# Patient Record
Sex: FEMALE | Race: AMERICAN INDIAN OR ALASKA NATIVE | Employment: UNEMPLOYED | URBAN - METROPOLITAN AREA
[De-identification: names, ages, dates, MRNs, and addresses within clinical notes are randomized per-mention and may not be internally consistent; named-entity substitution may affect disease eponyms.]

---

## 2017-01-03 PROCEDURE — G0180 MD CERTIFICATION HHA PATIENT: HCPCS | Performed by: FAMILY MEDICINE

## 2017-01-05 DIAGNOSIS — M79.2 NERVE PAIN: Primary | ICD-10-CM

## 2017-01-05 RX ORDER — GABAPENTIN 100 MG/1
CAPSULE ORAL
Qty: 180 CAPSULE | Refills: 5 | Status: SHIPPED | OUTPATIENT
Start: 2017-01-05 | End: 2017-04-21

## 2017-01-05 NOTE — TELEPHONE ENCOUNTER
gabapentin (NEURONTIN) 100 MG capsule      Last Written Prescription Date:  11-9-16  Last Fill Quantity: 180,   # refills: 1  Last Office Visit with [facility], [facility] or [facility] prescribing provider: 11-16-16  Future Office visit:    Next 5 appointments (look out 90 days)     Jan 06, 2017 10:00 AM   Return Visit with José Vogel MD   [facility] ([address] [address]   [phone]            Jan 06, 2017 11:40 AM   SHORT with Kevin Esquivel MD   [facility]    [address] [place] [id number] [phone]                   Routing refill request to provider for review/approval because:  Drug not on the [facility], [facility] or [facility] refill protocol or controlled substance

## 2017-01-16 ENCOUNTER — TELEPHONE (OUTPATIENT)
Dept: FAMILY MEDICINE | Facility: CLINIC | Age: 72
End: 2017-01-16

## 2017-01-16 DIAGNOSIS — C34.91 MALIGNANT NEOPLASM OF UNSPECIFIED PART OF RIGHT BRONCHUS OR LUNG (H): Primary | ICD-10-CM

## 2017-01-16 NOTE — TELEPHONE ENCOUNTER
Forms received from: [facility]   Phone number listed: [phone]   Fax listed: [phone]  Date received: 1-16-17  Form description: [facility]  Once forms are completed, please return to [facility] via fax.  Is patient requesting to be contacted when forms are completed: n/a  Form placed: RN folder  Anisa Conteh    Form given to RN to review med list.  Order pended for provider to sign.

## 2017-01-18 NOTE — TELEPHONE ENCOUNTER
Discrepancies:     Not on HHC, but in Epic:  1. Compazine 10 mg- take 5 mg PO every 6 hrs PRN.  2. Albuterol(Proair, Proventil, Ventolin) 108 mcg-Inhale 2 puffs q 6 hours  3. Meperidine (Demerol) 50 mg tab-take 1 tab PO q4hrs PRN.  4. Lorazepam 1 mg-Take 1 mg PO q8 hrs PRN.    In Epic, not on HHC:  1. Labetalol 100 mg - 100 mg BID PO  2. Hydroxyzine Pamoate 25 mg- Take 25 mg TID PRN PO  3. Clindamycin - 150 mg TID PO  4. Lasix 20 mg -20 mg BID PO  5. Oxycontin 10 mg- 10 mg BID PO    Clarify;   1. Ambien 5 m mg nightly or PRN?  2. Ibuprofen 800 or 600 mg? q 8 hrs PRN.  3. Calcium Polycarbophil 625 mg- take 2 tabs (650) PO daily or 625 mg daily PO?  4. Senna- 1-2  BID PRN or 2 tab BID PO?  5. Insulin aspart (Novolog) 100 units daily or 1-15 unit TID  6. Lantus 45 u daily?  7. Miralax 17 g- daily PRN or every other day?  8. Calcium Carbonate-Vit D-600 mg (1500 mg) 400 unit 1 tab daily or 1200 mg Ca-600 mg D3.    Called patient who was at chemo. She states that she is no longer receiving HHC services through [facility] anymore. She also said that she has a new PCP and is no longer coming to [facility].     Routed to provider. What needs to be done with the HHC form?   Brittney Olivares RN  [facility]

## 2017-01-20 ENCOUNTER — TELEPHONE (OUTPATIENT)
Dept: FAMILY MEDICINE | Facility: CLINIC | Age: 72
End: 2017-01-20

## 2017-01-20 DIAGNOSIS — G47.00 INSOMNIA, UNSPECIFIED TYPE: Primary | ICD-10-CM

## 2017-01-20 RX ORDER — ZOLPIDEM TARTRATE 5 MG/1
5 TABLET ORAL
Qty: 30 TABLET | Refills: 0 | Status: SHIPPED | OUTPATIENT
Start: 2017-01-20 | End: 2017-04-21

## 2017-01-20 NOTE — TELEPHONE ENCOUNTER
Called and spoke with Opal from [facility] at [phone]. She did verify that patient is no longer receiving [facility] services. She will call back to go over patient's medications though. RN provided Opal with RN triage line number to call back today.    Brittney Olivares RN  [facility]

## 2017-01-20 NOTE — TELEPHONE ENCOUNTER
zolpidem (AMBIEN) 5 MG tablet      Last Written Prescription Date:  12-14-16  Last Fill Quantity: 30,   # refills: 0  Last Office Visit with [facility] or [facility] prescribing provider: 11-16-16  Future Office visit:    Next 5 appointments (look out 90 days)     Jan 25, 2017 10:00 AM   Return Visit with José Vogel MD   [facility] ([place] [id number] [facility]  [place] [address]   [phone]                   Routing refill request to provider for review/approval because:  Drug not on the [facility], [facility] or [facility] refill protocol or controlled substance

## 2017-01-20 NOTE — TELEPHONE ENCOUNTER
Prescription of Ambien was faxed to pharmacy. Also notified patient of this.     Brittney Olivares RN  [facility]

## 2017-01-22 ENCOUNTER — TRANSFERRED RECORDS (OUTPATIENT)
Dept: HEALTH INFORMATION MANAGEMENT | Facility: CLINIC | Age: 72
End: 2017-01-22

## 2017-01-24 DIAGNOSIS — E78.5 HYPERLIPIDEMIA LDL GOAL <100: Primary | ICD-10-CM

## 2017-01-24 RX ORDER — ROSUVASTATIN CALCIUM 20 MG/1
20 TABLET, COATED ORAL DAILY
Qty: 90 TABLET | Refills: 0 | Status: SHIPPED | OUTPATIENT
Start: 2017-01-24 | End: 2017-04-21

## 2017-01-24 NOTE — TELEPHONE ENCOUNTER
rosuvastatin (CRESTOR) 20 MG tablet     Last Written Prescription Date: 11/9/16  Last Fill Quantity: 90, # refills: 0  Last Office Visit with [facility], [facility] or [facility] prescribing provider: 11/16/16   Next 5 appointments (look out 90 days)     Jan 25, 2017 10:00 AM   [facility] Visit with José Vogel MD   [facility]    [address] [address]   [phone]                   CHOL      135   8/4/2015  HDL       52   8/4/2015  LDL       55   8/4/2015  TRIG      138   8/4/2015  CHOLHDLRATIO      2.6   8/4/2015

## 2017-01-24 NOTE — TELEPHONE ENCOUNTER
Called and spoke with Opal WINTERS RN to do med rec. Last C we had faxed back was on 1/3/17 for the exact dates of 10/30/16-12/28/16 which is around the time patient was dismissed from [facility]. Opal will inquire further if this last one that was sent 1/16 was sent in error and what to do with the form. Will call us back to let us know.     Brittney Olivares RN  [facility]

## 2017-01-27 NOTE — TELEPHONE ENCOUNTER
Called and spoke with Opal. She states that they did not receive the HHC from 12/30/16 that was faxed to them on 1/3/17.   Needing that same HHC that was faxed to them from 1/3/17 to be resent to them at fax number below.    Brittney Olivares RN  [facility]

## 2017-02-08 ENCOUNTER — TRANSFERRED RECORDS (OUTPATIENT)
Dept: HEALTH INFORMATION MANAGEMENT | Facility: CLINIC | Age: 72
End: 2017-02-08

## 2017-02-27 DIAGNOSIS — Z79.4 TYPE 2 DIABETES MELLITUS WITHOUT COMPLICATION, WITH LONG-TERM CURRENT USE OF INSULIN (H): Primary | ICD-10-CM

## 2017-02-27 DIAGNOSIS — E11.9 TYPE 2 DIABETES MELLITUS WITHOUT COMPLICATION, WITH LONG-TERM CURRENT USE OF INSULIN (H): Primary | ICD-10-CM

## 2017-02-27 NOTE — TELEPHONE ENCOUNTER
blood glucose monitoring (ONE TOUCH TEST STRIPS) test strip      Last Written Prescription Date: 11-18-15  Last Fill Quantity: 3,  # refills: 3   Last Office Visit with [facility], [facility] or [facility] prescribing provider: 1-6-17                                         Next 5 appointments (look out 90 days)     Mar 03, 2017 10:30 AM CST   [facility]    [address] [facility] [address]   [phone]                    insulin aspart (NOVOLOG PEN) 100 UNIT/ML soln         Last Written Prescription Date: 1-28-16  Last Fill Quantity: 3, # refills: 1  Last Office Visit with [facility], [facility] or  Health prescribing provider:  1-6-17   Next 5 appointments (look out 90 days)     Mar 03, 2017 10:30 AM CST   [facility]    [address]  [place] [address]   [phone]                   BP Readings from Last 3 Encounters:   11/16/16 130/58   11/09/16 133/59   07/18/16 124/70     Lab Results   Component Value Date    MICROL <5 04/15/2016     No results found for: MICROALBUMIN  Creatinine   Date Value Ref Range Status   07/29/2016 0.78 mg/dL Final   ]  GFR Estimate   Date Value Ref Range Status   07/05/2016 >90  Non  GFR Calc   >60 mL/min/1.7m2 Final   12/03/2015 >90  Non  GFR Calc   >60 mL/min/1.7m2 Final   08/04/2015 >90  Non  GFR Calc   >60 mL/min/1.7m2 Final     GFR Estimate If Black   Date Value Ref Range Status   07/05/2016 >90   GFR Calc   >60 mL/min/1.7m2 Final   12/03/2015 >90   GFR Calc   >60 mL/min/1.7m2 Final   08/04/2015 >90   GFR Calc   >60 mL/min/1.7m2 Final     Lab Results   Component Value Date    CHOL 135 08/04/2015     Lab Results   Component Value Date    HDL 52 08/04/2015     Lab Results   Component Value Date    LDL 55 08/04/2015     Lab Results    Component Value Date    TRIG 138 08/04/2015     Lab Results   Component Value Date    CHOLHDLRATIO 2.6 08/04/2015     Lab Results   Component Value Date    AST 22 07/05/2016     Lab Results   Component Value Date    ALT 28 07/05/2016     Lab Results   Component Value Date    A1C 8.4 07/05/2016    A1C 9.1 03/04/2016    A1C 8.7 12/03/2015    A1C 9.6 08/04/2015    A1C 8.6 05/28/2015     Potassium   Date Value Ref Range Status   07/29/2016 4.2 mmol/L Final

## 2017-02-28 NOTE — TELEPHONE ENCOUNTER
Patient has a new provider/ clinic she goes to.  Pharmacy should call them.  Please inform pharmacy.    Thanks    Kevin Esquivel MD

## 2017-02-28 NOTE — TELEPHONE ENCOUNTER
Routing refill request to provider for review/approval because:  Labs not current:  No results for Micro albumin    Brittney Olivares RN  [facility]

## 2017-02-28 NOTE — TELEPHONE ENCOUNTER
Called pharmacy, they knew about this.  Request has been deleted and LDD removed as PCP.    Dina Jeffrey RN  [facility]

## 2017-03-01 ENCOUNTER — TRANSFERRED RECORDS (OUTPATIENT)
Dept: HEALTH INFORMATION MANAGEMENT | Facility: CLINIC | Age: 72
End: 2017-03-01

## 2017-04-12 ENCOUNTER — TRANSFERRED RECORDS (OUTPATIENT)
Dept: HEALTH INFORMATION MANAGEMENT | Facility: CLINIC | Age: 72
End: 2017-04-12

## 2017-04-21 ENCOUNTER — OFFICE VISIT (OUTPATIENT)
Dept: FAMILY MEDICINE | Facility: CLINIC | Age: 72
End: 2017-04-21
Payer: COMMERCIAL

## 2017-04-21 ENCOUNTER — TELEPHONE (OUTPATIENT)
Dept: FAMILY MEDICINE | Facility: CLINIC | Age: 72
End: 2017-04-21

## 2017-04-21 VITALS
OXYGEN SATURATION: 99 % | HEIGHT: 64 IN | SYSTOLIC BLOOD PRESSURE: 138 MMHG | TEMPERATURE: 98.3 F | WEIGHT: 164 LBS | HEART RATE: 53 BPM | DIASTOLIC BLOOD PRESSURE: 58 MMHG | BODY MASS INDEX: 28 KG/M2

## 2017-04-21 DIAGNOSIS — K86.1 IDIOPATHIC CHRONIC PANCREATITIS (H): ICD-10-CM

## 2017-04-21 DIAGNOSIS — G47.00 INSOMNIA, UNSPECIFIED TYPE: ICD-10-CM

## 2017-04-21 DIAGNOSIS — E78.5 HYPERLIPIDEMIA LDL GOAL <100: ICD-10-CM

## 2017-04-21 DIAGNOSIS — R11.0 NAUSEA: ICD-10-CM

## 2017-04-21 DIAGNOSIS — F41.9 ANXIETY: ICD-10-CM

## 2017-04-21 DIAGNOSIS — Z79.4 TYPE 2 DIABETES MELLITUS WITHOUT COMPLICATION, WITH LONG-TERM CURRENT USE OF INSULIN (H): Primary | ICD-10-CM

## 2017-04-21 DIAGNOSIS — K86.1 CHRONIC PANCREATITIS, UNSPECIFIED PANCREATITIS TYPE (H): ICD-10-CM

## 2017-04-21 DIAGNOSIS — C34.90 MALIGNANT NEOPLASM OF LUNG, UNSPECIFIED LATERALITY, UNSPECIFIED PART OF LUNG (H): ICD-10-CM

## 2017-04-21 DIAGNOSIS — Z12.31 VISIT FOR SCREENING MAMMOGRAM: ICD-10-CM

## 2017-04-21 DIAGNOSIS — E11.9 TYPE 2 DIABETES MELLITUS WITHOUT COMPLICATION, WITH LONG-TERM CURRENT USE OF INSULIN (H): Primary | ICD-10-CM

## 2017-04-21 DIAGNOSIS — J45.20 INTERMITTENT ASTHMA, UNCOMPLICATED: ICD-10-CM

## 2017-04-21 LAB
CREAT UR-MCNC: 68 MG/DL
MICROALBUMIN UR-MCNC: 9 MG/L
MICROALBUMIN/CREAT UR: 13.68 MG/G CR (ref 0–25)

## 2017-04-21 PROCEDURE — 99214 OFFICE O/P EST MOD 30 MIN: CPT | Performed by: FAMILY MEDICINE

## 2017-04-21 PROCEDURE — 99207 C FOOT EXAM  NO CHARGE: CPT | Mod: 25 | Performed by: FAMILY MEDICINE

## 2017-04-21 PROCEDURE — 82043 UR ALBUMIN QUANTITATIVE: CPT | Performed by: FAMILY MEDICINE

## 2017-04-21 RX ORDER — PEN NEEDLE, DIABETIC 30 GX5/16"
NEEDLE, DISPOSABLE MISCELLANEOUS
Qty: 180 EACH | Refills: 11 | Status: SHIPPED | OUTPATIENT
Start: 2017-04-21 | End: 2018-05-24

## 2017-04-21 RX ORDER — LORAZEPAM 1 MG/1
1 TABLET ORAL EVERY 8 HOURS PRN
Qty: 30 TABLET | Refills: 0 | Status: SHIPPED | OUTPATIENT
Start: 2017-04-21 | End: 2018-02-22 | Stop reason: DRUGHIGH

## 2017-04-21 RX ORDER — ALBUTEROL SULFATE 90 UG/1
2 AEROSOL, METERED RESPIRATORY (INHALATION) EVERY 6 HOURS PRN
Qty: 2 INHALER | Refills: 5 | Status: SHIPPED | OUTPATIENT
Start: 2017-04-21 | End: 2018-05-24

## 2017-04-21 RX ORDER — MEPERIDINE HYDROCHLORIDE 50 MG/1
50 TABLET ORAL EVERY 4 HOURS PRN
Qty: 20 TABLET | Refills: 0 | Status: SHIPPED | OUTPATIENT
Start: 2017-04-21 | End: 2018-11-19

## 2017-04-21 RX ORDER — ROSUVASTATIN CALCIUM 20 MG/1
20 TABLET, COATED ORAL DAILY
Qty: 90 TABLET | Refills: 1 | Status: SHIPPED | OUTPATIENT
Start: 2017-04-21 | End: 2017-10-12

## 2017-04-21 RX ORDER — PROMETHAZINE HYDROCHLORIDE 25 MG/1
25 TABLET ORAL EVERY 6 HOURS PRN
Qty: 30 TABLET | Refills: 1 | Status: SHIPPED | OUTPATIENT
Start: 2017-04-21 | End: 2018-02-22 | Stop reason: DRUGHIGH

## 2017-04-21 RX ORDER — ZOLPIDEM TARTRATE 5 MG/1
5 TABLET ORAL
Qty: 30 TABLET | Refills: 2 | Status: SHIPPED | OUTPATIENT
Start: 2017-04-21 | End: 2018-05-24

## 2017-04-21 RX ORDER — OXYCODONE AND ACETAMINOPHEN 5; 325 MG/1; MG/1
TABLET ORAL EVERY 4 HOURS PRN
COMMUNITY
End: 2018-11-19

## 2017-04-21 ASSESSMENT — PAIN SCALES - GENERAL: PAINLEVEL: MODERATE PAIN (5)

## 2017-04-21 NOTE — TELEPHONE ENCOUNTER
Angélica from [employer] is calling back. She said she spoke with someone and the called dropped.    [phone]

## 2017-04-21 NOTE — PROGRESS NOTES
SUBJECTIVE:                                                    Leah Guerrero is a 71 year old female who presents to clinic today for the following health issues:       Diabetes Follow-up      Patient is checking blood sugars: blood sugars have been elevated since chemo    Diabetic concerns: blood sugar frequently over 200     Symptoms of hypoglycemia (low blood sugar): none     Paresthesias (numbness or burning in feet) or sores: No     Date of last diabetic eye exam: ?       Amount of exercise or physical activity: None    Problems taking medications regularly: No    Medication side effects: none    Diet: low salt, low fat/cholesterol and diabetic      Follow up    Problem list and histories reviewed & adjusted, as indicated.  Additional history: as documented         Reviewed and updated as needed this visit by clinical staff  Tobacco  Allergies  Meds  Problems  Med Hx  Surg Hx  Fam Hx  Soc Hx        Reviewed and updated as needed this visit by Provider          patient had surgery and chemo for lung cancer    As of now, cancer free      Starting to gain wt    Was down to 140    Weak    Feeling better now    Eating better now    Food still challenging    Takes nausea med before eating    Sugars real high with chemo    Getting iv steroids once per week, for another couple months    lantus pens, needs needles    Has been at [facility] but now switching back to our clinic    Physical Exam   Constitutional: She is oriented to person, place, and time and well-developed, well-nourished, and in no distress. No distress.   HENT:   Head: Normocephalic and atraumatic.   Neck: Carotid bruit is not present.   Cardiovascular: Normal rate, regular rhythm, normal heart sounds and intact distal pulses.  Exam reveals no gallop and no friction rub.    No murmur heard.  Pulmonary/Chest: Effort normal and breath sounds normal.   Abdominal: Soft. Bowel sounds are normal. She exhibits no distension and no mass. There is  "tenderness (minimally tender). There is no rebound and no guarding.   Musculoskeletal: She exhibits no edema.   Neurological: She is alert and oriented to person, place, and time.   Skin: She is not diaphoretic.   Psychiatric: Mood and affect normal.   foot exam normal bilaterally      ASSESSMENT / PLAN:  (E11.9,  Z79.4) Type 2 diabetes mellitus without complication, with long-term current use of insulin (H)  (primary encounter diagnosis)  Comment: very recent hemoglobin a1c about 8.3 or so.  The steroids are affecting things.  Plan: DIABETES EDUCATOR REFERRAL, OPTOMETRY REFERRAL,        FOOT EXAM, Albumin Random Urine Quantitative,         insulin glargine (LANTUS SOLOSTAR) 100 UNIT/ML         injection, insulin aspart (NOVOLOG PEN) 100         UNIT/ML injection, Insulin Pen Needle (PEN         NEEDLES 5/16\") 31G X 8 MM MISC        Refill meds.  See us in a couple months, sooner if needed.    (K86.1) Chronic pancreatitis, unspecified pancreatitis type (H)  Comment: not too bad recently but has had the lung cancer to deal with   Plan: monitor    (C34.90) Malignant neoplasm of lung, unspecified laterality, unspecified part of lung (H)  Comment: finished chemo ; had surgery   Plan: mgt per onc     (Z12.31) Visit for screening mammogram  Comment: patient to schedule   Plan: *MA Screening Digital Bilateral             (E78.5) Hyperlipidemia LDL goal <100  Comment: needs refill   Plan: rosuvastatin (CRESTOR) 20 MG tablet             (G47.00) Insomnia, unspecified type  Comment: refill   Plan: zolpidem (AMBIEN) 5 MG tablet        Uses nightly still     (R11.0) Nausea  Comment: needs prn med   Plan: promethazine (PHENERGAN) 25 MG tablet        This works for patient     (K86.1) Idiopathic chronic pancreatitis (H)  Comment: uses rarely   Plan: meperidine (DEMEROL) 50 MG tablet             (J45.20) Intermittent asthma, uncomplicated  Comment: uses prn   Plan: albuterol (PROAIR HFA/PROVENTIL HFA/VENTOLIN         HFA) 108 (90 " BASE) MCG/ACT Inhaler        Refill     (F41.9) Anxiety  Comment: lots of stress/ anxiety recently   Plan: LORazepam (ATIVAN) 1 MG tablet               I reviewed the patient's medications, allergies, medical history, family history, and social history.    Kevin Esquivel MD

## 2017-04-21 NOTE — MR AVS SNAPSHOT
After Visit Summary   4/21/2017    Leah Guerrero    MRN: [id number]           Patient Information     Date Of Birth          1945        Visit Information        Provider Department      4/21/2017 7:40 AM Kevin Esquivel MD [facility]        Today's Diagnoses     Type 2 diabetes mellitus without complication, with long-term current use of insulin (H)    -  1    Chronic pancreatitis, unspecified pancreatitis type (H)        Malignant neoplasm of lung, unspecified laterality, unspecified part of lung (H)        Visit for screening mammogram        Hyperlipidemia LDL goal <100        Insomnia, unspecified type        Nausea        Idiopathic chronic pancreatitis (H)        Intermittent asthma, uncomplicated        Anxiety          Care Instructions    See us in a couple months, check labs then    Keep working on healthy diet/exercise     Continue meds as listed            Follow-ups after your visit        Additional Services     DIABETES EDUCATOR REFERRAL       DIABETES SELF MANAGEMENT TRAINING (DSMT)      Your provider has referred you to Diabetes Education: FMG: Diabetes Education - [facility] [phone]   [email or web address]    Type of training and number of hours: Previous Diagnosis: Follow-up DSMT - 2 hours.    Medicare covers: 10 hours of initial DSMT in 12 month period from the time of first visit, plus 2 hours of follow-up DSMT annually, and additional hours as requested for insulin training.    Diabetes Type: Type 2 - On Insulin             Diabetes Co-Morbidities: none               A1C Goal:  <8.0       A1C is: Lab Results       Component                Value               Date                       A1C                      8.4                 07/05/2016              If an urgent visit is needed or A1C is above 12, Care Team to call the Diabetes Education Team at [phone] or send an In Basket  message to the Diabetes Education Pool (P DIAB ED-PATIENT CARE).    Diabetes Education Topics: Comprehensive Knowledge Assessment and Instruction    Special Educational Needs Requiring Individual DSMT: None       MEDICAL NUTRITION THERAPY (MNT) for Diabetes    Medical Nutrition Therapy with a Registered Dietitian can be provided in coordination with Diabetes Self-Management Training to assist in achieving optimal diabetes management.    MNT Type and Hours: Previous diagnosis: Annual follow-up MNT - 2 hours                       Medicare will cover: 3 hours initial MNT in 12 month period after first visit, plus 2 hours of follow-up MNT annually    Please be aware that coverage of these services is subject to the terms and limitations of your health insurance plan.  Call member services at your health plan to determine Diabetes Self-Management Training benefits and ask which blood glucose monitor brands are covered by your plan.      Please bring the following with you to your appointment:    (1)  List of current medications   (2)  List of Blood Glucose Monitor brands that are covered by your insurance plan  (3)  Blood Glucose Monitor and log book  (4)   Food records for the 3 days prior to your visit    The Certified Diabetes Educator may make diabetes medication adjustments per the CDE Protocol and Collaborative Practice Agreement.            OPTOMETRY REFERRAL       Your provider has referred you to:  FM[facility]: [facility] [place] [phone]   [email or web address]      Please be aware that coverage of these services is subject to the terms and limitations of your health insurance plan.  Call member services at your health plan with any benefit or coverage questions.      Please bring the following to your appointment:  >>   Any x-rays, CTs or MRIs which have been performed.  Contact the facility where they were done to arrange for  prior to your scheduled appointment.  Any  "new CT, MRI or other procedures ordered by your specialist must be performed at a [place] facility or coordinated by your clinic's referral office.    >>   List of current medications   >>   This referral request   >>   Any documents/labs given to you for this referral                  Future tests that were ordered for you today     Open Future Orders        Priority Expected Expires Ordered    *MA Screening Digital Bilateral Routine  4/21/2018 4/21/2017            Who to contact     If you have questions or need follow up information about today's clinic visit or your schedule please contact [facility] directly at [phone].  Normal or non-critical lab and imaging results will be communicated to you by [employer], letter or phone within 4 business days after the clinic has received the results. If you do not hear from us within 7 days, please contact the clinic through [employer] or phone. If you have a critical or abnormal lab result, we will notify you by phone as soon as possible.  Submit refill requests through [employer] or call your pharmacy and they will forward the refill request to us. Please allow 3 business days for your refill to be completed.          Additional Information About Your Visit        MyChart Information     [employer] gives you secure access to your electronic health record. If you see a primary care provider, you can also send messages to your care team and make appointments. If you have questions, please call your primary care clinic.  If you do not have a primary care provider, please call [phone] and they will assist you.        Care EveryWhere ID     This is your Care EveryWhere ID. This could be used by other organizations to access your [place] medical records  [id number]        Your Vitals Were     Pulse Temperature Height Pulse Oximetry Breastfeeding? BMI (Body Mass Index)    53 98.3  F (36.8  C) (Oral) 5' 4\" (1.626 m) 99% No 28.15 kg/m2       Blood " "Pressure from Last 3 Encounters:   04/21/17 138/58   11/16/16 130/58   11/09/16 133/59    Weight from Last 3 Encounters:   04/21/17 164 lb (74.4 kg)   11/16/16 176 lb (79.8 kg)   11/09/16 176 lb (79.8 kg)              We Performed the Following     Albumin Random Urine Quantitative     DIABETES EDUCATOR REFERRAL     FOOT EXAM     OPTOMETRY REFERRAL          Today's Medication Changes          These changes are accurate as of: 4/21/17  8:24 AM.  If you have any questions, ask your nurse or doctor.               Start taking these medicines.        Dose/Directions    insulin glargine 100 UNIT/ML injection   Commonly known as:  LANTUS SOLOSTAR   Used for:  Type 2 diabetes mellitus without complication, with long-term current use of insulin (H)   Replaces:  insulin glargine 100 UNIT/ML injection   Started by:  Kevin Esquivel MD        Dose:  70 Units   Inject 70 Units Subcutaneous At Bedtime   Quantity:  30 mL   Refills:  1         These medicines have changed or have updated prescriptions.        Dose/Directions    insulin aspart 100 UNIT/ML injection   Commonly known as:  NovoLOG PEN   This may have changed:  additional instructions   Used for:  Type 2 diabetes mellitus without complication, with long-term current use of insulin (H)   Changed by:  Kevin Esquivel MD        Patient uses 100 units daily split up between multiple doses   Quantity:  30 mL   Refills:  1       pen needles 5/16\" 31G X 8 MM Misc   This may have changed:  additional instructions   Used for:  Type 2 diabetes mellitus without complication, with long-term current use of insulin (H)   Changed by:  Kevin Esquivel MD        Patient does 4 doses novolog and 2 lantus shots  insulin daily.  Needs 180 needles per month. Okay refills for one year.   Quantity:  180 each   Refills:  11       rosuvastatin 20 MG tablet   Commonly known as:  CRESTOR   This may have changed:  additional instructions   Used for:  Hyperlipidemia LDL goal <100   Changed " "by:  Kevin Esquivel MD        Dose:  20 mg   Take 1 tablet (20 mg) by mouth daily   Quantity:  90 tablet   Refills:  1         Stop taking these medicines if you haven't already. Please contact your care team if you have questions.     insulin glargine 100 UNIT/ML injection   Commonly known as:  LANTUS   Replaced by:  insulin glargine 100 UNIT/ML injection   Stopped by:  Kevin Esquivel MD           insulin syringe-needle U-100 31G X 5/16\" 1 ML   Stopped by:  Kevin Esquivel MD           prochlorperazine 10 MG tablet   Commonly known as:  COMPAZINE   Stopped by:  Kevin Esquivel MD                Where to get your medicines      These medications were sent to [facility] [address] [place] [address] [address] [facility] [address], [facility] [address]    Hours:  Test fax successful 12/11/02  [facility] Phone:  [phone]     albuterol 108 (90 BASE) MCG/ACT Inhaler    insulin aspart 100 UNIT/ML injection    insulin glargine 100 UNIT/ML injection    pen needles 5/16\" 31G X 8 MM Misc    promethazine 25 MG tablet    rosuvastatin 20 MG tablet         Some of these will need a paper prescription and others can be bought over the counter.  Ask your nurse if you have questions.     Bring a paper prescription for each of these medications     LORazepam 1 MG tablet    meperidine 50 MG tablet    zolpidem 5 MG tablet                Primary Care Provider Office Phone # Fax #    Kevin Esquivel [phone]       [facility] [address] [facility] [address]        Goals        General    I will continue the exercises provided by physical therapy for my knee (pt-stated)     Notes - Note created  7/9/2015 10:30 AM by Ray Holland RN    As of today's date 7/9/2015 goal is met at 0 - 25%.   Goal Status:  Active        I will remember to take my insulin before meals especially lunch when I am  away from home. (pt-stated)     Notes - Note " created  3/25/2015  3:51 PM by Ray Holland RN      As of today's date 3/25/2015 goal is met at 0 - 25%.   Goal Status:  Active.          I will work on testing my blood sugar and taking my insulin when I am away from home at lunch time. (pt-stated)     Notes - Note created  5/20/2016  2:43 PM by Ray Holland RN    As of today's date 5/20/2016 goal is met at 0 - 25%.   Goal Status:  Active          Thank you!     Thank you for choosing [facility]  for your care. Our goal is always to provide you with excellent care. Hearing back from our patients is one way we can continue to improve our services. Please take a few minutes to complete the written survey that you may receive in the mail after your visit with us. Thank you!             Your Updated Medication List - Protect others around you: Learn how to safely use, store and throw away your medicines at www.disposemymeds.org.          This list is accurate as of: 4/21/17  8:24 AM.  Always use your most recent med list.                   Brand Name Dispense Instructions for use    ACE/ARB NOT PRESCRIBED (INTENTIONAL)      ACE & ARB not prescribed due to Allergy       * albuterol (2.5 MG/3ML) 0.083% neb solution     1 Box    Take 3 mLs (2.5 mg) by nebulization 4 times daily as needed       * albuterol 108 (90 BASE) MCG/ACT Inhaler    PROAIR HFA/PROVENTIL HFA/VENTOLIN HFA    2 Inhaler    Inhale 2 puffs into the lungs every 6 hours as needed for shortness of breath / dyspnea or wheezing       aspirin 81 MG tablet      Take 1 tablet by mouth daily. 1 daily       blood glucose monitoring test strip    ONE TOUCH TEST STRIPS    3 Month    Please dispense the particular type of lancets and test strips that patient needs.  She tests frequently, averaging 6 times per day. Okay to dispense 3 months supplies with refills for a year       calcium polycarbophil 625 MG tablet    FIBERCON    60 tablet    Take 2 tablets (1,250 mg) by mouth daily        "CALCIUM-D PO      Take  by mouth. 1200mg calcium/600mg D3       fish oil-omega-3 fatty acids 1000 MG capsule      Take 1 capsule by mouth daily.       Garlic Caps      Take  by mouth. One daily       ibuprofen 800 MG tablet    ADVIL/MOTRIN    100 tablet    Take 1 tablet (800 mg) by mouth every 8 hours as needed for pain       insulin aspart 100 UNIT/ML injection    NovoLOG PEN    30 mL    Patient uses 100 units daily split up between multiple doses       insulin glargine 100 UNIT/ML injection    LANTUS SOLOSTAR    30 mL    Inject 70 Units Subcutaneous At Bedtime       LORazepam 1 MG tablet    ATIVAN    30 tablet    Take 1 tablet (1 mg) by mouth every 8 hours as needed for anxiety       meperidine 50 MG tablet    DEMEROL    20 tablet    Take 1 tablet (50 mg) by mouth every 4 hours as needed       MULTIVITAMIN TABS   OR      1 TABLET DAILY       oxyCODONE-acetaminophen 5-325 MG per tablet    PERCOCET     Take by mouth every 4 hours as needed for moderate to severe pain       pen needles 5/16\" 31G X 8 MM Misc     180 each    Patient does 4 doses novolog and 2 lantus shots  insulin daily.  Needs 180 needles per month. Okay refills for one year.       polyethylene glycol powder    MIRALAX    510 g    Take 17 g by mouth daily as needed for constipation       promethazine 25 MG tablet    PHENERGAN    30 tablet    Take 1 tablet (25 mg) by mouth every 6 hours as needed for nausea       rosuvastatin 20 MG tablet    CRESTOR    90 tablet    Take 1 tablet (20 mg) by mouth daily       SALINE      to clean port every other month       senna-docusate 8.6-50 MG per tablet    SENOKOT-S;PERICOLACE    100 tablet    1-2 po bid prn constipation       tiZANidine 2 MG tablet    ZANAFLEX    30 tablet    Take 1 tablet (2 mg) by mouth 3 times daily as needed for muscle spasms       zolpidem 5 MG tablet    AMBIEN    30 tablet    Take 1 tablet (5 mg) by mouth nightly as needed for sleep       * Notice:  This list has 2 medication(s) that are " the same as other medications prescribed for you. Read the directions carefully, and ask your doctor or other care provider to review them with you.

## 2017-04-21 NOTE — TELEPHONE ENCOUNTER
PA is needed for the medication, Meperidine 50 mg and Promethazine 25 mg.     Insurance has been called.  Alternatives that are covered are: NA        Would you like to start PA or Rx alternative medication?    If PA, please list all medications this patient has tried along with any contraindications that may have been experienced in the past. Thank you.    Pharmacy: [facility]  Phone: [phone]    Insurance Plan: [facility]  Phone: [phone]  Pt ID: [id number]  Group:   PA's done over the phone. Insurance will reply back within 72 hours.    Forwarded to Dr. Esquivel for review.  Emma Medina CMA

## 2017-04-21 NOTE — PATIENT INSTRUCTIONS
See us in a couple months, check labs then    Keep working on healthy diet/exercise     Continue meds as listed

## 2017-04-21 NOTE — NURSING NOTE
"Chief Complaint   Patient presents with     Diabetes       Initial /60 (BP Location: Left arm, Patient Position: Chair, Cuff Size: Adult Regular)  Pulse 53  Temp 98.3  F (36.8  C) (Oral)  Ht 5' 4\" (1.626 m)  Wt 164 lb (74.4 kg)  SpO2 99%  Breastfeeding? No  BMI 28.15 kg/m2 Estimated body mass index is 28.15 kg/(m^2) as calculated from the following:    Height as of this encounter: 5' 4\" (1.626 m).    Weight as of this encounter: 164 lb (74.4 kg).  Medication Reconciliation: complete   Emma Medina CMA      "

## 2017-04-21 NOTE — TELEPHONE ENCOUNTER
PA approved.  Effective date: 04/21/2017  PA reference #:   Pt. notified:   Letter sent by insurance company and letter forwarded to Abstracting I called pharmacy and notified them and they will contact patient when ready to .  Emma Medina CMA

## 2017-04-21 NOTE — TELEPHONE ENCOUNTER
Cesar @ [facility] called to confirm the Promethazine 25 mg.  When Emma answered the question of does the benefits out weigh the risks for a 65+ patient, she said no - which would indicate that the medication is not needed.  Cesar was calling to confirm that the question was answered correctly and not misunderstood.  Please call [phone], RE# [id number] to clarify.

## 2017-04-22 ASSESSMENT — ASTHMA QUESTIONNAIRES: ACT_TOTALSCORE: 20

## 2017-04-28 ENCOUNTER — OFFICE VISIT (OUTPATIENT)
Dept: FAMILY MEDICINE | Facility: CLINIC | Age: 72
End: 2017-04-28
Payer: COMMERCIAL

## 2017-04-28 VITALS
HEART RATE: 55 BPM | SYSTOLIC BLOOD PRESSURE: 111 MMHG | OXYGEN SATURATION: 98 % | WEIGHT: 164 LBS | BODY MASS INDEX: 28.15 KG/M2 | DIASTOLIC BLOOD PRESSURE: 56 MMHG | TEMPERATURE: 98.7 F

## 2017-04-28 DIAGNOSIS — E11.9 TYPE 2 DIABETES MELLITUS WITHOUT COMPLICATION, WITH LONG-TERM CURRENT USE OF INSULIN (H): Primary | ICD-10-CM

## 2017-04-28 DIAGNOSIS — Z79.4 TYPE 2 DIABETES MELLITUS WITHOUT COMPLICATION, WITH LONG-TERM CURRENT USE OF INSULIN (H): Primary | ICD-10-CM

## 2017-04-28 DIAGNOSIS — F43.10 PTSD (POST-TRAUMATIC STRESS DISORDER): ICD-10-CM

## 2017-04-28 DIAGNOSIS — K86.1 CHRONIC PANCREATITIS, UNSPECIFIED PANCREATITIS TYPE (H): ICD-10-CM

## 2017-04-28 PROCEDURE — 99213 OFFICE O/P EST LOW 20 MIN: CPT | Performed by: FAMILY MEDICINE

## 2017-04-28 ASSESSMENT — PAIN SCALES - GENERAL: PAINLEVEL: NO PAIN (0)

## 2017-04-28 NOTE — PROGRESS NOTES
SUBJECTIVE:                                                    Leah Guerrero is a 71 year old female who presents to clinic today for the following health issues:       Forms for DMV    none    Problem list and histories reviewed & adjusted, as indicated.  Additional history: as documented         Reviewed and updated as needed this visit by clinical staff       Reviewed and updated as needed this visit by Provider            Full physical not done     Mentation and affect are fine    No tremor of speech or extremity    Reviewed some labs      Patient also has form for discharging of a student loan    ASSESSMENT / PLAN:  (E11.9,  Z79.4) Type 2 diabetes mellitus without complication, with long-term current use of insulin (H)  (primary encounter diagnosis)  Comment: fair control.  Last hemoglobin a1c was 8.4.     Plan: okay to drive.  Completed form.  Good for 2 years dmv form.     (K86.1) Chronic pancreatitis, unspecified pancreatitis type (H)  Comment: patient has been hospitalized many times with this.  Has other significant health issues.   Plan: thus reasonable to do the form for the discharging of student loan.  Completed today.     (F43.10) PTSD (post-traumatic stress disorder)  Comment: see above   Plan: see above       I reviewed the patient's medications, allergies, medical history, family history, and social history.    Kevin Esquivel MD

## 2017-04-28 NOTE — NURSING NOTE
"Chief Complaint   Patient presents with     Forms     Health Maintenance       Initial /56 (BP Location: Right arm, Patient Position: Chair, Cuff Size: Adult Regular)  Pulse 55  Temp 98.7  F (37.1  C) (Oral)  Wt 164 lb (74.4 kg)  SpO2 98%  Breastfeeding? No  BMI 28.15 kg/m2 Estimated body mass index is 28.15 kg/(m^2) as calculated from the following:    Height as of 4/21/17: 5' 4\" (1.626 m).    Weight as of this encounter: 164 lb (74.4 kg).  Medication Reconciliation: complete   Emma Medina CMA      "

## 2017-04-28 NOTE — MR AVS SNAPSHOT
After Visit Summary   4/28/2017    Leah Guerrero    MRN: [id number]           Patient Information     Date Of Birth          1945        Visit Information        Provider Department      4/28/2017 11:00 AM Kevin Esquivel MD [facility]        Care Instructions    Check with [facility] about the stool card test    See nutritionist     Keep working on healthy diet/exercise and wt loss    Continue same medications        Follow-ups after your visit        Your next 10 appointments already scheduled     May 18, 2017  1:00 PM CDT   Diabetic Education with [facility] DIABETIC ED RESOURCE   [facility] ([facility])    [address] [facility] [id number] [phone]              Who to contact     If you have questions or need follow up information about today's clinic visit or your schedule please contact [facility] directly at [phone].  Normal or non-critical lab and imaging results will be communicated to you by MyChart, letter or phone within 4 business days after the clinic has received the results. If you do not hear from us within 7 days, please contact the clinic through [employer] or phone. If you have a critical or abnormal lab result, we will notify you by phone as soon as possible.  Submit refill requests through [employer] or call your pharmacy and they will forward the refill request to us. Please allow 3 business days for your refill to be completed.          Additional Information About Your Visit        MyChart Information     [employer] gives you secure access to your electronic health record. If you see a primary care provider, you can also send messages to your care team and make appointments. If you have questions, please call your primary care clinic.  If you do not have a primary care provider, please call [phone] and they will assist you.        Care EveryWhere  ID     This is your Care EveryWhere ID. This could be used by other organizations to access your [facility] medical records  [id number]        Your Vitals Were     Pulse Temperature Pulse Oximetry Breastfeeding? BMI (Body Mass Index)       55 98.7  F (37.1  C) (Oral) 98% No 28.15 kg/m2        Blood Pressure from Last 3 Encounters:   04/28/17 111/56   04/21/17 138/58   11/16/16 130/58    Weight from Last 3 Encounters:   04/28/17 164 lb (74.4 kg)   04/21/17 164 lb (74.4 kg)   11/16/16 176 lb (79.8 kg)              Today, you had the following     No orders found for display       Primary Care Provider Office Phone # Fax #    Kevin Esquivel [phone]       [facility] [address] [facility] [address]        Goals        General    I will continue the exercises provided by physical therapy for my knee (pt-stated)     Notes - Note created  7/9/2015 10:30 AM by Ray Holland RN    As of today's date 7/9/2015 goal is met at 0 - 25%.   Goal Status:  Active        I will remember to take my insulin before meals especially lunch when I am  away from home. (pt-stated)     Notes - Note created  3/25/2015  3:51 PM by Ray Holland RN      As of today's date 3/25/2015 goal is met at 0 - 25%.   Goal Status:  Active.          I will work on testing my blood sugar and taking my insulin when I am away from home at lunch time. (pt-stated)     Notes - Note created  5/20/2016  2:43 PM by Ray Holland RN    As of today's date 5/20/2016 goal is met at 0 - 25%.   Goal Status:  Active          Thank you!     Thank you for choosing [facility]  for your care. Our goal is always to provide you with excellent care. Hearing back from our patients is one way we can continue to improve our services. Please take a few minutes to complete the written survey that you may receive in the mail after your visit with us. Thank you!             Your Updated Medication List -  Protect others around you: Learn how to safely use, store and throw away your medicines at www.disposemymeds.org.          This list is accurate as of: 4/28/17 11:26 AM.  Always use your most recent med list.                   Brand Name Dispense Instructions for use    ACE/ARB NOT PRESCRIBED (INTENTIONAL)      ACE & ARB not prescribed due to Allergy       * albuterol (2.5 MG/3ML) 0.083% neb solution     1 Box    Take 3 mLs (2.5 mg) by nebulization 4 times daily as needed       * albuterol 108 (90 BASE) MCG/ACT Inhaler    PROAIR HFA/PROVENTIL HFA/VENTOLIN HFA    2 Inhaler    Inhale 2 puffs into the lungs every 6 hours as needed for shortness of breath / dyspnea or wheezing       aspirin 81 MG tablet      Take 1 tablet by mouth daily. 1 daily       blood glucose monitoring test strip    ONE TOUCH TEST STRIPS    3 Month    Please dispense the particular type of lancets and test strips that patient needs.  She tests frequently, averaging 6 times per day. Okay to dispense 3 months supplies with refills for a year       calcium polycarbophil 625 MG tablet    FIBERCON    60 tablet    Take 2 tablets (1,250 mg) by mouth daily       CALCIUM-D PO      Take  by mouth. 1200mg calcium/600mg D3       fish oil-omega-3 fatty acids 1000 MG capsule      Take 1 capsule by mouth daily.       Garlic Caps      Take  by mouth. One daily       ibuprofen 800 MG tablet    ADVIL/MOTRIN    100 tablet    Take 1 tablet (800 mg) by mouth every 8 hours as needed for pain       insulin aspart 100 UNIT/ML injection    NovoLOG PEN    30 mL    Patient uses 100 units daily split up between multiple doses       insulin glargine 100 UNIT/ML injection    LANTUS SOLOSTAR    30 mL    Inject 70 Units Subcutaneous At Bedtime       LORazepam 1 MG tablet    ATIVAN    30 tablet    Take 1 tablet (1 mg) by mouth every 8 hours as needed for anxiety       meperidine 50 MG tablet    DEMEROL    20 tablet    Take 1 tablet (50 mg) by mouth every 4 hours as needed        "MULTIVITAMIN TABS   OR      1 TABLET DAILY       oxyCODONE-acetaminophen 5-325 MG per tablet    PERCOCET     Take by mouth every 4 hours as needed for moderate to severe pain       pen needles 5/16\" 31G X 8 MM Misc     180 each    Patient does 4 doses novolog and 2 lantus shots  insulin daily.  Needs 180 needles per month. Okay refills for one year.       polyethylene glycol powder    MIRALAX    510 g    Take 17 g by mouth daily as needed for constipation       promethazine 25 MG tablet    PHENERGAN    30 tablet    Take 1 tablet (25 mg) by mouth every 6 hours as needed for nausea       rosuvastatin 20 MG tablet    CRESTOR    90 tablet    Take 1 tablet (20 mg) by mouth daily       SALINE      to clean port every other month       senna-docusate 8.6-50 MG per tablet    SENOKOT-S;PERICOLACE    100 tablet    1-2 po bid prn constipation       tiZANidine 2 MG tablet    ZANAFLEX    30 tablet    Take 1 tablet (2 mg) by mouth 3 times daily as needed for muscle spasms       zolpidem 5 MG tablet    AMBIEN    30 tablet    Take 1 tablet (5 mg) by mouth nightly as needed for sleep       * Notice:  This list has 2 medication(s) that are the same as other medications prescribed for you. Read the directions carefully, and ask your doctor or other care provider to review them with you.      "

## 2017-04-28 NOTE — PATIENT INSTRUCTIONS
Check with [facility] about the stool card test    See nutritionist     Keep working on healthy diet/exercise and wt loss    Continue same medications

## 2017-05-10 ENCOUNTER — TRANSFERRED RECORDS (OUTPATIENT)
Dept: HEALTH INFORMATION MANAGEMENT | Facility: CLINIC | Age: 72
End: 2017-05-10

## 2017-05-16 ENCOUNTER — TELEPHONE (OUTPATIENT)
Dept: FAMILY MEDICINE | Facility: CLINIC | Age: 72
End: 2017-05-16

## 2017-05-16 DIAGNOSIS — Z95.828 PORT-A-CATH IN PLACE: Primary | ICD-10-CM

## 2017-05-16 NOTE — TELEPHONE ENCOUNTER
Patient is scheduled for port flush on Friday; previous port flush order , has not had port flushed routinely at clinic by RN since 2016.  Pended standing order routed to Dr. Esquivel to approve.  Keyanna Carrasquillo RN  [facility]

## 2017-05-18 ENCOUNTER — ALLIED HEALTH/NURSE VISIT (OUTPATIENT)
Dept: EDUCATION SERVICES | Facility: CLINIC | Age: 72
End: 2017-05-18
Payer: COMMERCIAL

## 2017-05-18 VITALS — BODY MASS INDEX: 28.49 KG/M2 | WEIGHT: 166 LBS

## 2017-05-18 DIAGNOSIS — Z79.4 TYPE 2 DIABETES MELLITUS WITHOUT COMPLICATION, WITH LONG-TERM CURRENT USE OF INSULIN (H): Primary | ICD-10-CM

## 2017-05-18 DIAGNOSIS — E11.9 TYPE 2 DIABETES MELLITUS WITHOUT COMPLICATION, WITH LONG-TERM CURRENT USE OF INSULIN (H): Primary | ICD-10-CM

## 2017-05-18 PROCEDURE — G0108 DIAB MANAGE TRN  PER INDIV: HCPCS

## 2017-05-18 NOTE — PATIENT INSTRUCTIONS
Rather than taking multiple Lantus doses during the day, begin taking:    Lantus 50 units in the morning and 20 units at bedtime (about 12 hours apart)    For Humalog:    Take 2 units per 15 grams of carbohydrate    Correction scale - if blood sugar is:  170-199 - take 2 units Humalog  200-229 - 4 units  230-259 - 6 units  260-289 - 8 units  290-319 - 10 units  320-349 - 12 units  350-379 - 14 units  380-409 - 16 units  410-439 - 18 units  440-469 - 20 units  470-499 - 22 units  500 or higher, take 24 units    Follow-up on Thursday, 5/25 at 1 pm with Lorena white [place]

## 2017-05-18 NOTE — PROGRESS NOTES
Diabetes Self Management Training: Individual Review Visit    Leah Guerrero presents today for education and evaluation of glucose control related to Type 2 diabetes.    She is accompanied by self    Patient's diabetes management related comments/concerns: patient reports that her blood sugars are all over the place, stopped chemotherapy but receives weekly steroid infusions on Wednesdays and this causes high blood sugars for 2-3 days each week. She has been trying to take 15 units of rapid-acting insulin prior to leaving for her steroid infusions to help offset the high BG that start that day. States that she thinks her Lantus dose is too high - she reports taking 20 units 4 times a day for a total of 80 units - because she's having low blood sugars overnight - wakes up, treats with orange juice, and blood sugar continues to drop so continues to treat with juice, bread and cookies until BG starts going back up. Reports appetite is poor, so has not been eating as much. Often forgets to take her mealtime insulin before meals, especially if she is away from home and forgets her insulin. Then usually doses when she gets home after blood sugars are already high. Usually doses 3-4 units insulin for food and 6-7 units for correction at each mealtime dose she takes.     Patient's emotional response to diabetes: expresses readiness to learn and concern for health and well-being    Patient would like this visit to be focused around the following diabetes-related behaviors and goals: improving BG management    ASSESSMENT:  Patient Problem List and Family Medical History reviewed for relevant medical history, current medical status, and diabetes risk factors.    Patient is a questionable historian - reports she saw an Endocrinologist at [facility] last week, he prescribed an oral medication (she thinks it might be Glipizide twice a day), in addition to her current insulin regimen. BG are elevated, with the exception of some  overnights when they recently have been in the 70-80's. Basal bolus ratio is 73% to 27%. Patient would benefit from more balanced insulin doses. She reports that she is dosing 1 unit per 15 grams carbohydrate at meals and she doesn't have a set scale for correcting for high blood sugars, but doses 6 units for 200-250 and up to 15 units if in the 400's, but reports this does not seem to help lower blood sugars. Based on estimated average TDD per patient report, insulin to carbohydrate ratio is 1 unit per 4 grams carbohydrate and insulin sensitivity factor is 1 unit lowers 16 mg/dL. Recommend that patient not dose Lantus so frequently, but only twice a day - advised reducing from 80 units to 70 units (current prescribed dose) and split to be 50 units in the morning and 20 units in the evening. Recommend insulin to carbohydrate ratio of 2 units per 15 grams of carbohydrate to get closer to calculated insulin to carbohydrate ratio and to use correction scale of 2 units per 30 mg/dL over 170 mg/dL.    Current Diabetes Management per Patient:  Taking diabetes medications?   yes:     Diabetes Medication(s)     Insulin Sig    insulin glargine (LANTUS SOLOSTAR) 100 UNIT/ML injection Inject 70 Units Subcutaneous At Bedtime     Patient taking differently:  Inject 20 Units Subcutaneous 4 times daily     insulin aspart (NOVOLOG PEN) 100 UNIT/ML injection Patient uses 100 units daily split up between multiple doses     Patient taking differently:  10 Units 3 times daily (with meals), reports 1 unit per 15 g carbohydrate and correction dosing        Difficulty taking medication as prescribed? Yes - forgets NovoLog    Also, leaves pen needles on insulin pens between use, which may cause variability in the amount of insulin she gets when she doses if air is introduced into the pen reservoir.    Past Diabetes Education: Yes    Patient glucose self monitoring as follows: 2-13 times daily.   BG meter: One Touch Ultra Mini meter  BG  "results:          BG values are: Not in goal  Patient's most recent A1C from [facility] - [facility]  Lab Results   Component Value Date    A1C 8.3 04/12/2017    is not meeting goal of <8.0    Nutrition:  Patient skips breakfast regularly, but is trying to have something like a protein mix with milk; stopped drinking pop    Breakfast - nothing or protein mix with milk + sometimes orange juice  Lunch - sandwich (bologna and cheese OR peanut butter and banana OR grilled cheese) OR fried egg over hashbrowns edvin  Dinner - lean cuisine with at least 10-12 grams protein - usually has vegetable and some carbohydrate in as well   Snacks - not usually during the day, may have popcorn or nuts; bedtime - glass of milk OR OJ and 1/2 slice bread with peanut butter if hypoglycemia.     Beverages: Juice 8-16 oz/day, Milk 1-2/day, Sports Drink (Gatorade, Propel, etc.)  G2 - 3-4 bottles/day and zero calorie water 3-4/day    Cultural/Denominational diet restrictions: No     Biggest Challenge to Healthy Eating: not hungry    Physical Activity:    Minimal activity  Physical limitations: health issues limit activity    Diabetes Risk Factors:  age over 45 years, ethnicity, overweight/obesity and inactivity    Diabetes Complications:  Acute Complications:   At risk for hypoglycemia? yes  Symptoms of low blood sugar? sweating and shaking  Frequency of hypoglycemia: 1-2 times/week  Patient carries a carbohydrate source with them regularly: Yes     Vitals:  Wt 75.3 kg (166 lb)  BMI 28.49 kg/m2  Estimated body mass index is 28.49 kg/(m^2) as calculated from the following:    Height as of 4/21/17: 1.626 m (5' 4\").    Weight as of this encounter: 75.3 kg (166 lb).   Last 3 BP:   BP Readings from Last 3 Encounters:   04/28/17 111/56   04/21/17 138/58   11/16/16 130/58       History   Smoking Status     Never Smoker   Smokeless Tobacco     Never Used       Labs:  Lab Results   Component Value Date    A1C 8.4 07/05/2016     Lab Results   Component " Value Date     07/29/2016     Lab Results   Component Value Date    LDL 55 08/04/2015     HDL Cholesterol   Date Value Ref Range Status   08/04/2015 52 >50 mg/dL Final   ]  GFR Estimate   Date Value Ref Range Status   07/05/2016 >90  Non  GFR Calc   >60 mL/min/1.7m2 Final     GFR Estimate If Black   Date Value Ref Range Status   07/05/2016 >90   GFR Calc   >60 mL/min/1.7m2 Final     Lab Results   Component Value Date    CR 0.78 07/29/2016     No results found for: MICROALBUMIN    Socio/Economic Considerations:    Support system: not assessed    Health Beliefs and Attitudes:   Patient Activation Measure Survey Score:  No flowsheet data found.    Stage of Change: ACTION (Actively working towards change)      Diabetes knowledge and skills assessment:     Patient is knowledgeable in diabetes management concepts related to: Monitoring    Patient needs further education on the following diabetes management concepts: Healthy Eating, Being Active, Taking Medication, Problem Solving, Reducing Risks and Healthy Coping    Barriers to Learning Assessment: No Barriers identified    Based on learning assessment above, most appropriate setting for further diabetes education would be: Individual setting.    INTERVENTION:   1. Adjust rapid-acting insulin dosing to provide better daytime and mealtime coverage per calculated insulin to carbohydrate ratio and insulin sensitivity factor - 2 units per 15 grams carbohydrate and 2 units per 30 mg/dL over 170 mg/dL.  2. Adjust long-acting insulin dosing downward and split dosing to 50 units in the morning and 20 units in the evening.  3. Discussed that if BG is elevated, such as in the 2-3 days after steroid infusion, she can use correction dosing every 4 hours while awake, as needed.    Education provided today on:  AADE Self-Care Behaviors:  Healthy Eating: carbohydrate counting and consistency in amount, composition, and timing of food  intake  Taking Medication: administering and storing injectable diabetes medications and when to take medications  Problem Solving: high blood glucose - causes, signs/symptoms, treatment and prevention, low blood glucose - causes, signs/symptoms, treatment and prevention and carrying a carbohydrate source at all times  Reducing Risks: A1C - goals, relating to blood glucose levels, how often to check    Opportunities for ongoing education and support in diabetes-self management were discussed.    Pt verbalized understanding of concepts discussed and recommendations provided today.       Education Materials Provided:  No new materials provided today    PLAN:  See Patient Instructions for co-developed, patient-stated behavior change goals.  AVS printed and provided to patient today.    FOLLOW-UP:  Follow-up appointment scheduled on 5/25.  Chart routed to referring provider.    Ongoing plan for education and support: Follow-up visit with diabetes educator in 1 week    Lorena Mejia MPH RD LD CDE   Time Spent: 60 minutes  Encounter Type: Individual    Any diabetes medication dose changes were made via the CDE Protocol and Collaborative Practice Agreement with the patient's primary care provider. A copy of this encounter was shared with the provider.

## 2017-05-18 NOTE — MR AVS SNAPSHOT
After Visit Summary   5/18/2017    Leah Guerrero    MRN: [id number]           Patient Information     Date Of Birth          1945        Visit Information        Provider Department      5/18/2017 1:00 PM CP DIABETIC ED RESOURCE [facility]        Care Instructions    Rather than taking multiple Lantus doses during the day, begin taking:    Lantus 50 units in the morning and 20 units at bedtime (about 12 hours apart)    For Humalog:    Take 2 units per 15 grams of carbohydrate    Correction scale - if blood sugar is:  170-199 - take 2 units Humalog  200-229 - 4 units  230-259 - 6 units  260-289 - 8 units  290-319 - 10 units  320-349 - 12 units  350-379 - 14 units  380-409 - 16 units  410-439 - 18 units  440-469 - 20 units  470-499 - 22 units  500 or higher, take 24 units    Follow-up on Thursday, 5/25 at 1 pm with Lorena [facility]          Follow-ups after your visit        Your next 10 appointments already scheduled     May 19, 2017 11:30 AM CDT   Nurse Only with CP RN   [facility] ([facility])    [address] [address]   [phone]           Honoring Choices need to be scheduled for 60 mins            May 25, 2017  1:00 PM CDT   Diabetic Education with CP DIABETIC ED RESOURCE   [facility] ([facility])    [address] [address]   [phone]              Who to contact     If you have questions or need follow up information about today's clinic visit or your schedule please contact [facility] directly at [phone].  Normal or non-critical lab and imaging results will be communicated to you by MyChart, letter or phone within 4 business days after the clinic has received the results. If you do not hear from us within 7 days, please contact the clinic through TripsByTipst  or phone. If you have a critical or abnormal lab result, we will notify you by phone as soon as possible.  Submit refill requests through RentFeeder or call your pharmacy and they will forward the refill request to us. Please allow 3 business days for your refill to be completed.          Additional Information About Your Visit        Bringghart Information     RentFeeder gives you secure access to your electronic health record. If you see a primary care provider, you can also send messages to your care team and make appointments. If you have questions, please call your primary care clinic.  If you do not have a primary care provider, please call [phone] and they will assist you.        Care EveryWhere ID     This is your Care EveryWhere ID. This could be used by other organizations to access your [place] medical records  [id number]        Your Vitals Were     BMI (Body Mass Index)                   28.49 kg/m2            Blood Pressure from Last 3 Encounters:   04/28/17 111/56   04/21/17 138/58   11/16/16 130/58    Weight from Last 3 Encounters:   05/18/17 75.3 kg (166 lb)   04/28/17 74.4 kg (164 lb)   04/21/17 74.4 kg (164 lb)              Today, you had the following     No orders found for display       Primary Care Provider Office Phone # Fax #    Kevin Esquivel [phone]       [facility] [address] [facility] [address]        Goals        General    I will continue the exercises provided by physical therapy for my knee (pt-stated)     Notes - Note created  7/9/2015 10:30 AM by Ray Holland RN    As of today's date 7/9/2015 goal is met at 0 - 25%.   Goal Status:  Active        I will remember to take my insulin before meals especially lunch when I am  away from home. (pt-stated)     Notes - Note created  3/25/2015  3:51 PM by Ray Holland RN      As of today's date 3/25/2015 goal is met at 0 - 25%.   Goal Status:  Active.          I will work on testing my  blood sugar and taking my insulin when I am away from home at lunch time. (pt-stated)     Notes - Note created  5/20/2016  2:43 PM by Ray Holland RN    As of today's date 5/20/2016 goal is met at 0 - 25%.   Goal Status:  Active          Thank you!     Thank you for choosing [facility]  for your care. Our goal is always to provide you with excellent care. Hearing back from our patients is one way we can continue to improve our services. Please take a few minutes to complete the written survey that you may receive in the mail after your visit with us. Thank you!             Your Updated Medication List - Protect others around you: Learn how to safely use, store and throw away your medicines at www.disposemymeds.org.          This list is accurate as of: 5/18/17  2:02 PM.  Always use your most recent med list.                   Brand Name Dispense Instructions for use    ACE/ARB NOT PRESCRIBED (INTENTIONAL)      ACE & ARB not prescribed due to Allergy       * albuterol (2.5 MG/3ML) 0.083% neb solution     1 Box    Take 3 mLs (2.5 mg) by nebulization 4 times daily as needed       * albuterol 108 (90 BASE) MCG/ACT Inhaler    PROAIR HFA/PROVENTIL HFA/VENTOLIN HFA    2 Inhaler    Inhale 2 puffs into the lungs every 6 hours as needed for shortness of breath / dyspnea or wheezing       aspirin 81 MG tablet      Take 1 tablet by mouth daily. 1 daily       blood glucose monitoring test strip    ONE TOUCH TEST STRIPS    3 Month    Please dispense the particular type of lancets and test strips that patient needs.  She tests frequently, averaging 6 times per day. Okay to dispense 3 months supplies with refills for a year       calcium polycarbophil 625 MG tablet    FIBERCON    60 tablet    Take 2 tablets (1,250 mg) by mouth daily       CALCIUM-D PO      Take  by mouth. 1200mg calcium/600mg D3       fish oil-omega-3 fatty acids 1000 MG capsule      Take 1 capsule by mouth daily.       Garlic Caps       "Take  by mouth. One daily       ibuprofen 800 MG tablet    ADVIL/MOTRIN    100 tablet    Take 1 tablet (800 mg) by mouth every 8 hours as needed for pain       insulin aspart 100 UNIT/ML injection    NovoLOG PEN    30 mL    Patient uses 100 units daily split up between multiple doses       insulin glargine 100 UNIT/ML injection    LANTUS SOLOSTAR    30 mL    Inject 70 Units Subcutaneous At Bedtime       LORazepam 1 MG tablet    ATIVAN    30 tablet    Take 1 tablet (1 mg) by mouth every 8 hours as needed for anxiety       meperidine 50 MG tablet    DEMEROL    20 tablet    Take 1 tablet (50 mg) by mouth every 4 hours as needed       MULTIVITAMIN TABS   OR      1 TABLET DAILY       oxyCODONE-acetaminophen 5-325 MG per tablet    PERCOCET     Take by mouth every 4 hours as needed for moderate to severe pain       pen needles 5/16\" 31G X 8 MM Misc     180 each    Patient does 4 doses novolog and 2 lantus shots  insulin daily.  Needs 180 needles per month. Okay refills for one year.       polyethylene glycol powder    MIRALAX    510 g    Take 17 g by mouth daily as needed for constipation       promethazine 25 MG tablet    PHENERGAN    30 tablet    Take 1 tablet (25 mg) by mouth every 6 hours as needed for nausea       rosuvastatin 20 MG tablet    CRESTOR    90 tablet    Take 1 tablet (20 mg) by mouth daily       SALINE      to clean port every other month       senna-docusate 8.6-50 MG per tablet    SENOKOT-S;PERICOLACE    100 tablet    1-2 po bid prn constipation       tiZANidine 2 MG tablet    ZANAFLEX    30 tablet    Take 1 tablet (2 mg) by mouth 3 times daily as needed for muscle spasms       zolpidem 5 MG tablet    AMBIEN    30 tablet    Take 1 tablet (5 mg) by mouth nightly as needed for sleep       * Notice:  This list has 2 medication(s) that are the same as other medications prescribed for you. Read the directions carefully, and ask your doctor or other care provider to review them with you.      "

## 2017-05-18 NOTE — Clinical Note
Hi Dr. Esquivel,  I saw Leah for diabetes education today. Her BG are quite elevated most of the time, with the exception of some overnight readings in the 70-80's. She is dosing her Lantus unconventionally - 20 units 4 times a day to help prevent hypoglycemia. She forgets NovoLog sometimes but also reports that the correction dosing she's been using (which does not appear to be a formal scale) is not helping to get blood glucose levels down. Recommended that she take Lantus 50 units in the morning and 20 units in the evening. I calculated insulin to carbohydrate ratio and insulin correction factor based on her estimated total daily dose and would recommend 2 units NovoLog per 15 grams carbohydrate and 2 units NovoLog per 30 mg/dL that BG is above 170 mg/dL. She is agreeable to trying this plan. We'll follow-up next week to review how it is working.   Thanks! Lorena

## 2017-05-19 ENCOUNTER — ALLIED HEALTH/NURSE VISIT (OUTPATIENT)
Dept: NURSING | Facility: CLINIC | Age: 72
End: 2017-05-19
Payer: COMMERCIAL

## 2017-05-19 VITALS — TEMPERATURE: 97.5 F

## 2017-05-19 DIAGNOSIS — E78.5 HYPERLIPIDEMIA LDL GOAL <100: ICD-10-CM

## 2017-05-19 DIAGNOSIS — R53.83 FATIGUE, UNSPECIFIED TYPE: ICD-10-CM

## 2017-05-19 DIAGNOSIS — E11.9 TYPE 2 DIABETES MELLITUS WITHOUT COMPLICATION, WITH LONG-TERM CURRENT USE OF INSULIN (H): ICD-10-CM

## 2017-05-19 DIAGNOSIS — Z79.4 TYPE 2 DIABETES MELLITUS WITHOUT COMPLICATION, WITH LONG-TERM CURRENT USE OF INSULIN (H): ICD-10-CM

## 2017-05-19 DIAGNOSIS — Z95.828 PORT-A-CATH IN PLACE: ICD-10-CM

## 2017-05-19 LAB
ALBUMIN SERPL-MCNC: 3.2 G/DL (ref 3.4–5)
ALP SERPL-CCNC: 92 U/L (ref 40–150)
ALT SERPL W P-5'-P-CCNC: 19 U/L (ref 0–50)
ANION GAP SERPL CALCULATED.3IONS-SCNC: 10 MMOL/L (ref 3–14)
AST SERPL W P-5'-P-CCNC: 11 U/L (ref 0–45)
BASOPHILS # BLD AUTO: 0 10E9/L (ref 0–0.2)
BASOPHILS NFR BLD AUTO: 0.2 %
BILIRUB SERPL-MCNC: 0.2 MG/DL (ref 0.2–1.3)
BUN SERPL-MCNC: 18 MG/DL (ref 7–30)
CALCIUM SERPL-MCNC: 8.7 MG/DL (ref 8.5–10.1)
CHLORIDE SERPL-SCNC: 109 MMOL/L (ref 94–109)
CHOLEST SERPL-MCNC: 201 MG/DL
CO2 SERPL-SCNC: 23 MMOL/L (ref 20–32)
CREAT SERPL-MCNC: 0.6 MG/DL (ref 0.52–1.04)
DIFFERENTIAL METHOD BLD: NORMAL
EOSINOPHIL # BLD AUTO: 0.2 10E9/L (ref 0–0.7)
EOSINOPHIL NFR BLD AUTO: 2.5 %
ERYTHROCYTE [DISTWIDTH] IN BLOOD BY AUTOMATED COUNT: 14.6 % (ref 10–15)
GFR SERPL CREATININE-BSD FRML MDRD: ABNORMAL ML/MIN/1.7M2
GLUCOSE SERPL-MCNC: 147 MG/DL (ref 70–99)
HBA1C MFR BLD: 8.8 % (ref 4.3–6)
HCT VFR BLD AUTO: 36.6 % (ref 35–47)
HDLC SERPL-MCNC: 56 MG/DL
HGB BLD-MCNC: 11.9 G/DL (ref 11.7–15.7)
LDLC SERPL CALC-MCNC: 101 MG/DL
LYMPHOCYTES # BLD AUTO: 2.9 10E9/L (ref 0.8–5.3)
LYMPHOCYTES NFR BLD AUTO: 46 %
MCH RBC QN AUTO: 27.7 PG (ref 26.5–33)
MCHC RBC AUTO-ENTMCNC: 32.5 G/DL (ref 31.5–36.5)
MCV RBC AUTO: 85 FL (ref 78–100)
MONOCYTES # BLD AUTO: 0.7 10E9/L (ref 0–1.3)
MONOCYTES NFR BLD AUTO: 10.7 %
NEUTROPHILS # BLD AUTO: 2.6 10E9/L (ref 1.6–8.3)
NEUTROPHILS NFR BLD AUTO: 40.6 %
NONHDLC SERPL-MCNC: 145 MG/DL
PLATELET # BLD AUTO: 253 10E9/L (ref 150–450)
POTASSIUM SERPL-SCNC: 4.3 MMOL/L (ref 3.4–5.3)
PROT SERPL-MCNC: 7 G/DL (ref 6.8–8.8)
RBC # BLD AUTO: 4.29 10E12/L (ref 3.8–5.2)
SODIUM SERPL-SCNC: 142 MMOL/L (ref 133–144)
TRIGL SERPL-MCNC: 222 MG/DL
WBC # BLD AUTO: 6.3 10E9/L (ref 4–11)

## 2017-05-19 PROCEDURE — 85025 COMPLETE CBC W/AUTO DIFF WBC: CPT | Performed by: FAMILY MEDICINE

## 2017-05-19 PROCEDURE — 83036 HEMOGLOBIN GLYCOSYLATED A1C: CPT | Performed by: FAMILY MEDICINE

## 2017-05-19 PROCEDURE — 80061 LIPID PANEL: CPT | Performed by: FAMILY MEDICINE

## 2017-05-19 PROCEDURE — 99207 ZZC NO CHARGE NURSE ONLY: CPT

## 2017-05-19 PROCEDURE — 36415 COLL VENOUS BLD VENIPUNCTURE: CPT | Performed by: FAMILY MEDICINE

## 2017-05-19 PROCEDURE — 80053 COMPREHEN METABOLIC PANEL: CPT | Performed by: FAMILY MEDICINE

## 2017-05-19 NOTE — MR AVS SNAPSHOT
After Visit Summary   5/19/2017    Leah Guerrero    MRN: [id number]           Patient Information     Date Of Birth          1945        Visit Information        Provider Department      5/19/2017 11:30 AM CP RN [facility]        Today's Diagnoses     Port-a-cath in place        Hyperlipidemia LDL goal <100        Type 2 diabetes mellitus without complication, with long-term current use of insulin (H)        Fatigue, unspecified type           Follow-ups after your visit        Your next 10 appointments already scheduled     May 25, 2017  1:00 PM CDT   Diabetic Education with CP DIABETIC ED RESOURCE   [facility] ([facility])    [address] [id number] [phone]              Who to contact     If you have questions or need follow up information about today's clinic visit or your schedule please contact [facility] directly at [phone].  Normal or non-critical lab and imaging results will be communicated to you by Talkrayhart, letter or phone within 4 business days after the clinic has received the results. If you do not hear from us within 7 days, please contact the clinic through Talkrayhart or phone. If you have a critical or abnormal lab result, we will notify you by phone as soon as possible.  Submit refill requests through Bunchball or call your pharmacy and they will forward the refill request to us. Please allow 3 business days for your refill to be completed.          Additional Information About Your Visit        MyChart Information     Bunchball gives you secure access to your electronic health record. If you see a primary care provider, you can also send messages to your care team and make appointments. If you have questions, please call your primary care clinic.  If you do not have a primary care provider, please call [phone] and they will assist  you.        Care EveryWhere ID     This is your Care EveryWhere ID. This could be used by other organizations to access your [place] medical records  [id number]        Your Vitals Were     Temperature                   97.5  F (36.4  C) (Tympanic)            Blood Pressure from Last 3 Encounters:   04/28/17 111/56   04/21/17 138/58   11/16/16 130/58    Weight from Last 3 Encounters:   05/18/17 166 lb (75.3 kg)   04/28/17 164 lb (74.4 kg)   04/21/17 164 lb (74.4 kg)              We Performed the Following     CBC with platelets differential     Comprehensive metabolic panel     Hemoglobin A1c     HEPARIN SODIUM PER 10 U     Lipid panel reflex to direct LDL     PORT FLUSH ONLY        Primary Care Provider Office Phone # Fax #    Kevin Esquivel [phone]       [facility] [address]  [facility] [address]        Goals        General    I will continue the exercises provided by physical therapy for my knee (pt-stated)     Notes - Note created  7/9/2015 10:30 AM by Ray Holland RN    As of today's date 7/9/2015 goal is met at 0 - 25%.   Goal Status:  Active        I will remember to take my insulin before meals especially lunch when I am  away from home. (pt-stated)     Notes - Note created  3/25/2015  3:51 PM by Ray Holland RN      As of today's date 3/25/2015 goal is met at 0 - 25%.   Goal Status:  Active.          I will work on testing my blood sugar and taking my insulin when I am away from home at lunch time. (pt-stated)     Notes - Note created  5/20/2016  2:43 PM by Ray Holland RN    As of today's date 5/20/2016 goal is met at 0 - 25%.   Goal Status:  Active          Thank you!     Thank you for choosing [facility]  for your care. Our goal is always to provide you with excellent care. Hearing back from our patients is one way we can continue to improve our services. Please take a few minutes to complete the written survey that you  may receive in the mail after your visit with us. Thank you!             Your Updated Medication List - Protect others around you: Learn how to safely use, store and throw away your medicines at www.disposemymeds.org.          This list is accurate as of: 5/19/17 11:53 AM.  Always use your most recent med list.                   Brand Name Dispense Instructions for use    ACE/ARB NOT PRESCRIBED (INTENTIONAL)      ACE & ARB not prescribed due to Allergy       * albuterol (2.5 MG/3ML) 0.083% neb solution     1 Box    Take 3 mLs (2.5 mg) by nebulization 4 times daily as needed       * albuterol 108 (90 BASE) MCG/ACT Inhaler    PROAIR HFA/PROVENTIL HFA/VENTOLIN HFA    2 Inhaler    Inhale 2 puffs into the lungs every 6 hours as needed for shortness of breath / dyspnea or wheezing       aspirin 81 MG tablet      Take 1 tablet by mouth daily. 1 daily       blood glucose monitoring test strip    ONE TOUCH TEST STRIPS    3 Month    Please dispense the particular type of lancets and test strips that patient needs.  She tests frequently, averaging 6 times per day. Okay to dispense 3 months supplies with refills for a year       calcium polycarbophil 625 MG tablet    FIBERCON    60 tablet    Take 2 tablets (1,250 mg) by mouth daily       CALCIUM-D PO      Take  by mouth. 1200mg calcium/600mg D3       fish oil-omega-3 fatty acids 1000 MG capsule      Take 1 capsule by mouth daily.       Garlic Caps      Take  by mouth. One daily       ibuprofen 800 MG tablet    ADVIL/MOTRIN    100 tablet    Take 1 tablet (800 mg) by mouth every 8 hours as needed for pain       insulin aspart 100 UNIT/ML injection    NovoLOG PEN    30 mL    Patient uses 100 units daily split up between multiple doses. Take 2 units per 15 grams of carbohydrate eaten and 2 units per 30 mg/dL above 170 mg/dL.       insulin glargine 100 UNIT/ML injection    LANTUS SOLOSTAR    30 mL    Take 50 units in the morning and 20 units in the evening.       LORazepam 1 MG  "tablet    ATIVAN    30 tablet    Take 1 tablet (1 mg) by mouth every 8 hours as needed for anxiety       meperidine 50 MG tablet    DEMEROL    20 tablet    Take 1 tablet (50 mg) by mouth every 4 hours as needed       MULTIVITAMIN TABS   OR      1 TABLET DAILY       oxyCODONE-acetaminophen 5-325 MG per tablet    PERCOCET     Take by mouth every 4 hours as needed for moderate to severe pain       pen needles 5/16\" 31G X 8 MM Misc     180 each    Patient does 4 doses novolog and 2 lantus shots  insulin daily.  Needs 180 needles per month. Okay refills for one year.       polyethylene glycol powder    MIRALAX    510 g    Take 17 g by mouth daily as needed for constipation       promethazine 25 MG tablet    PHENERGAN    30 tablet    Take 1 tablet (25 mg) by mouth every 6 hours as needed for nausea       rosuvastatin 20 MG tablet    CRESTOR    90 tablet    Take 1 tablet (20 mg) by mouth daily       SALINE      to clean port every other month       senna-docusate 8.6-50 MG per tablet    SENOKOT-S;PERICOLACE    100 tablet    1-2 po bid prn constipation       tiZANidine 2 MG tablet    ZANAFLEX    30 tablet    Take 1 tablet (2 mg) by mouth 3 times daily as needed for muscle spasms       zolpidem 5 MG tablet    AMBIEN    30 tablet    Take 1 tablet (5 mg) by mouth nightly as needed for sleep       * Notice:  This list has 2 medication(s) that are the same as other medications prescribed for you. Read the directions carefully, and ask your doctor or other care provider to review them with you.      "

## 2017-05-19 NOTE — PROGRESS NOTES
Port flush orders dated 5/16/17, current.    Port site without redness, swelling, or breakdown, has healed incision above port on right chest; patient reports port from left side was removed and power port place about a month ago to accommodate contrast for scans.  Port site prepped with 3 betadine swabs, air dried, followed by 3 alcohol wipes, air dried.  Stabilized port and accessed with 20 G 3/4 inch bello gripper non-coring needle without difficulty on 1st attempt.  Easy flush, good blood return observed.  Flushed with 10 ml saline and then waste blood removed and discarded, sample obtained; flushed with 20 ml of sterile saline for infusion followed by 5 ml of 100 unit/ml heparin flush.  De-accessed and gauze pad applied, patient tolerated well.  Lab tubes labeled and delivered to lab.  Patient reports she is fasting.    The following medication was given:     MEDICATION: saline 30 ml  ROUTE: IV  SITE: via port-a-cath  DOSE: 20 ml  LOT #: [id number]  :  Hospira  EXPIRATION DATE:  3/1/2018  NDC#: 9707-8306-99    The following medication was given:     MEDICATION: heparin 100 unit/ml  ROUTE: IV  SITE: port-a-cath  DOSE: 5 ml  LOT #: [id number]  :  Embibe  EXPIRATION DATE:  12/2018  NDC#: 82733-981-44    Patient reports she gets weekly infusions at [facility] center so no need to schedule routine monthly flush at this time.    Keyanna Carrasquillo RN

## 2017-05-21 NOTE — PROGRESS NOTES
The diabetes test ( hemoglobin a1c ) is higher than last time.  Now 8.8.  Ideal would be around 7.    Increase insulin as needed and keep working on healthy diet/exercise / wt loss.    We can recheck in 2-3 months.    Cholesterol is higher than last time.   Stay on the cholesterol med rosuvastatin.     Other labs are okay.    Kevin Esquivel MD

## 2017-05-25 ENCOUNTER — TRANSFERRED RECORDS (OUTPATIENT)
Dept: HEALTH INFORMATION MANAGEMENT | Facility: CLINIC | Age: 72
End: 2017-05-25

## 2017-05-26 NOTE — PROGRESS NOTES
[address] [address]  [phone]  Dept: [phone]    PRE-OP EVALUATION:  Today's date: 2017    Leah Guerrero (: 1945) presents for pre-operative evaluation assessment as requested by Dr. Keller.  She requires evaluation and anesthesia risk assessment prior to undergoing surgery/procedure for treatment of lump in neck .  Proposed procedure: lump removal    Date of Surgery/ Procedure: 2017  Time of Surgery/ Procedure:   Hospital/Surgical Facility: [facility]  Fax number for surgical facility:   Primary Physician: Kevin Esquivel  Type of Anesthesia Anticipated: General    Patient has a Health Care Directive or Living Will:  YES on file    1. YES - DO YOU HAVE A HISTORY OF HEART ATTACK, STROKE, STENT, BYPASS OR SURGERY ON AN ARTERY IN THE HEAD, NECK, HEART OR LEG? In the 90s had small stroke after anesthesia  2. NO - Do you ever have any pain or discomfort in your chest?  3. NO - Do you have a history of  Heart Failure?  4. NO - Are you troubled by shortness of breath when: walking on the level, up a slight hill or at night?  5. NO - Do you currently have a cold, bronchitis or other respiratory infection?  6. NO - Do you have a cough, shortness of breath or wheezing?  7. NO - Do you sometimes get pains in the calves of your legs when you walk?  8. NO - Do you or anyone in your family have previous history of blood clots?  9. NO - Do you or does anyone in your family have a serious bleeding problem such as prolonged bleeding following surgeries or cuts?  10. NO - Have you ever had problems with anemia or been told to take iron pills?  11. NO - Have you had any abnormal blood loss such as black, tarry or bloody stools, or abnormal vaginal bleeding?  12. YES - HAVE YOU EVER HAD A BLOOD TRANSFUSION? Long time ago  13. NO - Have you or any of your relatives ever had problems with anesthesia?  14. NO - Do you have sleep  apnea, excessive snoring or daytime drowsiness?  15. NO - Do you have any prosthetic heart valves?  16. NO - Do you have prosthetic joints?  17. NO - Is there any chance that you may be pregnant?      HPI:                                                      Brief HPI related to upcoming procedure: 71 year old female with lump on back of neck for a few months.  Has been dealing with lung cancer for about a year.           MEDICAL HISTORY:                                                      Patient Active Problem List    Diagnosis Date Noted     Malignant neoplasm of lung, unspecified laterality, unspecified part of lung (H) 04/21/2017     Priority: Medium     Chronic pancreatitis, unspecified pancreatitis type (H) 11/16/2016     Priority: Medium     Type 2 diabetes mellitus without complication, with long-term current use of insulin (H) 11/16/2016     Priority: Medium     Anxiety 07/20/2016     Priority: Medium     Insomnia, unspecified type 06/07/2016     Priority: Medium     Idiopathic chronic pancreatitis (H) 11/04/2015     Priority: Medium     Primary osteoarthritis of left knee 10/28/2015     Priority: Medium     Hypertension goal BP (blood pressure) < 140/90 05/11/2015     Priority: Medium     Obesity 01/26/2015     Priority: Medium     Health Care Home 10/22/2014     Priority: Medium       Status:  Accepted  Care Coordinator:  Ray Holland    See Letters for [facility] Care Plan  Date:  March 25, 2015               Intermittent asthma without complication 07/11/2014     Priority: Medium     IT band syndrome 01/13/2014     Priority: Medium     Contusion of knee 11/20/2013     Priority: Medium     Prepatellar bursitis of left knee 11/20/2013     Priority: Medium     Insomnia 05/30/2013     Priority: Medium     Mechanical low back pain 03/22/2013     Priority: Medium     Radicular pain of left lower extremity 03/22/2013     Priority: Medium     GERD (gastroesophageal reflux disease) 02/06/2013     Priority: Medium      Pulmonary nodule, left      Priority: Medium     0.5 cm; needs f/u chest CT scan Jan 2013       Abnormal CT of the chest      Priority: Medium     Nodular consolidation right lobe 1.6 cm. Needs repeat CT Jan 2013       Anxiety state 06/21/2012     Priority: Medium     Problem list name updated by automated process. Provider to review       Intermittent asthma 12/30/2011     Priority: Medium     Pancreatitis, chronic (H) 09/09/2011     Priority: Medium     Advanced directives, counseling/discussion 05/06/2011     Priority: Medium     04/29/2013  Advance Care Planning:   Receipt of ACP document:  Received: Health Care Directive which was witnessed or notarized on 07/06/2011.  Document previously scanned on 07/07/2011.  Validation form completed and sent to be scanned.  Code Status reflects choices in most recent ACP document.  Confirmed/documented designated decision maker(s). See permanent comments section of demographics in clinical tab. View document(s) and details by clicking on code status.   Added by Lacie Canchola on 4/29/2013.          Advance Care Planning:   ACP Review and Resources Provided:  Reviewed chart for advance care plan.  Leah Guerrero has no plan or code status on file however states presence of ACP document. Copy requested. Confirmed code status reflects current choices pending receipt of document/advance care plan review. Confirmed/documented designated decision maker(s). See permanent comments section of demographics in clinical tab.   Added by Emma Medina on 4/24/2013          Planning  Advance Directive Initial Visit  Leah Guerrero presents in person for initial session regarding completion of advanced directive form. She was referred to the facilitator by self.  She currently has an advance directive from 1980 & wants to fill out a new updated one.  Plan:  Advanced directive form, healthcare agent information card, advanced care planning information booklet provided to  patient.   Follow up meeting: to be arranged when patient calls back to make an appointment after reviewing documents in one week or so. Patient instructed to bring healthcare agent to this meeting.  Flores Gerard RN  Letter sent to patient for [employer] follow up.  6/22/2011  Flores Gerard RN  Advance Directive Follow-up Visit  Leah Guerrero presents for advanced care planning session accompanied by no one.  Reviewed definitions of advanced care planning and advance directive form, and informational handouts given to patient previously.  Patient has questions and /or concerns about acp process or form .  Reviewed advance directive form with patient & answered all of her questions. Designated healthcare agent is identified. Healthcare agent s name is Paul Guerrero. My Hopes and Wishes reviewed.  Finalized wishes are clear to patient Yes  Patient and designated healthcare agent are aware that document may be changed at any time in the future.  Advanced directive form: completed at this visit.  Advanced directive form: verified with notary signature. Original and two copies of advanced directive form given to patient copy sent to  for scanning into EMR and original given to patient and instructed to give copy to designated [facility] and non-[place] physician where applicable.  Flores Gerard RN  7/6/2011  Advance Directive Problem List Overview:   Name Relationship Phone    Primary Health Care Agent Paul Guerrero Son [phone]         Alternative Health Care Agent Hiram Guerrero Son c [phone]  [phone]                      Hyperlipidemia LDL goal <100 12/16/2010     Priority: Medium     Fatigue 12/15/2010     Priority: Medium     Type 2 diabetes, HbA1C goal < 8% (H) 11/24/2010     Priority: Medium     Female stress incontinence 10/28/2010     Priority: Medium      Past Medical History:   Diagnosis Date     Abnormal CT of the chest 10/12    Nodular consolidation right lobe 1.6 cm. Needs  repeat CT Jan 2013     Bleeding disorder (H)     in bowels x2      Chronic pancreatitis (H)      CVA (cerebral infarction)      Diabetes      Female stress incontinence      Hyperlipidemia LDL goal <100      Hypertension      Intermittent asthma 12/30/2011     Lateral epicondylitis (tennis elbow) - left 3/30/2011     OA (osteoarthritis) of knee 3/22/2013     Pulmonary nodule, left 10/12    0.5 cm; needs f/u chest CT scan Jan 2013     Surgical complications      Unspecified asthma(493.90)      Past Surgical History:   Procedure Laterality Date     APPENDECTOMY  2012     C HAND/FINGER SURGERY UNLISTED       C SHOULDER SURG PROC UNLISTED       C STOMACH SURGERY PROCEDURE UNLISTED       CHOLECYSTECTOMY  1969     COLONOSCOPY  6-2-08    Neg. At [facility]     COLONOSCOPY  9-3-13     EYE SURGERY       HYSTERECTOMY TOTAL ABD, HEIDI SALPINGO-OOPHORECTOMY, NODE DISSECTION, TUMOR DEBULKING, COMBINED  1988    fibroids?     KNEE SURGERY  x5     KNEE SURGERY      kneecap procedure     LUNG SURGERY  10-7-16    removal lung cancer     SHOULDER SURGERY  2005    right shoulder, bone spurs     WRIST SURGERY      right wrist     Current Outpatient Prescriptions   Medication Sig Dispense Refill     insulin aspart (NOVOLOG PEN) 100 UNIT/ML injection Patient uses 100 units daily split up between multiple doses. Take 2 units per 15 grams of carbohydrate eaten and 2 units per 30 mg/dL above 170 mg/dL. 30 mL 1     insulin glargine (LANTUS SOLOSTAR) 100 UNIT/ML injection Take 50 units in the morning and 20 units in the evening. 30 mL 1     oxyCODONE-acetaminophen (PERCOCET) 5-325 MG per tablet Take by mouth every 4 hours as needed for moderate to severe pain       rosuvastatin (CRESTOR) 20 MG tablet Take 1 tablet (20 mg) by mouth daily 90 tablet 1     zolpidem (AMBIEN) 5 MG tablet Take 1 tablet (5 mg) by mouth nightly as needed for sleep 30 tablet 2     promethazine (PHENERGAN) 25 MG tablet Take 1 tablet (25 mg) by mouth every 6 hours as needed for  "nausea 30 tablet 1     meperidine (DEMEROL) 50 MG tablet Take 1 tablet (50 mg) by mouth every 4 hours as needed 20 tablet 0     Insulin Pen Needle (PEN NEEDLES 5/16\") 31G X 8 MM MISC Patient does 4 doses novolog and 2 lantus shots  insulin daily.  Needs 180 needles per month. Okmaria isabel refills for one year. 180 each 11     albuterol (PROAIR HFA/PROVENTIL HFA/VENTOLIN HFA) 108 (90 BASE) MCG/ACT Inhaler Inhale 2 puffs into the lungs every 6 hours as needed for shortness of breath / dyspnea or wheezing 2 Inhaler 5     LORazepam (ATIVAN) 1 MG tablet Take 1 tablet (1 mg) by mouth every 8 hours as needed for anxiety 30 tablet 0     tiZANidine (ZANAFLEX) 2 MG tablet Take 1 tablet (2 mg) by mouth 3 times daily as needed for muscle spasms 30 tablet 1     ACE/ARB NOT PRESCRIBED, INTENTIONAL, ACE & ARB not prescribed due to Allergy       calcium polycarbophil (FIBERCON) 625 MG tablet Take 2 tablets (1,250 mg) by mouth daily 60 tablet 11     ibuprofen (ADVIL,MOTRIN) 800 MG tablet Take 1 tablet (800 mg) by mouth every 8 hours as needed for pain 100 tablet 0     senna-docusate (SENOKOT-S;PERICOLACE) 8.6-50 MG per tablet 1-2 po bid prn constipation 100 tablet 1     blood glucose monitoring (ONE TOUCH TEST STRIPS) test strip Please dispense the particular type of lancets and test strips that patient needs.  She tests frequently, averaging 6 times per day. Okay to dispense 3 months supplies with refills for a year 3 Month 3     polyethylene glycol (MIRALAX) powder Take 17 g by mouth daily as needed for constipation 510 g 1     albuterol (2.5 MG/3ML) 0.083% nebulizer solution Take 3 mLs (2.5 mg) by nebulization 4 times daily as needed 1 Box 5     fish oil-omega-3 fatty acids (FISH OIL) 1000 MG capsule Take 1 capsule by mouth daily.       Calcium Carbonate-Vitamin D (CALCIUM-D PO) Take  by mouth. 1200mg calcium/600mg D3       Garlic CAPS Take  by mouth. One daily       aspirin 81 MG tablet Take 1 tablet by mouth daily. 1 daily       SALINE " to clean port every other month       MULTIVITAMIN TABS   OR 1 TABLET DAILY     note patient also on glipizide bid, per endocrinology       OTC products: None, except as noted above    Allergies   Allergen Reactions     Hydralazine Shortness Of Breath     Famotidine Nausea and Vomiting and Unknown     Benadryl Itch Stopping      Gives her more energy, can't sleep     Lisinopril      Tongue swelling       Metoclopramide Nausea and Vomiting     Ondansetron Nausea and Vomiting     Other reaction(s): Headache     Penicillins Hives     Tape [Adhesive Tape]      blisters     Tylenol      Anxiety  Tablets only.      Latex Allergy: NO    Social History   Substance Use Topics     Smoking status: Never Smoker     Smokeless tobacco: Never Used     Alcohol use No     History   Drug Use No       REVIEW OF SYSTEMS:                                                    Constitutional, neuro, ENT, endocrine, pulmonary, cardiac, gastrointestinal, genitourinary, musculoskeletal, integument and psychiatric systems are negative, except as otherwise noted.    No recent illnesses    Long history of chronic pancreatitis but no acute flares recently    Currently getting steroids but not chemo for the lung ca        EXAM:                                                    /71 (BP Location: Right arm, Patient Position: Chair, Cuff Size: Adult Regular)  Pulse 70  Temp 98.8  F (37.1  C) (Oral)  Wt 167 lb (75.8 kg)  SpO2 98%  Breastfeeding? No  BMI 28.67 kg/m2    GENERAL APPEARANCE: healthy, alert and no distress     EYES: EOMI, PERRL     HENT: ear canals and TM's normal and nose and mouth without ulcers or lesions     NECK: no adenopathy, no asymmetry, masses, or scars and thyroid normal to palpation     RESP: lungs clear to auscultation - no rales, rhonchi or wheezes     CV: regular rates and rhythm, normal S1 S2, no S3 or S4 and no murmur, click or rub     ABDOMEN: mild discomfort epigastrium and right upper quadrant.  No change.      MS: extremities normal- no gross deformities noted, no evidence of inflammation in joints, FROM in all extremities.     SKIN: no suspicious lesions or rashes     NEURO: Normal strength and tone, sensory exam grossly normal, mentation intact and speech normal     PSYCH: mentation appears normal. and affect normal/bright     LYMPHATICS: No axillary, cervical, or supraclavicular nodes    DIAGNOSTICS:                                                    Just had ekg done at [facility], see in chart  Also had recent cxr at [facility], no acute findings     On 5-25-17 at [facility], creatinine 0.75, potassium 4.3    Hemoglobin 11.5, wbc 9.0., and platelets 241    Lipase normal, liver function tests normal    Last hemoglobin a1c 8.8 on 5-19-17    Also on that day, chol 201,     Recent Labs   Lab Test  05/19/17   1130 07/29/16 07/05/16   1148   10/06/14   1150   HGB  11.9   --   12.7   < >  12.7   PLT  253   --   263   < >  214   INR   --    --    --    --   1.03   NA  142   --   138   < >  137   POTASSIUM  4.3  4.2  4.4   < >  4.1   CR  0.60  0.78  0.57   < >  0.59   A1C  8.8*   --   8.4*   < >  8.8*    < > = values in this interval not displayed.        IMPRESSION:                                                    Reason for surgery/procedure: 71 year old with lump on back left of neck, to have biopsy done.  History of lung cancer   Diagnosis/reason for consult: pre-op clearance    The proposed surgical procedure is considered INTERMEDIATE risk.    REVISED CARDIAC RISK INDEX  The patient has the following serious cardiovascular risks for perioperative complications such as (MI, PE, VFib and 3  AV Block):  No serious cardiac risks  INTERPRETATION: 0 risks: Class I (very low risk - 0.4% complication rate)    The patient has the following additional risks for perioperative complications:  No identified additional risks      ICD-10-CM    1. Preop general physical exam Z01.818        RECOMMENDATIONS:                                                            --Patient is to take all scheduled medications on the day of surgery EXCEPT for modifications listed below.    Hold aspirin and fish oil this week    Patient will take 1/2 dose lantus night before procedure    Hold insulin am of procedure    Note patient recently started glipizide per endocrinology.  She will hold this night before and am of procedure.    Patient was taken off lisinopril last fall due to angioedema.  Blood pressure occasionally high.   Blood pressure okay here today.  We will start low dose hctz but patient can start this after procedure completed.    APPROVAL GIVEN to proceed with proposed procedure, without further diagnostic evaluation       Signed Electronically by: Kevin Esquivel MD    Copy of this evaluation report is provided to requesting physician.    [place] Preop Guidelines

## 2017-05-26 NOTE — PATIENT INSTRUCTIONS
Before Your Surgery      Call your surgeon if there is any change in your health. This includes signs of a cold or flu (such as a sore throat, runny nose, cough, rash or fever).    Do not smoke, drink alcohol or take over the counter medicine (unless your surgeon or primary care doctor tells you to) for the 24 hours before and after surgery.    If you take prescribed drugs: Follow your doctor s orders about which medicines to take and which to stop until after surgery.    Eating and drinking prior to surgery: follow the instructions from your surgeon    Take a shower or bath the night before surgery. Use the soap your surgeon gave you to gently clean your skin. If you do not have soap from your surgeon, use your regular soap. Do not shave or scrub the surgery site.  Wear clean pajamas and have clean sheets on your bed.         Hold aspirin and fish oil this week    Take 1/2 dose lantus night before procedure    No insulin morning of procedure    Hold glipizide night before and am of procedure    Start new blood pressure med hydrochlorothiazide 12.5 once daily in am, a couple days after procedure

## 2017-05-30 ENCOUNTER — OFFICE VISIT (OUTPATIENT)
Dept: FAMILY MEDICINE | Facility: CLINIC | Age: 72
End: 2017-05-30
Payer: COMMERCIAL

## 2017-05-30 VITALS
SYSTOLIC BLOOD PRESSURE: 128 MMHG | HEART RATE: 70 BPM | WEIGHT: 167 LBS | TEMPERATURE: 98.8 F | BODY MASS INDEX: 28.67 KG/M2 | OXYGEN SATURATION: 98 % | DIASTOLIC BLOOD PRESSURE: 71 MMHG

## 2017-05-30 DIAGNOSIS — I10 HYPERTENSION GOAL BP (BLOOD PRESSURE) < 140/90: ICD-10-CM

## 2017-05-30 DIAGNOSIS — Z01.818 PREOP GENERAL PHYSICAL EXAM: Primary | ICD-10-CM

## 2017-05-30 DIAGNOSIS — R22.1 LUMP IN NECK: ICD-10-CM

## 2017-05-30 PROCEDURE — 99215 OFFICE O/P EST HI 40 MIN: CPT | Performed by: FAMILY MEDICINE

## 2017-05-30 RX ORDER — HYDROCHLOROTHIAZIDE 12.5 MG/1
12.5 TABLET ORAL DAILY
Qty: 30 TABLET | Refills: 1 | Status: SHIPPED | OUTPATIENT
Start: 2017-05-30 | End: 2018-02-22

## 2017-05-30 ASSESSMENT — PAIN SCALES - GENERAL: PAINLEVEL: NO PAIN (0)

## 2017-05-30 NOTE — MR AVS SNAPSHOT
After Visit Summary   5/30/2017    Leah Guerrero    MRN: [id number]           Patient Information     Date Of Birth          1945        Visit Information        Provider Department      5/30/2017 8:00 AM Kevin Esquivel MD [facility]        Today's Diagnoses     Preop general physical exam    -  1    Lump in neck        Hypertension goal BP (blood pressure) < 140/90          Care Instructions      Before Your Surgery      Call your surgeon if there is any change in your health. This includes signs of a cold or flu (such as a sore throat, runny nose, cough, rash or fever).    Do not smoke, drink alcohol or take over the counter medicine (unless your surgeon or primary care doctor tells you to) for the 24 hours before and after surgery.    If you take prescribed drugs: Follow your doctor s orders about which medicines to take and which to stop until after surgery.    Eating and drinking prior to surgery: follow the instructions from your surgeon    Take a shower or bath the night before surgery. Use the soap your surgeon gave you to gently clean your skin. If you do not have soap from your surgeon, use your regular soap. Do not shave or scrub the surgery site.  Wear clean pajamas and have clean sheets on your bed.         Hold aspirin and fish oil this week    Take 1/2 dose lantus night before procedure    No insulin morning of procedure    Hold glipizide night before and am of procedure    Start new blood pressure med hydrochlorothiazide 12.5 once daily in am, a couple days after procedure          Follow-ups after your visit        Who to contact     If you have questions or need follow up information about today's clinic visit or your schedule please contact [facility] directly at [phone].  Normal or non-critical lab and imaging results will be communicated to you by MyChart, letter or phone within 4 business days after the clinic has  received the results. If you do not hear from us within 7 days, please contact the clinic through [employer] or phone. If you have a critical or abnormal lab result, we will notify you by phone as soon as possible.  Submit refill requests through [employer] or call your pharmacy and they will forward the refill request to us. Please allow 3 business days for your refill to be completed.          Additional Information About Your Visit        [employer] Information     [employer] gives you secure access to your electronic health record. If you see a primary care provider, you can also send messages to your care team and make appointments. If you have questions, please call your primary care clinic.  If you do not have a primary care provider, please call [phone] and they will assist you.        Care EveryWhere ID     This is your Care EveryWhere ID. This could be used by other organizations to access your [place] medical records  [id number]        Your Vitals Were     Pulse Temperature Pulse Oximetry Breastfeeding? BMI (Body Mass Index)       70 98.8  F (37.1  C) (Oral) 98% No 28.67 kg/m2        Blood Pressure from Last 3 Encounters:   05/30/17 128/71   04/28/17 111/56   04/21/17 138/58    Weight from Last 3 Encounters:   05/30/17 167 lb (75.8 kg)   05/18/17 166 lb (75.3 kg)   04/28/17 164 lb (74.4 kg)              Today, you had the following     No orders found for display         Today's Medication Changes          These changes are accurate as of: 5/30/17  8:35 AM.  If you have any questions, ask your nurse or doctor.               Start taking these medicines.        Dose/Directions    hydrochlorothiazide 12.5 MG Tabs tablet   Used for:  Hypertension goal BP (blood pressure) < 140/90   Started by:  Kevin Esquivel MD        Dose:  12.5 mg   Take 1 tablet (12.5 mg) by mouth daily   Quantity:  30 tablet   Refills:  1            Where to get your medicines      These medications were sent to [facility]  [id number] - [place], [place] - [address]  [address], [place] [address]    Hours:  Test fax successful 12/11/02  KR Phone:  [phone]     hydrochlorothiazide 12.5 MG Tabs tablet                Primary Care Provider Office Phone # Fax #    Kevin Esquivel [phone]       [facility] [address]  [facility] [address]        Goals        General    I will continue the exercises provided by physical therapy for my knee (pt-stated)     Notes - Note created  7/9/2015 10:30 AM by Ray Holland RN    As of today's date 7/9/2015 goal is met at 0 - 25%.   Goal Status:  Active        I will remember to take my insulin before meals especially lunch when I am  away from home. (pt-stated)     Notes - Note created  3/25/2015  3:51 PM by Ray Holland RN      As of today's date 3/25/2015 goal is met at 0 - 25%.   Goal Status:  Active.          I will work on testing my blood sugar and taking my insulin when I am away from home at lunch time. (pt-stated)     Notes - Note created  5/20/2016  2:43 PM by Ray Holland RN    As of today's date 5/20/2016 goal is met at 0 - 25%.   Goal Status:  Active          Thank you!     Thank you for choosing [facility]  for your care. Our goal is always to provide you with excellent care. Hearing back from our patients is one way we can continue to improve our services. Please take a few minutes to complete the written survey that you may receive in the mail after your visit with us. Thank you!             Your Updated Medication List - Protect others around you: Learn how to safely use, store and throw away your medicines at www.disposemymeds.org.          This list is accurate as of: 5/30/17  8:35 AM.  Always use your most recent med list.                   Brand Name Dispense Instructions for use    ACE/ARB NOT PRESCRIBED (INTENTIONAL)      ACE & ARB not prescribed due to  Allergy       * albuterol (2.5 MG/3ML) 0.083% neb solution     1 Box    Take 3 mLs (2.5 mg) by nebulization 4 times daily as needed       * albuterol 108 (90 BASE) MCG/ACT Inhaler    PROAIR HFA/PROVENTIL HFA/VENTOLIN HFA    2 Inhaler    Inhale 2 puffs into the lungs every 6 hours as needed for shortness of breath / dyspnea or wheezing       aspirin 81 MG tablet      Take 1 tablet by mouth daily. 1 daily       blood glucose monitoring test strip    ONE TOUCH TEST STRIPS    3 Month    Please dispense the particular type of lancets and test strips that patient needs.  She tests frequently, averaging 6 times per day. Okay to dispense 3 months supplies with refills for a year       calcium polycarbophil 625 MG tablet    FIBERCON    60 tablet    Take 2 tablets (1,250 mg) by mouth daily       CALCIUM-D PO      Take  by mouth. 1200mg calcium/600mg D3       fish oil-omega-3 fatty acids 1000 MG capsule      Take 1 capsule by mouth daily.       Garlic Caps      Take  by mouth. One daily       hydrochlorothiazide 12.5 MG Tabs tablet     30 tablet    Take 1 tablet (12.5 mg) by mouth daily       ibuprofen 800 MG tablet    ADVIL/MOTRIN    100 tablet    Take 1 tablet (800 mg) by mouth every 8 hours as needed for pain       insulin aspart 100 UNIT/ML injection    NovoLOG PEN    30 mL    Patient uses 100 units daily split up between multiple doses. Take 2 units per 15 grams of carbohydrate eaten and 2 units per 30 mg/dL above 170 mg/dL.       insulin glargine 100 UNIT/ML injection    LANTUS SOLOSTAR    30 mL    Take 50 units in the morning and 20 units in the evening.       LORazepam 1 MG tablet    ATIVAN    30 tablet    Take 1 tablet (1 mg) by mouth every 8 hours as needed for anxiety       meperidine 50 MG tablet    DEMEROL    20 tablet    Take 1 tablet (50 mg) by mouth every 4 hours as needed       MULTIVITAMIN TABS   OR      1 TABLET DAILY       oxyCODONE-acetaminophen 5-325 MG per tablet    PERCOCET     Take by mouth every 4  "hours as needed for moderate to severe pain       pen needles 5/16\" 31G X 8 MM Misc     180 each    Patient does 4 doses novolog and 2 lantus shots  insulin daily.  Needs 180 needles per month. Okay refills for one year.       polyethylene glycol powder    MIRALAX    510 g    Take 17 g by mouth daily as needed for constipation       promethazine 25 MG tablet    PHENERGAN    30 tablet    Take 1 tablet (25 mg) by mouth every 6 hours as needed for nausea       rosuvastatin 20 MG tablet    CRESTOR    90 tablet    Take 1 tablet (20 mg) by mouth daily       SALINE      to clean port every other month       senna-docusate 8.6-50 MG per tablet    SENOKOT-S;PERICOLACE    100 tablet    1-2 po bid prn constipation       tiZANidine 2 MG tablet    ZANAFLEX    30 tablet    Take 1 tablet (2 mg) by mouth 3 times daily as needed for muscle spasms       zolpidem 5 MG tablet    AMBIEN    30 tablet    Take 1 tablet (5 mg) by mouth nightly as needed for sleep       * Notice:  This list has 2 medication(s) that are the same as other medications prescribed for you. Read the directions carefully, and ask your doctor or other care provider to review them with you.      "

## 2017-05-30 NOTE — NURSING NOTE
"Chief Complaint   Patient presents with     Pre-Op Exam     Health Maintenance       Initial /71 (BP Location: Right arm, Patient Position: Chair, Cuff Size: Adult Regular)  Pulse 70  Temp 98.8  F (37.1  C) (Oral)  Wt 167 lb (75.8 kg)  SpO2 98%  Breastfeeding? No  BMI 28.67 kg/m2 Estimated body mass index is 28.67 kg/(m^2) as calculated from the following:    Height as of 4/21/17: 5' 4\" (1.626 m).    Weight as of this encounter: 167 lb (75.8 kg).  Medication Reconciliation: complete   Emma Medina CMA      "

## 2017-06-01 ENCOUNTER — TELEPHONE (OUTPATIENT)
Dept: FAMILY MEDICINE | Facility: CLINIC | Age: 72
End: 2017-06-01

## 2017-06-01 NOTE — TELEPHONE ENCOUNTER
Reason for Call:  Other call back/Fax    Detailed comments:Mary from [facility] called and is requesting a copy of the patients Pre-op from 05/30/17 be faxed to [phone], if you have any questions please call at [phone]  Phone Number can be reached at:[phone]  Best Time:Anytime  Can we leave a detailed message on this number? YES    Call taken on 6/1/2017 at 7:39 AM by Amalia Patiño

## 2017-06-14 ENCOUNTER — TRANSFERRED RECORDS (OUTPATIENT)
Dept: HEALTH INFORMATION MANAGEMENT | Facility: CLINIC | Age: 72
End: 2017-06-14

## 2017-06-15 ENCOUNTER — TRANSFERRED RECORDS (OUTPATIENT)
Dept: HEALTH INFORMATION MANAGEMENT | Facility: CLINIC | Age: 72
End: 2017-06-15

## 2017-06-18 ENCOUNTER — TELEPHONE (OUTPATIENT)
Dept: FAMILY MEDICINE | Facility: CLINIC | Age: 72
End: 2017-06-18

## 2017-06-18 DIAGNOSIS — M79.2 NERVE PAIN: ICD-10-CM

## 2017-06-19 RX ORDER — GABAPENTIN 100 MG/1
CAPSULE ORAL
Qty: 180 CAPSULE | Refills: 0 | Status: SHIPPED | OUTPATIENT
Start: 2017-06-19 | End: 2017-07-19

## 2017-06-19 NOTE — TELEPHONE ENCOUNTER
Okay x one but then see us in the next month in clinic.    Please inform patient    Kevin Esquivel MD

## 2017-06-19 NOTE — TELEPHONE ENCOUNTER
gabapentin (NEURONTIN) 100 MG capsule (Discontinued)      Last Written Prescription Date:  1/5/17  Last Fill Quantity: 180,   # refills: 5  Last Office Visit with G, P or [facility] prescribing provider: 5/30/17  Future Office visit:       Routing refill request to provider for review/approval because:  Drug not active on patient's medication list

## 2017-06-19 NOTE — TELEPHONE ENCOUNTER
Spoke with patient and informed.  Patient states that she is not longer taking this medication and to disregard any further requests from pharmacy.

## 2017-08-14 ENCOUNTER — TELEPHONE (OUTPATIENT)
Dept: FAMILY MEDICINE | Facility: CLINIC | Age: 72
End: 2017-08-14

## 2017-08-14 NOTE — LETTER
August 14, 2017    Leah Guerrero  [address]   [place] [address]    Dear Leah    We care about your health and have reviewed your health plan. We have reviewed your medical conditions, medication list, and lab results and are making recommendations based on this review, to better manage your health.    You are in particular need of attention regarding:  - Scheduling a Breast Cancer Screening (Mammography) [phone]      Here is a list of Health Maintenance topics that are due now or due soon:  Health Maintenance Due   Topic Date Due     MEDICARE ANNUAL WELLNESS VISIT  11/27/1963     EYE EXAM Q1 YEAR  09/16/2011     DIABETIC EDUCATION Q1 YEAR  05/24/2012     DEXA Q3 YR  02/22/2014     PNEUMOCOCCAL (2 of 2 - PCV13) 04/24/2014     MAMMO SCREEN Q2 YR (SYSTEM ASSIGNED)  04/13/2017     We will be calling you in the next couple of weeks to help you schedule any appointments that are needed.  Please call us at [phone] (or use [employer]) to address the above recommendations.     Thank you for trusting [facility] and we appreciate the opportunity to serve you.  We look forward to supporting your healthcare needs in the future.    Healthy Regards,    Your Care Team

## 2017-08-14 NOTE — TELEPHONE ENCOUNTER
Panel Management Review      Patient has the following on her problem list:     Hypertension   Last three blood pressure readings:  BP Readings from Last 3 Encounters:   05/30/17 128/71   04/28/17 111/56   04/21/17 138/58     Blood pressure: Passed    HTN Guidelines:  Age 18-59 BP range:  Less than 140/90  Age 60-85 with Diabetes:  Less than 140/90  Age 60-85 without Diabetes:  less than 150/90        Composite cancer screening  Chart review shows that this patient is due/due soon for the following Mammogram  Summary:    Patient is due/failing the following:   MAMMOGRAM    Action needed:   Patient needs referral/order: Mammogram    Type of outreach:    Sent letter.    Questions for provider review:    None                                                                                                                                    Raine Hawkins MA       Chart routed to Care Team .

## 2017-09-06 ENCOUNTER — TRANSFERRED RECORDS (OUTPATIENT)
Dept: HEALTH INFORMATION MANAGEMENT | Facility: CLINIC | Age: 72
End: 2017-09-06

## 2017-09-27 ENCOUNTER — OFFICE VISIT (OUTPATIENT)
Dept: ORTHOPEDICS | Facility: CLINIC | Age: 72
End: 2017-09-27
Payer: COMMERCIAL

## 2017-09-27 VITALS — WEIGHT: 179 LBS | HEIGHT: 64 IN | RESPIRATION RATE: 16 BRPM | BODY MASS INDEX: 30.56 KG/M2

## 2017-09-27 DIAGNOSIS — M17.12 PRIMARY OSTEOARTHRITIS OF LEFT KNEE: Primary | ICD-10-CM

## 2017-09-27 PROCEDURE — 20610 DRAIN/INJ JOINT/BURSA W/O US: CPT | Mod: LT | Performed by: ORTHOPAEDIC SURGERY

## 2017-09-27 RX ORDER — METHYLPREDNISOLONE ACETATE 80 MG/ML
80 INJECTION, SUSPENSION INTRA-ARTICULAR; INTRALESIONAL; INTRAMUSCULAR; SOFT TISSUE ONCE
Qty: 5 ML | Refills: 0 | OUTPATIENT
Start: 2017-09-27 | End: 2017-09-27

## 2017-09-27 ASSESSMENT — PAIN SCALES - GENERAL: PAINLEVEL: MODERATE PAIN (5)

## 2017-09-27 NOTE — LETTER
9/27/2017         RE: Leah Guerrero  [address]   [place] [id number]        Dear Colleague,    Thank you for referring your patient, Leah Guerrero, to the [facility]. Please see a copy of my visit note below.    Chief Complaint   Patient presents with     RECHECK     Left knee pain follow-up. Had knee scope on 10/10/14. Last cortisone injection on 3/30/16. This helped a lot up until last week when she twisted her leg. She's having pain on the lateral aspect of the knee.        HISTORY OF PRESENT ILLNESS:  Leah Guerrero is a 71 year old female seen for left knee pain, advanced primary osteoarthritis. She had prior left knee arthroscopy on 10/10/2014. She returns today with moderate pain today, rated a 5/10. Her last injection was on 3/30/2016, 1.5 years ago. She reports this injection worked great until last week after twisting her leg. Pain is located over the lateral and medial aspects of the knee. She would like a repeat injection today. Presents today with her dog.    Past Medical History:   Diagnosis Date     Abnormal CT of the chest 10/12    Nodular consolidation right lobe 1.6 cm. Needs repeat CT Jan 2013     Bleeding disorder (H)     in bowels x2      Chronic pancreatitis (H)      CVA (cerebral infarction)      Diabetes      Female stress incontinence      Hyperlipidemia LDL goal <100      Hypertension      Intermittent asthma 12/30/2011     Lateral epicondylitis (tennis elbow) - left 3/30/2011     OA (osteoarthritis) of knee 3/22/2013     Pulmonary nodule, left 10/12    0.5 cm; needs f/u chest CT scan Jan 2013     Surgical complications      Unspecified asthma(493.90)      Patient Active Problem List    Diagnosis Date Noted     Malignant neoplasm of lung, unspecified laterality, unspecified part of lung (H) 04/21/2017     Priority: Medium     Chronic pancreatitis, unspecified pancreatitis type (H) 11/16/2016     Priority: Medium     Type 2 diabetes  mellitus without complication, with long-term current use of insulin (H) 11/16/2016     Priority: Medium     Anxiety 07/20/2016     Priority: Medium     Insomnia, unspecified type 06/07/2016     Priority: Medium     Idiopathic chronic pancreatitis (H) 11/04/2015     Priority: Medium     Primary osteoarthritis of left knee 10/28/2015     Priority: Medium     Hypertension goal BP (blood pressure) < 140/90 05/11/2015     Priority: Medium     Obesity 01/26/2015     Priority: Medium     Health Care Home 10/22/2014     Priority: Medium       Status:  Accepted  Care Coordinator:  Ray Holland    See Letters for [facility] Care Plan  Date:  March 25, 2015               Intermittent asthma without complication 07/11/2014     Priority: Medium     IT band syndrome 01/13/2014     Priority: Medium     Contusion of knee 11/20/2013     Priority: Medium     Prepatellar bursitis of left knee 11/20/2013     Priority: Medium     Insomnia 05/30/2013     Priority: Medium     Mechanical low back pain 03/22/2013     Priority: Medium     Radicular pain of left lower extremity 03/22/2013     Priority: Medium     GERD (gastroesophageal reflux disease) 02/06/2013     Priority: Medium     Pulmonary nodule, left      Priority: Medium     0.5 cm; needs f/u chest CT scan Jan 2013       Abnormal CT of the chest      Priority: Medium     Nodular consolidation right lobe 1.6 cm. Needs repeat CT Jan 2013       Anxiety state 06/21/2012     Priority: Medium     Problem list name updated by automated process. Provider to review       Intermittent asthma 12/30/2011     Priority: Medium     Pancreatitis, chronic (H) 09/09/2011     Priority: Medium     Advanced directives, counseling/discussion 05/06/2011     Priority: Medium     04/29/2013  Advance Care Planning:   Receipt of ACP document:  Received: Health Care Directive which was witnessed or notarized on 07/06/2011.  Document previously scanned on 07/07/2011.  Validation form completed and sent to be scanned.   Code Status reflects choices in most recent ACP document.  Confirmed/documented designated decision maker(s). See permanent comments section of demographics in clinical tab. View document(s) and details by clicking on code status.   Added by Lacie Canchola on 4/29/2013.          Advance Care Planning:   ACP Review and Resources Provided:  Reviewed chart for advance care plan.  Leah Guerrero has no plan or code status on file however states presence of ACP document. Copy requested. Confirmed code status reflects current choices pending receipt of document/advance care plan review. Confirmed/documented designated decision maker(s). See permanent comments section of demographics in clinical tab.   Added by Emma Medina on 4/24/2013          Planning  Advance Directive Initial Visit  Leah Guerrero presents in person for initial session regarding completion of advanced directive form. She was referred to the facilitator by self.  She currently has an advance directive from 1980 & wants to fill out a new updated one.  Plan:  Advanced directive form, healthcare agent information card, advanced care planning information booklet provided to patient.   Follow up meeting: to be arranged when patient calls back to make an appointment after reviewing documents in one week or so. Patient instructed to bring healthcare agent to this meeting.  Flores Gerard RN  Letter sent to patient for [employer] follow up.  6/22/2011  Flores Gerard RN  Advance Directive Follow-up Visit  Leah Guerrero presents for advanced care planning session accompanied by no one.  Reviewed definitions of advanced care planning and advance directive form, and informational handouts given to patient previously.  Patient has questions and /or concerns about acp process or form .  Reviewed advance directive form with patient & answered all of her questions. Designated healthcare agent is identified. Healthcare agent s name is Paul  Cesar. My Hopes and Wishes reviewed.  Finalized wishes are clear to patient Yes  Patient and designated healthcare agent are aware that document may be changed at any time in the future.  Advanced directive form: completed at this visit.  Advanced directive form: verified with notary signature. Original and two copies of advanced directive form given to patient copy sent to  for scanning into EMR and original given to patient and instructed to give copy to designated [facility] and non-[place] physician where applicable.  Flores Gerard RN  7/6/2011  Advance Directive Problem List Overview:   Name Relationship Phone    Primary Health Care Agent Paul Guerrero Son [phone]         Alternative Health Care Agent Hiram Guerrero Son c [phone]  [phone]                      Hyperlipidemia LDL goal <100 12/16/2010     Priority: Medium     Fatigue 12/15/2010     Priority: Medium     Type 2 diabetes, HbA1C goal < 8% (H) 11/24/2010     Priority: Medium     Female stress incontinence 10/28/2010     Priority: Medium     Past Surgical History:   Procedure Laterality Date     APPENDECTOMY  2012     C HAND/FINGER SURGERY UNLISTED       C SHOULDER SURG PROC UNLISTED       C STOMACH SURGERY PROCEDURE UNLISTED       CHOLECYSTECTOMY  1969     COLONOSCOPY  6-2-08    Neg. At [facility]     COLONOSCOPY  9-3-13     EYE SURGERY       HYSTERECTOMY TOTAL ABD, HEIDI SALPINGO-OOPHORECTOMY, NODE DISSECTION, TUMOR DEBULKING, COMBINED  1988    fibroids?     KNEE SURGERY  x5     KNEE SURGERY      kneecap procedure     LUNG SURGERY  10-7-16    removal lung cancer     SHOULDER SURGERY  2005    right shoulder, bone spurs     WRIST SURGERY      right wrist       Medications:   Current Outpatient Prescriptions:      gabapentin (NEURONTIN) 100 MG capsule, TAKE TWO CAPSULES BY MOUTH THREE TIMES DAILY, Disp: 180 capsule, Rfl: 3     hydrochlorothiazide 12.5 MG TABS tablet, Take 1 tablet (12.5 mg) by mouth daily, Disp: 30 tablet, Rfl: 1      "insulin aspart (NOVOLOG PEN) 100 UNIT/ML injection, Patient uses 100 units daily split up between multiple doses. Take 2 units per 15 grams of carbohydrate eaten and 2 units per 30 mg/dL above 170 mg/dL., Disp: 30 mL, Rfl: 1     insulin glargine (LANTUS SOLOSTAR) 100 UNIT/ML injection, Take 50 units in the morning and 20 units in the evening., Disp: 30 mL, Rfl: 1     oxyCODONE-acetaminophen (PERCOCET) 5-325 MG per tablet, Take by mouth every 4 hours as needed for moderate to severe pain, Disp: , Rfl:      rosuvastatin (CRESTOR) 20 MG tablet, Take 1 tablet (20 mg) by mouth daily, Disp: 90 tablet, Rfl: 1     zolpidem (AMBIEN) 5 MG tablet, Take 1 tablet (5 mg) by mouth nightly as needed for sleep, Disp: 30 tablet, Rfl: 2     promethazine (PHENERGAN) 25 MG tablet, Take 1 tablet (25 mg) by mouth every 6 hours as needed for nausea, Disp: 30 tablet, Rfl: 1     meperidine (DEMEROL) 50 MG tablet, Take 1 tablet (50 mg) by mouth every 4 hours as needed, Disp: 20 tablet, Rfl: 0     Insulin Pen Needle (PEN NEEDLES 5/16\") 31G X 8 MM MISC, Patient does 4 doses novolog and 2 lantus shots  insulin daily.  Needs 180 needles per month. Okay refills for one year., Disp: 180 each, Rfl: 11     albuterol (PROAIR HFA/PROVENTIL HFA/VENTOLIN HFA) 108 (90 BASE) MCG/ACT Inhaler, Inhale 2 puffs into the lungs every 6 hours as needed for shortness of breath / dyspnea or wheezing, Disp: 2 Inhaler, Rfl: 5     LORazepam (ATIVAN) 1 MG tablet, Take 1 tablet (1 mg) by mouth every 8 hours as needed for anxiety, Disp: 30 tablet, Rfl: 0     tiZANidine (ZANAFLEX) 2 MG tablet, Take 1 tablet (2 mg) by mouth 3 times daily as needed for muscle spasms, Disp: 30 tablet, Rfl: 1     ACE/ARB NOT PRESCRIBED, INTENTIONAL,, ACE & ARB not prescribed due to Allergy, Disp: , Rfl:      calcium polycarbophil (FIBERCON) 625 MG tablet, Take 2 tablets (1,250 mg) by mouth daily, Disp: 60 tablet, Rfl: 11     ibuprofen (ADVIL,MOTRIN) 800 MG tablet, Take 1 tablet (800 mg) by " mouth every 8 hours as needed for pain, Disp: 100 tablet, Rfl: 0     senna-docusate (SENOKOT-S;PERICOLACE) 8.6-50 MG per tablet, 1-2 po bid prn constipation, Disp: 100 tablet, Rfl: 1     blood glucose monitoring (ONE TOUCH TEST STRIPS) test strip, Please dispense the particular type of lancets and test strips that patient needs.  She tests frequently, averaging 6 times per day. Okay to dispense 3 months supplies with refills for a year, Disp: 3 Month, Rfl: 3     polyethylene glycol (MIRALAX) powder, Take 17 g by mouth daily as needed for constipation, Disp: 510 g, Rfl: 1     albuterol (2.5 MG/3ML) 0.083% nebulizer solution, Take 3 mLs (2.5 mg) by nebulization 4 times daily as needed, Disp: 1 Box, Rfl: 5     fish oil-omega-3 fatty acids (FISH OIL) 1000 MG capsule, Take 1 capsule by mouth daily., Disp: , Rfl:      Calcium Carbonate-Vitamin D (CALCIUM-D PO), Take  by mouth. 1200mg calcium/600mg D3, Disp: , Rfl:      Garlic CAPS, Take  by mouth. One daily, Disp: , Rfl:      aspirin 81 MG tablet, Take 1 tablet by mouth daily. 1 daily, Disp: , Rfl:      SALINE, to clean port every other month, Disp: , Rfl:      MULTIVITAMIN TABS   OR, 1 TABLET DAILY, Disp: , Rfl:     Allergies:   Allergies   Allergen Reactions     Hydralazine Shortness Of Breath     Famotidine Nausea and Vomiting and Unknown     Benadryl Itch Stopping      Gives her more energy, can't sleep     Lisinopril      Tongue swelling       Metoclopramide Nausea and Vomiting     Ondansetron Nausea and Vomiting     Other reaction(s): Headache     Penicillins Hives     Tape [Adhesive Tape]      blisters     Tylenol      Anxiety  Tablets only.       REVIEW OF SYSTEMS:   CONSTITUTIONAL:NEGATIVE for fever, chills, night sweats  INTEGUMENTARY/SKIN: NEGATIVE for worrisome wound problems or redness.  MUSCULOSKELETAL:See HPI above  NEURO: NEGATIVE for weakness, dizziness or paresthesias    This document serves as a record of the services and decisions personally performed  "and made by José Vogel MD. It was created on his behalf by Nely Pratt, a trained medical scribe. The creation of this document is based the provider's statements to the medical scribe.    Scribe Nely Pratt 10:55 AM 9/27/2017     PHYSICAL EXAM:  Resp 16  Ht 1.613 m (5' 3.5\")  Wt 81.2 kg (179 lb)  BMI 31.21 kg/m2   GENERAL APPEARANCE: healthy, alert, no distress, accompanied by her dog   SKIN: no suspicious lesions or rashes  NEURO: Normal strength and tone, mentation intact and speech normal  PSYCH:  mentation appears normal and affect normal/bright, not anxious  RESPIRATORY: No increased work of breathing.    left  LOWER EXTREMITY:  Gait: antalgic left.  mild Quad atrophy, strength weakened.  Intact sensation deep peroneal nerve, superficial peroneal nerve, med/lat tibial nerve, sural nerve, saphenous nerve  Intact EHL, EDL, TA, FHL, GS, quadriceps hamstrings and hip flexors  Toes warm and well perfused, brisk capillary refill. Palpable 2+ dp pulses.  calf soft and nttp or squeeze.  Edema: trace    left  KNEE EXAM:    Skin: intact, no ecchymosis or erythema; mild swelling.  ROM: 5 degrees extension to 95 degrees flexion, discomfort at extremes  Effusion: none  Tender: med/lat joint line, anterior/posterior knee.      X-RAY: no new xrays today.    3 views left knee 3/30/2016 reviewed: Bone on bone medial loss of joint space, subchondral sclerosis, marginal osteohytes. Possible lucency under medial femoral condyle subchondral bone. Patello-femoral marginal osteophytes. Arthritis progression since prior xrays.    LEFT KNEE INTRAOP ARTHROSCOPY FINDINGS   1. Trace effusion.   2. Moderate synovitis.   3. Grade 4 chondrosis of the patella and grade 3 chondrosis of the femoral trochlea.   4. Prominent medial plica with rubbing along the anterior aspect of the medial femoral condyle.   5. No loose bodies in the media or lateral gutter but small marginal osteophytes.   6. Intact ACL, within the notch. There was some " fraying or partial tearing of fibers of the PCL with PCL grossly otherwise intact.   7. Lateral compartment with a small free edge flap tear off of the posterior horn of the lateral meniscus and a partial thickness undersurface tear towards the periphery of the posterior horn body junction.   8. Lateral compartment with an area of grade 4 chondrosis of the weight-bearing portion of the femoral condyle, as well as grade 4 chondrosis of the posterior medial aspect of the lateral tibia.   9. Complex tearing of the posterior horn and body of the medial meniscus with horizontal cleavage and radial tears with a displaced flap off of the medial aspect of the body of the meniscus.   10. Medial compartment with grade 4 chondrosis of the majority of the medial femoral condyle and grade 3 diffuse chondrosis of the medial tibia with areas of grade 4 chondrosis        Impression: Leah Guerrero is a 71 year old female with left knee pain, primary osteoarthritis.       Plan:   Discussed typical symptoms of arthritic pain is pain aggravated with weight bearing activities, stiffness, relieved by sitting or rest. Swelling may be associated. It is common to have some grinding and popping in the knee with arthritis.    Discussed various treatments for degenerative arthrosis of the knee, including nonoperative and operative approaches. Nonoperative approaches, which are exhausted prior to operative treatment, include doing nothing and living with the pain as patient has been doing, activity modification (avoid aggravating activities), physical therapy and strengthening exercises, weight loss, anti-inflammatory medications, bracing, ambulatory aids (cane, walker) and injections. Once these have been tried and are deemed unsuccessful with a good effort, and the patient is an appropriate candidate, the next treatment would be knee arthroplasty or replacement. Depending on the location of the arthritis, knee replacement can be partial  "(one side of the knee affected by arthritis) or a total knee arthroplasty (all 3 compartments). The risks, perceived benefits and perioperative rehabilitation expectations of knee arthroplasty were discussed in detail. Also discussed that approximately 10% of patients that undergo knee arthroplasty are not happy following surgery and may have worse pain or no improvement in pain, contrary to their preoperative expectations.    At this time, nonoperative treatment will be continued.    Non-surgical treatment for knee arthritis includes:    * rest, sitting  * Activity modification - avoid impact activities or activities that aggravate symptoms.  * NSAIDS (non-steroidal anti-inflammatory medications; e.g. Aleve, advil, motrin, ibuprofen) - regular use for inflammation ( twice daily or three times daily), with food, as long as no contra-indications Please discuss with primary care doctor if needed  * ice, 15-20 minutes at a time several times a day or as needed.  * Strengthening of quadriceps muscles  * Physical Therapy for strengthening, stretching and range of motion exercises of legs  * Tylenol as needed for pain, consider Tylenol arthritis or similar  * Weight loss: Weight loss:  Body mass index is 31.21 kg/(m^2).. weight loss benefits, not only for the current pain symptoms, but also overall health. Recommend a good diet plan that works for the patient, with the assistance of a dietician or primary care doctor as needed. Also, a good, low-impact exercise program for at least 20 minutes per day, 3 times per week, such as exercise bike, elliptical , or pool.  * Exercise: low impact such as stationary bike, elliptical, pool.  * Injections: cortisone versus viscosupplementation (hyaluronic acid, \"rooster comb\", \"gel shots\"); risks and perceived benefits discussed today. Patient elects to proceed.  * Bracing: bracing the knee may offer some relief of symptoms when worn and provide some stability.  * over the " counter supplements such as glucosamine and chondroitin sulfate may help with joint pain.  * topical ointments may help as well    * return to clinic as needed.      PROCEDURE NOTE:  The risks, perceived benefits and potential complications (including but not limited to: bleeding, infection, pain, scar, damage to adjacent structures, atrophy or necrosis of soft tissue, skin blanching, failure to relieve symptoms, worsening of symptoms, allergic reaction) of injection were discussed with the patient. Questions were addressed and answered.The patient elected to proceed. Written informed consent was obtained. The correct procedural site was identified and confirmed. A left knee intraarticular injection was performed using 1mL Depo Medrol 80mg per mL and 7mL (4mL 1% lidocaine, 3mL 0.25% marcaine)  of local anesthetic after sterile prep, to the correct procedural site. Sterile bandaid applied. This was tolerated well by the patient. No apparent complications. Did also discuss that if diabetic, recommend close monitoring of blood sugars over the next week as cortisone injections can temporarily elevate blood sugars.    The information in this document, created by a scribe for me, accurately reflects the services I personally performed and the decisions made by me. I have reviewed and approved this document for accuracy.      José Vogel M.D., M.S.  Dept. of Orthopaedic Surgery  [facility]      The patient's left knee was prepped with betadine solution after verification of allergies. Area approximately 10 cm x 10 cm prepped in a sterile fashion. After injection, betadine removed with soap and water and band-aids applied.    4cc Lidocaine 1% NDC 1377-4853-46, [id number],  [id number]  3cc Bupivacaine 0.25% NDC 7148-9751-38, LOT -[id number],  [id number]  1cc Depo Medrol 80 mg/ml NDC 3693-8055-13, LOT [id number],  2019     injected into patient's left Knee by:  Grant Dillon PA-C, CAQ  (Ortho)  Supervising Physician: José Vogel M.D., M.S.  Dept. of Orthopaedic Surgery  [facility]            Again, thank you for allowing me to participate in the care of your patient.        Sincerely,        José Vogel MD

## 2017-09-27 NOTE — NURSING NOTE
"Chief Complaint   Patient presents with     RECHECK     Left knee pain follow-up. Had knee scope on 10/10/14. Last cortisone injection on 3/30/16. This helped a lot up until last week when she twisted her leg. She's having pain on the lateral aspect of the knee.        Initial Resp 16  Ht 1.613 m (5' 3.5\")  Wt 81.2 kg (179 lb)  BMI 31.21 kg/m2 Estimated body mass index is 31.21 kg/(m^2) as calculated from the following:    Height as of this encounter: 1.613 m (5' 3.5\").    Weight as of this encounter: 81.2 kg (179 lb).  Medication Reconciliation: complete   Grant Dillon PA-C, CAQ (Ortho)  Supervising Physician: José Vogel M.D., M.S.  Dept. of Orthopaedic Surgery  [facility]      "

## 2017-09-27 NOTE — PROGRESS NOTES
Chief Complaint   Patient presents with     RECHECK     Left knee pain follow-up. Had knee scope on 10/10/14. Last cortisone injection on 3/30/16. This helped a lot up until last week when she twisted her leg. She's having pain on the lateral aspect of the knee.        HISTORY OF PRESENT ILLNESS:  Leah Guerrero is a 71 year old female seen for left knee pain, advanced primary osteoarthritis. She had prior left knee arthroscopy on 10/10/2014. She returns today with moderate pain today, rated a 5/10. Her last injection was on 3/30/2016, 1.5 years ago. She reports this injection worked great until last week after twisting her leg. Pain is located over the lateral and medial aspects of the knee. She would like a repeat injection today. Presents today with her dog.    Past Medical History:   Diagnosis Date     Abnormal CT of the chest 10/12    Nodular consolidation right lobe 1.6 cm. Needs repeat CT Jan 2013     Bleeding disorder (H)     in bowels x2      Chronic pancreatitis (H)      CVA (cerebral infarction)      Diabetes      Female stress incontinence      Hyperlipidemia LDL goal <100      Hypertension      Intermittent asthma 12/30/2011     Lateral epicondylitis (tennis elbow) - left 3/30/2011     OA (osteoarthritis) of knee 3/22/2013     Pulmonary nodule, left 10/12    0.5 cm; needs f/u chest CT scan Jan 2013     Surgical complications      Unspecified asthma(493.90)      Patient Active Problem List    Diagnosis Date Noted     Malignant neoplasm of lung, unspecified laterality, unspecified part of lung (H) 04/21/2017     Priority: Medium     Chronic pancreatitis, unspecified pancreatitis type (H) 11/16/2016     Priority: Medium     Type 2 diabetes mellitus without complication, with long-term current use of insulin (H) 11/16/2016     Priority: Medium     Anxiety 07/20/2016     Priority: Medium     Insomnia, unspecified type 06/07/2016     Priority: Medium     Idiopathic chronic pancreatitis (H) 11/04/2015      Priority: Medium     Primary osteoarthritis of left knee 10/28/2015     Priority: Medium     Hypertension goal BP (blood pressure) < 140/90 05/11/2015     Priority: Medium     Obesity 01/26/2015     Priority: Medium     Health Care Home 10/22/2014     Priority: Medium       Status:  Accepted  Care Coordinator:  Ray Holland    See Letters for [facility] Care Plan  Date:  March 25, 2015               Intermittent asthma without complication 07/11/2014     Priority: Medium     IT band syndrome 01/13/2014     Priority: Medium     Contusion of knee 11/20/2013     Priority: Medium     Prepatellar bursitis of left knee 11/20/2013     Priority: Medium     Insomnia 05/30/2013     Priority: Medium     Mechanical low back pain 03/22/2013     Priority: Medium     Radicular pain of left lower extremity 03/22/2013     Priority: Medium     GERD (gastroesophageal reflux disease) 02/06/2013     Priority: Medium     Pulmonary nodule, left      Priority: Medium     0.5 cm; needs f/u chest CT scan Jan 2013       Abnormal CT of the chest      Priority: Medium     Nodular consolidation right lobe 1.6 cm. Needs repeat CT Jan 2013       Anxiety state 06/21/2012     Priority: Medium     Problem list name updated by automated process. Provider to review       Intermittent asthma 12/30/2011     Priority: Medium     Pancreatitis, chronic (H) 09/09/2011     Priority: Medium     Advanced directives, counseling/discussion 05/06/2011     Priority: Medium     04/29/2013  Advance Care Planning:   Receipt of ACP document:  Received: Health Care Directive which was witnessed or notarized on 07/06/2011.  Document previously scanned on 07/07/2011.  Validation form completed and sent to be scanned.  Code Status reflects choices in most recent ACP document.  Confirmed/documented designated decision maker(s). See permanent comments section of demographics in clinical tab. View document(s) and details by clicking on code status.   Added by Lacie Canchola on  4/29/2013.          Advance Care Planning:   ACP Review and Resources Provided:  Reviewed chart for advance care plan.  Leah Guerrero has no plan or code status on file however states presence of ACP document. Copy requested. Confirmed code status reflects current choices pending receipt of document/advance care plan review. Confirmed/documented designated decision maker(s). See permanent comments section of demographics in clinical tab.   Added by Emma Medina on 4/24/2013          Planning  Advance Directive Initial Visit  Leah Guerrero presents in person for initial session regarding completion of advanced directive form. She was referred to the facilitator by self.  She currently has an advance directive from 1980 & wants to fill out a new updated one.  Plan:  Advanced directive form, healthcare agent information card, advanced care planning information booklet provided to patient.   Follow up meeting: to be arranged when patient calls back to make an appointment after reviewing documents in one week or so. Patient instructed to bring healthcare agent to this meeting.  Flores Gerard RN  Letter sent to patient for [employer] follow up.  6/22/2011  Flores Gerard RN  Advance Directive Follow-up Visit  Leah Guerrero presents for advanced care planning session accompanied by no one.  Reviewed definitions of advanced care planning and advance directive form, and informational handouts given to patient previously.  Patient has questions and /or concerns about acp process or form .  Reviewed advance directive form with patient & answered all of her questions. Designated healthcare agent is identified. Healthcare agent s name is Paul Guerrero. My Hopes and Wishes reviewed.  Finalized wishes are clear to patient Yes  Patient and designated healthcare agent are aware that document may be changed at any time in the future.  Advanced directive form: completed at this visit.  Advanced directive  form: verified with notary signature. Original and two copies of advanced directive form given to patient copy sent to  for scanning into EMR and original given to patient and instructed to give copy to designated [facility] and non-[place] physician where applicable.  Flores Gerard RN  7/6/2011  Advance Directive Problem List Overview:   Name Relationship Phone    Primary Health Care Agent Paul Bro [phone]         Alternative Health Care Agent Hiram Bro c [phone]  [phone]                      Hyperlipidemia LDL goal <100 12/16/2010     Priority: Medium     Fatigue 12/15/2010     Priority: Medium     Type 2 diabetes, HbA1C goal < 8% (H) 11/24/2010     Priority: Medium     Female stress incontinence 10/28/2010     Priority: Medium     Past Surgical History:   Procedure Laterality Date     APPENDECTOMY  2012     C HAND/FINGER SURGERY UNLISTED       C SHOULDER SURG PROC UNLISTED       C STOMACH SURGERY PROCEDURE UNLISTED       CHOLECYSTECTOMY  1969     COLONOSCOPY  6-2-08    Neg. At [facility]     COLONOSCOPY  9-3-13     EYE SURGERY       HYSTERECTOMY TOTAL ABD, HEIDI SALPINGO-OOPHORECTOMY, NODE DISSECTION, TUMOR DEBULKING, COMBINED  1988    fibroids?     KNEE SURGERY  x5     KNEE SURGERY      kneecap procedure     LUNG SURGERY  10-7-16    removal lung cancer     SHOULDER SURGERY  2005    right shoulder, bone spurs     WRIST SURGERY      right wrist       Medications:   Current Outpatient Prescriptions:      gabapentin (NEURONTIN) 100 MG capsule, TAKE TWO CAPSULES BY MOUTH THREE TIMES DAILY, Disp: 180 capsule, Rfl: 3     hydrochlorothiazide 12.5 MG TABS tablet, Take 1 tablet (12.5 mg) by mouth daily, Disp: 30 tablet, Rfl: 1     insulin aspart (NOVOLOG PEN) 100 UNIT/ML injection, Patient uses 100 units daily split up between multiple doses. Take 2 units per 15 grams of carbohydrate eaten and 2 units per 30 mg/dL above 170 mg/dL., Disp: 30 mL, Rfl: 1     insulin glargine (LANTUS SOLOSTAR)  "100 UNIT/ML injection, Take 50 units in the morning and 20 units in the evening., Disp: 30 mL, Rfl: 1     oxyCODONE-acetaminophen (PERCOCET) 5-325 MG per tablet, Take by mouth every 4 hours as needed for moderate to severe pain, Disp: , Rfl:      rosuvastatin (CRESTOR) 20 MG tablet, Take 1 tablet (20 mg) by mouth daily, Disp: 90 tablet, Rfl: 1     zolpidem (AMBIEN) 5 MG tablet, Take 1 tablet (5 mg) by mouth nightly as needed for sleep, Disp: 30 tablet, Rfl: 2     promethazine (PHENERGAN) 25 MG tablet, Take 1 tablet (25 mg) by mouth every 6 hours as needed for nausea, Disp: 30 tablet, Rfl: 1     meperidine (DEMEROL) 50 MG tablet, Take 1 tablet (50 mg) by mouth every 4 hours as needed, Disp: 20 tablet, Rfl: 0     Insulin Pen Needle (PEN NEEDLES 5/16\") 31G X 8 MM MISC, Patient does 4 doses novolog and 2 lantus shots  insulin daily.  Needs 180 needles per month. Okay refills for one year., Disp: 180 each, Rfl: 11     albuterol (PROAIR HFA/PROVENTIL HFA/VENTOLIN HFA) 108 (90 BASE) MCG/ACT Inhaler, Inhale 2 puffs into the lungs every 6 hours as needed for shortness of breath / dyspnea or wheezing, Disp: 2 Inhaler, Rfl: 5     LORazepam (ATIVAN) 1 MG tablet, Take 1 tablet (1 mg) by mouth every 8 hours as needed for anxiety, Disp: 30 tablet, Rfl: 0     tiZANidine (ZANAFLEX) 2 MG tablet, Take 1 tablet (2 mg) by mouth 3 times daily as needed for muscle spasms, Disp: 30 tablet, Rfl: 1     ACE/ARB NOT PRESCRIBED, INTENTIONAL,, ACE & ARB not prescribed due to Allergy, Disp: , Rfl:      calcium polycarbophil (FIBERCON) 625 MG tablet, Take 2 tablets (1,250 mg) by mouth daily, Disp: 60 tablet, Rfl: 11     ibuprofen (ADVIL,MOTRIN) 800 MG tablet, Take 1 tablet (800 mg) by mouth every 8 hours as needed for pain, Disp: 100 tablet, Rfl: 0     senna-docusate (SENOKOT-S;PERICOLACE) 8.6-50 MG per tablet, 1-2 po bid prn constipation, Disp: 100 tablet, Rfl: 1     blood glucose monitoring (ONE TOUCH TEST STRIPS) test strip, Please dispense the " particular type of lancets and test strips that patient needs.  She tests frequently, averaging 6 times per day. Okay to dispense 3 months supplies with refills for a year, Disp: 3 Month, Rfl: 3     polyethylene glycol (MIRALAX) powder, Take 17 g by mouth daily as needed for constipation, Disp: 510 g, Rfl: 1     albuterol (2.5 MG/3ML) 0.083% nebulizer solution, Take 3 mLs (2.5 mg) by nebulization 4 times daily as needed, Disp: 1 Box, Rfl: 5     fish oil-omega-3 fatty acids (FISH OIL) 1000 MG capsule, Take 1 capsule by mouth daily., Disp: , Rfl:      Calcium Carbonate-Vitamin D (CALCIUM-D PO), Take  by mouth. 1200mg calcium/600mg D3, Disp: , Rfl:      Garlic CAPS, Take  by mouth. One daily, Disp: , Rfl:      aspirin 81 MG tablet, Take 1 tablet by mouth daily. 1 daily, Disp: , Rfl:      SALINE, to clean port every other month, Disp: , Rfl:      MULTIVITAMIN TABS   OR, 1 TABLET DAILY, Disp: , Rfl:     Allergies:   Allergies   Allergen Reactions     Hydralazine Shortness Of Breath     Famotidine Nausea and Vomiting and Unknown     Benadryl Itch Stopping      Gives her more energy, can't sleep     Lisinopril      Tongue swelling       Metoclopramide Nausea and Vomiting     Ondansetron Nausea and Vomiting     Other reaction(s): Headache     Penicillins Hives     Tape [Adhesive Tape]      blisters     Tylenol      Anxiety  Tablets only.       REVIEW OF SYSTEMS:   CONSTITUTIONAL:NEGATIVE for fever, chills, night sweats  INTEGUMENTARY/SKIN: NEGATIVE for worrisome wound problems or redness.  MUSCULOSKELETAL:See HPI above  NEURO: NEGATIVE for weakness, dizziness or paresthesias    This document serves as a record of the services and decisions personally performed and made by José Vogel MD. It was created on his behalf by Nely Pratt, a trained medical scribe. The creation of this document is based the provider's statements to the medical scribe.    Tamiko Pratt 10:55 AM 9/27/2017     PHYSICAL EXAM:  Resp 16  Ht 1.613  "m (5' 3.5\")  Wt 81.2 kg (179 lb)  BMI 31.21 kg/m2   GENERAL APPEARANCE: healthy, alert, no distress, accompanied by her dog   SKIN: no suspicious lesions or rashes  NEURO: Normal strength and tone, mentation intact and speech normal  PSYCH:  mentation appears normal and affect normal/bright, not anxious  RESPIRATORY: No increased work of breathing.    left  LOWER EXTREMITY:  Gait: antalgic left.  mild Quad atrophy, strength weakened.  Intact sensation deep peroneal nerve, superficial peroneal nerve, med/lat tibial nerve, sural nerve, saphenous nerve  Intact EHL, EDL, TA, FHL, GS, quadriceps hamstrings and hip flexors  Toes warm and well perfused, brisk capillary refill. Palpable 2+ dp pulses.  calf soft and nttp or squeeze.  Edema: trace    left  KNEE EXAM:    Skin: intact, no ecchymosis or erythema; mild swelling.  ROM: 5 degrees extension to 95 degrees flexion, discomfort at extremes  Effusion: none  Tender: med/lat joint line, anterior/posterior knee.      X-RAY: no new xrays today.    3 views left knee 3/30/2016 reviewed: Bone on bone medial loss of joint space, subchondral sclerosis, marginal osteohytes. Possible lucency under medial femoral condyle subchondral bone. Patello-femoral marginal osteophytes. Arthritis progression since prior xrays.    LEFT KNEE INTRAOP ARTHROSCOPY FINDINGS   1. Trace effusion.   2. Moderate synovitis.   3. Grade 4 chondrosis of the patella and grade 3 chondrosis of the femoral trochlea.   4. Prominent medial plica with rubbing along the anterior aspect of the medial femoral condyle.   5. No loose bodies in the media or lateral gutter but small marginal osteophytes.   6. Intact ACL, within the notch. There was some fraying or partial tearing of fibers of the PCL with PCL grossly otherwise intact.   7. Lateral compartment with a small free edge flap tear off of the posterior horn of the lateral meniscus and a partial thickness undersurface tear towards the periphery of the " posterior horn body junction.   8. Lateral compartment with an area of grade 4 chondrosis of the weight-bearing portion of the femoral condyle, as well as grade 4 chondrosis of the posterior medial aspect of the lateral tibia.   9. Complex tearing of the posterior horn and body of the medial meniscus with horizontal cleavage and radial tears with a displaced flap off of the medial aspect of the body of the meniscus.   10. Medial compartment with grade 4 chondrosis of the majority of the medial femoral condyle and grade 3 diffuse chondrosis of the medial tibia with areas of grade 4 chondrosis        Impression: Leah Guerrero is a 71 year old female with left knee pain, primary osteoarthritis.       Plan:   Discussed typical symptoms of arthritic pain is pain aggravated with weight bearing activities, stiffness, relieved by sitting or rest. Swelling may be associated. It is common to have some grinding and popping in the knee with arthritis.    Discussed various treatments for degenerative arthrosis of the knee, including nonoperative and operative approaches. Nonoperative approaches, which are exhausted prior to operative treatment, include doing nothing and living with the pain as patient has been doing, activity modification (avoid aggravating activities), physical therapy and strengthening exercises, weight loss, anti-inflammatory medications, bracing, ambulatory aids (cane, walker) and injections. Once these have been tried and are deemed unsuccessful with a good effort, and the patient is an appropriate candidate, the next treatment would be knee arthroplasty or replacement. Depending on the location of the arthritis, knee replacement can be partial (one side of the knee affected by arthritis) or a total knee arthroplasty (all 3 compartments). The risks, perceived benefits and perioperative rehabilitation expectations of knee arthroplasty were discussed in detail. Also discussed that approximately 10% of  "patients that undergo knee arthroplasty are not happy following surgery and may have worse pain or no improvement in pain, contrary to their preoperative expectations.    At this time, nonoperative treatment will be continued.    Non-surgical treatment for knee arthritis includes:    * rest, sitting  * Activity modification - avoid impact activities or activities that aggravate symptoms.  * NSAIDS (non-steroidal anti-inflammatory medications; e.g. Aleve, advil, motrin, ibuprofen) - regular use for inflammation ( twice daily or three times daily), with food, as long as no contra-indications Please discuss with primary care doctor if needed  * ice, 15-20 minutes at a time several times a day or as needed.  * Strengthening of quadriceps muscles  * Physical Therapy for strengthening, stretching and range of motion exercises of legs  * Tylenol as needed for pain, consider Tylenol arthritis or similar  * Weight loss: Weight loss:  Body mass index is 31.21 kg/(m^2).. weight loss benefits, not only for the current pain symptoms, but also overall health. Recommend a good diet plan that works for the patient, with the assistance of a dietician or primary care doctor as needed. Also, a good, low-impact exercise program for at least 20 minutes per day, 3 times per week, such as exercise bike, elliptical , or pool.  * Exercise: low impact such as stationary bike, elliptical, pool.  * Injections: cortisone versus viscosupplementation (hyaluronic acid, \"rooster comb\", \"gel shots\"); risks and perceived benefits discussed today. Patient elects to proceed.  * Bracing: bracing the knee may offer some relief of symptoms when worn and provide some stability.  * over the counter supplements such as glucosamine and chondroitin sulfate may help with joint pain.  * topical ointments may help as well    * return to clinic as needed.      PROCEDURE NOTE:  The risks, perceived benefits and potential complications (including but not " limited to: bleeding, infection, pain, scar, damage to adjacent structures, atrophy or necrosis of soft tissue, skin blanching, failure to relieve symptoms, worsening of symptoms, allergic reaction) of injection were discussed with the patient. Questions were addressed and answered.The patient elected to proceed. Written informed consent was obtained. The correct procedural site was identified and confirmed. A left knee intraarticular injection was performed using 1mL Depo Medrol 80mg per mL and 7mL (4mL 1% lidocaine, 3mL 0.25% marcaine)  of local anesthetic after sterile prep, to the correct procedural site. Sterile bandaid applied. This was tolerated well by the patient. No apparent complications. Did also discuss that if diabetic, recommend close monitoring of blood sugars over the next week as cortisone injections can temporarily elevate blood sugars.    The information in this document, created by a scribe for me, accurately reflects the services I personally performed and the decisions made by me. I have reviewed and approved this document for accuracy.      José Vogel M.D., M.S.  Dept. of Orthopaedic Surgery  [facility]

## 2017-09-27 NOTE — PROGRESS NOTES
The patient's left knee was prepped with betadine solution after verification of allergies. Area approximately 10 cm x 10 cm prepped in a sterile fashion. After injection, betadine removed with soap and water and band-aids applied.    4cc Lidocaine 1% NDC 0623-7812-40, [id number],  [id number]  3cc Bupivacaine 0.25% NDC 7323-3632-02, LOT -[id number],  [id number]  1cc Depo Medrol 80 mg/ml NDC 5473-4419-88, LOT [id number],  2019     injected into patient's left Knee by:  Grant Dillon PA-C, CAQ (Ortho)  Supervising Physician: José Vogel M.D., M.S.  Dept. of Orthopaedic Surgery  [facility]

## 2017-09-27 NOTE — MR AVS SNAPSHOT
After Visit Summary   9/27/2017    Leah Guerrero    MRN: [id number]           Patient Information     Date Of Birth          1945        Visit Information        Provider Department      9/27/2017 10:30 AM José Vogel MD [facility]        Today's Diagnoses     Primary osteoarthritis of left knee    -  1      Care Instructions    Please remember to call and schedule a follow up appointment if one was recommended at your earliest convenience.  Orthopedics CLINIC HOURS TELEPHONE NUMBER   Dr. Lela Frausto  Certified Medical Assistant   Monday & Wednesday   8am - 5pm  Thursday 1pm - 5pm  Friday 8am -11:30am Specialty schedulers:   [phone] to schedule your surgery.  Main Clinic:   [phone] to make an appointment with any provider.    Urgent Care locations:    [facility] Monday-Friday Closed  Saturday-Sunday 9am-5pm      Monday-Friday 12pm - 8pm  Saturday-Sunday 9am-5pm [phone]     If SURGERY has been recommended, please call our Specialty Schedulers at [phone] to schedule your procedure.    If you need a medication refill, please contact your pharmacy. Please allow 3 business days for your refill to be completed.    If an MRI or CT scan has been recommended, please call [facility] Schedulers at [phone] to schedule your appointment.  Use re3Dt (secure e-mail communication and access to your chart) to send a message or to make an appointment. Please ask how you can sign up for Miproto.  Your care team's suggested websites for health information:   [email or web address] : Up to date and easily searchable information on multiple topics.   Www.[email or web address].Count includes the [facility].[place].us : [place] dept of heat, public health issues in [place], N1N1              Follow-ups after your visit        Follow-up notes from your care team     Return if symptoms worsen or fail to improve.      Who to contact     If you have  "questions or need follow up information about today's clinic visit or your schedule please contact [facility] directly at [phone].  Normal or non-critical lab and imaging results will be communicated to you by RocketOnhart, letter or phone within 4 business days after the clinic has received the results. If you do not hear from us within 7 days, please contact the clinic through RocketOnhart or phone. If you have a critical or abnormal lab result, we will notify you by phone as soon as possible.  Submit refill requests through [employer] or call your pharmacy and they will forward the refill request to us. Please allow 3 business days for your refill to be completed.          Additional Information About Your Visit        [employer] Information     [employer] gives you secure access to your electronic health record. If you see a primary care provider, you can also send messages to your care team and make appointments. If you have questions, please call your primary care clinic.  If you do not have a primary care provider, please call [phone] and they will assist you.        Care EveryWhere ID     This is your Care EveryWhere ID. This could be used by other organizations to access your [place] medical records  [id number]        Your Vitals Were     Respirations Height BMI (Body Mass Index)             16 5' 3.5\" (1.613 m) 31.21 kg/m2          Blood Pressure from Last 3 Encounters:   05/30/17 128/71   04/28/17 111/56   04/21/17 138/58    Weight from Last 3 Encounters:   09/27/17 179 lb (81.2 kg)   05/30/17 167 lb (75.8 kg)   05/18/17 166 lb (75.3 kg)              We Performed the Following     DRAIN/INJECT LARGE JOINT/BURSA     METHYLPREDNISOLONE 80 MG INJ          Today's Medication Changes          These changes are accurate as of: 9/27/17 12:51 PM.  If you have any questions, ask your nurse or doctor.               Start taking these medicines.        Dose/Directions    methylPREDNISolone acetate 80 MG/ML " injection   Commonly known as:  DEPO-MEDROL   Used for:  Primary osteoarthritis of left knee   Started by:  José Vogel MD        Dose:  80 mg   1 mL (80 mg) by INTRA-ARTICULAR route once for 1 dose   Quantity:  5 mL   Refills:  0            Where to get your medicines      Some of these will need a paper prescription and others can be bought over the counter.  Ask your nurse if you have questions.     You don't need a prescription for these medications     methylPREDNISolone acetate 80 MG/ML injection                Primary Care Provider Office Phone # Fax #    Kevin Esquivel [phone]       [address] [facility] [address]        Goals        General    I will continue the exercises provided by physical therapy for my knee (pt-stated)     Notes - Note created  7/9/2015 10:30 AM by Ray Holland RN    As of today's date 7/9/2015 goal is met at 0 - 25%.   Goal Status:  Active        I will remember to take my insulin before meals especially lunch when I am  away from home. (pt-stated)     Notes - Note created  3/25/2015  3:51 PM by Ray Holland RN      As of today's date 3/25/2015 goal is met at 0 - 25%.   Goal Status:  Active.          I will work on testing my blood sugar and taking my insulin when I am away from home at lunch time. (pt-stated)     Notes - Note created  5/20/2016  2:43 PM by Ray Holland RN    As of today's date 5/20/2016 goal is met at 0 - 25%.   Goal Status:  Active          Equal Access to Services     [facility] AH: Hadii aad ku hadasho Soomaali, waaxda luqadaha, qaybta kaalmada maralyamaxine, waxay viridiana [place] . So [facility] [phone].    ATENCIÓN: Si habla español, tiene a verduzco disposición servicios gratuitos de asistencia lingüística. Llame al [phone].    We comply with applicable federal civil rights laws and [place] laws. We do not discriminate on the basis of race, color, national origin, age, disability sex, sexual  orientation or gender identity.            Thank you!     Thank you for choosing [facility]  for your care. Our goal is always to provide you with excellent care. Hearing back from our patients is one way we can continue to improve our services. Please take a few minutes to complete the written survey that you may receive in the mail after your visit with us. Thank you!             Your Updated Medication List - Protect others around you: Learn how to safely use, store and throw away your medicines at www.disposemymeds.org.          This list is accurate as of: 9/27/17 12:51 PM.  Always use your most recent med list.                   Brand Name Dispense Instructions for use Diagnosis    ACE/ARB NOT PRESCRIBED (INTENTIONAL)      ACE & ARB not prescribed due to Allergy    Type 2 diabetes mellitus without complication, with long-term current use of insulin (H)       * albuterol (2.5 MG/3ML) 0.083% neb solution     1 Box    Take 3 mLs (2.5 mg) by nebulization 4 times daily as needed    Intermittent asthma       * albuterol 108 (90 BASE) MCG/ACT Inhaler    PROAIR HFA/PROVENTIL HFA/VENTOLIN HFA    2 Inhaler    Inhale 2 puffs into the lungs every 6 hours as needed for shortness of breath / dyspnea or wheezing    Intermittent asthma, uncomplicated       aspirin 81 MG tablet      Take 1 tablet by mouth daily. 1 daily        blood glucose monitoring test strip    ONE TOUCH TEST STRIPS    3 Month    Please dispense the particular type of lancets and test strips that patient needs.  She tests frequently, averaging 6 times per day. Okay to dispense 3 months supplies with refills for a year    Type 2 diabetes mellitus without complication (H)       calcium polycarbophil 625 MG tablet    FIBERCON    60 tablet    Take 2 tablets (1,250 mg) by mouth daily    Health care maintenance       CALCIUM-D PO      Take  by mouth. 1200mg calcium/600mg D3        fish oil-omega-3 fatty acids 1000 MG capsule      Take 1 capsule by  "mouth daily.        gabapentin 100 MG capsule    NEURONTIN    180 capsule    TAKE TWO CAPSULES BY MOUTH THREE TIMES DAILY    Nerve pain       Garlic Caps      Take  by mouth. One daily        hydrochlorothiazide 12.5 MG Tabs tablet     30 tablet    Take 1 tablet (12.5 mg) by mouth daily    Hypertension goal BP (blood pressure) < 140/90       ibuprofen 800 MG tablet    ADVIL/MOTRIN    100 tablet    Take 1 tablet (800 mg) by mouth every 8 hours as needed for pain    Arthritis       insulin aspart 100 UNIT/ML injection    NovoLOG PEN    30 mL    Patient uses 100 units daily split up between multiple doses. Take 2 units per 15 grams of carbohydrate eaten and 2 units per 30 mg/dL above 170 mg/dL.    Type 2 diabetes mellitus without complication, with long-term current use of insulin (H)       insulin glargine 100 UNIT/ML injection    LANTUS SOLOSTAR    30 mL    Take 50 units in the morning and 20 units in the evening.    Type 2 diabetes mellitus without complication, with long-term current use of insulin (H)       LORazepam 1 MG tablet    ATIVAN    30 tablet    Take 1 tablet (1 mg) by mouth every 8 hours as needed for anxiety    Anxiety       meperidine 50 MG tablet    DEMEROL    20 tablet    Take 1 tablet (50 mg) by mouth every 4 hours as needed    Idiopathic chronic pancreatitis (H)       methylPREDNISolone acetate 80 MG/ML injection    DEPO-MEDROL    5 mL    1 mL (80 mg) by INTRA-ARTICULAR route once for 1 dose    Primary osteoarthritis of left knee       MULTIVITAMIN TABS   OR      1 TABLET DAILY        oxyCODONE-acetaminophen 5-325 MG per tablet    PERCOCET     Take by mouth every 4 hours as needed for moderate to severe pain        pen needles 5/16\" 31G X 8 MM Misc     180 each    Patient does 4 doses novolog and 2 lantus shots  insulin daily.  Needs 180 needles per month. Okay refills for one year.    Type 2 diabetes mellitus without complication, with long-term current use of insulin (H)       polyethylene glycol " powder    MIRALAX    510 g    Take 17 g by mouth daily as needed for constipation    Constipation, unspecified constipation type       promethazine 25 MG tablet    PHENERGAN    30 tablet    Take 1 tablet (25 mg) by mouth every 6 hours as needed for nausea    Nausea       rosuvastatin 20 MG tablet    CRESTOR    90 tablet    Take 1 tablet (20 mg) by mouth daily    Hyperlipidemia LDL goal <100       SALINE      to clean port every other month    Bronchitis       senna-docusate 8.6-50 MG per tablet    SENOKOT-S;PERICOLACE    100 tablet    1-2 po bid prn constipation    Constipation, unspecified constipation type       tiZANidine 2 MG tablet    ZANAFLEX    30 tablet    Take 1 tablet (2 mg) by mouth 3 times daily as needed for muscle spasms    Muscle pain       zolpidem 5 MG tablet    AMBIEN    30 tablet    Take 1 tablet (5 mg) by mouth nightly as needed for sleep    Insomnia, unspecified type       * Notice:  This list has 2 medication(s) that are the same as other medications prescribed for you. Read the directions carefully, and ask your doctor or other care provider to review them with you.

## 2017-09-27 NOTE — PATIENT INSTRUCTIONS
Please remember to call and schedule a follow up appointment if one was recommended at your earliest convenience.  Orthopedics CLINIC HOURS TELEPHONE NUMBER   Dr. Lela Frausto  Certified Medical Assistant   Monday & Wednesday   8am - 5pm  Thursday 1pm - 5pm  Friday 8am -11:30am Specialty schedulers:   [phone] to schedule your surgery.  Main Clinic:   [phone] to make an appointment with any provider.    Urgent Care locations:    [facility] Monday-Friday Closed  Saturday-Sunday 9am-5pm      Monday-Friday 12pm - 8pm  Saturday-Sunday 9am-5pm [phone]     If SURGERY has been recommended, please call our Specialty Schedulers at [phone] to schedule your procedure.    If you need a medication refill, please contact your pharmacy. Please allow 3 business days for your refill to be completed.    If an MRI or CT scan has been recommended, please call [facility] Schedulers at [phone] to schedule your appointment.  Use Agenda (secure e-mail communication and access to your chart) to send a message or to make an appointment. Please ask how you can sign up for Agenda.  Your care team's suggested websites for health information:   Www.[email or web address] : Up to date and easily searchable information on multiple topics.   [email or web address] : [place] dept of heat, public health issues in [place], N1N1

## 2017-10-07 ENCOUNTER — TELEPHONE (OUTPATIENT)
Dept: FAMILY MEDICINE | Facility: CLINIC | Age: 72
End: 2017-10-07

## 2017-10-07 DIAGNOSIS — E11.9 TYPE 2 DIABETES MELLITUS WITHOUT COMPLICATION, WITH LONG-TERM CURRENT USE OF INSULIN (H): ICD-10-CM

## 2017-10-07 DIAGNOSIS — Z79.4 TYPE 2 DIABETES MELLITUS WITHOUT COMPLICATION, WITH LONG-TERM CURRENT USE OF INSULIN (H): ICD-10-CM

## 2017-10-09 RX ORDER — INSULIN GLARGINE 100 [IU]/ML
INJECTION, SOLUTION SUBCUTANEOUS
Qty: 30 ML | Refills: 0 | Status: SHIPPED | OUTPATIENT
Start: 2017-10-09 | End: 2017-11-02

## 2017-10-09 NOTE — TELEPHONE ENCOUNTER
Did okay one refill but I think this patient may have a new primary clinic ( [facility] ? ).  Please call patient to clarify.    Thanks.    Kevin Esquivel MD

## 2017-10-09 NOTE — TELEPHONE ENCOUNTER
RN sees 2 differing prescriptions that do not match requested Rx.  Routed to PCP to clarify dosing.        BP Readings from Last 3 Encounters:   05/30/17 128/71   04/28/17 111/56   04/21/17 138/58     Lab Results   Component Value Date    MICROL 9 04/21/2017     Lab Results   Component Value Date    UMALCR 13.68 04/21/2017     Creatinine   Date Value Ref Range Status   05/19/2017 0.60 0.52 - 1.04 mg/dL Final   ]  GFR Estimate   Date Value Ref Range Status   05/19/2017 >90  Non  GFR Calc   >60 mL/min/1.7m2 Final   07/05/2016 >90  Non  GFR Calc   >60 mL/min/1.7m2 Final   12/03/2015 >90  Non  GFR Calc   >60 mL/min/1.7m2 Final     GFR Estimate If Black   Date Value Ref Range Status   05/19/2017 >90   GFR Calc   >60 mL/min/1.7m2 Final   07/05/2016 >90   GFR Calc   >60 mL/min/1.7m2 Final   12/03/2015 >90   GFR Calc   >60 mL/min/1.7m2 Final     Lab Results   Component Value Date    CHOL 201 05/19/2017     Lab Results   Component Value Date    HDL 56 05/19/2017     Lab Results   Component Value Date     05/19/2017     Lab Results   Component Value Date    TRIG 222 05/19/2017     Lab Results   Component Value Date    CHOLHDLRATIO 2.6 08/04/2015     Lab Results   Component Value Date    AST 11 05/19/2017     Lab Results   Component Value Date    ALT 19 05/19/2017     Lab Results   Component Value Date    A1C 8.8 05/19/2017    A1C 8.4 07/05/2016    A1C 9.1 03/04/2016    A1C 8.7 12/03/2015    A1C 9.6 08/04/2015     Potassium   Date Value Ref Range Status   05/19/2017 4.3 3.4 - 5.3 mmol/L Final

## 2017-10-09 NOTE — TELEPHONE ENCOUNTER
Spoke with patient and informed.  She see's [facility] for her Oncologist only, still considers [facility] as PCP.    Routing to provider as FYI.

## 2017-10-09 NOTE — TELEPHONE ENCOUNTER
insulin glargine (LANTUS SOLOSTAR) 100 UNIT/ML injection (Discontinued)      Last Written Prescription Date:  4/21/17  Last Fill Quantity: 30,   # refills: 1  Last Office Visit with [facility], P or [facility] prescribing provider: 5/30/17  Future Office visit:       Routing refill request to provider for review/approval because:  Drug not active on patient's medication list

## 2017-10-10 ENCOUNTER — TELEPHONE (OUTPATIENT)
Dept: OBGYN | Facility: CLINIC | Age: 72
End: 2017-10-10

## 2017-10-11 NOTE — TELEPHONE ENCOUNTER
Patient having pain on left breast, will not travel to any other clinic due to no car. Please put patient on waiting list for cancellations for a sooner appointment. Please advise.     Thank You,    Central Scheduler  [facility]

## 2017-10-11 NOTE — TELEPHONE ENCOUNTER
Message handled by Nurse Triage with [facility] - provider name: Dr. Kilgore via [facility].  It would be appropriate to follow up with Dr. Esquivel.   Nayeli Soares RN, BAN

## 2017-10-11 NOTE — TELEPHONE ENCOUNTER
Noted patient has not been seen by Dr. Kilgore in the past. First available appt is on 10-24-17 at [facility] with Dr. Kilgore.   Noted last mammogram was 04-13-15-normal. Orders for annual mammo placed on 04-21-17 by Dr. Esquivel. No mammo scheduled.  Noted last appt with Dr. Esquivel was on 04-21-17. Noted hx of lung cancer.     This writer attempted to contact Leah on 10/11/17      Reason for call back about an appt with Dr. Kilgore and left detailed message. Stated do not have an appt with Dr. Kilgore at [facility] sooner than 10-24-17. Explained it may be more appropriate to be seen by Dr. Esquivel for sx's and that she could probably be scheduled with him sooner.       If patient calls back:   Patient contacted by clinic RN team. Inform patient that someone from the team will contact them, document that pt called and route to care team. .        Nayeli Soares RN

## 2017-10-12 DIAGNOSIS — E78.5 HYPERLIPIDEMIA LDL GOAL <100: ICD-10-CM

## 2017-10-12 NOTE — TELEPHONE ENCOUNTER
rosuvastatin (CRESTOR) 20 MG tablet     Last Written Prescription Date: 4-21-17  Last Fill Quantity: 90, # refills: 1  Last Office Visit with [facility], P or [facility] prescribing provider: 5-30-17  Next 5 appointments (look out 90 days)     Oct 24, 2017 11:00 AM CDT   Office Visit with Rajan Kilgore MD   [facility]    [address]  [place] [address]   [phone]            Oct 25, 2017 10:45 AM CDT   Return Visit with José Vogel MD   [facility] ([facility])    [address]  [place] [address]   [phone]                   Lab Results   Component Value Date    CHOL 201 05/19/2017     Lab Results   Component Value Date    HDL 56 05/19/2017     Lab Results   Component Value Date     05/19/2017     Lab Results   Component Value Date    TRIG 222 05/19/2017     Lab Results   Component Value Date    CHOLHDLRATIO 2.6 08/04/2015

## 2017-10-13 RX ORDER — ROSUVASTATIN CALCIUM 20 MG/1
TABLET, COATED ORAL
Qty: 90 TABLET | Refills: 1 | Status: SHIPPED | OUTPATIENT
Start: 2017-10-13 | End: 2018-04-26

## 2017-10-24 ENCOUNTER — OFFICE VISIT (OUTPATIENT)
Dept: OBGYN | Facility: CLINIC | Age: 72
End: 2017-10-24
Payer: COMMERCIAL

## 2017-10-24 VITALS
OXYGEN SATURATION: 97 % | WEIGHT: 179.6 LBS | SYSTOLIC BLOOD PRESSURE: 140 MMHG | HEART RATE: 63 BPM | BODY MASS INDEX: 31.32 KG/M2 | DIASTOLIC BLOOD PRESSURE: 67 MMHG

## 2017-10-24 DIAGNOSIS — N64.4 BREAST PAIN: Primary | ICD-10-CM

## 2017-10-24 DIAGNOSIS — N64.52 DISCHARGE FROM LEFT NIPPLE: ICD-10-CM

## 2017-10-24 PROCEDURE — 99204 OFFICE O/P NEW MOD 45 MIN: CPT | Performed by: OBSTETRICS & GYNECOLOGY

## 2017-10-24 NOTE — MR AVS SNAPSHOT
After Visit Summary   10/24/2017    Leah Guerrero    MRN: [id number]           Patient Information     Date Of Birth          1945        Visit Information        Provider Department      10/24/2017 11:00 AM Rajan Kilgore MD [facility]        Today's Diagnoses     Breast pain    -  1    Discharge from left nipple           Follow-ups after your visit        Your next 10 appointments already scheduled     Oct 25, 2017 10:45 AM CDT   Return Visit with José Vogel MD   [facility] ([facility]    [address] [id number] [phone]            Oct 27, 2017 11:20 AM CDT   SHORT with Kevin Esquivel MD   [facility]    [address] [place] [id number] [phone]              Future tests that were ordered for you today     Open Future Orders        Priority Expected Expires Ordered    MA Diagnostic Digital Bilateral Routine  10/24/2018 10/24/2017            Who to contact     If you have questions or need follow up information about today's clinic visit or your schedule please contact [facility] directly at [phone].  Normal or non-critical lab and imaging results will be communicated to you by MyChart, letter or phone within 4 business days after the clinic has received the results. If you do not hear from us within 7 days, please contact the clinic through MyChart or phone. If you have a critical or abnormal lab result, we will notify you by phone as soon as possible.  Submit refill requests through [employer] or call your pharmacy and they will forward the refill request to us. Please allow 3 business days for your refill to be completed.          Additional Information About Your Visit        [employer] Information     [employer] gives you secure access to your electronic health record. If you see a primary care  provider, you can also send messages to your care team and make appointments. If you have questions, please call your primary care clinic.  If you do not have a primary care provider, please call [phone] and they will assist you.        Care EveryWhere ID     This is your Care EveryWhere ID. This could be used by other organizations to access your [facility] medical records  [id number]        Your Vitals Were     Pulse Pulse Oximetry Breastfeeding? BMI (Body Mass Index)          63 97% No 31.32 kg/m2         Blood Pressure from Last 3 Encounters:   10/24/17 140/67   05/30/17 128/71   04/28/17 111/56    Weight from Last 3 Encounters:   10/24/17 179 lb 9.6 oz (81.5 kg)   09/27/17 179 lb (81.2 kg)   05/30/17 167 lb (75.8 kg)               Primary Care Provider Office Phone # Fax #    Kevin Esquivel [phone]       [address] [facility] [address]        Goals        General    I will continue the exercises provided by physical therapy for my knee (pt-stated)     Notes - Note created  7/9/2015 10:30 AM by Ray Holland RN    As of today's date 7/9/2015 goal is met at 0 - 25%.   Goal Status:  Active        I will remember to take my insulin before meals especially lunch when I am  away from home. (pt-stated)     Notes - Note created  3/25/2015  3:51 PM by Ray Holland, RN      As of today's date 3/25/2015 goal is met at 0 - 25%.   Goal Status:  Active.          I will work on testing my blood sugar and taking my insulin when I am away from home at lunch time. (pt-stated)     Notes - Note created  5/20/2016  2:43 PM by Ray Holland, RN    As of today's date 5/20/2016 goal is met at 0 - 25%.   Goal Status:  Active          Equal Access to Services     KRISTEN GUNTER : Sandie Evans, wacharmaine luqadaha, qaybta kaaljude king. [facility] [phone].    ATENCIÓN: Si habla español, tiene a verduzco disposición servicios gratuitos de  cristoa lingüística. Ese al [phone].    We comply with applicable federal civil rights laws and [place] laws. We do not discriminate on the basis of race, color, national origin, age, disability, sex, sexual orientation, or gender identity.            Thank you!     Thank you for choosing [facility]  for your care. Our goal is always to provide you with excellent care. Hearing back from our patients is one way we can continue to improve our services. Please take a few minutes to complete the written survey that you may receive in the mail after your visit with us. Thank you!             Your Updated Medication List - Protect others around you: Learn how to safely use, store and throw away your medicines at www.disposemymeds.org.          This list is accurate as of: 10/24/17 11:23 AM.  Always use your most recent med list.                   Brand Name Dispense Instructions for use Diagnosis    ACE/ARB/ARNI NOT PRESCRIBED (INTENTIONAL)      ACE & ARB not prescribed due to Allergy    Type 2 diabetes mellitus without complication, with long-term current use of insulin (H)       * albuterol (2.5 MG/3ML) 0.083% neb solution     1 Box    Take 3 mLs (2.5 mg) by nebulization 4 times daily as needed    Intermittent asthma       * albuterol 108 (90 BASE) MCG/ACT Inhaler    PROAIR HFA/PROVENTIL HFA/VENTOLIN HFA    2 Inhaler    Inhale 2 puffs into the lungs every 6 hours as needed for shortness of breath / dyspnea or wheezing    Intermittent asthma, uncomplicated       aspirin 81 MG tablet      Take 1 tablet by mouth daily. 1 daily        blood glucose monitoring test strip    ONE TOUCH TEST STRIPS    3 Month    Please dispense the particular type of lancets and test strips that patient needs.  She tests frequently, averaging 6 times per day. Okay to dispense 3 months supplies with refills for a year    Type 2 diabetes mellitus without complication (H)       calcium polycarbophil 625 MG tablet     FIBERCON    60 tablet    Take 2 tablets (1,250 mg) by mouth daily    Health care maintenance       CALCIUM-D PO      Take  by mouth. 1200mg calcium/600mg D3        fish oil-omega-3 fatty acids 1000 MG capsule      Take 1 capsule by mouth daily.        gabapentin 100 MG capsule    NEURONTIN    180 capsule    TAKE TWO CAPSULES BY MOUTH THREE TIMES DAILY    Nerve pain       Garlic Caps      Take  by mouth. One daily        hydrochlorothiazide 12.5 MG Tabs tablet     30 tablet    Take 1 tablet (12.5 mg) by mouth daily    Hypertension goal BP (blood pressure) < 140/90       ibuprofen 800 MG tablet    ADVIL/MOTRIN    100 tablet    Take 1 tablet (800 mg) by mouth every 8 hours as needed for pain    Arthritis       insulin aspart 100 UNIT/ML injection    NovoLOG PEN    30 mL    Patient uses 100 units daily split up between multiple doses. Take 2 units per 15 grams of carbohydrate eaten and 2 units per 30 mg/dL above 170 mg/dL.    Type 2 diabetes mellitus without complication, with long-term current use of insulin (H)       * insulin glargine 100 UNIT/ML injection    LANTUS SOLOSTAR    30 mL    Take 50 units in the morning and 20 units in the evening.    Type 2 diabetes mellitus without complication, with long-term current use of insulin (H)       * LANTUS SOLOSTAR 100 UNIT/ML injection   Generic drug:  insulin glargine     30 mL    NJECT 70 UNITS SUBCUTANEOUSLY AT BEDTIME    Type 2 diabetes mellitus without complication, with long-term current use of insulin (H)       LORazepam 1 MG tablet    ATIVAN    30 tablet    Take 1 tablet (1 mg) by mouth every 8 hours as needed for anxiety    Anxiety       meperidine 50 MG tablet    DEMEROL    20 tablet    Take 1 tablet (50 mg) by mouth every 4 hours as needed    Idiopathic chronic pancreatitis (H)       MULTIVITAMIN TABS   OR      1 TABLET DAILY        oxyCODONE-acetaminophen 5-325 MG per tablet    PERCOCET     Take by mouth every 4 hours as needed for moderate to severe pain         "pen needles 5/16\" 31G X 8 MM Misc     180 each    Patient does 4 doses novolog and 2 lantus shots  insulin daily.  Needs 180 needles per month. Okay refills for one year.    Type 2 diabetes mellitus without complication, with long-term current use of insulin (H)       polyethylene glycol powder    MIRALAX    510 g    Take 17 g by mouth daily as needed for constipation    Constipation, unspecified constipation type       promethazine 25 MG tablet    PHENERGAN    30 tablet    Take 1 tablet (25 mg) by mouth every 6 hours as needed for nausea    Nausea       rosuvastatin 20 MG tablet    CRESTOR    90 tablet    TAKE 1 TABLET(20 MG) BY MOUTH DAILY    Hyperlipidemia LDL goal <100       SALINE      to clean port every other month    Bronchitis       senna-docusate 8.6-50 MG per tablet    SENOKOT-S;PERICOLACE    100 tablet    1-2 po bid prn constipation    Constipation, unspecified constipation type       tiZANidine 2 MG tablet    ZANAFLEX    30 tablet    Take 1 tablet (2 mg) by mouth 3 times daily as needed for muscle spasms    Muscle pain       zolpidem 5 MG tablet    AMBIEN    30 tablet    Take 1 tablet (5 mg) by mouth nightly as needed for sleep    Insomnia, unspecified type       * Notice:  This list has 4 medication(s) that are the same as other medications prescribed for you. Read the directions carefully, and ask your doctor or other care provider to review them with you.      "

## 2017-10-24 NOTE — NURSING NOTE
"Chief Complaint   Patient presents with     Breast Pain     left breast pain for 1 month       Initial /67 (BP Location: Right arm, Cuff Size: Adult Regular)  Pulse 63  Wt 179 lb 9.6 oz (81.5 kg)  SpO2 97%  Breastfeeding? No  BMI 31.32 kg/m2 Estimated body mass index is 31.32 kg/(m^2) as calculated from the following:    Height as of 9/27/17: 5' 3.5\" (1.613 m).    Weight as of this encounter: 179 lb 9.6 oz (81.5 kg).  Medication Reconciliation: complete   MILES Alonzo 10/24/2017         "

## 2017-10-24 NOTE — PROGRESS NOTES
"Leah Guerrero is a 71 year old year old female, G 4 , who presents today with breast pain/chest pain on left side.  She reports that she has had the pain for about one month.    Location: left breast, from the 12 o'clock position to the 3 o'clock position, in the upper, outer quadrant.   Quality: sharp, \"just there.\"     Severity: mild to moderate.   Timing: see above.   Context: She feels it when she touches the breast.   Modifying factors:  No known alleviating factors, or aggravating factors.   Associated signs/symptoms: she had some brown discharge from the left nipple a couple of months ago.  Family history of breast cancer:  None known   Previous breast surgery:  None   Previous pelvic surgery:  Hysterectomy in the 1980s  Hormones used:   Currently none, in past she used some estrogen but she developed pancreatitis and she stopped the HT in 1996  Nipple discharge:  she had some brown discharge from the left nipple a couple of months ago.  Skin changes:  no  History of breast feeding:  no  Previous mammogram:  She has had mammogram  Previous ultrasound:  No    She has a history of lung cancer diagnosed last year.  It was located in the right lung.   She sees Dr. Borrego at [place] [facility].     SCREENING MAMMOGRAM, BILATERAL, DIGITAL w/CAD - 4/13/2015 11:55 AM.  BREAST SYMPTOMS: No current breast complaints.   COMPARISON:  12/17/2012, 02/22/2011.  BREAST DENSITY: Heterogeneously dense.  COMMENTS: No findings of suspicion for malignancy.        IMPRESSION: BI-RADS CATEGORY: 1 - Negative.  RECOMMENDED FOLLOW-UP: Annual Mammography.  Recommend routine annual screening mammography.         Past Medical History:   Diagnosis Date     Abnormal CT of the chest 10/12    Nodular consolidation right lobe 1.6 cm. Needs repeat CT Jan 2013     Bleeding disorder (H)     in bowels x2      Chronic pancreatitis (H)      CVA (cerebral infarction)      Diabetes      Female stress incontinence      Hyperlipidemia LDL goal " <100      Hypertension      Intermittent asthma 2011     Lateral epicondylitis (tennis elbow) - left 3/30/2011     OA (osteoarthritis) of knee 3/22/2013     Pulmonary nodule, left 10/12    0.5 cm; needs f/u chest CT scan 2013     Surgical complications      Unspecified asthma(493.90)        Past Surgical History:   Procedure Laterality Date     APPENDECTOMY       C HAND/FINGER SURGERY UNLISTED       C SHOULDER SURG PROC UNLISTED       C STOMACH SURGERY PROCEDURE UNLISTED       CHOLECYSTECTOMY       COLONOSCOPY  08    Neg. At [facility]     COLONOSCOPY  9-3-13     EYE SURGERY       HYSTERECTOMY TOTAL ABD, HEIDI SALPINGO-OOPHORECTOMY, NODE DISSECTION, TUMOR DEBULKING, COMBINED      fibroids?     KNEE SURGERY  x5     KNEE SURGERY      kneecap procedure     LUNG SURGERY  10-7-16    removal lung cancer     SHOULDER SURGERY      right shoulder, bone spurs     WRIST SURGERY      right wrist       Obstetric History       T3      L1     SAB0   TAB0   Ectopic0   Multiple0   Live Births2       # Outcome Date GA Lbr Kulwinder/2nd Weight Sex Delivery Anes PTL Lv   4 SAB            3 Term            2 Term            1 Term                   Allergies   Allergen Reactions     Hydralazine Shortness Of Breath     Famotidine Nausea and Vomiting and Unknown     Benadryl Itch Stopping      Gives her more energy, can't sleep     Lisinopril      Tongue swelling       Metoclopramide Nausea and Vomiting     Ondansetron Nausea and Vomiting     Other reaction(s): Headache     Penicillins Hives     Tape [Adhesive Tape]      blisters     Tylenol      Anxiety  Tablets only.       Current Outpatient Prescriptions   Medication Sig Dispense Refill     rosuvastatin (CRESTOR) 20 MG tablet TAKE 1 TABLET(20 MG) BY MOUTH DAILY 90 tablet 1     LANTUS SOLOSTAR 100 UNIT/ML soln NJECT 70 UNITS SUBCUTANEOUSLY AT BEDTIME 30 mL 0     gabapentin (NEURONTIN) 100 MG capsule TAKE TWO CAPSULES BY MOUTH THREE TIMES DAILY 180 capsule  "3     hydrochlorothiazide 12.5 MG TABS tablet Take 1 tablet (12.5 mg) by mouth daily 30 tablet 1     insulin aspart (NOVOLOG PEN) 100 UNIT/ML injection Patient uses 100 units daily split up between multiple doses. Take 2 units per 15 grams of carbohydrate eaten and 2 units per 30 mg/dL above 170 mg/dL. 30 mL 1     insulin glargine (LANTUS SOLOSTAR) 100 UNIT/ML injection Take 50 units in the morning and 20 units in the evening. 30 mL 1     oxyCODONE-acetaminophen (PERCOCET) 5-325 MG per tablet Take by mouth every 4 hours as needed for moderate to severe pain       zolpidem (AMBIEN) 5 MG tablet Take 1 tablet (5 mg) by mouth nightly as needed for sleep 30 tablet 2     promethazine (PHENERGAN) 25 MG tablet Take 1 tablet (25 mg) by mouth every 6 hours as needed for nausea 30 tablet 1     meperidine (DEMEROL) 50 MG tablet Take 1 tablet (50 mg) by mouth every 4 hours as needed 20 tablet 0     Insulin Pen Needle (PEN NEEDLES 5/16\") 31G X 8 MM MISC Patient does 4 doses novolog and 2 lantus shots  insulin daily.  Needs 180 needles per month. Okay refills for one year. 180 each 11     albuterol (PROAIR HFA/PROVENTIL HFA/VENTOLIN HFA) 108 (90 BASE) MCG/ACT Inhaler Inhale 2 puffs into the lungs every 6 hours as needed for shortness of breath / dyspnea or wheezing 2 Inhaler 5     LORazepam (ATIVAN) 1 MG tablet Take 1 tablet (1 mg) by mouth every 8 hours as needed for anxiety 30 tablet 0     tiZANidine (ZANAFLEX) 2 MG tablet Take 1 tablet (2 mg) by mouth 3 times daily as needed for muscle spasms 30 tablet 1     ACE/ARB NOT PRESCRIBED, INTENTIONAL, ACE & ARB not prescribed due to Allergy       calcium polycarbophil (FIBERCON) 625 MG tablet Take 2 tablets (1,250 mg) by mouth daily 60 tablet 11     ibuprofen (ADVIL,MOTRIN) 800 MG tablet Take 1 tablet (800 mg) by mouth every 8 hours as needed for pain 100 tablet 0     senna-docusate (SENOKOT-S;PERICOLACE) 8.6-50 MG per tablet 1-2 po bid prn constipation 100 tablet 1     blood glucose " monitoring (ONE TOUCH TEST STRIPS) test strip Please dispense the particular type of lancets and test strips that patient needs.  She tests frequently, averaging 6 times per day. Okay to dispense 3 months supplies with refills for a year 3 Month 3     polyethylene glycol (MIRALAX) powder Take 17 g by mouth daily as needed for constipation 510 g 1     albuterol (2.5 MG/3ML) 0.083% nebulizer solution Take 3 mLs (2.5 mg) by nebulization 4 times daily as needed 1 Box 5     fish oil-omega-3 fatty acids (FISH OIL) 1000 MG capsule Take 1 capsule by mouth daily.       Calcium Carbonate-Vitamin D (CALCIUM-D PO) Take  by mouth. 1200mg calcium/600mg D3       Garlic CAPS Take  by mouth. One daily       aspirin 81 MG tablet Take 1 tablet by mouth daily. 1 daily       SALINE to clean port every other month       MULTIVITAMIN TABS   OR 1 TABLET DAILY         Social History     Social History     Marital status: Single     Spouse name: N/A     Number of children: 4     Years of education: 15     Occupational History      Disabled     Social History Main Topics     Smoking status: Never Smoker     Smokeless tobacco: Never Used     Alcohol use No     Drug use: No     Sexual activity: No     Other Topics Concern     Parent/Sibling W/ Cabg, Mi Or Angioplasty Before 65f 55m? No      Service No     Blood Transfusions No     Caffeine Concern No     Occupational Exposure No     Hobby Hazards No     Sleep Concern Yes     Stress Concern No     Weight Concern Yes     Special Diet Yes     diabetic diet     Back Care No     Exercise Yes     Bike Helmet No     no bike     Seat Belt Yes     Self-Exams No     Social History Narrative       No family history on file.       Review of Symptoms:    10 point ROS of systems including Constitutional, Eyes, Respiratory, Cardiovascular, Gastroenterology, Genitourinary, Integumentary, Muscularskeletal, Psychiatric were all negative except for pertinent positives noted in my HPI and in the PMH.            EXAM:  /67 (BP Location: Right arm, Cuff Size: Adult Regular)  Pulse 63  Wt 179 lb 9.6 oz (81.5 kg)  SpO2 97%  Breastfeeding? No  BMI 31.32 kg/m2   General Appearance: Pleasant, alert, appropriate appearance for age. No acute distress  Head Exam: Normal. Normocephalic, atraumatic.  Neck Exam: neck supple with no rigidity  Thyroid Exam: No nodules or enlargement., normal to palpation  Chest/Respiratory Exam: good respiratory effort   Breast Exam: No dimpling, nipple retraction or discharge. No masses or nodes, normal breast tissue bilaterally.  Bilateral breast tenderness.   Cardiovascular Exam: Regular rate  Gastrointestinal Exam: Soft, nontender, no abnormal masses or organomegaly.  Musculoskeletal Exam: Back is straight and non-tender, full ROM of upper and lower extremities.  Skin: no rash or abnormalities  Neurologic Exam: Normal gross motor movement, tone, and coordination. No tremor.  Psychiatric Exam: Alert and oriented, appropriate affect.      ASSESSMENT:  Left breast pain  Left nipple discharge  Bilateral breast tenderness      PLAN:  Recommend to have the diagnostic mammogram and possible ultrasound.    Follow up with Dr. Borrego for the lung cancer and possible correlation with current symptoms.     Rajan Kilgore MD  10/24/2017

## 2017-10-25 ENCOUNTER — RADIANT APPOINTMENT (OUTPATIENT)
Dept: GENERAL RADIOLOGY | Facility: CLINIC | Age: 72
End: 2017-10-25
Attending: PHYSICIAN ASSISTANT
Payer: COMMERCIAL

## 2017-10-25 ENCOUNTER — OFFICE VISIT (OUTPATIENT)
Dept: ORTHOPEDICS | Facility: CLINIC | Age: 72
End: 2017-10-25
Payer: COMMERCIAL

## 2017-10-25 VITALS — BODY MASS INDEX: 30.59 KG/M2 | HEIGHT: 64 IN | WEIGHT: 179.2 LBS | RESPIRATION RATE: 16 BRPM

## 2017-10-25 DIAGNOSIS — M17.12 PRIMARY OSTEOARTHRITIS OF LEFT KNEE: Primary | ICD-10-CM

## 2017-10-25 DIAGNOSIS — M17.12 PRIMARY OSTEOARTHRITIS OF LEFT KNEE: ICD-10-CM

## 2017-10-25 PROCEDURE — 73562 X-RAY EXAM OF KNEE 3: CPT | Mod: LT

## 2017-10-25 PROCEDURE — 20610 DRAIN/INJ JOINT/BURSA W/O US: CPT | Mod: LT | Performed by: ORTHOPAEDIC SURGERY

## 2017-10-25 RX ORDER — METHYLPREDNISOLONE ACETATE 80 MG/ML
80 INJECTION, SUSPENSION INTRA-ARTICULAR; INTRALESIONAL; INTRAMUSCULAR; SOFT TISSUE ONCE
Qty: 1 ML | Refills: 0 | OUTPATIENT
Start: 2017-10-25 | End: 2017-10-25

## 2017-10-25 ASSESSMENT — PAIN SCALES - GENERAL: PAINLEVEL: EXTREME PAIN (9)

## 2017-10-25 NOTE — PROGRESS NOTES
The patient's left knee was prepped with betadine solution after verification of allergies. Area approximately 10 cm x 10 cm prepped in a sterile fashion. After injection, betadine removed with soap and water and band-aids applied.    4cc Lidocaine 1% NDC 4253-0690-58, LOT -[id number],  [id number]  3cc Bupivacaine 0.25% NDC 0018-6115-05, LOT -DK,  2017  1cc Depo Medrol 80 mg/ml NDC 3887-7045-77, LOT [id number],  2019     injected into patient's left Knee by:  Grant Dillon PA-C, CAQ (Ortho)  Supervising Physician: José Vogel M.D., M.S.  Dept. of Orthopaedic Surgery  [facility]

## 2017-10-25 NOTE — NURSING NOTE
"Chief Complaint   Patient presents with     RECHECK     Left knee pain. Last injection: 9/27/17. Injection only helped for a couple of days. She would like to cut her knee off. She is having excruciating pain when walking. She has trouble walking. Pain will go all the way up to her hip. She says her knee is crooked, it goes in instead of straight.        Initial Resp 16  Ht 1.613 m (5' 3.5\")  Wt 81.3 kg (179 lb 3.2 oz)  BMI 31.25 kg/m2 Estimated body mass index is 31.25 kg/(m^2) as calculated from the following:    Height as of this encounter: 1.613 m (5' 3.5\").    Weight as of this encounter: 81.3 kg (179 lb 3.2 oz).  Medication Reconciliation: howard Jacob Certified Medical Assistant    "

## 2017-10-25 NOTE — PROGRESS NOTES
Chief Complaint   Patient presents with     RECHECK     Left knee pain. Last injection: 9/27/17. Injection only helped for a couple of days. She would like to cut her knee off. She is having excruciating pain when walking. She has trouble walking. Pain will go all the way up to her hip. She says her knee is crooked, it goes in instead of straight.        HISTORY OF PRESENT ILLNESS:  Leah Guerrero is a 71 year old female seen for follow up left knee pain, advanced primary osteoarthritis. She had prior left knee arthroscopy on 10/10/2014. She returns today with extreme pain today, rated a 9/10. Her last injection was on 9/27/2017, 1 month ago. She notes the injection only worked for a couple of days. She is having excruciating pain with walking. Pain is the worst with any weightbearing. She reports the pain will start in the knee and radiates up into the hip. She also feels that her knee is crooked, not straight. Presents with her dog.       Past Medical History:   Diagnosis Date     Abnormal CT of the chest 10/12    Nodular consolidation right lobe 1.6 cm. Needs repeat CT Jan 2013     Bleeding disorder (H)     in bowels x2      Chronic pancreatitis (H)      CVA (cerebral infarction)      Diabetes      Female stress incontinence      Hyperlipidemia LDL goal <100      Hypertension      Intermittent asthma 12/30/2011     Lateral epicondylitis (tennis elbow) - left 3/30/2011     OA (osteoarthritis) of knee 3/22/2013     Pulmonary nodule, left 10/12    0.5 cm; needs f/u chest CT scan Jan 2013     Surgical complications      Unspecified asthma(493.90)      Patient Active Problem List    Diagnosis Date Noted     Malignant neoplasm of lung, unspecified laterality, unspecified part of lung (H) 04/21/2017     Priority: Medium     Chronic pancreatitis, unspecified pancreatitis type (H) 11/16/2016     Priority: Medium     Type 2 diabetes mellitus without complication, with long-term current use of insulin (H) 11/16/2016      Priority: Medium     Anxiety 07/20/2016     Priority: Medium     Insomnia, unspecified type 06/07/2016     Priority: Medium     Idiopathic chronic pancreatitis (H) 11/04/2015     Priority: Medium     Primary osteoarthritis of left knee 10/28/2015     Priority: Medium     Hypertension goal BP (blood pressure) < 140/90 05/11/2015     Priority: Medium     Obesity 01/26/2015     Priority: Medium     Health Care Home 10/22/2014     Priority: Medium       Status:  Accepted  Care Coordinator:  Ray Holland    See Letters for [facility] Care Plan  Date:  March 25, 2015               Intermittent asthma without complication 07/11/2014     Priority: Medium     IT band syndrome 01/13/2014     Priority: Medium     Contusion of knee 11/20/2013     Priority: Medium     Prepatellar bursitis of left knee 11/20/2013     Priority: Medium     Insomnia 05/30/2013     Priority: Medium     Mechanical low back pain 03/22/2013     Priority: Medium     Radicular pain of left lower extremity 03/22/2013     Priority: Medium     GERD (gastroesophageal reflux disease) 02/06/2013     Priority: Medium     Pulmonary nodule, left      Priority: Medium     0.5 cm; needs f/u chest CT scan Jan 2013       Abnormal CT of the chest      Priority: Medium     Nodular consolidation right lobe 1.6 cm. Needs repeat CT Jan 2013       Anxiety state 06/21/2012     Priority: Medium     Problem list name updated by automated process. Provider to review       Intermittent asthma 12/30/2011     Priority: Medium     Pancreatitis, chronic (H) 09/09/2011     Priority: Medium     Advanced directives, counseling/discussion 05/06/2011     Priority: Medium     04/29/2013  Advance Care Planning:   Receipt of ACP document:  Received: Health Care Directive which was witnessed or notarized on 07/06/2011.  Document previously scanned on 07/07/2011.  Validation form completed and sent to be scanned.  Code Status reflects choices in most recent ACP document.  Confirmed/documented  designated decision maker(s). See permanent comments section of demographics in clinical tab. View document(s) and details by clicking on code status.   Added by Lacie Canchola on 4/29/2013.          Advance Care Planning:   ACP Review and Resources Provided:  Reviewed chart for advance care plan.  Leah Guerrero has no plan or code status on file however states presence of ACP document. Copy requested. Confirmed code status reflects current choices pending receipt of document/advance care plan review. Confirmed/documented designated decision maker(s). See permanent comments section of demographics in clinical tab.   Added by Emma Medina on 4/24/2013          Planning  Advance Directive Initial Visit  Leah Guerrero presents in person for initial session regarding completion of advanced directive form. She was referred to the facilitator by self.  She currently has an advance directive from 1980 & wants to fill out a new updated one.  Plan:  Advanced directive form, healthcare agent information card, advanced care planning information booklet provided to patient.   Follow up meeting: to be arranged when patient calls back to make an appointment after reviewing documents in one week or so. Patient instructed to bring healthcare agent to this meeting.  Flores Gerard RN  Letter sent to patient for [facility] [employer] follow up.  6/22/2011  Flores Gerard RN  Advance Directive Follow-up Visit  Leah Guerrero presents for advanced care planning session accompanied by no one.  Reviewed definitions of advanced care planning and advance directive form, and informational handouts given to patient previously.  Patient has questions and /or concerns about acp process or form .  Reviewed advance directive form with patient & answered all of her questions. Designated healthcare agent is identified. Healthcare agent s name is Paul Purcells. My Hopes and Wishes reviewed.  Finalized wishes are clear to patient  Yes  Patient and designated healthcare agent are aware that document may be changed at any time in the future.  Advanced directive form: completed at this visit.  Advanced directive form: verified with notary signature. Original and two copies of advanced directive form given to patient copy sent to  for scanning into EMR and original given to patient and instructed to give copy to designated [facility] and non-[place] physician where applicable.  Flores Gerrad RN  7/6/2011  Advance Directive Problem List Overview:   Name Relationship Phone    Primary Health Care Agent Paul Bro [phone]         Alternative Health Care Agent Hiram Bro c [phone]  [phone]                      Hyperlipidemia LDL goal <100 12/16/2010     Priority: Medium     Fatigue 12/15/2010     Priority: Medium     Type 2 diabetes, HbA1C goal < 8% (H) 11/24/2010     Priority: Medium     Female stress incontinence 10/28/2010     Priority: Medium     Past Surgical History:   Procedure Laterality Date     APPENDECTOMY  2012     C HAND/FINGER SURGERY UNLISTED       C SHOULDER SURG PROC UNLISTED       C STOMACH SURGERY PROCEDURE UNLISTED       CHOLECYSTECTOMY  1969     COLONOSCOPY  6-2-08    Neg. At [facility]     COLONOSCOPY  9-3-13     EYE SURGERY       HYSTERECTOMY TOTAL ABD, HEIDI SALPINGO-OOPHORECTOMY, NODE DISSECTION, TUMOR DEBULKING, COMBINED  1988    fibroids?     KNEE SURGERY  x5     KNEE SURGERY      kneecap procedure     LUNG SURGERY  10-7-16    removal lung cancer     SHOULDER SURGERY  2005    right shoulder, bone spurs     WRIST SURGERY      right wrist       Medications:   Current Outpatient Prescriptions:      rosuvastatin (CRESTOR) 20 MG tablet, TAKE 1 TABLET(20 MG) BY MOUTH DAILY, Disp: 90 tablet, Rfl: 1     LANTUS SOLOSTAR 100 UNIT/ML soln, NJECT 70 UNITS SUBCUTANEOUSLY AT BEDTIME, Disp: 30 mL, Rfl: 0     gabapentin (NEURONTIN) 100 MG capsule, TAKE TWO CAPSULES BY MOUTH THREE TIMES DAILY, Disp: 180 capsule,  "Rfl: 3     hydrochlorothiazide 12.5 MG TABS tablet, Take 1 tablet (12.5 mg) by mouth daily, Disp: 30 tablet, Rfl: 1     insulin aspart (NOVOLOG PEN) 100 UNIT/ML injection, Patient uses 100 units daily split up between multiple doses. Take 2 units per 15 grams of carbohydrate eaten and 2 units per 30 mg/dL above 170 mg/dL., Disp: 30 mL, Rfl: 1     insulin glargine (LANTUS SOLOSTAR) 100 UNIT/ML injection, Take 50 units in the morning and 20 units in the evening., Disp: 30 mL, Rfl: 1     oxyCODONE-acetaminophen (PERCOCET) 5-325 MG per tablet, Take by mouth every 4 hours as needed for moderate to severe pain, Disp: , Rfl:      zolpidem (AMBIEN) 5 MG tablet, Take 1 tablet (5 mg) by mouth nightly as needed for sleep, Disp: 30 tablet, Rfl: 2     promethazine (PHENERGAN) 25 MG tablet, Take 1 tablet (25 mg) by mouth every 6 hours as needed for nausea, Disp: 30 tablet, Rfl: 1     meperidine (DEMEROL) 50 MG tablet, Take 1 tablet (50 mg) by mouth every 4 hours as needed, Disp: 20 tablet, Rfl: 0     Insulin Pen Needle (PEN NEEDLES 5/16\") 31G X 8 MM MISC, Patient does 4 doses novolog and 2 lantus shots  insulin daily.  Needs 180 needles per month. Okay refills for one year., Disp: 180 each, Rfl: 11     albuterol (PROAIR HFA/PROVENTIL HFA/VENTOLIN HFA) 108 (90 BASE) MCG/ACT Inhaler, Inhale 2 puffs into the lungs every 6 hours as needed for shortness of breath / dyspnea or wheezing, Disp: 2 Inhaler, Rfl: 5     LORazepam (ATIVAN) 1 MG tablet, Take 1 tablet (1 mg) by mouth every 8 hours as needed for anxiety, Disp: 30 tablet, Rfl: 0     tiZANidine (ZANAFLEX) 2 MG tablet, Take 1 tablet (2 mg) by mouth 3 times daily as needed for muscle spasms, Disp: 30 tablet, Rfl: 1     ACE/ARB NOT PRESCRIBED, INTENTIONAL,, ACE & ARB not prescribed due to Allergy, Disp: , Rfl:      calcium polycarbophil (FIBERCON) 625 MG tablet, Take 2 tablets (1,250 mg) by mouth daily, Disp: 60 tablet, Rfl: 11     ibuprofen (ADVIL,MOTRIN) 800 MG tablet, Take 1 " tablet (800 mg) by mouth every 8 hours as needed for pain, Disp: 100 tablet, Rfl: 0     senna-docusate (SENOKOT-S;PERICOLACE) 8.6-50 MG per tablet, 1-2 po bid prn constipation, Disp: 100 tablet, Rfl: 1     blood glucose monitoring (ONE TOUCH TEST STRIPS) test strip, Please dispense the particular type of lancets and test strips that patient needs.  She tests frequently, averaging 6 times per day. Okay to dispense 3 months supplies with refills for a year, Disp: 3 Month, Rfl: 3     polyethylene glycol (MIRALAX) powder, Take 17 g by mouth daily as needed for constipation, Disp: 510 g, Rfl: 1     albuterol (2.5 MG/3ML) 0.083% nebulizer solution, Take 3 mLs (2.5 mg) by nebulization 4 times daily as needed, Disp: 1 Box, Rfl: 5     fish oil-omega-3 fatty acids (FISH OIL) 1000 MG capsule, Take 1 capsule by mouth daily., Disp: , Rfl:      Calcium Carbonate-Vitamin D (CALCIUM-D PO), Take  by mouth. 1200mg calcium/600mg D3, Disp: , Rfl:      Garlic CAPS, Take  by mouth. One daily, Disp: , Rfl:      aspirin 81 MG tablet, Take 1 tablet by mouth daily. 1 daily, Disp: , Rfl:      SALINE, to clean port every other month, Disp: , Rfl:      MULTIVITAMIN TABS   OR, 1 TABLET DAILY, Disp: , Rfl:     Allergies:   Allergies   Allergen Reactions     Hydralazine Shortness Of Breath     Famotidine Nausea and Vomiting and Unknown     Benadryl Itch Stopping      Gives her more energy, can't sleep     Lisinopril      Tongue swelling       Metoclopramide Nausea and Vomiting     Ondansetron Nausea and Vomiting     Other reaction(s): Headache     Penicillins Hives     Tape [Adhesive Tape]      blisters     Tylenol      Anxiety  Tablets only.       REVIEW OF SYSTEMS:   CONSTITUTIONAL:NEGATIVE for fever, chills, night sweats  INTEGUMENTARY/SKIN: NEGATIVE for worrisome wound problems or redness.  MUSCULOSKELETAL:See HPI above  NEURO: NEGATIVE for weakness, dizziness or paresthesias    This document serves as a record of the services and decisions  "personally performed and made by José Vogel MD. It was created on his behalf by Nely Pratt, a trained medical scribe. The creation of this document is based the provider's statements to the medical scribe.    Scribe Nely Pratt 11:06 AM 10/25/2017      PHYSICAL EXAM:  Resp 16  Ht 1.613 m (5' 3.5\")  Wt 81.3 kg (179 lb 3.2 oz)  BMI 31.25 kg/m2   GENERAL APPEARANCE: healthy, alert, no distress, accompanied by her dog   SKIN: no suspicious lesions or rashes  NEURO: Normal strength and tone, mentation intact and speech normal  PSYCH:  mentation appears normal and affect normal, not anxious  RESPIRATORY: No increased work of breathing.    left  LOWER EXTREMITY:  Gait: antalgic left.  mild Quad atrophy, strength weakened.  Intact sensation deep peroneal nerve, superficial peroneal nerve, med/lat tibial nerve, sural nerve, saphenous nerve  Intact EHL, EDL, TA, FHL, GS, quadriceps hamstrings and hip flexors  Toes warm and well perfused, brisk capillary refill. Palpable 2+ dp pulses.  calf soft and nttp or squeeze.  Edema: trace    left  KNEE EXAM:    Skin: intact, no ecchymosis or erythema; mild swelling.  ROM: 5 degrees extension to 95 degrees flexion, discomfort at extremes  Effusion: none  Tender: med/lat joint line, anterior/posterior knee.      X-RAY: no new xrays today.  3 views left knee 10/25/2017 reviewed: Bone on bone medial loss of joint space, subchondral sclerosis, marginal osteohytes with medial articular flattening. Possible lucency under medial femoral condyle subchondral bone. Patello-femoral marginal osteophytes are more prominent. Arthritis progression since prior xrays.    LEFT KNEE INTRAOP ARTHROSCOPY FINDINGS   1. Trace effusion.   2. Moderate synovitis.   3. Grade 4 chondrosis of the patella and grade 3 chondrosis of the femoral trochlea.   4. Prominent medial plica with rubbing along the anterior aspect of the medial femoral condyle.   5. No loose bodies in the media or lateral gutter but small " marginal osteophytes.   6. Intact ACL, within the notch. There was some fraying or partial tearing of fibers of the PCL with PCL grossly otherwise intact.   7. Lateral compartment with a small free edge flap tear off of the posterior horn of the lateral meniscus and a partial thickness undersurface tear towards the periphery of the posterior horn body junction.   8. Lateral compartment with an area of grade 4 chondrosis of the weight-bearing portion of the femoral condyle, as well as grade 4 chondrosis of the posterior medial aspect of the lateral tibia.   9. Complex tearing of the posterior horn and body of the medial meniscus with horizontal cleavage and radial tears with a displaced flap off of the medial aspect of the body of the meniscus.   10. Medial compartment with grade 4 chondrosis of the majority of the medial femoral condyle and grade 3 diffuse chondrosis of the medial tibia with areas of grade 4 chondrosis      Impression: Leah Guerrero is a 71 year old female with left knee pain, primary osteoarthritis.       Plan:   * unclear why recent injection didn't provide relief like others in the past. Injections do have lessened effect over time.  * discussed that it is too early to repeat injection and advise against it too soon.    Discussed various treatments for degenerative arthrosis of the knee, including nonoperative and operative approaches. Nonoperative approaches, which are exhausted prior to operative treatment, include doing nothing and living with the pain as patient has been doing, activity modification (avoid aggravating activities), physical therapy and strengthening exercises, weight loss, anti-inflammatory medications, bracing, ambulatory aids (cane, walker) and injections. Once these have been tried and are deemed unsuccessful with a good effort, and the patient is an appropriate candidate, the next treatment would be knee arthroplasty or replacement. Depending on the location of the  "arthritis, knee replacement can be partial (one side of the knee affected by arthritis) or a total knee arthroplasty (all 3 compartments). The risks, perceived benefits and perioperative rehabilitation expectations of knee arthroplasty were discussed in detail. Also discussed that approximately 10% of patients that undergo knee arthroplasty are not happy following surgery and may have worse pain or no improvement in pain, contrary to their preoperative expectations.    At this time, nonoperative treatment will be continued.    Non-surgical treatment for knee arthritis includes:    * rest, sitting  * Activity modification - avoid impact activities or activities that aggravate symptoms.  * NSAIDS (non-steroidal anti-inflammatory medications; e.g. Aleve, advil, motrin, ibuprofen) - regular use for inflammation ( twice daily or three times daily), with food, as long as no contra-indications Please discuss with primary care doctor if needed  * ice, 15-20 minutes at a time several times a day or as needed.  * Strengthening of quadriceps muscles  * Physical Therapy for strengthening, stretching and range of motion exercises of legs  * Tylenol as needed for pain, consider Tylenol arthritis or similar  * Weight loss: Body mass index is 31.25 kg/(m^2).. weight loss benefits, not only for the current pain symptoms, but also overall health. Recommend a good diet plan that works for the patient, with the assistance of a dietician or primary care doctor as needed. Also, a good, low-impact exercise program for at least 20 minutes per day, 3 times per week, such as exercise bike, elliptical , or pool.  * Exercise: low impact such as stationary bike, elliptical, pool.  * Injections: discussed viscosupplementation (hyaluronic acid, \"rooster comb\", \"gel shots\"); risks and perceived benefits discussed today. Patient elects to not proceed with that but would again like to try cortisone.   * Bracing: bracing the knee may offer " some relief of symptoms when worn and provide some stability.  * over the counter supplements such as glucosamine and chondroitin sulfate may help with joint pain.  * topical ointments may help as well  * x-rays taken on the way out of clinic today to evaluate for progression.    * return to clinic as needed.    PROCEDURE NOTE:  The risks, perceived benefits and potential complications (including but not limited to: bleeding, infection, pain, scar, damage to adjacent structures, atrophy or necrosis of soft tissue, skin blanching, failure to relieve symptoms, worsening of symptoms, allergic reaction) of injection were discussed with the patient. Questions were addressed and answered.The patient elected to proceed. Written informed consent was obtained. The correct procedural site was identified and confirmed. A LEFT knee intraarticular injection was performed using 1mL Depo Medrol 80mg per mL and 7mL (4mL 1% lidocaine, 3mL 0.25% marcaine)  of local anesthetic after sterile prep, to the correct procedural site. Sterile bandaid applied. This was tolerated well by the patient. No apparent complications. Did also discuss that if diabetic, recommend close monitoring of blood sugars over the next week as cortisone injections can temporarily elevate blood sugars.       The information in this document, created by a scribe for me, accurately reflects the services I personally performed and the decisions made by me. I have reviewed and approved this document for accuracy.      José Vogel M.D., M.S.  Dept. of Orthopaedic Surgery  [facility]

## 2017-10-25 NOTE — PATIENT INSTRUCTIONS
Please remember to call and schedule a follow up appointment if one was recommended at your earliest convenience.  Orthopedics CLINIC HOURS TELEPHONE NUMBER   Dr. Lela Frausto  Certified Medical Assistant   Monday & Wednesday   8am - 5pm  Thursday 1pm - 5pm  Friday 8am -11:30am Specialty schedulers:   [phone] to schedule your surgery.  Main Clinic:   [phone] to make an appointment with any provider.    Urgent Care locations:    [facility] Monday-Friday Closed  Saturday-Sunday 9am-5pm      Monday-Friday 12pm - 8pm  Saturday-Sunday 9am-5pm [phone]     If SURGERY has been recommended, please call our Specialty Schedulers at [phone] to schedule your procedure.    If you need a medication refill, please contact your pharmacy. Please allow 3 business days for your refill to be completed.    If an MRI or CT scan has been recommended, please call [facility] Schedulers at [phone] to schedule your appointment.  Use [facility] (secure e-mail communication and access to your chart) to send a message or to make an appointment. Please ask how you can sign up for [facility].  Your care team's suggested websites for health information:   [email or web address] : Up to date and easily searchable information on multiple topics.   [email or web address] : [place] dept of heat, public health issues in [place], N1N1

## 2017-10-25 NOTE — LETTER
10/25/2017         RE: Leah Guerrero  [address]   [facility] [id number]        Dear Colleague,    Thank you for referring your patient, Leah Guerrero, to the [facility]. Please see a copy of my visit note below.    Chief Complaint   Patient presents with     RECHECK     Left knee pain. Last injection: 9/27/17. Injection only helped for a couple of days. She would like to cut her knee off. She is having excruciating pain when walking. She has trouble walking. Pain will go all the way up to her hip. She says her knee is crooked, it goes in instead of straight.        HISTORY OF PRESENT ILLNESS:  Leah Guerrero is a 71 year old female seen for follow up left knee pain, advanced primary osteoarthritis. She had prior left knee arthroscopy on 10/10/2014. She returns today with extreme pain today, rated a 9/10. Her last injection was on 9/27/2017, 1 month ago. She notes the injection only worked for a couple of days. She is having excruciating pain with walking. Pain is the worst with any weightbearing. She reports the pain will start in the knee and radiates up into the hip. She also feels that her knee is crooked, not straight. Presents with her dog.       Past Medical History:   Diagnosis Date     Abnormal CT of the chest 10/12    Nodular consolidation right lobe 1.6 cm. Needs repeat CT Jan 2013     Bleeding disorder (H)     in bowels x2      Chronic pancreatitis (H)      CVA (cerebral infarction)      Diabetes      Female stress incontinence      Hyperlipidemia LDL goal <100      Hypertension      Intermittent asthma 12/30/2011     Lateral epicondylitis (tennis elbow) - left 3/30/2011     OA (osteoarthritis) of knee 3/22/2013     Pulmonary nodule, left 10/12    0.5 cm; needs f/u chest CT scan Jan 2013     Surgical complications      Unspecified asthma(493.90)      Patient Active Problem List    Diagnosis Date Noted     Malignant neoplasm of lung, unspecified  laterality, unspecified part of lung (H) 04/21/2017     Priority: Medium     Chronic pancreatitis, unspecified pancreatitis type (H) 11/16/2016     Priority: Medium     Type 2 diabetes mellitus without complication, with long-term current use of insulin (H) 11/16/2016     Priority: Medium     Anxiety 07/20/2016     Priority: Medium     Insomnia, unspecified type 06/07/2016     Priority: Medium     Idiopathic chronic pancreatitis (H) 11/04/2015     Priority: Medium     Primary osteoarthritis of left knee 10/28/2015     Priority: Medium     Hypertension goal BP (blood pressure) < 140/90 05/11/2015     Priority: Medium     Obesity 01/26/2015     Priority: Medium     Health Care Home 10/22/2014     Priority: Medium       Status:  Accepted  Care Coordinator:  Ray Holland    See Letters for [facility] Care Plan  Date:  March 25, 2015               Intermittent asthma without complication 07/11/2014     Priority: Medium     IT band syndrome 01/13/2014     Priority: Medium     Contusion of knee 11/20/2013     Priority: Medium     Prepatellar bursitis of left knee 11/20/2013     Priority: Medium     Insomnia 05/30/2013     Priority: Medium     Mechanical low back pain 03/22/2013     Priority: Medium     Radicular pain of left lower extremity 03/22/2013     Priority: Medium     GERD (gastroesophageal reflux disease) 02/06/2013     Priority: Medium     Pulmonary nodule, left      Priority: Medium     0.5 cm; needs f/u chest CT scan Jan 2013       Abnormal CT of the chest      Priority: Medium     Nodular consolidation right lobe 1.6 cm. Needs repeat CT Jan 2013       Anxiety state 06/21/2012     Priority: Medium     Problem list name updated by automated process. Provider to review       Intermittent asthma 12/30/2011     Priority: Medium     Pancreatitis, chronic (H) 09/09/2011     Priority: Medium     Advanced directives, counseling/discussion 05/06/2011     Priority: Medium     04/29/2013  Advance Care Planning:   Receipt of ACP  document:  Received: Health Care Directive which was witnessed or notarized on 07/06/2011.  Document previously scanned on 07/07/2011.  Validation form completed and sent to be scanned.  Code Status reflects choices in most recent ACP document.  Confirmed/documented designated decision maker(s). See permanent comments section of demographics in clinical tab. View document(s) and details by clicking on code status.   Added by Lacie Canchola on 4/29/2013.          Advance Care Planning:   ACP Review and Resources Provided:  Reviewed chart for advance care plan.  Leah Guerrero has no plan or code status on file however states presence of ACP document. Copy requested. Confirmed code status reflects current choices pending receipt of document/advance care plan review. Confirmed/documented designated decision maker(s). See permanent comments section of demographics in clinical tab.   Added by Emma Medina on 4/24/2013          Planning  Advance Directive Initial Visit  Leah Guerrero presents in person for initial session regarding completion of advanced directive form. She was referred to the facilitator by self.  She currently has an advance directive from 1980 & wants to fill out a new updated one.  Plan:  Advanced directive form, healthcare agent information card, advanced care planning information booklet provided to patient.   Follow up meeting: to be arranged when patient calls back to make an appointment after reviewing documents in one week or so. Patient instructed to bring healthcare agent to this meeting.  Flores Gerard RN  Letter sent to patient for [employer] follow up.  6/22/2011  Flores Gerard RN  Advance Directive Follow-up Visit  Leah Guerrero presents for advanced care planning session accompanied by no one.  Reviewed definitions of advanced care planning and advance directive form, and informational handouts given to patient previously.  Patient has questions and /or  concerns about acp process or form .  Reviewed advance directive form with patient & answered all of her questions. Designated healthcare agent is identified. Healthcare agent s name is Paul Guerrero. My Hopes and Wishes reviewed.  Finalized wishes are clear to patient Yes  Patient and designated healthcare agent are aware that document may be changed at any time in the future.  Advanced directive form: completed at this visit.  Advanced directive form: verified with notary signature. Original and two copies of advanced directive form given to patient copy sent to  for scanning into EMR and original given to patient and instructed to give copy to designated [facility] and non-[place] physician where applicable.  Flores Gerard RN  7/6/2011  Advance Directive Problem List Overview:   Name Relationship Phone    Primary Health Care Agent Paul Guerrero Son [phone]         Alternative Health Care Agent Hiram Guerrero Son c [phone]  [phone]                      Hyperlipidemia LDL goal <100 12/16/2010     Priority: Medium     Fatigue 12/15/2010     Priority: Medium     Type 2 diabetes, HbA1C goal < 8% (H) 11/24/2010     Priority: Medium     Female stress incontinence 10/28/2010     Priority: Medium     Past Surgical History:   Procedure Laterality Date     APPENDECTOMY  2012     C HAND/FINGER SURGERY UNLISTED       C SHOULDER SURG PROC UNLISTED       C STOMACH SURGERY PROCEDURE UNLISTED       CHOLECYSTECTOMY  1969     COLONOSCOPY  6-2-08    Neg. At [facility]     COLONOSCOPY  9-3-13     EYE SURGERY       HYSTERECTOMY TOTAL ABD, HEIDI SALPINGO-OOPHORECTOMY, NODE DISSECTION, TUMOR DEBULKING, COMBINED  1988    fibroids?     KNEE SURGERY  x5     KNEE SURGERY      kneecap procedure     LUNG SURGERY  10-7-16    removal lung cancer     SHOULDER SURGERY  2005    right shoulder, bone spurs     WRIST SURGERY      right wrist       Medications:   Current Outpatient Prescriptions:      rosuvastatin (CRESTOR) 20 MG  "tablet, TAKE 1 TABLET(20 MG) BY MOUTH DAILY, Disp: 90 tablet, Rfl: 1     LANTUS SOLOSTAR 100 UNIT/ML soln, NJECT 70 UNITS SUBCUTANEOUSLY AT BEDTIME, Disp: 30 mL, Rfl: 0     gabapentin (NEURONTIN) 100 MG capsule, TAKE TWO CAPSULES BY MOUTH THREE TIMES DAILY, Disp: 180 capsule, Rfl: 3     hydrochlorothiazide 12.5 MG TABS tablet, Take 1 tablet (12.5 mg) by mouth daily, Disp: 30 tablet, Rfl: 1     insulin aspart (NOVOLOG PEN) 100 UNIT/ML injection, Patient uses 100 units daily split up between multiple doses. Take 2 units per 15 grams of carbohydrate eaten and 2 units per 30 mg/dL above 170 mg/dL., Disp: 30 mL, Rfl: 1     insulin glargine (LANTUS SOLOSTAR) 100 UNIT/ML injection, Take 50 units in the morning and 20 units in the evening., Disp: 30 mL, Rfl: 1     oxyCODONE-acetaminophen (PERCOCET) 5-325 MG per tablet, Take by mouth every 4 hours as needed for moderate to severe pain, Disp: , Rfl:      zolpidem (AMBIEN) 5 MG tablet, Take 1 tablet (5 mg) by mouth nightly as needed for sleep, Disp: 30 tablet, Rfl: 2     promethazine (PHENERGAN) 25 MG tablet, Take 1 tablet (25 mg) by mouth every 6 hours as needed for nausea, Disp: 30 tablet, Rfl: 1     meperidine (DEMEROL) 50 MG tablet, Take 1 tablet (50 mg) by mouth every 4 hours as needed, Disp: 20 tablet, Rfl: 0     Insulin Pen Needle (PEN NEEDLES 5/16\") 31G X 8 MM MISC, Patient does 4 doses novolog and 2 lantus shots  insulin daily.  Needs 180 needles per month. Okay refills for one year., Disp: 180 each, Rfl: 11     albuterol (PROAIR HFA/PROVENTIL HFA/VENTOLIN HFA) 108 (90 BASE) MCG/ACT Inhaler, Inhale 2 puffs into the lungs every 6 hours as needed for shortness of breath / dyspnea or wheezing, Disp: 2 Inhaler, Rfl: 5     LORazepam (ATIVAN) 1 MG tablet, Take 1 tablet (1 mg) by mouth every 8 hours as needed for anxiety, Disp: 30 tablet, Rfl: 0     tiZANidine (ZANAFLEX) 2 MG tablet, Take 1 tablet (2 mg) by mouth 3 times daily as needed for muscle spasms, Disp: 30 tablet, " Rfl: 1     ACE/ARB NOT PRESCRIBED, INTENTIONAL,, ACE & ARB not prescribed due to Allergy, Disp: , Rfl:      calcium polycarbophil (FIBERCON) 625 MG tablet, Take 2 tablets (1,250 mg) by mouth daily, Disp: 60 tablet, Rfl: 11     ibuprofen (ADVIL,MOTRIN) 800 MG tablet, Take 1 tablet (800 mg) by mouth every 8 hours as needed for pain, Disp: 100 tablet, Rfl: 0     senna-docusate (SENOKOT-S;PERICOLACE) 8.6-50 MG per tablet, 1-2 po bid prn constipation, Disp: 100 tablet, Rfl: 1     blood glucose monitoring (ONE TOUCH TEST STRIPS) test strip, Please dispense the particular type of lancets and test strips that patient needs.  She tests frequently, averaging 6 times per day. Okay to dispense 3 months supplies with refills for a year, Disp: 3 Month, Rfl: 3     polyethylene glycol (MIRALAX) powder, Take 17 g by mouth daily as needed for constipation, Disp: 510 g, Rfl: 1     albuterol (2.5 MG/3ML) 0.083% nebulizer solution, Take 3 mLs (2.5 mg) by nebulization 4 times daily as needed, Disp: 1 Box, Rfl: 5     fish oil-omega-3 fatty acids (FISH OIL) 1000 MG capsule, Take 1 capsule by mouth daily., Disp: , Rfl:      Calcium Carbonate-Vitamin D (CALCIUM-D PO), Take  by mouth. 1200mg calcium/600mg D3, Disp: , Rfl:      Garlic CAPS, Take  by mouth. One daily, Disp: , Rfl:      aspirin 81 MG tablet, Take 1 tablet by mouth daily. 1 daily, Disp: , Rfl:      SALINE, to clean port every other month, Disp: , Rfl:      MULTIVITAMIN TABS   OR, 1 TABLET DAILY, Disp: , Rfl:     Allergies:   Allergies   Allergen Reactions     Hydralazine Shortness Of Breath     Famotidine Nausea and Vomiting and Unknown     Benadryl Itch Stopping      Gives her more energy, can't sleep     Lisinopril      Tongue swelling       Metoclopramide Nausea and Vomiting     Ondansetron Nausea and Vomiting     Other reaction(s): Headache     Penicillins Hives     Tape [Adhesive Tape]      blisters     Tylenol      Anxiety  Tablets only.       REVIEW OF SYSTEMS:  "  CONSTITUTIONAL:NEGATIVE for fever, chills, night sweats  INTEGUMENTARY/SKIN: NEGATIVE for worrisome wound problems or redness.  MUSCULOSKELETAL:See HPI above  NEURO: NEGATIVE for weakness, dizziness or paresthesias    This document serves as a record of the services and decisions personally performed and made by José Vogel MD. It was created on his behalf by Nely Pratt, a trained medical scribe. The creation of this document is based the provider's statements to the medical scribe.    Scribe Nely Pratt 11:06 AM 10/25/2017      PHYSICAL EXAM:  Resp 16  Ht 1.613 m (5' 3.5\")  Wt 81.3 kg (179 lb 3.2 oz)  BMI 31.25 kg/m2   GENERAL APPEARANCE: healthy, alert, no distress, accompanied by her dog   SKIN: no suspicious lesions or rashes  NEURO: Normal strength and tone, mentation intact and speech normal  PSYCH:  mentation appears normal and affect normal, not anxious  RESPIRATORY: No increased work of breathing.    left  LOWER EXTREMITY:  Gait: antalgic left.  mild Quad atrophy, strength weakened.  Intact sensation deep peroneal nerve, superficial peroneal nerve, med/lat tibial nerve, sural nerve, saphenous nerve  Intact EHL, EDL, TA, FHL, GS, quadriceps hamstrings and hip flexors  Toes warm and well perfused, brisk capillary refill. Palpable 2+ dp pulses.  calf soft and nttp or squeeze.  Edema: trace    left  KNEE EXAM:    Skin: intact, no ecchymosis or erythema; mild swelling.  ROM: 5 degrees extension to 95 degrees flexion, discomfort at extremes  Effusion: none  Tender: med/lat joint line, anterior/posterior knee.      X-RAY: no new xrays today.  3 views left knee 10/25/2017 reviewed: Bone on bone medial loss of joint space, subchondral sclerosis, marginal osteohytes with medial articular flattening. Possible lucency under medial femoral condyle subchondral bone. Patello-femoral marginal osteophytes are more prominent. Arthritis progression since prior xrays.    LEFT KNEE INTRAOP ARTHROSCOPY FINDINGS   1. Trace " effusion.   2. Moderate synovitis.   3. Grade 4 chondrosis of the patella and grade 3 chondrosis of the femoral trochlea.   4. Prominent medial plica with rubbing along the anterior aspect of the medial femoral condyle.   5. No loose bodies in the media or lateral gutter but small marginal osteophytes.   6. Intact ACL, within the notch. There was some fraying or partial tearing of fibers of the PCL with PCL grossly otherwise intact.   7. Lateral compartment with a small free edge flap tear off of the posterior horn of the lateral meniscus and a partial thickness undersurface tear towards the periphery of the posterior horn body junction.   8. Lateral compartment with an area of grade 4 chondrosis of the weight-bearing portion of the femoral condyle, as well as grade 4 chondrosis of the posterior medial aspect of the lateral tibia.   9. Complex tearing of the posterior horn and body of the medial meniscus with horizontal cleavage and radial tears with a displaced flap off of the medial aspect of the body of the meniscus.   10. Medial compartment with grade 4 chondrosis of the majority of the medial femoral condyle and grade 3 diffuse chondrosis of the medial tibia with areas of grade 4 chondrosis      Impression: Leah Guerrero is a 71 year old female with left knee pain, primary osteoarthritis.       Plan:   * unclear why recent injection didn't provide relief like others in the past. Injections do have lessened effect over time.  * discussed that it is too early to repeat injection and advise against it too soon.    Discussed various treatments for degenerative arthrosis of the knee, including nonoperative and operative approaches. Nonoperative approaches, which are exhausted prior to operative treatment, include doing nothing and living with the pain as patient has been doing, activity modification (avoid aggravating activities), physical therapy and strengthening exercises, weight loss, anti-inflammatory  medications, bracing, ambulatory aids (cane, walker) and injections. Once these have been tried and are deemed unsuccessful with a good effort, and the patient is an appropriate candidate, the next treatment would be knee arthroplasty or replacement. Depending on the location of the arthritis, knee replacement can be partial (one side of the knee affected by arthritis) or a total knee arthroplasty (all 3 compartments). The risks, perceived benefits and perioperative rehabilitation expectations of knee arthroplasty were discussed in detail. Also discussed that approximately 10% of patients that undergo knee arthroplasty are not happy following surgery and may have worse pain or no improvement in pain, contrary to their preoperative expectations.    At this time, nonoperative treatment will be continued.    Non-surgical treatment for knee arthritis includes:    * rest, sitting  * Activity modification - avoid impact activities or activities that aggravate symptoms.  * NSAIDS (non-steroidal anti-inflammatory medications; e.g. Aleve, advil, motrin, ibuprofen) - regular use for inflammation ( twice daily or three times daily), with food, as long as no contra-indications Please discuss with primary care doctor if needed  * ice, 15-20 minutes at a time several times a day or as needed.  * Strengthening of quadriceps muscles  * Physical Therapy for strengthening, stretching and range of motion exercises of legs  * Tylenol as needed for pain, consider Tylenol arthritis or similar  * Weight loss: Body mass index is 31.25 kg/(m^2).. weight loss benefits, not only for the current pain symptoms, but also overall health. Recommend a good diet plan that works for the patient, with the assistance of a dietician or primary care doctor as needed. Also, a good, low-impact exercise program for at least 20 minutes per day, 3 times per week, such as exercise bike, elliptical , or pool.  * Exercise: low impact such as stationary  "bike, elliptical, pool.  * Injections: discussed viscosupplementation (hyaluronic acid, \"rooster comb\", \"gel shots\"); risks and perceived benefits discussed today. Patient elects to not proceed with that but would again like to try cortisone.   * Bracing: bracing the knee may offer some relief of symptoms when worn and provide some stability.  * over the counter supplements such as glucosamine and chondroitin sulfate may help with joint pain.  * topical ointments may help as well  * x-rays taken on the way out of clinic today to evaluate for progression.    * return to clinic as needed.    PROCEDURE NOTE:  The risks, perceived benefits and potential complications (including but not limited to: bleeding, infection, pain, scar, damage to adjacent structures, atrophy or necrosis of soft tissue, skin blanching, failure to relieve symptoms, worsening of symptoms, allergic reaction) of injection were discussed with the patient. Questions were addressed and answered.The patient elected to proceed. Written informed consent was obtained. The correct procedural site was identified and confirmed. A LEFT knee intraarticular injection was performed using 1mL Depo Medrol 80mg per mL and 7mL (4mL 1% lidocaine, 3mL 0.25% marcaine)  of local anesthetic after sterile prep, to the correct procedural site. Sterile bandaid applied. This was tolerated well by the patient. No apparent complications. Did also discuss that if diabetic, recommend close monitoring of blood sugars over the next week as cortisone injections can temporarily elevate blood sugars.       The information in this document, created by a scribe for me, accurately reflects the services I personally performed and the decisions made by me. I have reviewed and approved this document for accuracy.      José Vogel M.D., M.S.  Dept. of Orthopaedic Surgery  [facility]      The patient's left knee was prepped with betadine solution after verification of " allergies. Area approximately 10 cm x 10 cm prepped in a sterile fashion. After injection, betadine removed with soap and water and band-aids applied.    4cc Lidocaine 1% NDC 2342-5616-74, LOT -[id number],  [id number]  3cc Bupivacaine 0.25% NDC 0703-7789-02, LOT -[id number],  2017  1cc Depo Medrol 80 mg/ml NDC 0466-1923-77, LOT [id number],  2019     injected into patient's left Knee by:  Grant Dillon PA-C, CAQ (Ortho)  Supervising Physician: José Vogel M.D., M.S.  Dept. of Orthopaedic Surgery  [facility]            Again, thank you for allowing me to participate in the care of your patient.        Sincerely,        José Vogel MD

## 2017-10-25 NOTE — MR AVS SNAPSHOT
After Visit Summary   10/25/2017    Leah Guerrero    MRN: [id number]           Patient Information     Date Of Birth          1945        Visit Information        Provider Department      10/25/2017 10:45 AM José Vogel MD [facility]        Today's Diagnoses     Primary osteoarthritis of left knee    -  1      Care Instructions    Please remember to call and schedule a follow up appointment if one was recommended at your earliest convenience.  Orthopedics CLINIC HOURS TELEPHONE NUMBER   Dr. Lela Frausto  Certified Medical Assistant   Monday & Wednesday   8am - 5pm  Thursday 1pm - 5pm  Friday 8am -11:30am Specialty schedulers:   [phone] to schedule your surgery.  Main Clinic:   [phone] to make an appointment with any provider.    Urgent Care locations:    [facility] Monday-Friday Closed  Saturday-Sunday 9am-5pm      Monday-Friday 12pm - 8pm  Saturday-Sunday 9am-5pm [phone]     If SURGERY has been recommended, please call our Specialty Schedulers at [phone] to schedule your procedure.    If you need a medication refill, please contact your pharmacy. Please allow 3 business days for your refill to be completed.    If an MRI or CT scan has been recommended, please call [facility] Schedulers at [phone] to schedule your appointment.  Use [employer] (secure e-mail communication and access to your chart) to send a message or to make an appointment. Please ask how you can sign up for [employer]ker.  Your care team's suggested websites for health information:   Www.Sensobi.org : Up to date and easily searchable information on multiple topics.   [email or web address] : [place] dept of heat, public health issues in [place], N1N1              Follow-ups after your visit        Follow-up notes from your care team     Return if symptoms worsen or fail to improve.      Your next 10 appointments already  "scheduled     Oct 27, 2017 11:20 AM CDT   SHORT with Kevin Esquivel MD   [facility] ([facility]    [address] [place] [id number] [phone]              Future tests that were ordered for you today     Open Future Orders        Priority Expected Expires Ordered    US Breast Left Routine  10/24/2018 10/24/2017    MA Diagnostic Digital Bilateral Routine  10/24/2018 10/24/2017            Who to contact     If you have questions or need follow up information about today's clinic visit or your schedule please contact [facility] directly at [phone].  Normal or non-critical lab and imaging results will be communicated to you by MyChart, letter or phone within 4 business days after the clinic has received the results. If you do not hear from us within 7 days, please contact the clinic through DataMarkethart or phone. If you have a critical or abnormal lab result, we will notify you by phone as soon as possible.  Submit refill requests through [employer] or call your pharmacy and they will forward the refill request to us. Please allow 3 business days for your refill to be completed.          Additional Information About Your Visit        DataMarkethart Information     [employer] gives you secure access to your electronic health record. If you see a primary care provider, you can also send messages to your care team and make appointments. If you have questions, please call your primary care clinic.  If you do not have a primary care provider, please call [phone] and they will assist you.        Care EveryWhere ID     This is your Care EveryWhere ID. This could be used by other organizations to access your [place] medical records  [id number]        Your Vitals Were     Respirations Height BMI (Body Mass Index)             16 5' 3.5\" (1.613 m) 31.25 kg/m2          Blood Pressure from Last 3 Encounters:   10/24/17 140/67   05/30/17 " 128/71   04/28/17 111/56    Weight from Last 3 Encounters:   10/25/17 179 lb 3.2 oz (81.3 kg)   10/24/17 179 lb 9.6 oz (81.5 kg)   09/27/17 179 lb (81.2 kg)              We Performed the Following     DRAIN/INJECT LARGE JOINT/BURSA     METHYLPREDNISOLONE 80 MG INJ          Today's Medication Changes          These changes are accurate as of: 10/25/17  9:26 PM.  If you have any questions, ask your nurse or doctor.               Start taking these medicines.        Dose/Directions    methylPREDNISolone acetate 80 MG/ML injection   Commonly known as:  DEPO-MEDROL   Used for:  Primary osteoarthritis of left knee   Started by:  José Vogel MD        Dose:  80 mg   1 mL (80 mg) by INTRA-ARTICULAR route once for 1 dose   Quantity:  1 mL   Refills:  0            Where to get your medicines      Some of these will need a paper prescription and others can be bought over the counter.  Ask your nurse if you have questions.     You don't need a prescription for these medications     methylPREDNISolone acetate 80 MG/ML injection                Primary Care Provider Office Phone # Fax #    Kevin Esquivel [phone]       [address] [address]        Goals        General    I will continue the exercises provided by physical therapy for my knee (pt-stated)     Notes - Note created  7/9/2015 10:30 AM by Ray Holland RN    As of today's date 7/9/2015 goal is met at 0 - 25%.   Goal Status:  Active        I will remember to take my insulin before meals especially lunch when I am  away from home. (pt-stated)     Notes - Note created  3/25/2015  3:51 PM by Ray Holland RN      As of today's date 3/25/2015 goal is met at 0 - 25%.   Goal Status:  Active.          I will work on testing my blood sugar and taking my insulin when I am away from home at lunch time. (pt-stated)     Notes - Note created  5/20/2016  2:43 PM by Ray Holland RN    As of today's date 5/20/2016 goal is met at 0  - 25%.   Goal Status:  Active          Equal Access to Services     [facility] : Hadii candido santos kevin Evans, wakevinda luqmaura, qapilarta kadi marybetholivermaxine, waxmaria isabel yoniin hayaascarlet ruedaadam ortegaberthapancho angela. So [facility] [phone].    ATENCIÓN: Si habla español, tiene a verduzco disposición servicios gratuitos de asistencia lingüística. [facility] [phone].    We comply with applicable federal civil rights laws and [place] laws. We do not discriminate on the basis of race, color, national origin, age, disability, sex, sexual orientation, or gender identity.            Thank you!     Thank you for choosing [facility]  for your care. Our goal is always to provide you with excellent care. Hearing back from our patients is one way we can continue to improve our services. Please take a few minutes to complete the written survey that you may receive in the mail after your visit with us. Thank you!             Your Updated Medication List - Protect others around you: Learn how to safely use, store and throw away your medicines at www.disposemymeds.org.          This list is accurate as of: 10/25/17  9:26 PM.  Always use your most recent med list.                   Brand Name Dispense Instructions for use Diagnosis    ACE/ARB/ARNI NOT PRESCRIBED (INTENTIONAL)      ACE & ARB not prescribed due to Allergy    Type 2 diabetes mellitus without complication, with long-term current use of insulin (H)       * albuterol (2.5 MG/3ML) 0.083% neb solution     1 Box    Take 3 mLs (2.5 mg) by nebulization 4 times daily as needed    Intermittent asthma       * albuterol 108 (90 BASE) MCG/ACT Inhaler    PROAIR HFA/PROVENTIL HFA/VENTOLIN HFA    2 Inhaler    Inhale 2 puffs into the lungs every 6 hours as needed for shortness of breath / dyspnea or wheezing    Intermittent asthma, uncomplicated       aspirin 81 MG tablet      Take 1 tablet by mouth daily. 1 daily        blood glucose monitoring test strip    ONE TOUCH TEST STRIPS    3  Month    Please dispense the particular type of lancets and test strips that patient needs.  She tests frequently, averaging 6 times per day. Okay to dispense 3 months supplies with refills for a year    Type 2 diabetes mellitus without complication (H)       calcium polycarbophil 625 MG tablet    FIBERCON    60 tablet    Take 2 tablets (1,250 mg) by mouth daily    Health care maintenance       CALCIUM-D PO      Take  by mouth. 1200mg calcium/600mg D3        fish oil-omega-3 fatty acids 1000 MG capsule      Take 1 capsule by mouth daily.        gabapentin 100 MG capsule    NEURONTIN    180 capsule    TAKE TWO CAPSULES BY MOUTH THREE TIMES DAILY    Nerve pain       Garlic Caps      Take  by mouth. One daily        hydrochlorothiazide 12.5 MG Tabs tablet     30 tablet    Take 1 tablet (12.5 mg) by mouth daily    Hypertension goal BP (blood pressure) < 140/90       ibuprofen 800 MG tablet    ADVIL/MOTRIN    100 tablet    Take 1 tablet (800 mg) by mouth every 8 hours as needed for pain    Arthritis       insulin aspart 100 UNIT/ML injection    NovoLOG PEN    30 mL    Patient uses 100 units daily split up between multiple doses. Take 2 units per 15 grams of carbohydrate eaten and 2 units per 30 mg/dL above 170 mg/dL.    Type 2 diabetes mellitus without complication, with long-term current use of insulin (H)       * insulin glargine 100 UNIT/ML injection    LANTUS SOLOSTAR    30 mL    Take 50 units in the morning and 20 units in the evening.    Type 2 diabetes mellitus without complication, with long-term current use of insulin (H)       * LANTUS SOLOSTAR 100 UNIT/ML injection   Generic drug:  insulin glargine     30 mL    NJECT 70 UNITS SUBCUTANEOUSLY AT BEDTIME    Type 2 diabetes mellitus without complication, with long-term current use of insulin (H)       LORazepam 1 MG tablet    ATIVAN    30 tablet    Take 1 tablet (1 mg) by mouth every 8 hours as needed for anxiety    Anxiety       meperidine 50 MG tablet    DEMEROL  "   20 tablet    Take 1 tablet (50 mg) by mouth every 4 hours as needed    Idiopathic chronic pancreatitis (H)       methylPREDNISolone acetate 80 MG/ML injection    DEPO-MEDROL    1 mL    1 mL (80 mg) by INTRA-ARTICULAR route once for 1 dose    Primary osteoarthritis of left knee       MULTIVITAMIN TABS   OR      1 TABLET DAILY        oxyCODONE-acetaminophen 5-325 MG per tablet    PERCOCET     Take by mouth every 4 hours as needed for moderate to severe pain        pen needles 5/16\" 31G X 8 MM Misc     180 each    Patient does 4 doses novolog and 2 lantus shots  insulin daily.  Needs 180 needles per month. Okay refills for one year.    Type 2 diabetes mellitus without complication, with long-term current use of insulin (H)       polyethylene glycol powder    MIRALAX    510 g    Take 17 g by mouth daily as needed for constipation    Constipation, unspecified constipation type       promethazine 25 MG tablet    PHENERGAN    30 tablet    Take 1 tablet (25 mg) by mouth every 6 hours as needed for nausea    Nausea       rosuvastatin 20 MG tablet    CRESTOR    90 tablet    TAKE 1 TABLET(20 MG) BY MOUTH DAILY    Hyperlipidemia LDL goal <100       SALINE      to clean port every other month    Bronchitis       senna-docusate 8.6-50 MG per tablet    SENOKOT-S;PERICOLACE    100 tablet    1-2 po bid prn constipation    Constipation, unspecified constipation type       tiZANidine 2 MG tablet    ZANAFLEX    30 tablet    Take 1 tablet (2 mg) by mouth 3 times daily as needed for muscle spasms    Muscle pain       zolpidem 5 MG tablet    AMBIEN    30 tablet    Take 1 tablet (5 mg) by mouth nightly as needed for sleep    Insomnia, unspecified type       * Notice:  This list has 4 medication(s) that are the same as other medications prescribed for you. Read the directions carefully, and ask your doctor or other care provider to review them with you.      "

## 2017-10-27 ENCOUNTER — TELEPHONE (OUTPATIENT)
Dept: FAMILY MEDICINE | Facility: CLINIC | Age: 72
End: 2017-10-27

## 2017-10-27 NOTE — TELEPHONE ENCOUNTER
10/27/2017    Attempt 3    Contacted patient in regards to scheduling VIP mammogram  Message on voicemail     Patient is also due for - NONE    Comments: Clinic made prior attempts      Outreach   CC

## 2017-11-29 ENCOUNTER — TELEPHONE (OUTPATIENT)
Dept: FAMILY MEDICINE | Facility: CLINIC | Age: 72
End: 2017-11-29

## 2017-11-29 ENCOUNTER — TRANSFERRED RECORDS (OUTPATIENT)
Dept: HEALTH INFORMATION MANAGEMENT | Facility: CLINIC | Age: 72
End: 2017-11-29

## 2017-11-29 NOTE — TELEPHONE ENCOUNTER
Panel Management Review      Patient has the following on her problem list:     Hypertension   Last three blood pressure readings:  BP Readings from Last 3 Encounters:   10/24/17 140/67   05/30/17 128/71   04/28/17 111/56     Blood pressure: Passed    HTN Guidelines:  Age 18-59 BP range:  Less than 140/90  Age 60-85 with Diabetes:  Less than 140/90  Age 60-85 without Diabetes:  less than 150/90        Composite cancer screening  Chart review shows that this patient is due/due soon for the following Mammogram  Summary:    Patient is due/failing the following:   MAMMOGRAM    Action needed:   Patient needs referral/order: Mammogram    Type of outreach:    Sent letter.    Questions for provider review:    None                                                                                                                                    Raine Hawkins MA       Chart routed to Care Team .

## 2017-11-29 NOTE — LETTER
November 29, 2017    Leah Guerrero  [address]   [place] [address]    Dear Leah    We care about your health and have reviewed your health plan. We have reviewed your medical conditions, medication list, and lab results and are making recommendations based on this review, to better manage your health.    You are in particular need of attention regarding:  - Your Asthma  - Your Depression  - Your Diabetes  - Scheduling a Breast Cancer Screening (Mammography) [phone]      Here is a list of Health Maintenance topics that are due now or due soon:  Health Maintenance Due   Topic Date Due     MEDICARE ANNUAL WELLNESS VISIT  [birth year]27/1963     EYE EXAM Q1 YEAR  09/16/2011     DIABETIC EDUCATION Q1 YEAR  05/24/2012     DEXA Q3 YR  02/22/2014     PNEUMOCOCCAL (2 of 2 - PCV13) 04/24/2014     MAMMO SCREEN Q2 YR (SYSTEM ASSIGNED)  04/13/2017     INFLUENZA VACCINE (SYSTEM ASSIGNED)  09/01/2017     ASTHMA CONTROL TEST Q6 MOS  10/21/2017     DANIELLE QUESTIONNAIRE 1 YEAR  11/09/2017     PHQ-9 Q1YR  11/09/2017     A1C Q6 MO  11/19/2017     ASTHMA ACTION PLAN Q1 YR  11/09/2017     FALL RISK ASSESSMENT  11/09/2017     We will be calling you in the next couple of weeks to help you schedule any appointments that are needed.  Please call us at [phone] (or use Roomish) to address the above recommendations.     Thank you for trusting [facility] and we appreciate the opportunity to serve you.  We look forward to supporting your healthcare needs in the future.    Healthy Regards, Your Care Team

## 2017-12-04 ENCOUNTER — TRANSFERRED RECORDS (OUTPATIENT)
Dept: HEALTH INFORMATION MANAGEMENT | Facility: CLINIC | Age: 72
End: 2017-12-04

## 2018-01-06 ENCOUNTER — TELEPHONE (OUTPATIENT)
Dept: FAMILY MEDICINE | Facility: CLINIC | Age: 73
End: 2018-01-06

## 2018-01-06 DIAGNOSIS — Z79.4 TYPE 2 DIABETES MELLITUS WITHOUT COMPLICATION, WITH LONG-TERM CURRENT USE OF INSULIN (H): ICD-10-CM

## 2018-01-06 DIAGNOSIS — E11.9 TYPE 2 DIABETES MELLITUS WITHOUT COMPLICATION, WITH LONG-TERM CURRENT USE OF INSULIN (H): ICD-10-CM

## 2018-01-08 RX ORDER — INSULIN GLARGINE 100 [IU]/ML
INJECTION, SOLUTION SUBCUTANEOUS
Qty: 30 ML | Refills: 0 | Status: SHIPPED | OUTPATIENT
Start: 2018-01-08 | End: 2018-02-22

## 2018-01-08 NOTE — TELEPHONE ENCOUNTER
Requested Prescriptions   Pending Prescriptions Disp Refills     LANTUS SOLOSTAR 100 UNIT/ML soln [Pharmacy Med Name: LANTUS SOLOSTAR PEN INJ 5 X 3ML] 30 mL 0    Last Written Prescription Date:  11/3/17  Last Fill Quantity: 30,  # refills: 0   Last Office Visit with JUDITH, ZE or [facility] prescribing provider:  5/30/17   Future Office Visit:      Sig: NJECT 70 UNITS SUBCUTANEOUSLY AT BEDTIME    Long Acting Insulin Protocol Failed    1/6/2018  3:48 AM       Failed - Blood pressure less than 140/90 in past 6 months    BP Readings from Last 3 Encounters:   10/24/17 140/67   05/30/17 128/71   04/28/17 111/56                Failed - HgbA1C in past 3 or 6 months    Recent Labs   Lab Test  05/19/17   1130   A1C  8.8*            Failed - Recent visit with authorizing provider's specialty in past 6 months    Patient had office visit in the last 6 months or has a visit in the next 30 days with authorizing provider.  See chart review.            Passed - LDL on file in past 12 months    Recent Labs   Lab Test  05/19/17   1130   LDL  101*            Passed - Microalbumin on file in past 12 months    Recent Labs   Lab Test  04/21/17   0830   MICROL  9   UMALCR  13.68            Passed - Serum creatinine on file in past 12 months    Recent Labs   Lab Test  05/19/17   1130   CR  0.60            Passed - Patient is age 18 or older

## 2018-01-11 ENCOUNTER — TRANSFERRED RECORDS (OUTPATIENT)
Dept: HEALTH INFORMATION MANAGEMENT | Facility: CLINIC | Age: 73
End: 2018-01-11

## 2018-01-12 ENCOUNTER — CARE COORDINATION (OUTPATIENT)
Dept: GERIATRIC MEDICINE | Facility: CLINIC | Age: 73
End: 2018-01-12

## 2018-01-12 NOTE — PROGRESS NOTES
1/12/18 CM received client as a new [facility] member. CM received from previous /[facility], care plan, OBRA1 and UTF. Member was just seen 11/30/17 for her home visit.  CM called member and introduced herself as her new [facility] .  CM reviewed members care plan and LTCC. HRA completed with member.  Auth information sent to [employer] for homemaking and metro mobility tickets.  Client states she had been seeing Dr Esquivel for many years. She had a  named Maddy who she liked and was going to switch to an [facility] clinic to keep Maddy as her SW but could not find a new doctor there and so she was switched back to [place]. Maddy is her  from [facility]. Her last visit with Dr Esquivel was 4/17. She has been a no show for a few apppts last fall. She does see an ortho at [facility] and an oncologist at [facility]. She has lung cancer diagnosed in 2016 and has had partial lung removal and chemotherapy. Is now under observation. Leah states she will continue to see Dr Esquivel at [facility].  Leah states she has had some issues with her homemaking services. No one has come over the last 3 weeks. ARPAN called [facility] and inquired about the homemaking. [facility] staff state her regular homemaker had surgery and has been out. They have had Kimi another homemaker assigned to Leah. Normally the homemaker comes on Tuesdays but Leah changed the day to a Monday last week and Kimi could not do Monday. They have been in contact with Leah today and Kimi is in the process of scheduling a visit with Leah. [facility] staff will call her this afternoon to make arrangements for homemaking to come out.   Leah also states she was supposed to continue to get [employer] tickets but that has been inconsistent.  ARPAN sent and auth for metro mobility tickets monthly to [employer].    Leah states she has depression and PTSD. She was supposed to have MH home visits but  that was not set up. ARPAN is looking into a home visit MH clinic for member.  ARPAN gave Leah her phone number to call if needed. ARPAN will follow up with Leah about a MH clinic that will come to her home.  Keyanna Zhao RN BA N  [facility] Case Management  [phone]

## 2018-01-12 NOTE — PROGRESS NOTES
1/12/18 CM called financial worker Savanna GIBBONS at [facility] and LM giving her CM's name and number as members new CM.  Keyanna Zhao RN BA N  [facility] Case Management  [phone]

## 2018-01-12 NOTE — PROGRESS NOTES
1/12/18 CM received a VM from [facility] stating they have called enzo no answer, LMTRC and also the number of the homemaker to text if she would like to schedule a visit for homemaking.  Keyanna Zhao RN BA N  [facility] Case Management  [phone]

## 2018-01-15 ENCOUNTER — CARE COORDINATION (OUTPATIENT)
Dept: GERIATRIC MEDICINE | Facility: CLINIC | Age: 73
End: 2018-01-15

## 2018-01-15 NOTE — PROGRESS NOTES
1/15/17 In reviewing the UTF sent by previous CM there is a Mental Health   Aditi Villanueva at [phone]. CM called Aditi and [facility] asking what her role is with member. ARPAN was told by Paula that she was to get a psychiatrist or psychologist or maybe a counselor in her home for visits but it was not set up. Was this being set up by Aditi? Also the previous CM has mentioned an [facility] worker and was she involved in this also.  Keyanna Zhao RN BA N  [facility] Case Management  [phone]

## 2018-01-15 NOTE — PROGRESS NOTES
1/15/18  Members UTF from previous CM states she is being seen by an [facility] worker from [employer]. ARPAN called [facility] in [place] and they states they had a referral for member and they called 3 times and never heard back from Leah. They only call 3 times and if no response they do not call again.  ARPAN called Leah and left a detailed message [facility] her last Friday to schedule a homemaking visit this week. If she has not sppken to [facility] about a homemaking visit she needs to call them.   Also telling her the [facility] worker who comes to help her in her home referral was made to [facility] by her previous CM. They called 3 times and never heard back from her.   Also has she heard of a Aditi Villanueva listed from her previous CM as her mental health . ARPAN also has a call out to Aditi to see how she is involved in her care.  ARPAN asked leah to please call ARPAN back to discuss these. ARPAN left her phone number to call back.  Keyanna Zhao RN BA N  [facility] Case Management  [phone]

## 2018-01-18 ENCOUNTER — CARE COORDINATION (OUTPATIENT)
Dept: GERIATRIC MEDICINE | Facility: CLINIC | Age: 73
End: 2018-01-18

## 2018-01-18 ENCOUNTER — TRANSFERRED RECORDS (OUTPATIENT)
Dept: HEALTH INFORMATION MANAGEMENT | Facility: CLINIC | Age: 73
End: 2018-01-18

## 2018-01-18 NOTE — PROGRESS NOTES
18 ARPAN spoke with Leah regardin. [employer] homemaking. She states they are now coming out and doing homemaking services.  2. [facility] worker. Leah does not remember [facility] calling her. ARPAN asked her if she needs assistance in doing paperwork, bills, scheduling appts, organization in the home and she states no she does not need help with this. She would like a Psychologist to come to the home. ARPAN asked if the name Aidti Villanueva is familiar. She states she knows the name but has so many people coming into her home she cannot remember who that is. Aditi Villanueva is listed as her mental health . Leah did not know of that.   ARPAN called Aditi Villanueva [phone]. Aditi states she is Leah's mental health . She works for [employer] She comes out to see Leah monthly and Leah does have a lot of people in and out of her home it is hard for her to keep everyone straight. ARPAN told her Leah is asking CM for a psychologist to come into the home and see her. Aditi did not know that. Aditi states she has been trying to locate the psychologist member states she used to see but cannot remember who that is. Now that Aditi knows she prefers someone to come into the home she will work on finding someone for Leah.    Leah also [facility] CM [facility]. CM let her know it has been ordered and she will get it in the mail from [employer].    Keyanna Zhao RN BA N  [facility] Case Management  [phone]

## 2018-02-19 ENCOUNTER — TRANSFERRED RECORDS (OUTPATIENT)
Dept: HEALTH INFORMATION MANAGEMENT | Facility: CLINIC | Age: 73
End: 2018-02-19

## 2018-02-21 ENCOUNTER — TRANSFERRED RECORDS (OUTPATIENT)
Dept: HEALTH INFORMATION MANAGEMENT | Facility: CLINIC | Age: 73
End: 2018-02-21

## 2018-02-22 ENCOUNTER — TELEPHONE (OUTPATIENT)
Dept: FAMILY MEDICINE | Facility: CLINIC | Age: 73
End: 2018-02-22

## 2018-02-22 ENCOUNTER — OFFICE VISIT (OUTPATIENT)
Dept: FAMILY MEDICINE | Facility: CLINIC | Age: 73
End: 2018-02-22
Payer: COMMERCIAL

## 2018-02-22 VITALS
OXYGEN SATURATION: 97 % | BODY MASS INDEX: 32.18 KG/M2 | WEIGHT: 188.5 LBS | TEMPERATURE: 98.3 F | SYSTOLIC BLOOD PRESSURE: 138 MMHG | DIASTOLIC BLOOD PRESSURE: 61 MMHG | HEART RATE: 57 BPM | HEIGHT: 64 IN

## 2018-02-22 DIAGNOSIS — Z79.4 TYPE 2 DIABETES MELLITUS WITHOUT COMPLICATION, WITH LONG-TERM CURRENT USE OF INSULIN (H): Primary | ICD-10-CM

## 2018-02-22 DIAGNOSIS — E11.9 TYPE 2 DIABETES MELLITUS WITHOUT COMPLICATION, WITH LONG-TERM CURRENT USE OF INSULIN (H): ICD-10-CM

## 2018-02-22 DIAGNOSIS — Z95.828 PORT-A-CATH IN PLACE: ICD-10-CM

## 2018-02-22 DIAGNOSIS — Z79.4 TYPE 2 DIABETES MELLITUS WITHOUT COMPLICATION, WITH LONG-TERM CURRENT USE OF INSULIN (H): ICD-10-CM

## 2018-02-22 DIAGNOSIS — E11.9 TYPE 2 DIABETES MELLITUS WITHOUT COMPLICATION, WITH LONG-TERM CURRENT USE OF INSULIN (H): Primary | ICD-10-CM

## 2018-02-22 DIAGNOSIS — J45.20 MILD INTERMITTENT ASTHMA WITHOUT COMPLICATION: ICD-10-CM

## 2018-02-22 DIAGNOSIS — Z01.818 PREOP GENERAL PHYSICAL EXAM: Primary | ICD-10-CM

## 2018-02-22 DIAGNOSIS — M17.12 PRIMARY OSTEOARTHRITIS OF LEFT KNEE: ICD-10-CM

## 2018-02-22 LAB
ALBUMIN SERPL-MCNC: 3.5 G/DL (ref 3.4–5)
ALBUMIN UR-MCNC: NEGATIVE MG/DL
ALP SERPL-CCNC: 110 U/L (ref 40–150)
ALT SERPL W P-5'-P-CCNC: 33 U/L (ref 0–50)
ANION GAP SERPL CALCULATED.3IONS-SCNC: 11 MMOL/L (ref 3–14)
APPEARANCE UR: CLEAR
AST SERPL W P-5'-P-CCNC: 30 U/L (ref 0–45)
BASOPHILS # BLD AUTO: 0 10E9/L (ref 0–0.2)
BASOPHILS NFR BLD AUTO: 0.1 %
BILIRUB SERPL-MCNC: 0.3 MG/DL (ref 0.2–1.3)
BILIRUB UR QL STRIP: NEGATIVE
BUN SERPL-MCNC: 20 MG/DL (ref 7–30)
CALCIUM SERPL-MCNC: 8.9 MG/DL (ref 8.5–10.1)
CHLORIDE SERPL-SCNC: 101 MMOL/L (ref 94–109)
CO2 SERPL-SCNC: 25 MMOL/L (ref 20–32)
COLOR UR AUTO: YELLOW
CREAT SERPL-MCNC: 0.56 MG/DL (ref 0.52–1.04)
DIFFERENTIAL METHOD BLD: NORMAL
EOSINOPHIL # BLD AUTO: 0 10E9/L (ref 0–0.7)
EOSINOPHIL NFR BLD AUTO: 0.3 %
ERYTHROCYTE [DISTWIDTH] IN BLOOD BY AUTOMATED COUNT: 13 % (ref 10–15)
GFR SERPL CREATININE-BSD FRML MDRD: >90 ML/MIN/1.7M2
GLUCOSE SERPL-MCNC: 297 MG/DL (ref 70–99)
GLUCOSE UR STRIP-MCNC: >=1000 MG/DL
HBA1C MFR BLD: 10.2 % (ref 4.3–6)
HCT VFR BLD AUTO: 37.6 % (ref 35–47)
HGB BLD-MCNC: 12.6 G/DL (ref 11.7–15.7)
HGB UR QL STRIP: NEGATIVE
KETONES UR STRIP-MCNC: NEGATIVE MG/DL
LEUKOCYTE ESTERASE UR QL STRIP: NEGATIVE
LYMPHOCYTES # BLD AUTO: 2.1 10E9/L (ref 0.8–5.3)
LYMPHOCYTES NFR BLD AUTO: 22.1 %
MCH RBC QN AUTO: 29.8 PG (ref 26.5–33)
MCHC RBC AUTO-ENTMCNC: 33.5 G/DL (ref 31.5–36.5)
MCV RBC AUTO: 89 FL (ref 78–100)
MONOCYTES # BLD AUTO: 0.8 10E9/L (ref 0–1.3)
MONOCYTES NFR BLD AUTO: 8.8 %
NEUTROPHILS # BLD AUTO: 6.5 10E9/L (ref 1.6–8.3)
NEUTROPHILS NFR BLD AUTO: 68.7 %
NITRATE UR QL: NEGATIVE
PH UR STRIP: 5 PH (ref 5–7)
PLATELET # BLD AUTO: 195 10E9/L (ref 150–450)
POTASSIUM SERPL-SCNC: 4.1 MMOL/L (ref 3.4–5.3)
PROT SERPL-MCNC: 7 G/DL (ref 6.8–8.8)
RBC # BLD AUTO: 4.23 10E12/L (ref 3.8–5.2)
SODIUM SERPL-SCNC: 137 MMOL/L (ref 133–144)
SOURCE: ABNORMAL
SP GR UR STRIP: >1.03 (ref 1–1.03)
TSH SERPL DL<=0.005 MIU/L-ACNC: 0.44 MU/L (ref 0.4–4)
UROBILINOGEN UR STRIP-ACNC: 0.2 EU/DL (ref 0.2–1)
WBC # BLD AUTO: 9.5 10E9/L (ref 4–11)

## 2018-02-22 PROCEDURE — 83036 HEMOGLOBIN GLYCOSYLATED A1C: CPT | Performed by: FAMILY MEDICINE

## 2018-02-22 PROCEDURE — 81003 URINALYSIS AUTO W/O SCOPE: CPT | Performed by: FAMILY MEDICINE

## 2018-02-22 PROCEDURE — 80050 GENERAL HEALTH PANEL: CPT | Performed by: FAMILY MEDICINE

## 2018-02-22 PROCEDURE — 99215 OFFICE O/P EST HI 40 MIN: CPT | Performed by: FAMILY MEDICINE

## 2018-02-22 PROCEDURE — 36415 COLL VENOUS BLD VENIPUNCTURE: CPT | Performed by: FAMILY MEDICINE

## 2018-02-22 RX ORDER — INSULIN GLARGINE 100 [IU]/ML
INJECTION, SOLUTION SUBCUTANEOUS
Qty: 75 ML | Refills: 3 | Status: SHIPPED | OUTPATIENT
Start: 2018-02-22 | End: 2018-03-23

## 2018-02-22 RX ORDER — PROMETHAZINE HYDROCHLORIDE 12.5 MG/1
12.5 TABLET ORAL PRN
Refills: 5 | COMMUNITY
Start: 2018-02-14 | End: 2019-08-23

## 2018-02-22 RX ORDER — LORAZEPAM 0.5 MG/1
0.5 TABLET ORAL PRN
Refills: 3 | COMMUNITY
Start: 2018-02-10 | End: 2018-05-03

## 2018-02-22 ASSESSMENT — ANXIETY QUESTIONNAIRES
1. FEELING NERVOUS, ANXIOUS, OR ON EDGE: NOT AT ALL
IF YOU CHECKED OFF ANY PROBLEMS ON THIS QUESTIONNAIRE, HOW DIFFICULT HAVE THESE PROBLEMS MADE IT FOR YOU TO DO YOUR WORK, TAKE CARE OF THINGS AT HOME, OR GET ALONG WITH OTHER PEOPLE: NOT DIFFICULT AT ALL
7. FEELING AFRAID AS IF SOMETHING AWFUL MIGHT HAPPEN: NOT AT ALL
3. WORRYING TOO MUCH ABOUT DIFFERENT THINGS: NOT AT ALL
GAD7 TOTAL SCORE: 0
6. BECOMING EASILY ANNOYED OR IRRITABLE: NOT AT ALL
5. BEING SO RESTLESS THAT IT IS HARD TO SIT STILL: NOT AT ALL
2. NOT BEING ABLE TO STOP OR CONTROL WORRYING: NOT AT ALL

## 2018-02-22 ASSESSMENT — PAIN SCALES - GENERAL: PAINLEVEL: NO PAIN (0)

## 2018-02-22 ASSESSMENT — PATIENT HEALTH QUESTIONNAIRE - PHQ9: 5. POOR APPETITE OR OVEREATING: NOT AT ALL

## 2018-02-22 NOTE — PROGRESS NOTES
[address] [address] [phone]  Dept: [phone]    PRE-OP EVALUATION:  Today's date: 2018    Leah Guerrero (: 1945) presents for pre-operative evaluation assessment as requested by Dr. Cooper.  She requires evaluation and anesthesia risk assessment prior to undergoing surgery/procedure for treatment of knee pain .    Fax number for surgical facility: [phone]  Primary Physician: Kevin Esquivel  Type of Anesthesia Anticipated: General    Patient has a Health Care Directive or Living Will:  YES     Preop Questions 2018   Who is doing your surgery? dr prieto   What are you having done? knee replacement   Date of Surgery/Procedure: 3 2 2018   [facility]    1.  Do you have a history of Heart attack, stroke, stent, coronary bypass surgery, or other heart surgery? No   2.  Do you ever have any pain or discomfort in your chest? No   3.  Do you have a history of  Heart Failure? No   4.   Are you troubled by shortness of breath when:  walking on a level surface, or up a slight hill, or at night? No   5.  Do you currently have a cold, bronchitis or other respiratory infection? No   6.  Do you have a cough, shortness of breath, or wheezing? YES - breathing stable; cold weather sets it off   7.  Do you sometimes get pains in the calves of your legs when you walk? No   8. Do you or anyone in your family have previous history of blood clots? No   9.  Do you or does anyone in your family have a serious bleeding problem such as prolonged bleeding following surgeries or cuts? No   10. Have you ever had problems with anemia or been told to take iron pills? YES -not on iron currently   11. Have you had any abnormal blood loss such as black, tarry or bloody stools, or abnormal vaginal bleeding? YES - no recent bleeding   12. Have you ever had a blood transfusion? No    13. Have you or any of your relatives ever had problems with anesthesia? No   14. Do you have sleep apnea, excessive snoring or daytime drowsiness? No   15. Do you have any prosthetic heart valves? No   16. Do you have prosthetic joints? No   17. Is there any chance that you may be pregnant? No         HPI:     HPI related to upcoming procedure: left knee pain / arthritis.  To have tka next week per orthopedics            MEDICAL HISTORY:     Patient Active Problem List    Diagnosis Date Noted     Malignant neoplasm of lung, unspecified laterality, unspecified part of lung (H) 2017     Priority: Medium     Chronic pancreatitis, unspecified pancreatitis type (H) 2016     Priority: Medium     Type 2 diabetes mellitus without complication, with long-term current use of insulin (H) 2016     Priority: Medium     Anxiety 2016     Priority: Medium     Insomnia, unspecified type 2016     Priority: Medium     Idiopathic chronic pancreatitis (H) 2015     Priority: Medium     Primary osteoarthritis of left knee 10/28/2015     Priority: Medium     Hypertension goal BP (blood pressure) < 140/90 2015     Priority: Medium     Obesity 2015     Priority: Medium     Health Care Home 10/22/2014     Priority: Medium     [facility]   Guerline Zhao RN  [phone]    [facility] CARE PLAN SUMMARY    Client Name:  Leah Guerrero  Address:  [address] [place] [address] Phone: [phone] (home)    :  1945 Date of Assessment: HRA telephone assessment 18,  Previous LTCC 17   [place]  [facility] [place]: [id number] Medical Assistance Number: [id number]  Financial Worker:  Savanna Collins  [phone]  Case #:  [id number]   [facility] :  Guerline Zhao RN CM Phone:  [phone]  CM Fax:  [phone]   [facility] Enrollment Date: 18 Case Mix:  L  Rate Cell:  B  Waiver Type: EW    Emergency  Contact:   Primary Emergency Contact: Paul Guerrero  Home Phone: [phone]  Relation: Son  Secondary Emergency Contact: Hiram Guerrero   Mobile Phone: [phone]  Relation: Son Language:  English  :  No   Health Care Agent/POA:   Advanced Directives/Living Will:  On file   Primary Care Clinic/Phone/Fax:  [facility]/(p) [phone], (f) [phone] Primary Dx:  Diabetes:E11.9  Secondary Dx: Osteoarthritis: M19.91    Primary Physician:  Kevin Esquivel   Height:    Weight:     Specialty Physician:    Audiologist:     Eye Care Provider:   Dental Care Provider:    [facility]: [facility] [phone]   Other:        Homemaking/MILS  [phone]  Fax: [phone] 1/1/18-12/31/18 weekly 4 hrs/week  (16 units)  $4.61/unit      Transportation/[employer]  [facility] 1/1/18-12/31/18 monthly Rush:10/m  Non Rush: 10/m Rush $4/ticket  ($40/m)  Non rush  $3/ticket ($30/m)     [employer]Mental Health   Aditi Rich:[phone]  Fax: [phone] Ongoing Monthly visits              Intermittent asthma without complication 07/11/2014     Priority: Medium     IT band syndrome 01/13/2014     Priority: Medium     Contusion of knee 11/20/2013     Priority: Medium     Prepatellar bursitis of left knee 11/20/2013     Priority: Medium     Insomnia 05/30/2013     Priority: Medium     Mechanical low back pain 03/22/2013     Priority: Medium     Radicular pain of left lower extremity 03/22/2013     Priority: Medium     GERD (gastroesophageal reflux disease) 02/06/2013     Priority: Medium     Pulmonary nodule, left      Priority: Medium     0.5 cm; needs f/u chest CT scan Jan 2013       Abnormal CT of the chest      Priority: Medium     Nodular consolidation right lobe 1.6 cm. Needs repeat CT Jan 2013       Anxiety state 06/21/2012     Priority: Medium     Problem list name updated by automated process. Provider to review       Intermittent asthma 12/30/2011     Priority: Medium      Pancreatitis, chronic (H) 09/09/2011     Priority: Medium     Advanced directives, counseling/discussion 05/06/2011     Priority: Medium     04/29/2013  Advance Care Planning:   Receipt of ACP document:  Received: Health Care Directive which was witnessed or notarized on 07/06/2011.  Document previously scanned on 07/07/2011.  Validation form completed and sent to be scanned.  Code Status reflects choices in most recent ACP document.  Confirmed/documented designated decision maker(s). See permanent comments section of demographics in clinical tab. View document(s) and details by clicking on code status.   Added by Lacie Canchola on 4/29/2013.          Advance Care Planning:   ACP Review and Resources Provided:  Reviewed chart for advance care plan.  Leah Guerrero has no plan or code status on file however states presence of ACP document. Copy requested. Confirmed code status reflects current choices pending receipt of document/advance care plan review. Confirmed/documented designated decision maker(s). See permanent comments section of demographics in clinical tab.   Added by Emma Medina on 4/24/2013          Planning  Advance Directive Initial Visit  Leah Guerrero presents in person for initial session regarding completion of advanced directive form. She was referred to the facilitator by self.  She currently has an advance directive from 1980 & wants to fill out a new updated one.  Plan:  Advanced directive form, healthcare agent information card, advanced care planning information booklet provided to patient.   Follow up meeting: to be arranged when patient calls back to make an appointment after reviewing documents in one week or so. Patient instructed to bring healthcare agent to this meeting.  Flores Gerard RN  Letter sent to patient for Honoring Choices follow up.  6/22/2011  Flores Gerard RN  Advance Directive Follow-up Visit  Leah ALEK Cesar presents for advanced care planning session  accompanied by no one.  Reviewed definitions of advanced care planning and advance directive form, and informational handouts given to patient previously.  Patient has questions and /or concerns about acp process or form .  Reviewed advance directive form with patient & answered all of her questions. Designated healthcare agent is identified. Healthcare agent s name is Paul Guerrero. My Hopes and Wishes reviewed.  Finalized wishes are clear to patient Yes  Patient and designated healthcare agent are aware that document may be changed at any time in the future.  Advanced directive form: completed at this visit.  Advanced directive form: verified with notary signature. Original and two copies of advanced directive form given to patient copy sent to  for scanning into EMR and original given to patient and instructed to give copy to designated [facility] and non-[place] physician where applicable.  Flores Gerard RN  7/6/2011  Advance Directive Problem List Overview:   Name Relationship Phone    Primary Health Care Agent Paul Guerrero Son [phone]         Alternative Health Care Agent Hiram Guerrero Son c [phone]  [phone]                      Hyperlipidemia LDL goal <100 12/16/2010     Priority: Medium     Fatigue 12/15/2010     Priority: Medium     Type 2 diabetes, HbA1C goal < 8% (H) 11/24/2010     Priority: Medium     Female stress incontinence 10/28/2010     Priority: Medium      Past Medical History:   Diagnosis Date     Abnormal CT of the chest 10/12    Nodular consolidation right lobe 1.6 cm. Needs repeat CT Jan 2013     Bleeding disorder (H)     in bowels x2      Chronic pancreatitis (H)      CVA (cerebral infarction)      Diabetes      Female stress incontinence      Hyperlipidemia LDL goal <100      Hypertension      Intermittent asthma 12/30/2011     Lateral epicondylitis (tennis elbow) - left 3/30/2011     OA (osteoarthritis) of knee 3/22/2013     Pulmonary nodule, left 10/12    0.5 cm;  needs f/u chest CT scan Jan 2013     Surgical complications      Unspecified asthma(493.90)      Past Surgical History:   Procedure Laterality Date     APPENDECTOMY  2012     C HAND/FINGER SURGERY UNLISTED       C SHOULDER SURG PROC UNLISTED       C STOMACH SURGERY PROCEDURE UNLISTED       CHOLECYSTECTOMY  1969     COLONOSCOPY  6-2-08    Neg. At [facility]     COLONOSCOPY  9-3-13     EYE SURGERY       HYSTERECTOMY TOTAL ABD, HEIDI SALPINGO-OOPHORECTOMY, NODE DISSECTION, TUMOR DEBULKING, COMBINED  1988    fibroids?     KNEE SURGERY  x5     KNEE SURGERY      kneecap procedure     LUNG SURGERY  10-7-16    removal lung cancer     SHOULDER SURGERY  2005    right shoulder, bone spurs     WRIST SURGERY      right wrist     Current Outpatient Prescriptions   Medication Sig Dispense Refill     promethazine (PHENERGAN) 12.5 MG tablet Take 12.5 mg by mouth as needed  5     LORazepam (ATIVAN) 0.5 MG tablet Take 0.5 mg by mouth as needed  3     LANTUS SOLOSTAR 100 UNIT/ML soln NJECT 70 UNITS SUBCUTANEOUSLY AT BEDTIME 30 mL 0     blood glucose monitoring (ONE TOUCH ULTRASOFT) lancets TEST 6 TIMES PER  each 0     ONETOUCH ULTRA test strip TEST 6 TIMES PER  strip 0     rosuvastatin (CRESTOR) 20 MG tablet TAKE 1 TABLET(20 MG) BY MOUTH DAILY 90 tablet 1     hydrochlorothiazide 12.5 MG TABS tablet Take 1 tablet (12.5 mg) by mouth daily 30 tablet 1     insulin aspart (NOVOLOG PEN) 100 UNIT/ML injection Patient uses 100 units daily split up between multiple doses. Take 2 units per 15 grams of carbohydrate eaten and 2 units per 30 mg/dL above 170 mg/dL. 30 mL 1     oxyCODONE-acetaminophen (PERCOCET) 5-325 MG per tablet Take by mouth every 4 hours as needed for moderate to severe pain       zolpidem (AMBIEN) 5 MG tablet Take 1 tablet (5 mg) by mouth nightly as needed for sleep 30 tablet 2     meperidine (DEMEROL) 50 MG tablet Take 1 tablet (50 mg) by mouth every 4 hours as needed 20 tablet 0     Insulin Pen Needle (PEN NEEDLES  "5/16\") 31G X 8 MM MISC Patient does 4 doses novolog and 2 lantus shots  insulin daily.  Needs 180 needles per month. Okay refills for one year. 180 each 11     albuterol (PROAIR HFA/PROVENTIL HFA/VENTOLIN HFA) 108 (90 BASE) MCG/ACT Inhaler Inhale 2 puffs into the lungs every 6 hours as needed for shortness of breath / dyspnea or wheezing 2 Inhaler 5     tiZANidine (ZANAFLEX) 2 MG tablet Take 1 tablet (2 mg) by mouth 3 times daily as needed for muscle spasms 30 tablet 1     ACE/ARB NOT PRESCRIBED, INTENTIONAL, ACE & ARB not prescribed due to Allergy       calcium polycarbophil (FIBERCON) 625 MG tablet Take 2 tablets (1,250 mg) by mouth daily 60 tablet 11     ibuprofen (ADVIL,MOTRIN) 800 MG tablet Take 1 tablet (800 mg) by mouth every 8 hours as needed for pain 100 tablet 0     senna-docusate (SENOKOT-S;PERICOLACE) 8.6-50 MG per tablet 1-2 po bid prn constipation 100 tablet 1     albuterol (2.5 MG/3ML) 0.083% nebulizer solution Take 3 mLs (2.5 mg) by nebulization 4 times daily as needed 1 Box 5     fish oil-omega-3 fatty acids (FISH OIL) 1000 MG capsule Take 1 capsule by mouth daily.       Calcium Carbonate-Vitamin D (CALCIUM-D PO) Take  by mouth. 1200mg calcium/600mg D3       Garlic CAPS Take  by mouth. One daily       aspirin 81 MG tablet Take 1 tablet by mouth daily. 1 daily       SALINE to clean port every other month       MULTIVITAMIN TABS   OR 1 TABLET DAILY       [DISCONTINUED] insulin glargine (LANTUS SOLOSTAR) 100 UNIT/ML injection Take 50 units in the morning and 20 units in the evening. 30 mL 1     Note patient not currently taking hydrochlorothiazide      OTC products: None, except as noted above    Allergies   Allergen Reactions     Hydralazine Shortness Of Breath     Famotidine Nausea and Vomiting and Unknown     Benadryl Itch Stopping      Gives her more energy, can't sleep     Lisinopril      Tongue swelling       Metoclopramide Nausea and Vomiting     Ondansetron Nausea and Vomiting     Other " "reaction(s): Headache     Penicillins Hives     Tape [Adhesive Tape]      blisters     Tylenol      Anxiety  Tablets only.      Latex Allergy: NO    Social History   Substance Use Topics     Smoking status: Never Smoker     Smokeless tobacco: Never Used     Alcohol use No     History   Drug Use No       REVIEW OF SYSTEMS:   Had mammogram and flu shot    Getting weekly steroid infusion per oncology    Patient had lung cancer dx 2016, treated with chemo and surgery    Last CT scan 2-19-18 showed no evidence of cancer    Has diabetes, sugars high after steroid infusion    On lantus and novolog    Asthma fine; albuterol as needed    Long history of recurrent severe pancreatitis ( idiopathic ) but has not had any bad flares recently      EXAM:   /61 (BP Location: Right arm, Patient Position: Chair, Cuff Size: Adult Regular)  Pulse 57  Temp 98.3  F (36.8  C) (Oral)  Ht 5' 3.5\" (1.613 m)  Wt 188 lb 8 oz (85.5 kg)  SpO2 97%  Breastfeeding? No  BMI 32.87 kg/m2    GENERAL APPEARANCE: healthy, alert and no distress     HENT: ear canals and TM's normal and nose and mouth without ulcers or lesions     NECK: no adenopathy, no asymmetry, masses, or scars and thyroid normal to palpation     RESP: lungs clear to auscultation - no rales, rhonchi or wheezes     CV: regular rates and rhythm, normal S1 S2, no S3 or S4 and no murmur, click or rub     ABDOMEN: soft, only minimal tenderness     MS: extremities normal- no gross deformities noted, no evidence of inflammation in joints, FROM in all extremities.     SKIN: no suspicious lesions or rashes     NEURO: Normal strength and tone, sensory exam grossly normal, mentation intact and speech normal     PSYCH: mentation appears normal. and affect normal/bright     LYMPHATICS: No cervical adenopathy    DIAGNOSTICS:      Patient had normal ekg done May 2017 at [facility]    Labs drawn, pending    Recent Labs   Lab Test  05/19/17   1130 07/29/16 07/05/16   1148   10/06/14   1150 "   HGB  11.9   --   12.7   < >  12.7   PLT  253   --   263   < >  214   INR   --    --    --    --   1.03   NA  142   --   138   < >  137   POTASSIUM  4.3  4.2  4.4   < >  4.1   CR  0.60  0.78  0.57   < >  0.59   A1C  8.8*   --   8.4*   < >  8.8*    < > = values in this interval not displayed.        IMPRESSION:   Reason for surgery/procedure: left knee pain/ arthritis   Diagnosis/reason for consult: pre-op clearance    The proposed surgical procedure is considered INTERMEDIATE risk.    REVISED CARDIAC RISK INDEX  The patient has the following serious cardiovascular risks for perioperative complications such as (MI, PE, VFib and 3  AV Block):  No serious cardiac risks  INTERPRETATION: 0 risks: Class I (very low risk - 0.4% complication rate)    The patient has the following additional risks for perioperative complications:  No identified additional risks      ICD-10-CM    1. Preop general physical exam Z01.818        RECOMMENDATIONS:     --Consult hospital rounder / IM to assist post-op medical management    --Patient is to take all scheduled medications on the day of surgery EXCEPT for modifications listed below.    Take 1/2 dose lantus night before, no insulin morning of    She will hold the aspirin, ibuprofen, and fish oil for one week prior            APPROVAL GIVEN to proceed with proposed procedure, without further diagnostic evaluation, providing labs are okay    Note patient has never had any heart problems    No new chest pain    Recently had lung cancer, but went through treatment and no evidence of disease currently           Signed Electronically by: Kevin Esquivel MD    Copy of this evaluation report is provided to requesting physician.    [facility]    [address] [address]  [phone]  Dept: [phone]                   NOTE IGNORE BELOW THIS ( ELECTRONIC RECORD DUPLICATION )    HPI:        MEDICAL HISTORY:      Patient Active Problem List    Diagnosis Date Noted     Malignant neoplasm of lung, unspecified laterality, unspecified part of lung (H) 2017     Priority: Medium     Chronic pancreatitis, unspecified pancreatitis type (H) 2016     Priority: Medium     Type 2 diabetes mellitus without complication, with long-term current use of insulin (H) 2016     Priority: Medium     Anxiety 2016     Priority: Medium     Insomnia, unspecified type 2016     Priority: Medium     Idiopathic chronic pancreatitis (H) 2015     Priority: Medium     Primary osteoarthritis of left knee 10/28/2015     Priority: Medium     Hypertension goal BP (blood pressure) < 140/90 2015     Priority: Medium     Obesity 2015     Priority: Medium     Health Care Home 10/22/2014     Priority: Medium     [facility]   Guerline Zhao RN  [phone]    [facility] CARE PLAN SUMMARY    Client Name:  Leah Guerrero  Address:  [address] [place] [address] Phone: [phone] (home)    :  1945 Date of Assessment: HRA telephone assessment 18,  Previous LTCC 17   Health Plan:  [employer]  Health Plan Number: [id number] Medical Assistance Number: [id number]  Financial Worker:  Savanna Collins  [phone]  Case #:  [id number]   [facility] :  Guerline Zhao RN  Phone:  [phone]   Fax:  [phone]   [facility] Enrollment Date: 18 Case Mix:  L  Rate Cell:  B  Waiver Type: EW    Emergency Contact:   Primary Emergency Contact: Paul Guerrero  Home Phone: [phone]  Relation: Son  Secondary Emergency Contact: Hiram Guerrero   Mobile Phone: [phone]  Relation: Son Language:  English  :  No   Health Care Agent/POA:   Advanced Directives/Living Will:  On file   Primary Care Clinic/Phone/Fax:  [facility]/(p) [phone], (f) [phone] Primary Dx:  Diabetes:E11.9  Secondary Dx:  Osteoarthritis: M19.91    Primary Physician:  Kevin Esquivel   Height:    Weight:     Specialty Physician:    Audiologist:     Eye Care Provider:   Dental Care Provider:    [facility]: [facility] [phone]   Other:        Homemaking/MILS  [phone]  Fax: [phone] 1/1/18-12/31/18 weekly 4 hrs/week  (16 units)  $4.61/unit      Transportation/[employer]  [facility] 1/1/18-12/31/18 monthly Rush:10/m  Non Rush: 10/m Rush $4/ticket  ($40/m)  Non rush  $3/ticket ($30/m)     [facility]Mental Health   Aditi Rich:[phone]  Fax: [phone] Ongoing Monthly visits              Intermittent asthma without complication 07/11/2014     Priority: Medium     IT band syndrome 01/13/2014     Priority: Medium     Contusion of knee 11/20/2013     Priority: Medium     Prepatellar bursitis of left knee 11/20/2013     Priority: Medium     Insomnia 05/30/2013     Priority: Medium     Mechanical low back pain 03/22/2013     Priority: Medium     Radicular pain of left lower extremity 03/22/2013     Priority: Medium     GERD (gastroesophageal reflux disease) 02/06/2013     Priority: Medium     Pulmonary nodule, left      Priority: Medium     0.5 cm; needs f/u chest CT scan Jan 2013       Abnormal CT of the chest      Priority: Medium     Nodular consolidation right lobe 1.6 cm. Needs repeat CT Jan 2013       Anxiety state 06/21/2012     Priority: Medium     Problem list name updated by automated process. Provider to review       Intermittent asthma 12/30/2011     Priority: Medium     Pancreatitis, chronic (H) 09/09/2011     Priority: Medium     Advanced directives, counseling/discussion 05/06/2011     Priority: Medium     04/29/2013  Advance Care Planning:   Receipt of ACP document:  Received: Health Care Directive which was witnessed or notarized on 07/06/2011.  Document previously scanned on 07/07/2011.  Validation form completed and sent to be scanned.  Code Status reflects choices in most recent ACP document.   Confirmed/documented designated decision maker(s). See permanent comments section of demographics in clinical tab. View document(s) and details by clicking on code status.   Added by Lacie Canchola on 4/29/2013.          Advance Care Planning:   ACP Review and Resources Provided:  Reviewed chart for advance care plan.  Leah Guerrero has no plan or code status on file however states presence of ACP document. Copy requested. Confirmed code status reflects current choices pending receipt of document/advance care plan review. Confirmed/documented designated decision maker(s). See permanent comments section of demographics in clinical tab.   Added by Emma Medina on 4/24/2013          Planning  Advance Directive Initial Visit  Leah Guerrero presents in person for initial session regarding completion of advanced directive form. She was referred to the facilitator by self.  She currently has an advance directive from 1980 & wants to fill out a new updated one.  Plan:  Advanced directive form, healthcare agent information card, advanced care planning information booklet provided to patient.   Follow up meeting: to be arranged when patient calls back to make an appointment after reviewing documents in one week or so. Patient instructed to bring healthcare agent to this meeting.  Flores Gerard RN  Letter sent to patient for Honoring Choices follow up.  6/22/2011  Flores Gerard RN  Advance Directive Follow-up Visit  Leah Guerrero presents for advanced care planning session accompanied by no one.  Reviewed definitions of advanced care planning and advance directive form, and informational handouts given to patient previously.  Patient has questions and /or concerns about acp process or form .  Reviewed advance directive form with patient & answered all of her questions. Designated healthcare agent is identified. Healthcare agent s name is Paul Macielbanks. My Hopes and Wishes reviewed.  Finalized wishes  are clear to patient Yes  Patient and designated healthcare agent are aware that document may be changed at any time in the future.  Advanced directive form: completed at this visit.  Advanced directive form: verified with notary signature. Original and two copies of advanced directive form given to patient copy sent to  for scanning into EMR and original given to patient and instructed to give copy to designated [facility] and non-[place] physician where applicable.  Flores Gerard RN  7/6/2011  Advance Directive Problem List Overview:   Name Relationship Phone    Primary Health Care Agent Paul Bro [phone]         Alternative Health Care Agent Hiram Guerrero Son c [phone]  [phone]                      Hyperlipidemia LDL goal <100 12/16/2010     Priority: Medium     Fatigue 12/15/2010     Priority: Medium     Type 2 diabetes, HbA1C goal < 8% (H) 11/24/2010     Priority: Medium     Female stress incontinence 10/28/2010     Priority: Medium      Past Medical History:   Diagnosis Date     Abnormal CT of the chest 10/12    Nodular consolidation right lobe 1.6 cm. Needs repeat CT Jan 2013     Bleeding disorder (H)     in bowels x2      Chronic pancreatitis (H)      CVA (cerebral infarction)      Diabetes      Female stress incontinence      Hyperlipidemia LDL goal <100      Hypertension      Intermittent asthma 12/30/2011     Lateral epicondylitis (tennis elbow) - left 3/30/2011     OA (osteoarthritis) of knee 3/22/2013     Pulmonary nodule, left 10/12    0.5 cm; needs f/u chest CT scan Jan 2013     Surgical complications      Unspecified asthma(493.90)      Past Surgical History:   Procedure Laterality Date     APPENDECTOMY  2012     C HAND/FINGER SURGERY UNLISTED       C SHOULDER SURG PROC UNLISTED       C STOMACH SURGERY PROCEDURE UNLISTED       CHOLECYSTECTOMY  1969     COLONOSCOPY  6-2-08    Neg. At [facility]     COLONOSCOPY  9-3-13     EYE SURGERY       HYSTERECTOMY TOTAL ABD, HEIDI  "SALPINGO-OOPHORECTOMY, NODE DISSECTION, TUMOR DEBULKING, COMBINED  1988    fibroids?     KNEE SURGERY  x5     KNEE SURGERY      kneecap procedure     LUNG SURGERY  10-7-16    removal lung cancer     SHOULDER SURGERY  2005    right shoulder, bone spurs     WRIST SURGERY      right wrist     Current Outpatient Prescriptions   Medication Sig Dispense Refill     promethazine (PHENERGAN) 12.5 MG tablet Take 12.5 mg by mouth as needed  5     LORazepam (ATIVAN) 0.5 MG tablet Take 0.5 mg by mouth as needed  3     LANTUS SOLOSTAR 100 UNIT/ML soln NJECT 70 UNITS SUBCUTANEOUSLY AT BEDTIME 30 mL 0     blood glucose monitoring (ONE TOUCH ULTRASOFT) lancets TEST 6 TIMES PER  each 0     ONETOUCH ULTRA test strip TEST 6 TIMES PER  strip 0     rosuvastatin (CRESTOR) 20 MG tablet TAKE 1 TABLET(20 MG) BY MOUTH DAILY 90 tablet 1     hydrochlorothiazide 12.5 MG TABS tablet Take 1 tablet (12.5 mg) by mouth daily 30 tablet 1     insulin aspart (NOVOLOG PEN) 100 UNIT/ML injection Patient uses 100 units daily split up between multiple doses. Take 2 units per 15 grams of carbohydrate eaten and 2 units per 30 mg/dL above 170 mg/dL. 30 mL 1     oxyCODONE-acetaminophen (PERCOCET) 5-325 MG per tablet Take by mouth every 4 hours as needed for moderate to severe pain       zolpidem (AMBIEN) 5 MG tablet Take 1 tablet (5 mg) by mouth nightly as needed for sleep 30 tablet 2     meperidine (DEMEROL) 50 MG tablet Take 1 tablet (50 mg) by mouth every 4 hours as needed 20 tablet 0     Insulin Pen Needle (PEN NEEDLES 5/16\") 31G X 8 MM MISC Patient does 4 doses novolog and 2 lantus shots  insulin daily.  Needs 180 needles per month. Okay refills for one year. 180 each 11     albuterol (PROAIR HFA/PROVENTIL HFA/VENTOLIN HFA) 108 (90 BASE) MCG/ACT Inhaler Inhale 2 puffs into the lungs every 6 hours as needed for shortness of breath / dyspnea or wheezing 2 Inhaler 5     tiZANidine (ZANAFLEX) 2 MG tablet Take 1 tablet (2 mg) by mouth 3 times " "daily as needed for muscle spasms 30 tablet 1     ACE/ARB NOT PRESCRIBED, INTENTIONAL, ACE & ARB not prescribed due to Allergy       calcium polycarbophil (FIBERCON) 625 MG tablet Take 2 tablets (1,250 mg) by mouth daily 60 tablet 11     ibuprofen (ADVIL,MOTRIN) 800 MG tablet Take 1 tablet (800 mg) by mouth every 8 hours as needed for pain 100 tablet 0     senna-docusate (SENOKOT-S;PERICOLACE) 8.6-50 MG per tablet 1-2 po bid prn constipation 100 tablet 1     albuterol (2.5 MG/3ML) 0.083% nebulizer solution Take 3 mLs (2.5 mg) by nebulization 4 times daily as needed 1 Box 5     fish oil-omega-3 fatty acids (FISH OIL) 1000 MG capsule Take 1 capsule by mouth daily.       Calcium Carbonate-Vitamin D (CALCIUM-D PO) Take  by mouth. 1200mg calcium/600mg D3       Garlic CAPS Take  by mouth. One daily       aspirin 81 MG tablet Take 1 tablet by mouth daily. 1 daily       SALINE to clean port every other month       MULTIVITAMIN TABS   OR 1 TABLET DAILY       [DISCONTINUED] insulin glargine (LANTUS SOLOSTAR) 100 UNIT/ML injection Take 50 units in the morning and 20 units in the evening. 30 mL 1        Allergies   Allergen Reactions     Hydralazine Shortness Of Breath     Famotidine Nausea and Vomiting and Unknown     Benadryl Itch Stopping      Gives her more energy, can't sleep     Lisinopril      Tongue swelling       Metoclopramide Nausea and Vomiting     Ondansetron Nausea and Vomiting     Other reaction(s): Headache     Penicillins Hives     Tape [Adhesive Tape]      blisters     Tylenol      Anxiety  Tablets only.         Social History   Substance Use Topics     Smoking status: Never Smoker     Smokeless tobacco: Never Used     Alcohol use No     History   Drug Use No       REVIEW OF SYSTEMS:        EXAM:   /61 (BP Location: Right arm, Patient Position: Chair, Cuff Size: Adult Regular)  Pulse 57  Temp 98.3  F (36.8  C) (Oral)  Ht 5' 3.5\" (1.613 m)  Wt 188 lb 8 oz (85.5 kg)  SpO2 97%  Breastfeeding? No  BMI " 32.87 kg/m2       DIAGNOSTICS:       Recent Labs   Lab Test  05/19/17   1130 07/29/16 07/05/16   1148   10/06/14   1150   HGB  11.9   --   12.7   < >  12.7   PLT  253   --   263   < >  214   INR   --    --    --    --   1.03   NA  142   --   138   < >  137   POTASSIUM  4.3  4.2  4.4   < >  4.1   CR  0.60  0.78  0.57   < >  0.59   A1C  8.8*   --   8.4*   < >  8.8*    < > = values in this interval not displayed.        IMPRESSION:   {       ICD-10-CM    1. Preop general physical exam Z01.818        RECOMMENDATIONS:

## 2018-02-22 NOTE — LETTER
My Asthma Action Plan  Name: Leah Guerrero   YOB: 1945  Date: 2/22/2018   My doctor: Kevin Esquivel MD   My clinic: [facility]        My Control Medicine: None  My Rescue Medicine: albuterol    My Asthma Severity: intermittent  Avoid your asthma triggers: see list               GREEN ZONE   Good Control    I feel good    No cough or wheeze    Can work, sleep and play without asthma symptoms       Take your asthma control medicine every day.     1. If exercise triggers your asthma, take your rescue medication    15 minutes before exercise or sports, and    During exercise if you have asthma symptoms  2. Spacer to use with inhaler: If you have a spacer, make sure to use it with your inhaler             YELLOW ZONE Getting Worse  I have ANY of these:    I do not feel good    Cough or wheeze    Chest feels tight    Wake up at night   1. Keep taking your Green Zone medications  2. Start taking your rescue medicine:    every 20 minutes for up to 1 hour. Then every 4 hours for 24-48 hours.  3. If you stay in the Yellow Zone for more than 12-24 hours, contact your doctor.  4. If you do not return to the Green Zone in 12-24 hours or you get worse, start taking your oral steroid medicine if prescribed by your provider.           RED ZONE Medical Alert - Get Help  I have ANY of these:    I feel awful    Medicine is not helping    Breathing getting harder    Trouble walking or talking    Nose opens wide to breathe       1. Take your rescue medicine NOW  2. If your provider has prescribed an oral steroid medicine, start taking it NOW  3. Call your doctor NOW  4. If you are still in the Red Zone after 20 minutes and you have not reached your doctor:    Take your rescue medicine again and    Call 911 or go to the emergency room right away    See your regular doctor within 2 weeks of an Emergency Room or Urgent Care visit for follow-up treatment.        Electronically signed by:  Kevin Esquivel, February 22, 2018    Annual Reminders:  Meet with Asthma Educator,  Flu Shot in the Fall, consider Pneumonia Vaccination for patients with asthma (aged 19 and older).    Pharmacy:    [id number] - [place], [place] - [address] AT [facility] & [place]  [id number] - [facility], [place] - [address] AT [address] & [facility]  [id number] - [place], [place] - [address] SEC OF [place] & [facility] [address]  [facility] - [place], [place] - [address]  WRITTEN PRESCRIPTION REQUESTED  [facility] - [place]  [place], [place] - [address]                    Asthma Triggers  How To Control Things That Make Your Asthma Worse    Triggers are things that make your asthma worse.  Look at the list below to help you find your triggers and what you can do about them.  You can help prevent asthma flare-ups by staying away from your triggers.      Trigger                                                          What you can do   Cigarette Smoke  Tobacco smoke can make asthma worse. Do not allow smoking in your home, car or around you.  Be sure no one smokes at a child s day care or school.  If you smoke, ask your health care provider for ways to help you quit.  Ask family members to quit too.  Ask your health care provider for a referral to Quit Plan to help you quit smoking, or call 3-613-508-PLAN.     Colds, Flu, Bronchitis  These are common triggers of asthma. Wash your hands often.  Don t touch your eyes, nose or mouth.  Get a flu shot every year.     Dust Mites  These are tiny bugs that live in cloth or carpet. They are too small to see. Wash sheets and blankets in hot water every week.   Encase pillows and mattress in dust mite proof covers.  Avoid having carpet if you can. If you have carpet, vacuum weekly.   Use a dust mask and HEPA vacuum.   Pollen and Outdoor Mold  Some people are allergic to trees,  grass, or weed pollen, or molds. Try to keep your windows closed.  Limit time out doors when pollen count is high.   Ask you health care provider about taking medicine during allergy season.     Animal Dander  Some people are allergic to skin flakes, urine or saliva from pets with fur or feathers. Keep pets with fur or feathers out of your home.    If you can t keep the pet outdoors, then keep the pet out of your bedroom.  Keep the bedroom door closed.  Keep pets off cloth furniture and away from stuffed toys.     Mice, Rats, and Cockroaches  Some people are allergic to the waste from these pests.   Cover food and garbage.  Clean up spills and food crumbs.  Store grease in the refrigerator.   Keep food out of the bedroom.   Indoor Mold  This can be a trigger if your home has high moisture. Fix leaking faucets, pipes, or other sources of water.   Clean moldy surfaces.  Dehumidify basement if it is damp and smelly.   Smoke, Strong Odors, and Sprays  These can reduce air quality. Stay away from strong odors and sprays, such as perfume, powder, hair spray, paints, smoke incense, paint, cleaning products, candles and new carpet.   Exercise or Sports  Some people with asthma have this trigger. Be active!  Ask your doctor about taking medicine before sports or exercise to prevent symptoms.    Warm up for 5-10 minutes before and after sports or exercise.     Other Triggers of Asthma  Cold air:  Cover your nose and mouth with a scarf.  Sometimes laughing or crying can be a trigger.  Some medicines and food can trigger asthma.

## 2018-02-22 NOTE — TELEPHONE ENCOUNTER
Note patient seen for preop today.  She states lantus no longer covered.  [facility]  Kevin Esquivel MD

## 2018-02-22 NOTE — NURSING NOTE
Port draw for pre-op labs requested by Dr. Esquivel.    Port flush orders dated 5/16/17, current.    Port site without redness, swelling, or breakdown.  Port site prepped with 3 betadine swabs, air dried, followed by 3 alcohol wipes, air dried.  Stabilized port and accessed with 20 G 3/4 inch bello gripper non-coring needle without difficulty on 1st attempt.  Firm flush, no swelling to port site, good blood return observed after patient repositioned head and arm a few times and pushed and pulled saline into and out of port.   Waste blood obtained and discarded, sample obtained and placed in labeled lab tubes.  Flushed with 20 ml of sterile saline for infusion followed by 5 ml of 100 unit/ml heparin flush.  De-accessed and gauze pad applied, patient tolerated well.  Lab samples delivered to lab.    The following medication was given:     MEDICATION: saline 20 ml  ROUTE: IV  SITE: via port-a-cath  DOSE: 20 ml  LOT #: [id number]  :  [employer]  EXPIRATION DATE:  08/2019  NDC#: 63323-186-10    The following medication was given:     MEDICATION: heparin 100 unit/ml  ROUTE: IV  SITE: port-a-cath  DOSE: 5 ml  LOT #: [id number]  :  [employer]  EXPIRATION DATE:  12/2018  NDC#: 81614-877-30    Keyanna Carrasquillo RN

## 2018-02-22 NOTE — PATIENT INSTRUCTIONS
Before Your Surgery      Call your surgeon if there is any change in your health. This includes signs of a cold or flu (such as a sore throat, runny nose, cough, rash or fever).    Do not smoke, drink alcohol or take over the counter medicine (unless your surgeon or primary care doctor tells you to) for the 24 hours before and after surgery.    If you take prescribed drugs: Follow your doctor s orders about which medicines to take and which to stop until after surgery.    Eating and drinking prior to surgery: follow the instructions from your surgeon    Take a shower or bath the night before surgery. Use the soap your surgeon gave you to gently clean your skin. If you do not have soap from your surgeon, use your regular soap. Do not shave or scrub the surgery site.  Wear clean pajamas and have clean sheets on your bed.     Before Your Surgery      Call your surgeon if there is any change in your health. This includes signs of a cold or flu (such as a sore throat, runny nose, cough, rash or fever).    Do not smoke, drink alcohol or take over the counter medicine (unless your surgeon or primary care doctor tells you to) for the 24 hours before and after surgery.    If you take prescribed drugs: Follow your doctor s orders about which medicines to take and which to stop until after surgery.    Eating and drinking prior to surgery: follow the instructions from your surgeon    Take a shower or bath the night before surgery. Use the soap your surgeon gave you to gently clean your skin. If you do not have soap from your surgeon, use your regular soap. Do not shave or scrub the surgery site.  Wear clean pajamas and have clean sheets on your bed.       Take 1/2 dose lantus night before surgery    No insulin morning of procedure    Hold aspirin, fish oil, and ibuprofen for one week prior     We will send you lab results

## 2018-02-22 NOTE — MR AVS SNAPSHOT
After Visit Summary   2/22/2018    Leah Guerrero    MRN: [id number]           Patient Information     Date Of Birth          1945        Visit Information        Provider Department      2/22/2018 11:20 AM Kevin Esquivel MD [facility]'s Diagnoses     Preop general physical exam    -  1    Primary osteoarthritis of left knee        Type 2 diabetes mellitus without complication, with long-term current use of insulin (H)        Mild intermittent asthma without complication          Care Instructions      Before Your Surgery      Call your surgeon if there is any change in your health. This includes signs of a cold or flu (such as a sore throat, runny nose, cough, rash or fever).    Do not smoke, drink alcohol or take over the counter medicine (unless your surgeon or primary care doctor tells you to) for the 24 hours before and after surgery.    If you take prescribed drugs: Follow your doctor s orders about which medicines to take and which to stop until after surgery.    Eating and drinking prior to surgery: follow the instructions from your surgeon    Take a shower or bath the night before surgery. Use the soap your surgeon gave you to gently clean your skin. If you do not have soap from your surgeon, use your regular soap. Do not shave or scrub the surgery site.  Wear clean pajamas and have clean sheets on your bed.     Before Your Surgery      Call your surgeon if there is any change in your health. This includes signs of a cold or flu (such as a sore throat, runny nose, cough, rash or fever).    Do not smoke, drink alcohol or take over the counter medicine (unless your surgeon or primary care doctor tells you to) for the 24 hours before and after surgery.    If you take prescribed drugs: Follow your doctor s orders about which medicines to take and which to stop until after surgery.    Eating and drinking prior to surgery: follow the instructions  "from your surgeon    Take a shower or bath the night before surgery. Use the soap your surgeon gave you to gently clean your skin. If you do not have soap from your surgeon, use your regular soap. Do not shave or scrub the surgery site.  Wear clean pajamas and have clean sheets on your bed.       Take 1/2 dose lantus night before surgery    No insulin morning of procedure    Hold aspirin, fish oil, and ibuprofen for one week prior     We will send you lab results          Follow-ups after your visit        Who to contact     If you have questions or need follow up information about today's clinic visit or your schedule please contact [facility] directly at [phone].  Normal or non-critical lab and imaging results will be communicated to you by [employer], letter or phone within 4 business days after the clinic has received the results. If you do not hear from us within 7 days, please contact the clinic through [employer] or phone. If you have a critical or abnormal lab result, we will notify you by phone as soon as possible.  Submit refill requests through [employer] or call your pharmacy and they will forward the refill request to us. Please allow 3 business days for your refill to be completed.          Additional Information About Your Visit        [employer] Information     [employer] gives you secure access to your electronic health record. If you see a primary care provider, you can also send messages to your care team and make appointments. If you have questions, please call your primary care clinic.  If you do not have a primary care provider, please call [phone] and they will assist you.        Care EveryWhere ID     This is your Care EveryWhere ID. This could be used by other organizations to access your [facility] medical records  [id number]        Your Vitals Were     Pulse Temperature Height Pulse Oximetry Breastfeeding? BMI (Body Mass Index)    57 98.3  F (36.8  C) (Oral) 5' 3.5\" " (1.613 m) 97% No 32.87 kg/m2       Blood Pressure from Last 3 Encounters:   02/22/18 138/61   10/24/17 140/67   05/30/17 128/71    Weight from Last 3 Encounters:   02/22/18 188 lb 8 oz (85.5 kg)   10/25/17 179 lb 3.2 oz (81.3 kg)   10/24/17 179 lb 9.6 oz (81.5 kg)              We Performed the Following     Asthma Action Plan (AAP)     CBC with platelets differential     Comprehensive metabolic panel     Hemoglobin A1c     TSH with free T4 reflex          Today's Medication Changes          These changes are accurate as of 2/22/18 12:18 PM.  If you have any questions, ask your nurse or doctor.               These medicines have changed or have updated prescriptions.        Dose/Directions    LORazepam 0.5 MG tablet   Commonly known as:  ATIVAN   This may have changed:  Another medication with the same name was removed. Continue taking this medication, and follow the directions you see here.   Changed by:  Kevin Esquivel MD        Dose:  0.5 mg   Take 0.5 mg by mouth as needed   Refills:  3       promethazine 12.5 MG tablet   Commonly known as:  PHENERGAN   This may have changed:  Another medication with the same name was removed. Continue taking this medication, and follow the directions you see here.   Changed by:  Kevin Esquivel MD        Dose:  12.5 mg   Take 12.5 mg by mouth as needed   Refills:  5         Stop taking these medicines if you haven't already. Please contact your care team if you have questions.     hydrochlorothiazide 12.5 MG Tabs tablet   Stopped by:  Kevin Esquivel MD                    Primary Care Provider Office Phone # Fax #    Kevin Esquivel [phone] [address] [facility] [address]        Goals        General    I will continue the exercises provided by physical therapy for my knee (pt-stated)     Notes - Note created  7/9/2015 10:30 AM by Ray Holland RN    As of today's date 7/9/2015 goal is met at 0 - 25%.   Goal Status:  Active         I will remember to take my insulin before meals especially lunch when I am  away from home. (pt-stated)     Notes - Note created  3/25/2015  3:51 PM by Ray Holland RN      As of today's date 3/25/2015 goal is met at 0 - 25%.   Goal Status:  Active.          I will work on testing my blood sugar and taking my insulin when I am away from home at lunch time. (pt-stated)     Notes - Note created  5/20/2016  2:43 PM by Ray Holland RN    As of today's date 5/20/2016 goal is met at 0 - 25%.   Goal Status:  Active          Equal Access to Services     [facility]: Hadii aad tom hadasho Soomaali, waaxda luqadaha, qaybta kaalmada adeadamyada, jude livingston . So [facility] [phone].    ATENCIÓN: Si habla español, tiene a verduzco disposición servicios gratuitos de asistencia lingüística. Llame al [phone].    We comply with applicable federal civil rights laws and [place] laws. We do not discriminate on the basis of race, color, national origin, age, disability, sex, sexual orientation, or gender identity.            Thank you!     Thank you for choosing [facility]  for your care. Our goal is always to provide you with excellent care. Hearing back from our patients is one way we can continue to improve our services. Please take a few minutes to complete the written survey that you may receive in the mail after your visit with us. Thank you!             Your Updated Medication List - Protect others around you: Learn how to safely use, store and throw away your medicines at www.disposemymeds.org.          This list is accurate as of 2/22/18 12:18 PM.  Always use your most recent med list.                   Brand Name Dispense Instructions for use Diagnosis    ACE/ARB/ARNI NOT PRESCRIBED (INTENTIONAL)      ACE & ARB not prescribed due to Allergy    Type 2 diabetes mellitus without complication, with long-term current use of insulin (H)       * albuterol (2.5 MG/3ML) 0.083%  neb solution     1 Box    Take 3 mLs (2.5 mg) by nebulization 4 times daily as needed    Intermittent asthma       * albuterol 108 (90 BASE) MCG/ACT Inhaler    PROAIR HFA/PROVENTIL HFA/VENTOLIN HFA    2 Inhaler    Inhale 2 puffs into the lungs every 6 hours as needed for shortness of breath / dyspnea or wheezing    Intermittent asthma, uncomplicated       aspirin 81 MG tablet      Take 1 tablet by mouth daily. 1 daily        blood glucose monitoring lancets     600 each    TEST 6 TIMES PER DAY    Type 2 diabetes mellitus without complication, with long-term current use of insulin (H)       calcium polycarbophil 625 MG tablet    FIBERCON    60 tablet    Take 2 tablets (1,250 mg) by mouth daily    Health care maintenance       CALCIUM-D PO      Take  by mouth. 1200mg calcium/600mg D3        fish oil-omega-3 fatty acids 1000 MG capsule      Take 1 capsule by mouth daily.        Garlic Caps      Take  by mouth. One daily        ibuprofen 800 MG tablet    ADVIL/MOTRIN    100 tablet    Take 1 tablet (800 mg) by mouth every 8 hours as needed for pain    Arthritis       insulin aspart 100 UNIT/ML injection    NovoLOG PEN    30 mL    Patient uses 100 units daily split up between multiple doses. Take 2 units per 15 grams of carbohydrate eaten and 2 units per 30 mg/dL above 170 mg/dL.    Type 2 diabetes mellitus without complication, with long-term current use of insulin (H)       LANTUS SOLOSTAR 100 UNIT/ML injection   Generic drug:  insulin glargine     30 mL    NJECT 70 UNITS SUBCUTANEOUSLY AT BEDTIME    Type 2 diabetes mellitus without complication, with long-term current use of insulin (H)       LORazepam 0.5 MG tablet    ATIVAN     Take 0.5 mg by mouth as needed        meperidine 50 MG tablet    DEMEROL    20 tablet    Take 1 tablet (50 mg) by mouth every 4 hours as needed    Idiopathic chronic pancreatitis (H)       MULTIVITAMIN TABS   OR      1 TABLET DAILY        ONETOUCH ULTRA test strip   Generic drug:  blood  "glucose monitoring     600 strip    TEST 6 TIMES PER DAY        oxyCODONE-acetaminophen 5-325 MG per tablet    PERCOCET     Take by mouth every 4 hours as needed for moderate to severe pain        pen needles 5/16\" 31G X 8 MM Misc     180 each    Patient does 4 doses novolog and 2 lantus shots  insulin daily.  Needs 180 needles per month. Okay refills for one year.    Type 2 diabetes mellitus without complication, with long-term current use of insulin (H)       promethazine 12.5 MG tablet    PHENERGAN     Take 12.5 mg by mouth as needed        rosuvastatin 20 MG tablet    CRESTOR    90 tablet    TAKE 1 TABLET(20 MG) BY MOUTH DAILY    Hyperlipidemia LDL goal <100       SALINE      to clean port every other month    Bronchitis       senna-docusate 8.6-50 MG per tablet    SENOKOT-S;PERICOLACE    100 tablet    1-2 po bid prn constipation    Constipation, unspecified constipation type       tiZANidine 2 MG tablet    ZANAFLEX    30 tablet    Take 1 tablet (2 mg) by mouth 3 times daily as needed for muscle spasms    Muscle pain       zolpidem 5 MG tablet    AMBIEN    30 tablet    Take 1 tablet (5 mg) by mouth nightly as needed for sleep    Insomnia, unspecified type       * Notice:  This list has 2 medication(s) that are the same as other medications prescribed for you. Read the directions carefully, and ask your doctor or other care provider to review them with you.      "

## 2018-02-23 ASSESSMENT — ANXIETY QUESTIONNAIRES: GAD7 TOTAL SCORE: 0

## 2018-02-23 ASSESSMENT — ASTHMA QUESTIONNAIRES: ACT_TOTALSCORE: 20

## 2018-02-23 ASSESSMENT — PATIENT HEALTH QUESTIONNAIRE - PHQ9: SUM OF ALL RESPONSES TO PHQ QUESTIONS 1-9: 0

## 2018-02-25 NOTE — PROGRESS NOTES
The diabetes tests ( glucose and hemoglobin a1c ) are both quite high.  Increase insulin doses as needed.    Other pre-op labs here okay.    Kevin Mcdonoughhanie - please fax to [facility] ([place], [place]) ( [facility] I believe)  Thanks  Kevin Esquivel MD

## 2018-03-23 ENCOUNTER — OFFICE VISIT (OUTPATIENT)
Dept: FAMILY MEDICINE | Facility: CLINIC | Age: 73
End: 2018-03-23
Payer: COMMERCIAL

## 2018-03-23 VITALS
HEART RATE: 67 BPM | BODY MASS INDEX: 32.95 KG/M2 | DIASTOLIC BLOOD PRESSURE: 58 MMHG | SYSTOLIC BLOOD PRESSURE: 125 MMHG | WEIGHT: 189 LBS | TEMPERATURE: 97.6 F

## 2018-03-23 DIAGNOSIS — K86.1 IDIOPATHIC CHRONIC PANCREATITIS (H): ICD-10-CM

## 2018-03-23 DIAGNOSIS — E78.5 HYPERLIPIDEMIA LDL GOAL <100: ICD-10-CM

## 2018-03-23 DIAGNOSIS — I10 HYPERTENSION GOAL BP (BLOOD PRESSURE) < 140/90: ICD-10-CM

## 2018-03-23 DIAGNOSIS — Z79.4 TYPE 2 DIABETES MELLITUS WITHOUT COMPLICATION, WITH LONG-TERM CURRENT USE OF INSULIN (H): Primary | ICD-10-CM

## 2018-03-23 DIAGNOSIS — E11.9 TYPE 2 DIABETES MELLITUS WITHOUT COMPLICATION, WITH LONG-TERM CURRENT USE OF INSULIN (H): Primary | ICD-10-CM

## 2018-03-23 DIAGNOSIS — M17.12 PRIMARY OSTEOARTHRITIS OF LEFT KNEE: ICD-10-CM

## 2018-03-23 DIAGNOSIS — Z95.828 PORT-A-CATH IN PLACE: ICD-10-CM

## 2018-03-23 DIAGNOSIS — C34.90 MALIGNANT NEOPLASM OF LUNG, UNSPECIFIED LATERALITY, UNSPECIFIED PART OF LUNG (H): ICD-10-CM

## 2018-03-23 LAB
ALBUMIN SERPL-MCNC: 3.6 G/DL (ref 3.4–5)
ALP SERPL-CCNC: 102 U/L (ref 40–150)
ALT SERPL W P-5'-P-CCNC: 37 U/L (ref 0–50)
ANION GAP SERPL CALCULATED.3IONS-SCNC: 6 MMOL/L (ref 3–14)
AST SERPL W P-5'-P-CCNC: 33 U/L (ref 0–45)
BILIRUB SERPL-MCNC: 0.3 MG/DL (ref 0.2–1.3)
BUN SERPL-MCNC: 18 MG/DL (ref 7–30)
CALCIUM SERPL-MCNC: 8.7 MG/DL (ref 8.5–10.1)
CHLORIDE SERPL-SCNC: 106 MMOL/L (ref 94–109)
CHOLEST SERPL-MCNC: 95 MG/DL
CO2 SERPL-SCNC: 28 MMOL/L (ref 20–32)
CREAT SERPL-MCNC: 0.51 MG/DL (ref 0.52–1.04)
CREAT UR-MCNC: 52 MG/DL
GFR SERPL CREATININE-BSD FRML MDRD: >90 ML/MIN/1.7M2
GLUCOSE SERPL-MCNC: 125 MG/DL (ref 70–99)
HBA1C MFR BLD: 9.1 % (ref 4.3–6)
HDLC SERPL-MCNC: 41 MG/DL
LDLC SERPL CALC-MCNC: 16 MG/DL
MICROALBUMIN UR-MCNC: 8 MG/L
MICROALBUMIN/CREAT UR: 14.93 MG/G CR (ref 0–25)
NONHDLC SERPL-MCNC: 54 MG/DL
POTASSIUM SERPL-SCNC: 3.8 MMOL/L (ref 3.4–5.3)
PROT SERPL-MCNC: 7.3 G/DL (ref 6.8–8.8)
SODIUM SERPL-SCNC: 140 MMOL/L (ref 133–144)
TRIGL SERPL-MCNC: 188 MG/DL

## 2018-03-23 PROCEDURE — 82043 UR ALBUMIN QUANTITATIVE: CPT | Performed by: FAMILY MEDICINE

## 2018-03-23 PROCEDURE — 80061 LIPID PANEL: CPT | Performed by: FAMILY MEDICINE

## 2018-03-23 PROCEDURE — 80053 COMPREHEN METABOLIC PANEL: CPT | Performed by: FAMILY MEDICINE

## 2018-03-23 PROCEDURE — 36415 COLL VENOUS BLD VENIPUNCTURE: CPT | Performed by: FAMILY MEDICINE

## 2018-03-23 PROCEDURE — 83036 HEMOGLOBIN GLYCOSYLATED A1C: CPT | Performed by: FAMILY MEDICINE

## 2018-03-23 PROCEDURE — 99214 OFFICE O/P EST MOD 30 MIN: CPT | Performed by: FAMILY MEDICINE

## 2018-03-23 RX ORDER — INSULIN GLARGINE 100 [IU]/ML
INJECTION, SOLUTION SUBCUTANEOUS
Qty: 75 ML | Refills: 3 | Status: SHIPPED | OUTPATIENT
Start: 2018-03-23 | End: 2020-02-04

## 2018-03-23 NOTE — MR AVS SNAPSHOT
After Visit Summary   3/23/2018    Leah Guerrero    MRN: [id number]           Patient Information     Date Of Birth          1945        Visit Information        Provider Department      3/23/2018 11:40 AM Kevin Esquivel MD [facility]        Today's Diagnoses     Type 2 diabetes mellitus without complication, with long-term current use of insulin (H)    -  1    Hyperlipidemia LDL goal <100        Port-a-cath in place          Care Instructions    Call to schedule endocrinology consult      Agree with backing off steroid infusion      Increase insulin doses if needed      Can also see diab educator    See us in 1 to 1 1/2 months           Follow-ups after your visit        Additional Services     DIABETES EDUCATOR REFERRAL       DIABETES SELF MANAGEMENT TRAINING (DSMT)      Your provider has referred you to Diabetes Education: [facility]: Diabetes Education - All [facility] [phone]   [email or web address]     If an urgent visit is needed or A1C is above 12, Care Team to call the Diabetes  Education Team at [phone] or send an In Basket message to the Diabetes Education Pool (P DIAB ED-PATIENT CARE).    A  will call you to make your appointment. If it has been more than 3 business days since your referral was placed, please call the above phone number to schedule.    Type of training and number of hours: Previous Diagnosis: Follow-up DSMT - 2 hours.    Medicare covers: 10 hours of initial DSMT in 12 month period from the time of first visit, plus 2 hours of follow-up DSMT annually, and additional hours as requested for insulin training.    Diabetes Type: Type 2 - On Insulin             Diabetes Co-Morbidities: none               A1C Goal:  <8.0       A1C is: Lab Results       Component                Value               Date                       A1C                      10.2                02/22/2018               Diabetes Education Topics: Comprehensive Knowledge Assessment and Instruction    Special Educational Needs Requiring Individual DSMT: None       MEDICAL NUTRITION THERAPY (MNT) for Diabetes    Medical Nutrition Therapy with a Registered Dietitian can be provided in coordination with Diabetes Self-Management Training to assist in achieving optimal diabetes management.    MNT Type and Hours: Previous diagnosis: Annual follow-up MNT - 2 hours                       Medicare will cover: 3 hours initial MNT in 12 month period after first visit, plus 2 hours of follow-up MNT annually    Please be aware that coverage of these services is subject to the terms and limitations of your health insurance plan.  Call member services at your health plan to determine Diabetes Self-Management Training (Codes  &amp; ) and Medical Nutrition Therapy (Codes 81042 & 67005) benefits and ask which blood glucose monitor brands are covered by your plan.  Please bring the following with you to your appointment:    (1)  List of current medications   (2)  List of Blood Glucose Monitor brands that are covered by your insurance plan  (3)  Blood Glucose Monitor and log book  (4)   Food records for the 3 days prior to your visit    The Certified Diabetes Educator may make diabetes medication adjustments per the CDE Protocol and Collaborative Practice Agreement.            ENDOCRINOLOGY ADULT REFERRAL       Your provider [place] to: [facility]:  [facility] [phone]  [email or web address]  [facility] [place] [phone]   [email or web address]  [facility]: [facility] [place] [phone]   [email or web address]  [facility] [place] [phone]  [place] [phone]  [place] [phone]    Or  other covered endocrinologist       Please be aware that coverage of these services is subject to the terms and limitations of your health insurance plan.  Call member services at your health plan with any benefit or coverage questions.      Please bring the following to your appointment:    >>   Any x-rays, CTs or MRIs which have been performed.  Contact the facility where they were done to arrange for  prior to your scheduled appointment.    >>   List of current medications   >>   This referral request   >>   Any documents/labs given to you for this referral                  Who to contact     If you have questions or need follow up information about today's clinic visit or your schedule please contact [facility] directly at [phone].  Normal or non-critical lab and imaging results will be communicated to you by MyChart, letter or phone within 4 business days after the clinic has received the results. If you do not hear from us within 7 days, please contact the clinic through [employer] or phone. If you have a critical or abnormal lab result, we will notify you by phone as soon as possible.  Submit refill requests through [employer] or call your pharmacy and they will forward the refill request to us. Please allow 3 business days for your refill to be completed.          Additional Information About Your Visit        MyChart Information     [employer] gives you secure access to your electronic health record. If you see a primary care provider, you can also send messages to your care team and make appointments. If you have questions, please call your primary care clinic.  If you do not have a primary care provider, please call [phone] and they will assist you.        Care EveryWhere ID     This is your Care EveryWhere ID. This could be used by other organizations to access your [facility] medical records  [id number]        Your Vitals Were     Pulse Temperature Breastfeeding? BMI (Body  Mass Index)          67 97.6  F (36.4  C) (Oral) No 32.95 kg/m2         Blood Pressure from Last 3 Encounters:   03/23/18 153/59   02/22/18 138/61   10/24/17 140/67    Weight from Last 3 Encounters:   03/23/18 189 lb (85.7 kg)   02/22/18 188 lb 8 oz (85.5 kg)   10/25/17 179 lb 3.2 oz (81.3 kg)              We Performed the Following     Albumin Random Urine Quantitative with Creat Ratio     Comprehensive metabolic panel     DIABETES EDUCATOR REFERRAL     ENDOCRINOLOGY ADULT REFERRAL     Hemoglobin A1c     HEPARIN SODIUM PER 10 U     Lipid panel reflex to direct LDL Non-fasting     PORT FLUSH ONLY          Today's Medication Changes          These changes are accurate as of 3/23/18 12:28 PM.  If you have any questions, ask your nurse or doctor.               These medicines have changed or have updated prescriptions.        Dose/Directions    BASAGLAR 100 UNIT/ML injection   This may have changed:  additional instructions   Used for:  Type 2 diabetes mellitus without complication, with long-term current use of insulin (H)   Changed by:  Kevin Esquivel MD        75 units at bedtime or as directed   Quantity:  75 mL   Refills:  3            Where to get your medicines      These medications were sent to [facility] [address] [place] [address] [address] [facility] [address] [facility] [address]    Hours:  Test fax successful 12/11/02   Phone:  [phone]     BASAGLAR 100 UNIT/ML injection         Some of these will need a paper prescription and others can be bought over the counter.  Ask your nurse if you have questions.     Bring a paper prescription for each of these medications     insulin aspart 100 UNIT/ML injection                Primary Care Provider Office Phone # Fax #    Kevin Esquivel [phone]       [address] [place], [place]. \[facility].\"" [id number]        Goals        General    I will continue the exercises provided by physical therapy  for my knee (pt-stated)     Notes - Note created  7/9/2015 10:30 AM by Ray Holland RN    As of today's date 7/9/2015 goal is met at 0 - 25%.   Goal Status:  Active        I will remember to take my insulin before meals especially lunch when I am  away from home. (pt-stated)     Notes - Note created  3/25/2015  3:51 PM by Ray Holland RN      As of today's date 3/25/2015 goal is met at 0 - 25%.   Goal Status:  Active.          I will work on testing my blood sugar and taking my insulin when I am away from home at lunch time. (pt-stated)     Notes - Note created  5/20/2016  2:43 PM by Ray Holland RN    As of today's date 5/20/2016 goal is met at 0 - 25%.   Goal Status:  Active          Equal Access to Services     [facility]: Hadii candido santos hadasho Soomaali, waaxda luqadaha, qaybta kaalmada adeadamyada, jude livingston . So [facility] [phone].    ATENCIÓN: Si habla español, tiene a verduzco disposición servicios gratuitos de asistencia lingüística. Llame al [phone].    We comply with applicable federal civil rights laws and [place] laws. We do not discriminate on the basis of race, color, national origin, age, disability, sex, sexual orientation, or gender identity.            Thank you!     Thank you for choosing [facility]  for your care. Our goal is always to provide you with excellent care. Hearing back from our patients is one way we can continue to improve our services. Please take a few minutes to complete the written survey that you may receive in the mail after your visit with us. Thank you!             Your Updated Medication List - Protect others around you: Learn how to safely use, store and throw away your medicines at www.disposemymeds.org.          This list is accurate as of 3/23/18 12:28 PM.  Always use your most recent med list.                   Brand Name Dispense Instructions for use Diagnosis    ACE/ARB/ARNI NOT PRESCRIBED (INTENTIONAL)       ACE & ARB not prescribed due to Allergy    Type 2 diabetes mellitus without complication, with long-term current use of insulin (H)       * albuterol (2.5 MG/3ML) 0.083% neb solution     1 Box    Take 3 mLs (2.5 mg) by nebulization 4 times daily as needed    Intermittent asthma       * albuterol 108 (90 BASE) MCG/ACT Inhaler    PROAIR HFA/PROVENTIL HFA/VENTOLIN HFA    2 Inhaler    Inhale 2 puffs into the lungs every 6 hours as needed for shortness of breath / dyspnea or wheezing    Intermittent asthma, uncomplicated       aspirin 81 MG tablet      Take 1 tablet by mouth daily. 1 daily        BASAGLAR 100 UNIT/ML injection     75 mL    75 units at bedtime or as directed    Type 2 diabetes mellitus without complication, with long-term current use of insulin (H)       blood glucose monitoring lancets     600 each    TEST 6 TIMES PER DAY    Type 2 diabetes mellitus without complication, with long-term current use of insulin (H)       calcium polycarbophil 625 MG tablet    FIBERCON    60 tablet    Take 2 tablets (1,250 mg) by mouth daily    Health care maintenance       CALCIUM-D PO      Take  by mouth. 1200mg calcium/600mg D3        fish oil-omega-3 fatty acids 1000 MG capsule      Take 1 capsule by mouth daily.        Garlic Caps      Take  by mouth. One daily        ibuprofen 800 MG tablet    ADVIL/MOTRIN    100 tablet    Take 1 tablet (800 mg) by mouth every 8 hours as needed for pain    Arthritis       insulin aspart 100 UNIT/ML injection    NovoLOG PEN    30 mL    Patient uses 100 units daily split up between multiple doses. Take 2 units per 15 grams of carbohydrate eaten and 2 units per 30 mg/dL above 170 mg/dL.    Type 2 diabetes mellitus without complication, with long-term current use of insulin (H)       LORazepam 0.5 MG tablet    ATIVAN     Take 0.5 mg by mouth as needed        meperidine 50 MG tablet    DEMEROL    20 tablet    Take 1 tablet (50 mg) by mouth every 4 hours as needed    Idiopathic chronic  "pancreatitis (H)       MULTIVITAMIN TABS   OR      1 TABLET DAILY        ONETOUCH ULTRA test strip   Generic drug:  blood glucose monitoring     600 strip    TEST 6 TIMES PER DAY        oxyCODONE-acetaminophen 5-325 MG per tablet    PERCOCET     Take by mouth every 4 hours as needed for moderate to severe pain        pen needles 5/16\" 31G X 8 MM Misc     180 each    Patient does 4 doses novolog and 2 lantus shots  insulin daily.  Needs 180 needles per month. Okay refills for one year.    Type 2 diabetes mellitus without complication, with long-term current use of insulin (H)       promethazine 12.5 MG tablet    PHENERGAN     Take 12.5 mg by mouth as needed        rosuvastatin 20 MG tablet    CRESTOR    90 tablet    TAKE 1 TABLET(20 MG) BY MOUTH DAILY    Hyperlipidemia LDL goal <100       SALINE      to clean port every other month    Bronchitis       senna-docusate 8.6-50 MG per tablet    SENOKOT-S;PERICOLACE    100 tablet    1-2 po bid prn constipation    Constipation, unspecified constipation type       tiZANidine 2 MG tablet    ZANAFLEX    30 tablet    Take 1 tablet (2 mg) by mouth 3 times daily as needed for muscle spasms    Muscle pain       zolpidem 5 MG tablet    AMBIEN    30 tablet    Take 1 tablet (5 mg) by mouth nightly as needed for sleep    Insomnia, unspecified type       * Notice:  This list has 2 medication(s) that are the same as other medications prescribed for you. Read the directions carefully, and ask your doctor or other care provider to review them with you.      "

## 2018-03-23 NOTE — PROGRESS NOTES
SUBJECTIVE:   Leah Guerrero is a 72 year old female who presents to clinic today for the following health issues:       Follow up on elevated hgb a1c      none    Problem list and histories reviewed & adjusted, as indicated.  Additional history: as documented         Reviewed and updated as needed this visit by clinical staff  Allergies  Meds  Problems       Reviewed and updated as needed this visit by Provider          still has cough    Staying same    Not coughing stuff up      Sugars the last few days 100s    yest had infusion, was up to 300     Was supposed to get infusion every week but patient wants to get off    Raises sugars up to about 400s for 2-3 days    The high hemoglobin a1c prevented the knee surgery from happening    Watching diet     Eating more because was hardly eating much of anything for 2 months due to nausea etc    Not eating large amounts    Physical Exam   Constitutional: She is oriented to person, place, and time and well-developed, well-nourished, and in no distress. No distress.   HENT:   Head: Normocephalic and atraumatic.   Neck: Carotid bruit is not present.   Cardiovascular: Normal rate, regular rhythm, normal heart sounds and intact distal pulses.  Exam reveals no gallop and no friction rub.    No murmur heard.  Pulmonary/Chest: Effort normal and breath sounds normal.   Abdominal: Soft. There is tenderness (very mild tenderness epigastrium ).   Musculoskeletal: She exhibits no edema.   Neurological: She is alert and oriented to person, place, and time.   Skin: She is not diaphoretic.   Psychiatric: Mood and affect normal.     ASSESSMENT / PLAN:  (E11.9,  Z79.4) Type 2 diabetes mellitus without complication, with long-term current use of insulin (H)  (primary encounter diagnosis)  Comment: check labs.  Hemoglobin a1c likely to be high again.  Advised patient schedule with both endocrinology and diab ed.  Increase insulin as needed.  Keep working on healthy diet/exercise as  able.   Plan: Albumin Random Urine Quantitative with Creat         Ratio, Hemoglobin A1c, insulin aspart (NOVOLOG         PEN) 100 UNIT/ML injection, BASAGLAR 100         UNIT/ML injection, ENDOCRINOLOGY ADULT         REFERRAL, DIABETES EDUCATOR REFERRAL             (E78.5) Hyperlipidemia LDL goal <100  Comment: check today   Plan: Comprehensive metabolic panel, Lipid panel         reflex to direct LDL Non-fasting             (C34.90) Malignant neoplasm of lung, unspecified laterality, unspecified part of lung (H)  Comment: gets infusions which raise sugars  Plan: patient plans on backing off frequency     (K86.1) Idiopathic chronic pancreatitis (H)  Comment: not too bad recently   Plan: monitor     (Z95.828) Port-a-cath in place  Comment: chronic   Plan: HEPARIN SODIUM PER 10 U             (M17.12) Primary osteoarthritis of left knee  Comment: was to have tka but this cancelled due to sugars   Plan: try to get sugars better; see above     (I10) Hypertension goal BP (blood pressure) < 140/90  Comment: blood pressure okay on recheck   Plan: no change     See us in 1 to 1 1 /2 months    I reviewed the patient's medications, allergies, medical history, family history, and social history.    Kevin Esquivel MD

## 2018-03-23 NOTE — NURSING NOTE
Port flush orders dated 5/16/17, current.  Port blood draw requested by Dr. Esquivel.    Port site without redness, swelling, or breakdown, mild old bruise noted.  Port site prepped with 3 betadine swabs, air dried, followed by 3 alcohol wipes, air dried.  Stabilized port and accessed with 20 G 3/4 inch bello gripper non-coring needle without difficulty on 1st attempt.  Easy flush, good blood return observed after patient raised her right arm.   6 ml waste discarded, sample obtained and placed in appropriate lab tubes.  Flushed with 20 ml of sterile saline for infusion followed by 5 ml of 100 unit/ml heparin flush.  De-accessed and gauze pad applied, patient tolerated well.  Labeled samples delivered to lab.    The following medication was given:     MEDICATION: saline 20 ml  ROUTE: IV  SITE: via port-a-cath  DOSE: 20 ml  LOT #: [id number]  :  [employer]  EXPIRATION DATE:  08/2019  NDC#: 63323-186-10    The following medication was given:     MEDICATION: heparin 100 unit/ml  ROUTE: IV  SITE: port-a-cath  DOSE: 5 ml  LOT #: [id number]  :  [employer]  EXPIRATION DATE:  12/2018  NDC#: 91234-830-54    Keyanna Carrasquilol RN

## 2018-03-23 NOTE — PATIENT INSTRUCTIONS
Call to schedule endocrinology consult      Agree with backing off steroid infusion      Increase insulin doses if needed      Can also see diab educator    See us in 1 to 1 1/2 months

## 2018-03-24 NOTE — PROGRESS NOTES
The diabetes test ( hemoglobin a1c ) is still high, but better than last time.  See us in about 1 to 1 1/2 months.    Other labs are fine.    Kevin Esquivel MD

## 2018-03-27 ENCOUNTER — TELEPHONE (OUTPATIENT)
Dept: FAMILY MEDICINE | Facility: CLINIC | Age: 73
End: 2018-03-27

## 2018-03-27 NOTE — TELEPHONE ENCOUNTER
Diabetes Education Scheduling Outreach #1:    Call to patient to schedule. Left message with phone number to call to schedule.    Plan for 2nd outreach attempt within 1 week.    Roxy Zavala OnCall  Diabetes and Nutrition Scheduling

## 2018-04-11 ENCOUNTER — OFFICE VISIT (OUTPATIENT)
Dept: ORTHOPEDICS | Facility: CLINIC | Age: 73
End: 2018-04-11
Payer: COMMERCIAL

## 2018-04-11 VITALS
BODY MASS INDEX: 32.1 KG/M2 | RESPIRATION RATE: 16 BRPM | DIASTOLIC BLOOD PRESSURE: 71 MMHG | HEART RATE: 79 BPM | WEIGHT: 188 LBS | HEIGHT: 64 IN | SYSTOLIC BLOOD PRESSURE: 171 MMHG

## 2018-04-11 DIAGNOSIS — M17.12 PRIMARY OSTEOARTHRITIS OF LEFT KNEE: Primary | ICD-10-CM

## 2018-04-11 DIAGNOSIS — M25.562 CHRONIC PAIN OF LEFT KNEE: ICD-10-CM

## 2018-04-11 DIAGNOSIS — G89.29 CHRONIC PAIN OF LEFT KNEE: ICD-10-CM

## 2018-04-11 PROCEDURE — 99213 OFFICE O/P EST LOW 20 MIN: CPT | Performed by: ORTHOPAEDIC SURGERY

## 2018-04-11 ASSESSMENT — PAIN SCALES - GENERAL: PAINLEVEL: MILD PAIN (3)

## 2018-04-11 NOTE — LETTER
4/11/2018         RE: Leah Guerrero  [address]   [place] [address]        Dear Colleague,    Thank you for referring your patient, Leah Guerrero, to the [facility]. Please see a copy of my visit note below.    Chief Complaint   Patient presents with     RECHECK       Left knee pain. Last injection: 10/25/2017 Injection only helped for a few weeks. She would like another injection today. Her knee gave out on her last 2 months when she got up and walked.  Pain gradually getting worse. She is have difficulty breathing.         HISTORY OF PRESENT ILLNESS:  Leah Guerrero is a 72 year old female seen for follow up left knee pain, advanced primary osteoarthritis. She had prior left knee arthroscopy on 10/10/2014. She returns today with extreme pain today, rated a 9/10. Her last injection was on 10/25/2017, which helped for only a few weeks. About 2 months ago, she felt her knee give out when she stood up to walk, and pain has been gradually getting worse since then. She would like another injection, but is also interested in discussing and scheduling surgery. She was scheduled to have surgery with Dr. Stevens through [facility] on 3/3/18, but this was cancelled due to her high blood sugars (she mentions she has been on steroids for about a year and this contributes to her high readings and high HgbA1c values). She suspects her blood sugars would be more controlled by May.     Pain is the worst with any weightbearing and she states she can hardly walk. She can be up on her leg for about a half hour before the pain becomes unbearable. She reports the pain will start in the knee and radiates up the lateral side of her thigh into the hip. She has also noticed ankle swelling and warmth below her knee. She is also having some difficulty breathing. Presents with her dog.     She has done Physical Therapy, injections (cortisone and viscosupplementation), medications in the  past.      Past Medical History:   Diagnosis Date     Abnormal CT of the chest 10/12    Nodular consolidation right lobe 1.6 cm. Needs repeat CT 2013     Bleeding disorder (H)     in bowels x2      Chronic pancreatitis (H)      CVA (cerebral infarction)      Diabetes      Female stress incontinence      Hyperlipidemia LDL goal <100      Hypertension      Intermittent asthma 2011     Lateral epicondylitis (tennis elbow) - left 3/30/2011     OA (osteoarthritis) of knee 3/22/2013     Pulmonary nodule, left 10/12    0.5 cm; needs f/u chest CT scan 2013     Surgical complications      Unspecified asthma(493.90)      Patient Active Problem List    Diagnosis Date Noted     Mild intermittent asthma without complication 2018     Priority: Medium     Malignant neoplasm of lung, unspecified laterality, unspecified part of lung (H) 2017     Priority: Medium     Chronic pancreatitis, unspecified pancreatitis type (H) 2016     Priority: Medium     Type 2 diabetes mellitus without complication, with long-term current use of insulin (H) 2016     Priority: Medium     Anxiety 2016     Priority: Medium     Insomnia, unspecified type 2016     Priority: Medium     Idiopathic chronic pancreatitis (H) 2015     Priority: Medium     Primary osteoarthritis of left knee 10/28/2015     Priority: Medium     Hypertension goal BP (blood pressure) < 140/90 2015     Priority: Medium     Obesity 2015     Priority: Medium     Health Care Home 10/22/2014     Priority: Medium     [facility]   Guerline Zhao RN  [phone]    [facility] CARE PLAN SUMMARY    Client Name:  Leah Guerrero  Address:  [address] [place] [address] Phone: [phone] (home)    :  1945 Date of Assessment: HRA telephone assessment 18,  Previous LTCC 17   Health Plan:  [facility]  Health Plan Number: [id number] Medical  Assistance Number: [id number]  Financial Worker:  Savanna Collins  [phone]  Case #:  [id number]   [facility] :  Guerline Zhao RN CM Phone:  [phone]  CM Fax:  [phone]   [facility] Enrollment Date: 1/1/18 Case Mix:  L  Rate Cell:  B  Waiver Type: EW    Emergency Contact:   Primary Emergency Contact: Paul Guerrero  Home Phone: [phone]  Relation: Son  Secondary Emergency Contact: Hiram Guerrero   Mobile Phone: [phone]  Relation: Son Language:  English  :  No   Health Care Agent/POA:   Advanced Directives/Living Will:  On file   Primary Care Clinic/Phone/Fax:  [facility]/(p) [phone], (f) [phone] Primary Dx:  Diabetes:E11.9  Secondary Dx: Osteoarthritis: M19.91    Primary Physician:  Kevin Esquivel   Height:    Weight:     Specialty Physician:    Audiologist:     Eye Care Provider:   Dental Care Provider:    [facility] [phone]   Other:        Homemaking/[employer]  [phone]  Fax: [phone] 1/1/18-12/31/18 weekly 4 hrs/week  (16 units)  $4.61/unit      Transportation/[employer]  [facility] 1/1/18-12/31/18 monthly Rush:10/m  Non Rush: 10/m Rush $4/ticket  ($40/m)  Non rush  $3/ticket ($30/m)     [employer]Mental Health   [facility] [place]:[phone]  Fax: [phone] Ongoing Monthly visits              Intermittent asthma without complication 07/11/2014     Priority: Medium     IT band syndrome 01/13/2014     Priority: Medium     Contusion of knee 11/20/2013     Priority: Medium     Prepatellar bursitis of left knee 11/20/2013     Priority: Medium     Insomnia 05/30/2013     Priority: Medium     Mechanical low back pain 03/22/2013     Priority: Medium     Radicular pain of left lower extremity 03/22/2013     Priority: Medium     GERD (gastroesophageal reflux disease) 02/06/2013     Priority: Medium     Pulmonary nodule, left      Priority: Medium     0.5 cm; needs f/u chest CT scan Jan 2013       Abnormal CT of the chest       Priority: Medium     Nodular consolidation right lobe 1.6 cm. Needs repeat CT Jan 2013       Anxiety state 06/21/2012     Priority: Medium     Problem list name updated by automated process. Provider to review       Intermittent asthma 12/30/2011     Priority: Medium     Advanced directives, counseling/discussion 05/06/2011     Priority: Medium     04/29/2013  Advance Care Planning:   Receipt of ACP document:  Received: Health Care Directive which was witnessed or notarized on 07/06/2011.  Document previously scanned on 07/07/2011.  Validation form completed and sent to be scanned.  Code Status reflects choices in most recent ACP document.  Confirmed/documented designated decision maker(s). See permanent comments section of demographics in clinical tab. View document(s) and details by clicking on code status.   Added by Lacie Canchola on 4/29/2013.          Advance Care Planning:   ACP Review and Resources Provided:  Reviewed chart for advance care plan.  Leah Guerrero has no plan or code status on file however states presence of ACP document. Copy requested. Confirmed code status reflects current choices pending receipt of document/advance care plan review. Confirmed/documented designated decision maker(s). See permanent comments section of demographics in clinical tab.   Added by Emma Medina on 4/24/2013          Planning  Advance Directive Initial Visit  Leah Guerrero presents in person for initial session regarding completion of advanced directive form. She was referred to the facilitator by self.  She currently has an advance directive from 1980 & wants to fill out a new updated one.  Plan:  Advanced directive form, healthcare agent information card, advanced care planning information booklet provided to patient.   Follow up meeting: to be arranged when patient calls back to make an appointment after reviewing documents in one week or so. Patient instructed to bring healthcare agent to this  meeting.  Flores Gerard RN  Letter sent to patient for [employer] Cho[employer] follow up.  6/22/2011  Flores Gerard RN  Advance Directive Follow-up Visit  Leah Guerrero presents for advanced care planning session accompanied by no one.  Reviewed definitions of advanced care planning and advance directive form, and informational handouts given to patient previously.  Patient has questions and /or concerns about acp process or form .  Reviewed advance directive form with patient & answered all of her questions. Designated healthcare agent is identified. Healthcare agent s name is Paul Guerrero. My Hopes and Wishes reviewed.  Finalized wishes are clear to patient Yes  Patient and designated healthcare agent are aware that document may be changed at any time in the future.  Advanced directive form: completed at this visit.  Advanced directive form: verified with notary signature. Original and two copies of advanced directive form given to patient copy sent to  for scanning into EMR and original given to patient and instructed to give copy to designated [facility] and non-[facility] physician where applicable.  Flores Gerard RN  7/6/2011  Advance Directive Problem List Overview:   Name Relationship Phone    Primary Health Care Agent Paul Guerrero Son [phone]         Alternative Health Care Agent Hiram Guerrero Son c [phone]  [phone]                      Hyperlipidemia LDL goal <100 12/16/2010     Priority: Medium     Fatigue 12/15/2010     Priority: Medium     Type 2 diabetes, HbA1C goal < 8% (H) 11/24/2010     Priority: Medium     Female stress incontinence 10/28/2010     Priority: Medium     Past Surgical History:   Procedure Laterality Date     APPENDECTOMY  2012     C HAND/FINGER SURGERY UNLISTED       C SHOULDER SURG PROC UNLISTED       C STOMACH SURGERY PROCEDURE UNLISTED       CHOLECYSTECTOMY  1969     COLONOSCOPY  6-2-08    Neg. At [facility]     COLONOSCOPY  9-3-13     EYE SURGERY        "HYSTERECTOMY TOTAL ABD, HEIDI SALPINGO-OOPHORECTOMY, NODE DISSECTION, TUMOR DEBULKING, COMBINED  1988    fibroids?     KNEE SURGERY  x5     KNEE SURGERY      kneecap procedure     LUNG SURGERY  10-7-16    removal lung cancer     SHOULDER SURGERY  2005    right shoulder, bone spurs     WRIST SURGERY      right wrist       Medications:   Current Outpatient Prescriptions:      insulin aspart (NOVOLOG PEN) 100 UNIT/ML injection, Patient uses 100 units daily split up between multiple doses. Take 2 units per 15 grams of carbohydrate eaten and 2 units per 30 mg/dL above 170 mg/dL., Disp: 30 mL, Rfl: 1     BASAGLAR 100 UNIT/ML injection, 75 units at bedtime or as directed, Disp: 75 mL, Rfl: 3     promethazine (PHENERGAN) 12.5 MG tablet, Take 12.5 mg by mouth as needed, Disp: , Rfl: 5     LORazepam (ATIVAN) 0.5 MG tablet, Take 0.5 mg by mouth as needed, Disp: , Rfl: 3     blood glucose monitoring (ONE TOUCH ULTRASOFT) lancets, TEST 6 TIMES PER DAY, Disp: 600 each, Rfl: 0     ONETOUCH ULTRA test strip, TEST 6 TIMES PER DAY, Disp: 600 strip, Rfl: 0     rosuvastatin (CRESTOR) 20 MG tablet, TAKE 1 TABLET(20 MG) BY MOUTH DAILY, Disp: 90 tablet, Rfl: 1     oxyCODONE-acetaminophen (PERCOCET) 5-325 MG per tablet, Take by mouth every 4 hours as needed for moderate to severe pain, Disp: , Rfl:      zolpidem (AMBIEN) 5 MG tablet, Take 1 tablet (5 mg) by mouth nightly as needed for sleep, Disp: 30 tablet, Rfl: 2     meperidine (DEMEROL) 50 MG tablet, Take 1 tablet (50 mg) by mouth every 4 hours as needed, Disp: 20 tablet, Rfl: 0     Insulin Pen Needle (PEN NEEDLES 5/16\") 31G X 8 MM MISC, Patient does 4 doses novolog and 2 lantus shots  insulin daily.  Needs 180 needles per month. Okay refills for one year., Disp: 180 each, Rfl: 11     albuterol (PROAIR HFA/PROVENTIL HFA/VENTOLIN HFA) 108 (90 BASE) MCG/ACT Inhaler, Inhale 2 puffs into the lungs every 6 hours as needed for shortness of breath / dyspnea or wheezing, Disp: 2 Inhaler, Rfl: " 5     tiZANidine (ZANAFLEX) 2 MG tablet, Take 1 tablet (2 mg) by mouth 3 times daily as needed for muscle spasms, Disp: 30 tablet, Rfl: 1     ACE/ARB NOT PRESCRIBED, INTENTIONAL,, ACE & ARB not prescribed due to Allergy, Disp: , Rfl:      calcium polycarbophil (FIBERCON) 625 MG tablet, Take 2 tablets (1,250 mg) by mouth daily, Disp: 60 tablet, Rfl: 11     ibuprofen (ADVIL,MOTRIN) 800 MG tablet, Take 1 tablet (800 mg) by mouth every 8 hours as needed for pain, Disp: 100 tablet, Rfl: 0     senna-docusate (SENOKOT-S;PERICOLACE) 8.6-50 MG per tablet, 1-2 po bid prn constipation, Disp: 100 tablet, Rfl: 1     albuterol (2.5 MG/3ML) 0.083% nebulizer solution, Take 3 mLs (2.5 mg) by nebulization 4 times daily as needed, Disp: 1 Box, Rfl: 5     fish oil-omega-3 fatty acids (FISH OIL) 1000 MG capsule, Take 1 capsule by mouth daily., Disp: , Rfl:      Calcium Carbonate-Vitamin D (CALCIUM-D PO), Take  by mouth. 1200mg calcium/600mg D3, Disp: , Rfl:      Garlic CAPS, Take  by mouth. One daily, Disp: , Rfl:      aspirin 81 MG tablet, Take 1 tablet by mouth daily. 1 daily, Disp: , Rfl:      SALINE, to clean port every other month, Disp: , Rfl:      MULTIVITAMIN TABS   OR, 1 TABLET DAILY, Disp: , Rfl:     Allergies:   Allergies   Allergen Reactions     Hydralazine Shortness Of Breath     Famotidine Nausea and Vomiting and Unknown     Benadryl Itch Stopping      Gives her more energy, can't sleep     Lisinopril      Tongue swelling       Metoclopramide Nausea and Vomiting     Ondansetron Nausea and Vomiting     Other reaction(s): Headache     Penicillins Hives     Tape [Adhesive Tape]      blisters     Tylenol      Anxiety  Tablets only.       REVIEW OF SYSTEMS:   CONSTITUTIONAL:NEGATIVE for fever, chills, night sweats  INTEGUMENTARY/SKIN: NEGATIVE for worrisome wound problems or redness.  MUSCULOSKELETAL:See HPI above  NEURO: NEGATIVE for weakness, dizziness or paresthesias    This document serves as a record of the services and  "decisions personally performed and made by José Vogel MD. It was created on his behalf by Marcia Sloan, a trained medical scribe. The creation of this document is based the provider's statements to the medical scribe.  Marcia Sloan April 11, 2018 10:55 AM        PHYSICAL EXAM:  /71  Pulse 79  Resp 16  Ht 5' 3.5\" (1.613 m)  Wt 188 lb (85.3 kg)  BMI 32.78 kg/m2   GENERAL APPEARANCE: healthy, alert, no distress, accompanied by her dog   SKIN: no suspicious lesions or rashes  NEURO: Normal strength and tone, mentation intact and speech normal  PSYCH:  mentation appears normal and affect normal, not anxious  RESPIRATORY: No increased work of breathing.    left  LOWER EXTREMITY:  Gait: antalgic left.  mild Quad atrophy, strength weakened.  Intact sensation deep peroneal nerve, superficial peroneal nerve, med/lat tibial nerve, sural nerve, saphenous nerve  Intact EHL, EDL, TA, FHL, GS, quadriceps hamstrings and hip flexors  Toes warm and well perfused, brisk capillary refill. Palpable 2+ dp pulses.  calf soft and nttp or squeeze.  Edema: trace    left  KNEE EXAM:    Skin: intact, no ecchymosis or erythema; mild swelling.  ROM: 5 degrees extension to 95 degrees flexion, discomfort at extremes  Effusion: none  Tender: med/lat joint line, anterior/posterior knee.      X-RAY: no new xrays today.  3 views left knee 10/25/2017 reviewed: Bone on bone medial loss of joint space, subchondral sclerosis, marginal osteohytes with medial articular flattening. Possible lucency under medial femoral condyle subchondral bone. Patello-femoral marginal osteophytes are more prominent. Arthritis progression since prior xrays. Consistent with Kellgren stage IV arthritis.    LEFT KNEE INTRAOP ARTHROSCOPY FINDINGS   1. Trace effusion.   2. Moderate synovitis.   3. Grade 4 chondrosis of the patella and grade 3 chondrosis of the femoral trochlea.   4. Prominent medial plica with rubbing along the anterior aspect of the medial " femoral condyle.   5. No loose bodies in the media or lateral gutter but small marginal osteophytes.   6. Intact ACL, within the notch. There was some fraying or partial tearing of fibers of the PCL with PCL grossly otherwise intact.   7. Lateral compartment with a small free edge flap tear off of the posterior horn of the lateral meniscus and a partial thickness undersurface tear towards the periphery of the posterior horn body junction.   8. Lateral compartment with an area of grade 4 chondrosis of the weight-bearing portion of the femoral condyle, as well as grade 4 chondrosis of the posterior medial aspect of the lateral tibia.   9. Complex tearing of the posterior horn and body of the medial meniscus with horizontal cleavage and radial tears with a displaced flap off of the medial aspect of the body of the meniscus.   10. Medial compartment with grade 4 chondrosis of the majority of the medial femoral condyle and grade 3 diffuse chondrosis of the medial tibia with areas of grade 4 chondrosis      Impression: Leah Guerrero is a 72 year old female with chronic left knee pain, advanced primary osteoarthritis.       Plan:   * work on getting blood sugars and HgbA1c under more control  * once blood sugars are controlled, she can return to discuss surgical options  * follow up with PCP in May to check progress    Patient to follow up with Primary Care provider regarding elevated blood pressure.      Discussed various treatments for degenerative arthrosis of the knee, including nonoperative and operative approaches. Nonoperative approaches, which are exhausted prior to operative treatment, include doing nothing and living with the pain as patient has been doing, activity modification (avoid aggravating activities), physical therapy and strengthening exercises, weight loss, anti-inflammatory medications, bracing, ambulatory aids (cane, walker) and injections. Once these have been tried and are deemed unsuccessful  with a good effort, and the patient is an appropriate candidate, the next treatment would be knee arthroplasty or replacement. Depending on the location of the arthritis, knee replacement can be partial (one side of the knee affected by arthritis) or a total knee arthroplasty (all 3 compartments). The risks, perceived benefits and perioperative rehabilitation expectations of knee arthroplasty were discussed in detail. Also discussed that approximately 10% of patients that undergo knee arthroplasty are not happy following surgery and may have worse pain or no improvement in pain, contrary to their preoperative expectations.    At this time, nonoperative treatment will be continued and surgery can be discussed at a future appointment.    Non-surgical treatment for knee arthritis includes:    * rest, sitting  * Activity modification - avoid impact activities or activities that aggravate symptoms.  * NSAIDS (non-steroidal anti-inflammatory medications; e.g. Aleve, advil, motrin, ibuprofen) - regular use for inflammation ( twice daily or three times daily), with food, as long as no contra-indications Please discuss with primary care doctor if needed  * ice, 15-20 minutes at a time several times a day or as needed.  * Strengthening of quadriceps muscles  * Physical Therapy for strengthening, stretching and range of motion exercises of legs  * Tylenol as needed for pain, consider Tylenol arthritis or similar  * Weight loss: Body mass index is 32.78 kg/(m^2).. weight loss benefits, not only for the current pain symptoms, but also overall health. Recommend a good diet plan that works for the patient, with the assistance of a dietician or primary care doctor as needed. Also, a good, low-impact exercise program for at least 20 minutes per day, 3 times per week, such as exercise bike, elliptical , or pool.  * Exercise: low impact such as stationary bike, elliptical, pool.  * Injections: discussed viscosupplementation  "(hyaluronic acid, \"rooster comb\", \"gel shots\"); risks and perceived benefits discussed today. Patient elects to not proceed today as she'd like to proceed with total knee arthroplasty when able, knowing she'd have to wait at least 3 months after injection for any surgery.  * Bracing: bracing the knee may offer some relief of symptoms when worn and provide some stability.  * over the counter supplements such as glucosamine and chondroitin sulfate may help with joint pain.  * topical ointments may help as well    * return to clinic as needed.       The information in this document, created by a scribe for me, accurately reflects the services I personally performed and the decisions made by me. I have reviewed and approved this document for accuracy.      José Vogel M.D., M.S.  Dept. of Orthopaedic Surgery  [facility]      Again, thank you for allowing me to participate in the care of your patient.        Sincerely,        José Vogel MD    "

## 2018-04-11 NOTE — NURSING NOTE
"Chief Complaint   Patient presents with     RECHECK       Left knee pain. Last injection: 10/25/2017 Injection only helped for a few weeks. She would like another injection today. Her knee gave out on her last 2 months when she got up and walked.  Pain gradually getting worse. She is have difficulty breathing.         Initial /71  Pulse 79  Resp 16  Ht 1.613 m (5' 3.5\")  Wt 85.3 kg (188 lb)  BMI 32.78 kg/m2 Estimated body mass index is 32.78 kg/(m^2) as calculated from the following:    Height as of this encounter: 1.613 m (5' 3.5\").    Weight as of this encounter: 85.3 kg (188 lb).  Medication Reconciliation: complete   Fahad Sears MA      "

## 2018-04-11 NOTE — PROGRESS NOTES
Chief Complaint   Patient presents with     RECHECK       Left knee pain. Last injection: 10/25/2017 Injection only helped for a few weeks. She would like another injection today. Her knee gave out on her last 2 months when she got up and walked.  Pain gradually getting worse. She is have difficulty breathing.         HISTORY OF PRESENT ILLNESS:  Leah Guerrero is a 72 year old female seen for follow up left knee pain, advanced primary osteoarthritis. She had prior left knee arthroscopy on 10/10/2014. She returns today with extreme pain today, rated a 9/10. Her last injection was on 10/25/2017, which helped for only a few weeks. About 2 months ago, she felt her knee give out when she stood up to walk, and pain has been gradually getting worse since then. She would like another injection, but is also interested in discussing and scheduling surgery. She was scheduled to have surgery with Dr. Stevens through [facility] on 3/3/18, but this was cancelled due to her high blood sugars (she mentions she has been on steroids for about a year and this contributes to her high readings and high HgbA1c values). She suspects her blood sugars would be more controlled by May.     Pain is the worst with any weightbearing and she states she can hardly walk. She can be up on her leg for about a half hour before the pain becomes unbearable. She reports the pain will start in the knee and radiates up the lateral side of her thigh into the hip. She has also noticed ankle swelling and warmth below her knee. She is also having some difficulty breathing. Presents with her dog.     She has done Physical Therapy, injections (cortisone and viscosupplementation), medications in the past.      Past Medical History:   Diagnosis Date     Abnormal CT of the chest 10/12    Nodular consolidation right lobe 1.6 cm. Needs repeat CT Jan 2013     Bleeding disorder (H)     in bowels x2      Chronic pancreatitis (H)      CVA (cerebral infarction)       Diabetes      Female stress incontinence      Hyperlipidemia LDL goal <100      Hypertension      Intermittent asthma 2011     Lateral epicondylitis (tennis elbow) - left 3/30/2011     OA (osteoarthritis) of knee 3/22/2013     Pulmonary nodule, left 10/12    0.5 cm; needs f/u chest CT scan 2013     Surgical complications      Unspecified asthma(493.90)      Patient Active Problem List    Diagnosis Date Noted     Mild intermittent asthma without complication 2018     Priority: Medium     Malignant neoplasm of lung, unspecified laterality, unspecified part of lung (H) 2017     Priority: Medium     Chronic pancreatitis, unspecified pancreatitis type (H) 2016     Priority: Medium     Type 2 diabetes mellitus without complication, with long-term current use of insulin (H) 2016     Priority: Medium     Anxiety 2016     Priority: Medium     Insomnia, unspecified type 2016     Priority: Medium     Idiopathic chronic pancreatitis (H) 2015     Priority: Medium     Primary osteoarthritis of left knee 10/28/2015     Priority: Medium     Hypertension goal BP (blood pressure) < 140/90 2015     Priority: Medium     Obesity 2015     Priority: Medium     Health Care Home 10/22/2014     Priority: Medium     [facility]   Guerline Zhao RN  [phone]    [facility] CARE PLAN SUMMARY    Client Name:  Leah Guerrero  Address:  [address] [place] [id number] Phone: [phone] (home)    :  1945 Date of Assessment: HRA telephone assessment 18,  Previous LTCC 17   Health Plan:  [facility]  Health Plan Number: [id number] Medical Assistance Number: [id number]  Financial Worker:  Savanna Collins  [phone]  Case #:  [id number]   [facility] :  Guerline Zhao RN CM Phone:  [phone]   Fax:  [phone]   [facility] Enrollment Date: 18 Case Mix:  L  Rate Cell:  B  Waiver Type: EW     Emergency Contact:   Primary Emergency Contact: Paul Guerrero  Home Phone: [phone]  Relation: Son  Secondary Emergency Contact: Hiram Guerrero   Mobile Phone: [phone]  Relation: Son Language:  English  :  No   Health Care Agent/POA:   Advanced Directives/Living Will:  On file   Primary Care Clinic/Phone/Fax:  [facility]/(p) [phone], (f) [phone] Primary Dx:  Diabetes:E11.9  Secondary Dx: Osteoarthritis: M19.91    Primary Physician:  Kevin Esquivel   Height:    Weight:     Specialty Physician:    Audiologist:     Eye Care Provider:   Dental Care Provider:    [facility] [phone]   Other:        [employer]  [phone]  Fax: [phone] 1/1/18-12/31/18 weekly 4 hrs/week  (16 units)  $4.61/unit      Transportation/[employer]  [facility] 1/1/18-12/31/18 monthly Rush:10/m  Non Rush: 10/m Rush $4/ticket  ($40/m)  Non rush  $3/ticket ($30/m)     [employer]Mental Health   Aditi Rich:[phone]  Fax: [phone] Ongoing Monthly visits              Intermittent asthma without complication 07/11/2014     Priority: Medium     IT band syndrome 01/13/2014     Priority: Medium     Contusion of knee 11/20/2013     Priority: Medium     Prepatellar bursitis of left knee 11/20/2013     Priority: Medium     Insomnia 05/30/2013     Priority: Medium     Mechanical low back pain 03/22/2013     Priority: Medium     Radicular pain of left lower extremity 03/22/2013     Priority: Medium     GERD (gastroesophageal reflux disease) 02/06/2013     Priority: Medium     Pulmonary nodule, left      Priority: Medium     0.5 cm; needs f/u chest CT scan Jan 2013       Abnormal CT of the chest      Priority: Medium     Nodular consolidation right lobe 1.6 cm. Needs repeat CT Jan 2013       Anxiety state 06/21/2012     Priority: Medium     Problem list name updated by automated process. Provider to review       Intermittent asthma 12/30/2011     Priority:  Medium     Advanced directives, counseling/discussion 05/06/2011     Priority: Medium     04/29/2013  Advance Care Planning:   Receipt of ACP document:  Received: Health Care Directive which was witnessed or notarized on 07/06/2011.  Document previously scanned on 07/07/2011.  Validation form completed and sent to be scanned.  Code Status reflects choices in most recent ACP document.  Confirmed/documented designated decision maker(s). See permanent comments section of demographics in clinical tab. View document(s) and details by clicking on code status.   Added by Lacie Canchola on 4/29/2013.          Advance Care Planning:   ACP Review and Resources Provided:  Reviewed chart for advance care plan.  Leah Guerrero has no plan or code status on file however states presence of ACP document. Copy requested. Confirmed code status reflects current choices pending receipt of document/advance care plan review. Confirmed/documented designated decision maker(s). See permanent comments section of demographics in clinical tab.   Added by Emma Medina on 4/24/2013          Planning  Advance Directive Initial Visit  Leah Guerrero presents in person for initial session regarding completion of advanced directive form. She was referred to the facilitator by self.  She currently has an advance directive from 1980 & wants to fill out a new updated one.  Plan:  Advanced directive form, healthcare agent information card, advanced care planning information booklet provided to patient.   Follow up meeting: to be arranged when patient calls back to make an appointment after reviewing documents in one week or so. Patient instructed to bring healthcare agent to this meeting.  Flores Gerard RN  Letter sent to patient for [employer] Cho[employer] follow up.  6/22/2011  Flores Gerard RN  Advance Directive Follow-up Visit  Leah Guerrero presents for advanced care planning session accompanied by no one.  Reviewed definitions of  advanced care planning and advance directive form, and informational handouts given to patient previously.  Patient has questions and /or concerns about acp process or form .  Reviewed advance directive form with patient & answered all of her questions. Designated healthcare agent is identified. Healthcare agent s name is Paul Guerrero. My Hopes and Wishes reviewed.  Finalized wishes are clear to patient Yes  Patient and designated healthcare agent are aware that document may be changed at any time in the future.  Advanced directive form: completed at this visit.  Advanced directive form: verified with notary signature. Original and two copies of advanced directive form given to patient copy sent to  for scanning into EMR and original given to patient and instructed to give copy to designated [facility] and non-[facility] physician where applicable.  Flores Gerard RN  7/6/2011  Advance Directive Problem List Overview:   Name Relationship Phone    Primary Health Care Agent Paul Guerrero Son [phone]         Alternative Health Care Agent Hiram Guerrero Son c [phone]  [phone]                      Hyperlipidemia LDL goal <100 12/16/2010     Priority: Medium     Fatigue 12/15/2010     Priority: Medium     Type 2 diabetes, HbA1C goal < 8% (H) 11/24/2010     Priority: Medium     Female stress incontinence 10/28/2010     Priority: Medium     Past Surgical History:   Procedure Laterality Date     APPENDECTOMY  2012     C HAND/FINGER SURGERY UNLISTED       C SHOULDER SURG PROC UNLISTED       C STOMACH SURGERY PROCEDURE UNLISTED       CHOLECYSTECTOMY  1969     COLONOSCOPY  6-2-08    Neg. At [facility]     COLONOSCOPY  9-3-13     EYE SURGERY       HYSTERECTOMY TOTAL ABD, HEIDI SALPINGO-OOPHORECTOMY, NODE DISSECTION, TUMOR DEBULKING, COMBINED  1988    fibroids?     KNEE SURGERY  x5     KNEE SURGERY      kneecap procedure     LUNG SURGERY  10-7-16    removal lung cancer     SHOULDER SURGERY  2005    right shoulder,  "bone spurs     WRIST SURGERY      right wrist       Medications:   Current Outpatient Prescriptions:      insulin aspart (NOVOLOG PEN) 100 UNIT/ML injection, Patient uses 100 units daily split up between multiple doses. Take 2 units per 15 grams of carbohydrate eaten and 2 units per 30 mg/dL above 170 mg/dL., Disp: 30 mL, Rfl: 1     BASAGLAR 100 UNIT/ML injection, 75 units at bedtime or as directed, Disp: 75 mL, Rfl: 3     promethazine (PHENERGAN) 12.5 MG tablet, Take 12.5 mg by mouth as needed, Disp: , Rfl: 5     LORazepam (ATIVAN) 0.5 MG tablet, Take 0.5 mg by mouth as needed, Disp: , Rfl: 3     blood glucose monitoring (ONE TOUCH ULTRASOFT) lancets, TEST 6 TIMES PER DAY, Disp: 600 each, Rfl: 0     ONETOUCH ULTRA test strip, TEST 6 TIMES PER DAY, Disp: 600 strip, Rfl: 0     rosuvastatin (CRESTOR) 20 MG tablet, TAKE 1 TABLET(20 MG) BY MOUTH DAILY, Disp: 90 tablet, Rfl: 1     oxyCODONE-acetaminophen (PERCOCET) 5-325 MG per tablet, Take by mouth every 4 hours as needed for moderate to severe pain, Disp: , Rfl:      zolpidem (AMBIEN) 5 MG tablet, Take 1 tablet (5 mg) by mouth nightly as needed for sleep, Disp: 30 tablet, Rfl: 2     meperidine (DEMEROL) 50 MG tablet, Take 1 tablet (50 mg) by mouth every 4 hours as needed, Disp: 20 tablet, Rfl: 0     Insulin Pen Needle (PEN NEEDLES 5/16\") 31G X 8 MM MISC, Patient does 4 doses novolog and 2 lantus shots  insulin daily.  Needs 180 needles per month. Okay refills for one year., Disp: 180 each, Rfl: 11     albuterol (PROAIR HFA/PROVENTIL HFA/VENTOLIN HFA) 108 (90 BASE) MCG/ACT Inhaler, Inhale 2 puffs into the lungs every 6 hours as needed for shortness of breath / dyspnea or wheezing, Disp: 2 Inhaler, Rfl: 5     tiZANidine (ZANAFLEX) 2 MG tablet, Take 1 tablet (2 mg) by mouth 3 times daily as needed for muscle spasms, Disp: 30 tablet, Rfl: 1     ACE/ARB NOT PRESCRIBED, INTENTIONAL,, ACE & ARB not prescribed due to Allergy, Disp: , Rfl:      calcium polycarbophil (FIBERCON) " 625 MG tablet, Take 2 tablets (1,250 mg) by mouth daily, Disp: 60 tablet, Rfl: 11     ibuprofen (ADVIL,MOTRIN) 800 MG tablet, Take 1 tablet (800 mg) by mouth every 8 hours as needed for pain, Disp: 100 tablet, Rfl: 0     senna-docusate (SENOKOT-S;PERICOLACE) 8.6-50 MG per tablet, 1-2 po bid prn constipation, Disp: 100 tablet, Rfl: 1     albuterol (2.5 MG/3ML) 0.083% nebulizer solution, Take 3 mLs (2.5 mg) by nebulization 4 times daily as needed, Disp: 1 Box, Rfl: 5     fish oil-omega-3 fatty acids (FISH OIL) 1000 MG capsule, Take 1 capsule by mouth daily., Disp: , Rfl:      Calcium Carbonate-Vitamin D (CALCIUM-D PO), Take  by mouth. 1200mg calcium/600mg D3, Disp: , Rfl:      Garlic CAPS, Take  by mouth. One daily, Disp: , Rfl:      aspirin 81 MG tablet, Take 1 tablet by mouth daily. 1 daily, Disp: , Rfl:      SALINE, to clean port every other month, Disp: , Rfl:      MULTIVITAMIN TABS   OR, 1 TABLET DAILY, Disp: , Rfl:     Allergies:   Allergies   Allergen Reactions     Hydralazine Shortness Of Breath     Famotidine Nausea and Vomiting and Unknown     Benadryl Itch Stopping      Gives her more energy, can't sleep     Lisinopril      Tongue swelling       Metoclopramide Nausea and Vomiting     Ondansetron Nausea and Vomiting     Other reaction(s): Headache     Penicillins Hives     Tape [Adhesive Tape]      blisters     Tylenol      Anxiety  Tablets only.       REVIEW OF SYSTEMS:   CONSTITUTIONAL:NEGATIVE for fever, chills, night sweats  INTEGUMENTARY/SKIN: NEGATIVE for worrisome wound problems or redness.  MUSCULOSKELETAL:See HPI above  NEURO: NEGATIVE for weakness, dizziness or paresthesias    This document serves as a record of the services and decisions personally performed and made by José Vogel MD. It was created on his behalf by Marcia Sloan, a trained medical scribe. The creation of this document is based the provider's statements to the medical scribe.  Marcia Sloan April 11, 2018 10:55 AM       "  PHYSICAL EXAM:  /71  Pulse 79  Resp 16  Ht 5' 3.5\" (1.613 m)  Wt 188 lb (85.3 kg)  BMI 32.78 kg/m2   GENERAL APPEARANCE: healthy, alert, no distress, accompanied by her dog   SKIN: no suspicious lesions or rashes  NEURO: Normal strength and tone, mentation intact and speech normal  PSYCH:  mentation appears normal and affect normal, not anxious  RESPIRATORY: No increased work of breathing.    left  LOWER EXTREMITY:  Gait: antalgic left.  mild Quad atrophy, strength weakened.  Intact sensation deep peroneal nerve, superficial peroneal nerve, med/lat tibial nerve, sural nerve, saphenous nerve  Intact EHL, EDL, TA, FHL, GS, quadriceps hamstrings and hip flexors  Toes warm and well perfused, brisk capillary refill. Palpable 2+ dp pulses.  calf soft and nttp or squeeze.  Edema: trace    left  KNEE EXAM:    Skin: intact, no ecchymosis or erythema; mild swelling.  ROM: 5 degrees extension to 95 degrees flexion, discomfort at extremes  Effusion: none  Tender: med/lat joint line, anterior/posterior knee.      X-RAY: no new xrays today.  3 views left knee 10/25/2017 reviewed: Bone on bone medial loss of joint space, subchondral sclerosis, marginal osteohytes with medial articular flattening. Possible lucency under medial femoral condyle subchondral bone. Patello-femoral marginal osteophytes are more prominent. Arthritis progression since prior xrays. Consistent with Kellgren stage IV arthritis.    LEFT KNEE INTRAOP ARTHROSCOPY FINDINGS   1. Trace effusion.   2. Moderate synovitis.   3. Grade 4 chondrosis of the patella and grade 3 chondrosis of the femoral trochlea.   4. Prominent medial plica with rubbing along the anterior aspect of the medial femoral condyle.   5. No loose bodies in the media or lateral gutter but small marginal osteophytes.   6. Intact ACL, within the notch. There was some fraying or partial tearing of fibers of the PCL with PCL grossly otherwise intact.   7. Lateral compartment with a small " free edge flap tear off of the posterior horn of the lateral meniscus and a partial thickness undersurface tear towards the periphery of the posterior horn body junction.   8. Lateral compartment with an area of grade 4 chondrosis of the weight-bearing portion of the femoral condyle, as well as grade 4 chondrosis of the posterior medial aspect of the lateral tibia.   9. Complex tearing of the posterior horn and body of the medial meniscus with horizontal cleavage and radial tears with a displaced flap off of the medial aspect of the body of the meniscus.   10. Medial compartment with grade 4 chondrosis of the majority of the medial femoral condyle and grade 3 diffuse chondrosis of the medial tibia with areas of grade 4 chondrosis      Impression: Leah Guerrero is a 72 year old female with chronic left knee pain, advanced primary osteoarthritis.       Plan:   * work on getting blood sugars and HgbA1c under more control  * once blood sugars are controlled, she can return to discuss surgical options  * follow up with PCP in May to check progress    Patient to follow up with Primary Care provider regarding elevated blood pressure.      Discussed various treatments for degenerative arthrosis of the knee, including nonoperative and operative approaches. Nonoperative approaches, which are exhausted prior to operative treatment, include doing nothing and living with the pain as patient has been doing, activity modification (avoid aggravating activities), physical therapy and strengthening exercises, weight loss, anti-inflammatory medications, bracing, ambulatory aids (cane, walker) and injections. Once these have been tried and are deemed unsuccessful with a good effort, and the patient is an appropriate candidate, the next treatment would be knee arthroplasty or replacement. Depending on the location of the arthritis, knee replacement can be partial (one side of the knee affected by arthritis) or a total knee  "arthroplasty (all 3 compartments). The risks, perceived benefits and perioperative rehabilitation expectations of knee arthroplasty were discussed in detail. Also discussed that approximately 10% of patients that undergo knee arthroplasty are not happy following surgery and may have worse pain or no improvement in pain, contrary to their preoperative expectations.    At this time, nonoperative treatment will be continued and surgery can be discussed at a future appointment.    Non-surgical treatment for knee arthritis includes:    * rest, sitting  * Activity modification - avoid impact activities or activities that aggravate symptoms.  * NSAIDS (non-steroidal anti-inflammatory medications; e.g. Aleve, advil, motrin, ibuprofen) - regular use for inflammation ( twice daily or three times daily), with food, as long as no contra-indications Please discuss with primary care doctor if needed  * ice, 15-20 minutes at a time several times a day or as needed.  * Strengthening of quadriceps muscles  * Physical Therapy for strengthening, stretching and range of motion exercises of legs  * Tylenol as needed for pain, consider Tylenol arthritis or similar  * Weight loss: Body mass index is 32.78 kg/(m^2).. weight loss benefits, not only for the current pain symptoms, but also overall health. Recommend a good diet plan that works for the patient, with the assistance of a dietician or primary care doctor as needed. Also, a good, low-impact exercise program for at least 20 minutes per day, 3 times per week, such as exercise bike, elliptical , or pool.  * Exercise: low impact such as stationary bike, elliptical, pool.  * Injections: discussed viscosupplementation (hyaluronic acid, \"rooster comb\", \"gel shots\"); risks and perceived benefits discussed today. Patient elects to not proceed today as she'd like to proceed with total knee arthroplasty when able, knowing she'd have to wait at least 3 months after injection for any " surgery.  * Bracing: bracing the knee may offer some relief of symptoms when worn and provide some stability.  * over the counter supplements such as glucosamine and chondroitin sulfate may help with joint pain.  * topical ointments may help as well    * return to clinic as needed.       The information in this document, created by a scribe for me, accurately reflects the services I personally performed and the decisions made by me. I have reviewed and approved this document for accuracy.      José Vogel M.D., M.S.  Dept. of Orthopaedic Surgery  [facility]

## 2018-04-11 NOTE — MR AVS SNAPSHOT
After Visit Summary   4/11/2018    Leah Guerrero    MRN: [id number]           Patient Information     Date Of Birth          1945        Visit Information        Provider Department      4/11/2018 10:45 AM José Vogel MD [facility]        Today's Diagnoses     Primary osteoarthritis of left knee    -  1    Chronic pain of left knee          Care Instructions    Please remember to call and schedule a follow up appointment if one was recommended at your earliest convenience.  Orthopedics CLINIC HOURS TELEPHONE NUMBER   Dr. Lela Frausto  Certified Medical Assistant   Monday & Wednesday   8am - 5pm  Thursday 1pm - 5pm  Friday 8am -11:30am Specialty schedulers:   [phone] to make an appointment with any Specialty Provider.   Main Clinic:   [phone] to make an appointment with your primary provider   Urgent Care locations:    [facility] Monday-Friday Closed  Saturday-Sunday 9am-5pm      Monday-Friday 12pm - 8pm  Saturday-Sunday 9am-5pm [phone]     If SURGERY has been recommended, please call our Specialty Schedulers at [phone] to schedule your procedure.    If you need a medication refill, please contact your pharmacy. Please allow 3 business days for your refill to be completed.    If an MRI or CT scan has been recommended, please call Mobile Imaging Schedulers at [phone] to schedule your appointment.  Use [employer] (secure e-mail communication and access to your chart) to send a message or to make an appointment. Please ask how you can sign up for [employer].  Your care team's suggested websites for health information:   [email or web address].org : Up to date and easily searchable information on multiple topics.   [email or web address] : [place] dept of heat, public health issues in [place], N1N1              Follow-ups after your visit        Follow-up notes from your care team     Return if symptoms  "worsen or fail to improve.      Your next 10 appointments already scheduled     Apr 27, 2018 11:40 AM CDT   [facility] with [facility]   [facility]    [address] [place] [id number] [phone]              Who to contact     If you have questions or need follow up information about today's clinic visit or your schedule please contact [facility] directly at [phone].  Normal or non-critical lab and imaging results will be communicated to you by MyChart, letter or phone within 4 business days after the clinic has received the results. If you do not hear from us within 7 days, please contact the clinic through [employer] or phone. If you have a critical or abnormal lab result, we will notify you by phone as soon as possible.  Submit refill requests through [employer] or call your pharmacy and they will forward the refill request to us. Please allow 3 business days for your refill to be completed.          Additional Information About Your Visit        [employer] Information     Gloss48 gives you secure access to your electronic health record. If you see a primary care provider, you can also send messages to your care team and make appointments. If you have questions, please call your primary care clinic.  If you do not have a primary care provider, please call [phone] and they will assist you.        Care EveryWhere ID     This is your Care EveryWhere ID. This could be used by other organizations to access your [place] medical records  [id number]        Your Vitals Were     Pulse Respirations Height BMI (Body Mass Index)          79 16 5' 3.5\" (1.613 m) 32.78 kg/m2         Blood Pressure from Last 3 Encounters:   04/11/18 171/71   03/23/18 125/58   02/22/18 138/61    Weight from Last 3 Encounters:   04/11/18 188 lb (85.3 kg)   03/23/18 189 lb (85.7 kg)   02/22/18 188 lb 8 oz (85.5 kg)              Today, " you had the following     No orders found for display       Primary Care Provider Office Phone # Fax #    Kevin Esquivel [phone]       [address] [facility] [address]        Goals        General    I will continue the exercises provided by physical therapy for my knee (pt-stated)     Notes - Note created  7/9/2015 10:30 AM by Ray Holland RN    As of today's date 7/9/2015 goal is met at 0 - 25%.   Goal Status:  Active        I will remember to take my insulin before meals especially lunch when I am  away from home. (pt-stated)     Notes - Note created  3/25/2015  3:51 PM by Ray Holland RN      As of today's date 3/25/2015 goal is met at 0 - 25%.   Goal Status:  Active.          I will work on testing my blood sugar and taking my insulin when I am away from home at lunch time. (pt-stated)     Notes - Note created  5/20/2016  2:43 PM by Ray Holland RN    As of today's date 5/20/2016 goal is met at 0 - 25%.   Goal Status:  Active          Equal Access to Services     [facility]: Hadii aad ku hadasho Sotrae, waaxda luqadaha, qaybta kaalmada brinda, jude livingston . So [facility] [phone].    ATENCIÓN: Si habla español, tiene a verduzco disposición servicios gratuitos de asistencia lingüística. Llame al [phone].    We comply with applicable federal civil rights laws and [place] laws. We do not discriminate on the basis of race, color, national origin, age, disability, sex, sexual orientation, or gender identity.            Thank you!     Thank you for choosing [facility]  for your care. Our goal is always to provide you with excellent care. Hearing back from our patients is one way we can continue to improve our services. Please take a few minutes to complete the written survey that you may receive in the mail after your visit with us. Thank you!             Your Updated Medication List - Protect others around you: Learn how to  safely use, store and throw away your medicines at www.disposemymeds.org.          This list is accurate as of 4/11/18  1:04 PM.  Always use your most recent med list.                   Brand Name Dispense Instructions for use Diagnosis    ACE/ARB/ARNI NOT PRESCRIBED (INTENTIONAL)      ACE & ARB not prescribed due to Allergy    Type 2 diabetes mellitus without complication, with long-term current use of insulin (H)       * albuterol (2.5 MG/3ML) 0.083% neb solution     1 Box    Take 3 mLs (2.5 mg) by nebulization 4 times daily as needed    Intermittent asthma       * albuterol 108 (90 Base) MCG/ACT Inhaler    PROAIR HFA/PROVENTIL HFA/VENTOLIN HFA    2 Inhaler    Inhale 2 puffs into the lungs every 6 hours as needed for shortness of breath / dyspnea or wheezing    Intermittent asthma, uncomplicated       aspirin 81 MG tablet      Take 1 tablet by mouth daily. 1 daily        BASAGLAR 100 UNIT/ML injection     75 mL    75 units at bedtime or as directed    Type 2 diabetes mellitus without complication, with long-term current use of insulin (H)       blood glucose monitoring lancets     600 each    TEST 6 TIMES PER DAY    Type 2 diabetes mellitus without complication, with long-term current use of insulin (H)       calcium polycarbophil 625 MG tablet    FIBERCON    60 tablet    Take 2 tablets (1,250 mg) by mouth daily    Health care maintenance       CALCIUM-D PO      Take  by mouth. 1200mg calcium/600mg D3        fish oil-omega-3 fatty acids 1000 MG capsule      Take 1 capsule by mouth daily.        Garlic Caps      Take  by mouth. One daily        ibuprofen 800 MG tablet    ADVIL/MOTRIN    100 tablet    Take 1 tablet (800 mg) by mouth every 8 hours as needed for pain    Arthritis       insulin aspart 100 UNIT/ML injection    NovoLOG PEN    30 mL    Patient uses 100 units daily split up between multiple doses. Take 2 units per 15 grams of carbohydrate eaten and 2 units per 30 mg/dL above 170 mg/dL.    Type 2 diabetes  "mellitus without complication, with long-term current use of insulin (H)       LORazepam 0.5 MG tablet    ATIVAN     Take 0.5 mg by mouth as needed        meperidine 50 MG tablet    DEMEROL    20 tablet    Take 1 tablet (50 mg) by mouth every 4 hours as needed    Idiopathic chronic pancreatitis (H)       MULTIVITAMIN TABS   OR      1 TABLET DAILY        ONETOUCH ULTRA test strip   Generic drug:  blood glucose monitoring     600 strip    TEST 6 TIMES PER DAY        oxyCODONE-acetaminophen 5-325 MG per tablet    PERCOCET     Take by mouth every 4 hours as needed for moderate to severe pain        pen needles 5/16\" 31G X 8 MM Misc     180 each    Patient does 4 doses novolog and 2 lantus shots  insulin daily.  Needs 180 needles per month. Okay refills for one year.    Type 2 diabetes mellitus without complication, with long-term current use of insulin (H)       promethazine 12.5 MG tablet    PHENERGAN     Take 12.5 mg by mouth as needed        rosuvastatin 20 MG tablet    CRESTOR    90 tablet    TAKE 1 TABLET(20 MG) BY MOUTH DAILY    Hyperlipidemia LDL goal <100       SALINE      to clean port every other month    Bronchitis       senna-docusate 8.6-50 MG per tablet    SENOKOT-S;PERICOLACE    100 tablet    1-2 po bid prn constipation    Constipation, unspecified constipation type       tiZANidine 2 MG tablet    ZANAFLEX    30 tablet    Take 1 tablet (2 mg) by mouth 3 times daily as needed for muscle spasms    Muscle pain       zolpidem 5 MG tablet    AMBIEN    30 tablet    Take 1 tablet (5 mg) by mouth nightly as needed for sleep    Insomnia, unspecified type       * Notice:  This list has 2 medication(s) that are the same as other medications prescribed for you. Read the directions carefully, and ask your doctor or other care provider to review them with you.      "

## 2018-04-26 DIAGNOSIS — E78.5 HYPERLIPIDEMIA LDL GOAL <100: ICD-10-CM

## 2018-04-26 RX ORDER — ROSUVASTATIN CALCIUM 20 MG/1
TABLET, COATED ORAL
Qty: 90 TABLET | Refills: 2 | Status: SHIPPED | OUTPATIENT
Start: 2018-04-26

## 2018-04-26 NOTE — TELEPHONE ENCOUNTER
"Requested Prescriptions   Pending Prescriptions Disp Refills     rosuvastatin (CRESTOR) 20 MG tablet [Pharmacy Med Name: ROSUVASTATIN CALCIUM 20MG TABLETS] 90 tablet 0    Last Written Prescription Date:  10-13-17  Last Fill Quantity: 90,  # refills: 1   Last office visit: 3/23/2018 with prescribing provider:     Future Office Visit:   Next 5 appointments (look out 90 days)     Apr 27, 2018 11:40 AM CDT   [facility]    [address] [place] [address] [phone]                  Sig: TAKE 1 TABLET(20 MG) BY MOUTH DAILY    Statins Protocol Passed    4/26/2018  9:00 AM       Passed - LDL on file in past 12 months    Recent Labs   Lab Test  03/23/18   1207   LDL  16            Passed - No abnormal creatine kinase in past 12 months    Recent Labs   Lab Test  10/06/14   1150   CKT  105               Passed - Recent (12 mo) or future (30 days) visit within the authorizing provider's specialty    Patient had office visit in the last 12 months or has a visit in the next 30 days with authorizing provider or within the authorizing provider's specialty.  See \"Patient Info\" tab in inbasket, or \"Choose Columns\" in Meds & Orders section of the refill encounter.           Passed - Patient is age 18 or older       Passed - No active pregnancy on record       Passed - No positive pregnancy test in past 12 months          "

## 2018-05-02 NOTE — TELEPHONE ENCOUNTER
Diabetes Education Scheduling Outreach #2:    Call to patient to schedule. Left message with phone number to call to schedule.    Letter sent to patient requesting to call to schedule.    Roxy Zavala OnCall  Diabetes and Nutrition Scheduling

## 2018-05-02 NOTE — TELEPHONE ENCOUNTER
[employer] message sent to patient.  Recommend discussing diabetes education visit at follow up PCP visit tomorrow.    Diana Dacosta MS, RD, LD, CDE

## 2018-05-03 ENCOUNTER — OFFICE VISIT (OUTPATIENT)
Dept: FAMILY MEDICINE | Facility: CLINIC | Age: 73
End: 2018-05-03
Payer: COMMERCIAL

## 2018-05-03 VITALS
BODY MASS INDEX: 32.78 KG/M2 | HEART RATE: 77 BPM | WEIGHT: 188 LBS | DIASTOLIC BLOOD PRESSURE: 67 MMHG | SYSTOLIC BLOOD PRESSURE: 135 MMHG | OXYGEN SATURATION: 98 % | TEMPERATURE: 98.4 F

## 2018-05-03 DIAGNOSIS — Z79.4 TYPE 2 DIABETES MELLITUS WITHOUT COMPLICATION, WITH LONG-TERM CURRENT USE OF INSULIN (H): ICD-10-CM

## 2018-05-03 DIAGNOSIS — Z95.828 PORT-A-CATH IN PLACE: ICD-10-CM

## 2018-05-03 DIAGNOSIS — M17.12 PRIMARY OSTEOARTHRITIS OF LEFT KNEE: ICD-10-CM

## 2018-05-03 DIAGNOSIS — I10 HYPERTENSION GOAL BP (BLOOD PRESSURE) < 140/90: ICD-10-CM

## 2018-05-03 DIAGNOSIS — F41.9 ANXIETY: ICD-10-CM

## 2018-05-03 DIAGNOSIS — E11.9 TYPE 2 DIABETES MELLITUS WITHOUT COMPLICATION, WITH LONG-TERM CURRENT USE OF INSULIN (H): ICD-10-CM

## 2018-05-03 DIAGNOSIS — R06.09 DYSPNEA ON EXERTION: Primary | ICD-10-CM

## 2018-05-03 LAB
BASOPHILS # BLD AUTO: 0 10E9/L (ref 0–0.2)
BASOPHILS NFR BLD AUTO: 0.1 %
DIFFERENTIAL METHOD BLD: NORMAL
EOSINOPHIL # BLD AUTO: 0.2 10E9/L (ref 0–0.7)
EOSINOPHIL NFR BLD AUTO: 2.9 %
ERYTHROCYTE [DISTWIDTH] IN BLOOD BY AUTOMATED COUNT: 13.4 % (ref 10–15)
HBA1C MFR BLD: 8.3 % (ref 0–5.6)
HCT VFR BLD AUTO: 38.3 % (ref 35–47)
HGB BLD-MCNC: 12.8 G/DL (ref 11.7–15.7)
INR PPP: 0.99 (ref 0.86–1.14)
LYMPHOCYTES # BLD AUTO: 1.7 10E9/L (ref 0.8–5.3)
LYMPHOCYTES NFR BLD AUTO: 25.3 %
MCH RBC QN AUTO: 29.2 PG (ref 26.5–33)
MCHC RBC AUTO-ENTMCNC: 33.4 G/DL (ref 31.5–36.5)
MCV RBC AUTO: 87 FL (ref 78–100)
MONOCYTES # BLD AUTO: 0.6 10E9/L (ref 0–1.3)
MONOCYTES NFR BLD AUTO: 8.8 %
NEUTROPHILS # BLD AUTO: 4.3 10E9/L (ref 1.6–8.3)
NEUTROPHILS NFR BLD AUTO: 62.9 %
PLATELET # BLD AUTO: 207 10E9/L (ref 150–450)
RBC # BLD AUTO: 4.39 10E12/L (ref 3.8–5.2)
WBC # BLD AUTO: 6.8 10E9/L (ref 4–11)

## 2018-05-03 PROCEDURE — 85025 COMPLETE CBC W/AUTO DIFF WBC: CPT | Performed by: FAMILY MEDICINE

## 2018-05-03 PROCEDURE — 85610 PROTHROMBIN TIME: CPT | Performed by: FAMILY MEDICINE

## 2018-05-03 PROCEDURE — 36415 COLL VENOUS BLD VENIPUNCTURE: CPT | Performed by: FAMILY MEDICINE

## 2018-05-03 PROCEDURE — 83036 HEMOGLOBIN GLYCOSYLATED A1C: CPT | Performed by: FAMILY MEDICINE

## 2018-05-03 PROCEDURE — 99214 OFFICE O/P EST MOD 30 MIN: CPT | Performed by: FAMILY MEDICINE

## 2018-05-03 RX ORDER — LORAZEPAM 0.5 MG/1
0.5 TABLET ORAL EVERY 8 HOURS PRN
Qty: 40 TABLET | Refills: 3 | Status: SHIPPED | OUTPATIENT
Start: 2018-05-03 | End: 2018-08-24 | Stop reason: ALTCHOICE

## 2018-05-03 ASSESSMENT — PAIN SCALES - GENERAL: PAINLEVEL: NO PAIN (0)

## 2018-05-03 NOTE — PROGRESS NOTES
SUBJECTIVE:   Leah Guerrero is a 72 year old female who presents to clinic today for the following health issues:       Discuss diabetes and getting her A1C in range so she can have surgery    none    Problem list and histories reviewed & adjusted, as indicated.  Additional history: as documented         Reviewed and updated as needed this visit by clinical staff       Reviewed and updated as needed this visit by Provider           is range of sugars    Off soda    Water, sparkling water, etc      80  Units basaglar 40 and 40    Generic crestor now (rosuvastatin)    Lorazepam 1in am and 1 at night    novolog still about 100 units daily, split up among doses     Gets out of breath easily    Cough    Dry cough        Physical Exam   Constitutional: She is oriented to person, place, and time and well-developed, well-nourished, and in no distress. No distress.   HENT:   Head: Normocephalic and atraumatic.   Neck: Carotid bruit is not present.   Cardiovascular: Normal rate, regular rhythm, normal heart sounds and intact distal pulses.  Exam reveals no gallop and no friction rub.    No murmur heard.  Pulmonary/Chest: Effort normal and breath sounds normal.   Musculoskeletal: She exhibits edema (mild bilat).   Neurological: She is alert and oriented to person, place, and time.   Skin: She is not diaphoretic.   Psychiatric: Mood and affect normal.     ASSESSMENT / PLAN:  (R06.09) Dyspnea on exertion  (primary encounter diagnosis)  Comment: prudent to make sure heart okay, especially with likely upcoming tka surgery  Plan: CBC with platelets differential, NM Lexiscan         stress test             (E11.9,  Z79.4) Type 2 diabetes mellitus without complication, with long-term current use of insulin (H)  Comment: check to see if hemoglobin a1c better  Plan: Hemoglobin A1c, INR        They also wanted pre-op INR    (F41.9) Anxiety  Comment: refill lorazepam but discussed in detail and stressed we need to try to  avoid/ minimize use of this chronically.  Start ssri and then taper down and off the lorazepam soon  Plan: LORazepam (ATIVAN) 0.5 MG tablet, sertraline         (ZOLOFT) 50 MG tablet             (M17.12) Primary osteoarthritis of left knee  Comment: surgery pending   Plan: when diabetes is much better controlled then could schedule surgery     (I10) Hypertension goal BP (blood pressure) < 140/90  Comment:    Plan: at goal, no change       I reviewed the patient's medications, allergies, medical history, family history, and social history.    Kevin Esquivel MD

## 2018-05-03 NOTE — NURSING NOTE
Port flush and lab draw requested by Dr. Esquivel.    Port site without redness, swelling, or breakdown.  Port site prepped with 3 betadine swabs, air dried, followed by 3 alcohol wipes, air dried.  Stabilized port and accessed with 20 G 3/4 inch bello gripper non-coring needle without difficulty on 1st attempt.  Easy flush, good blood return observed.  Flushed with 10 ml of sterile saline for infusion, 6 ml waste blood removed and discarded, sample obtained, placed in appropriate tubes.  Flushed with 20 ml sterile saline for infusion followed by 5 ml of 100 unit/ml heparin flush.  De-accessed and gauze pad applied, patient tolerated well.    Lab tubes labeled and delivered to lab    The following medication was given:     MEDICATION: saline 30 ml  ROUTE: IV  SITE: via port-a-cath  DOSE: 20 ml  LOT #: [id number]  :  [employer]  EXPIRATION DATE:  08/2019  NDC#: 63323-186-10    The following medication was given:     MEDICATION: heparin 100 unit/ml  ROUTE: IV  SITE: port-a-cath  DOSE: 5 ml  LOT #: [id number]  :  [employer]  EXPIRATION DATE:  05/2020  NDC#: 47383-593-47    Keyanna Carrasquillo RN

## 2018-05-03 NOTE — PATIENT INSTRUCTIONS
Keep working on healthy diet/exercise and wt loss as able    Increase insulin doses as needed    We will send you lab results    Heart stress test    Start sertraline, take daily    Taper down and off lorazepam

## 2018-05-03 NOTE — MR AVS SNAPSHOT
After Visit Summary   5/3/2018    Leah Guerrero    MRN: [id number]           Patient Information     Date Of Birth          1945        Visit Information        Provider Department      5/3/2018 10:40 AM Kevin Esquivel MD [facility]        Today's Diagnoses     Dyspnea on exertion    -  1    Type 2 diabetes mellitus without complication, with long-term current use of insulin (H)        Anxiety          Care Instructions    Keep working on healthy diet/exercise and wt loss as able    Increase insulin doses as needed    We will send you lab results    Heart stress test    Start sertraline, take daily    Taper down and off lorazepam            Follow-ups after your visit        Your next 10 appointments already scheduled     May 15, 2018  9:30 AM CDT   Diabetic Education with CP DIABETIC ED RESOURCE   [facility] [place] ([facility])    [address] [id number] [phone]            May 17, 2018  1:00 PM CDT   NM INJECTION with MGNMINJ1   [facility])    [address] [facility] [address] [phone]            May 17, 2018  1:45 PM CDT   NM SCAN3 with MGNM1   [facility]    [address] [facility] [address] [phone]            May 17, 2018  2:15 PM CDT   Ekg Stress Nm Lexiscan with MG STRESS RM, MG IMAGING NURSE, MG CARD, MG CV TECH   [facility])    [address] [facility] [address] [phone]            May 17, 2018  2:45 PM CDT   NM MPI WITH LEXISCAN with MGNM1   [facility] ([facility])    [address] [facility] [address] [phone]           For a ONE day exam: Allow 3-4 hours for test. For a TWO day exam: Allow  minutes PER  day for test.  On the day of your resting scan: Please stop eating 3 hours before the test. You may drink water or juice.  On the day of your stress test: Stop all caffeine 12 hours before the test. This includes coffee, tea, soda pop, chocolate and certain medicines (such as Anacin, Excedrin and NoDoz). Also avoid decaf coffee and tea, as these contain small amounts of caffeine.  Stop eating 3 hours before the test. You may drink water.  You may need to stop some medicines before the test. Follow your doctor s orders. - If you take a beta blocker: Do not take your beta-blocker on the day before your test, unless specifically told to by your doctor. And do not take it on the day of your test. Bring it with you to take after the test. - If you take Aggrenox or dipyridamole (Persantine, Permole), stop taking it 48 hours before your test. - If you take Viagra, Cialis or Levitra, stop taking it 48 hours before your test. - If you take theophylline or aminophylline, stop taking it 12 hours before your test.  Do not take nitrates on the day of your test. Do not wear your Nitro-Patch.  Please wear a loose two-piece outfit. If you will have an exercise test, bring rubber-soled walking shoes.  When you arrive, please tell us if you have a pacemaker or ICD (implantable defibrillator).  Please call your Imaging Department at your exam site with any questions.              Future tests that were ordered for you today     Open Future Orders        Priority Expected Expires Ordered    NM Lexiscan stress test Routine  5/3/2019 5/3/2018            Who to contact     If you have questions or need follow up information about today's clinic visit or your schedule please contact [facility] directly at [phone].  Normal or non-critical lab and imaging results will be communicated to you by MyChart, letter or phone within 4 business days after the clinic has received the results. If you do not hear from us  within 7 days, please contact the clinic through [employer] or phone. If you have a critical or abnormal lab result, we will notify you by phone as soon as possible.  Submit refill requests through [employer] or call your pharmacy and they will forward the refill request to us. Please allow 3 business days for your refill to be completed.          Additional Information About Your Visit        [email or web address] Information     [employer] gives you secure access to your electronic health record. If you see a primary care provider, you can also send messages to your care team and make appointments. If you have questions, please call your primary care clinic.  If you do not have a primary care provider, please call [phone] and they will assist you.        Care EveryWhere ID     This is your Care EveryWhere ID. This could be used by other organizations to access your [place] medical records  [id number]        Your Vitals Were     Pulse Temperature Pulse Oximetry Breastfeeding? BMI (Body Mass Index)       77 98.4  F (36.9  C) (Oral) 98% No 32.78 kg/m2        Blood Pressure from Last 3 Encounters:   05/03/18 135/67   04/11/18 171/71   03/23/18 125/58    Weight from Last 3 Encounters:   05/03/18 188 lb (85.3 kg)   04/11/18 188 lb (85.3 kg)   03/23/18 189 lb (85.7 kg)              We Performed the Following     CBC with platelets differential     Hemoglobin A1c          Today's Medication Changes          These changes are accurate as of 5/3/18 11:22 AM.  If you have any questions, ask your nurse or doctor.               Start taking these medicines.        Dose/Directions    sertraline 50 MG tablet   Commonly known as:  ZOLOFT   Used for:  Anxiety   Started by:  Kevin Esquivel MD        Take 1/2 tablet (25 mg) for 1-2 weeks, then increase to 1 tablet orally daily   Quantity:  30 tablet   Refills:  1         These medicines have changed or have updated prescriptions.        Dose/Directions    LORazepam 0.5 MG tablet   Commonly  known as:  ATIVAN   This may have changed:  when to take this   Used for:  Anxiety   Changed by:  Kevin Esquivel MD        Dose:  0.5 mg   Take 1 tablet (0.5 mg) by mouth every 8 hours as needed   Quantity:  40 tablet   Refills:  3            Where to get your medicines      These medications were sent to [facility] [id number]  [place], [place] - [address] SEC OF [facility] [address]  [address], [place] [address]    Hours:  Test fax successful 12/11/02  [facility] Phone:  [phone]     sertraline 50 MG tablet         Some of these will need a paper prescription and others can be bought over the counter.  Ask your nurse if you have questions.     Bring a paper prescription for each of these medications     LORazepam 0.5 MG tablet                Primary Care Provider Office Phone # Fax #    Kevin Esquivel [phone]       [address] [facility] [address]        Goals        General    I will continue the exercises provided by physical therapy for my knee (pt-stated)     Notes - Note created  7/9/2015 10:30 AM by Ray Holland RN    As of today's date 7/9/2015 goal is met at 0 - 25%.   Goal Status:  Active        I will remember to take my insulin before meals especially lunch when I am  away from home. (pt-stated)     Notes - Note created  3/25/2015  3:51 PM by Ray Holland, RN      As of today's date 3/25/2015 goal is met at 0 - 25%.   Goal Status:  Active.          I will work on testing my blood sugar and taking my insulin when I am away from home at lunch time. (pt-stated)     Notes - Note created  5/20/2016  2:43 PM by Ray Holland RN    As of today's date 5/20/2016 goal is met at 0 - 25%.   Goal Status:  Active          Equal Access to Services     [facility]: Sandie Evans, waaxda luqadaha, qaybta kaalmada adeegyada, jude angela. [facility] [phone].    ATENCIÓN: Si habla español, tiene a verduzco  disposición servicios gratuitos de asistencia lingüística. Ese blancas [phone].    We comply with applicable federal civil rights laws and [place] laws. We do not discriminate on the basis of race, color, national origin, age, disability, sex, sexual orientation, or gender identity.            Thank you!     Thank you for choosing [facility]  for your care. Our goal is always to provide you with excellent care. Hearing back from our patients is one way we can continue to improve our services. Please take a few minutes to complete the written survey that you may receive in the mail after your visit with us. Thank you!             Your Updated Medication List - Protect others around you: Learn how to safely use, store and throw away your medicines at www.disposemymeds.org.          This list is accurate as of 5/3/18 11:22 AM.  Always use your most recent med list.                   Brand Name Dispense Instructions for use Diagnosis    ACE/ARB/ARNI NOT PRESCRIBED (INTENTIONAL)      ACE & ARB not prescribed due to Allergy    Type 2 diabetes mellitus without complication, with long-term current use of insulin (H)       * albuterol (2.5 MG/3ML) 0.083% neb solution     1 Box    Take 3 mLs (2.5 mg) by nebulization 4 times daily as needed    Intermittent asthma       * albuterol 108 (90 Base) MCG/ACT Inhaler    PROAIR HFA/PROVENTIL HFA/VENTOLIN HFA    2 Inhaler    Inhale 2 puffs into the lungs every 6 hours as needed for shortness of breath / dyspnea or wheezing    Intermittent asthma, uncomplicated       aspirin 81 MG tablet      Take 1 tablet by mouth daily. 1 daily        BASAGLAR 100 UNIT/ML injection     75 mL    75 units at bedtime or as directed    Type 2 diabetes mellitus without complication, with long-term current use of insulin (H)       blood glucose monitoring lancets     600 each    TEST 6 TIMES PER DAY    Type 2 diabetes mellitus without complication, with long-term current use of  "insulin (H)       calcium polycarbophil 625 MG tablet    FIBERCON    60 tablet    Take 2 tablets (1,250 mg) by mouth daily    Health care maintenance       CALCIUM-D PO      Take  by mouth. 1200mg calcium/600mg D3        fish oil-omega-3 fatty acids 1000 MG capsule      Take 1 capsule by mouth daily.        Garlic Caps      Take  by mouth. One daily        ibuprofen 800 MG tablet    ADVIL/MOTRIN    100 tablet    Take 1 tablet (800 mg) by mouth every 8 hours as needed for pain    Arthritis       insulin aspart 100 UNIT/ML injection    NovoLOG PEN    30 mL    Patient uses 100 units daily split up between multiple doses. Take 2 units per 15 grams of carbohydrate eaten and 2 units per 30 mg/dL above 170 mg/dL.    Type 2 diabetes mellitus without complication, with long-term current use of insulin (H)       LORazepam 0.5 MG tablet    ATIVAN    40 tablet    Take 1 tablet (0.5 mg) by mouth every 8 hours as needed    Anxiety       meperidine 50 MG tablet    DEMEROL    20 tablet    Take 1 tablet (50 mg) by mouth every 4 hours as needed    Idiopathic chronic pancreatitis (H)       MULTIVITAMIN TABS   OR      1 TABLET DAILY        ONETOUCH ULTRA test strip   Generic drug:  blood glucose monitoring     600 strip    TEST 6 TIMES PER DAY        oxyCODONE-acetaminophen 5-325 MG per tablet    PERCOCET     Take by mouth every 4 hours as needed for moderate to severe pain        pen needles 5/16\" 31G X 8 MM Misc     180 each    Patient does 4 doses novolog and 2 lantus shots  insulin daily.  Needs 180 needles per month. Okay refills for one year.    Type 2 diabetes mellitus without complication, with long-term current use of insulin (H)       promethazine 12.5 MG tablet    PHENERGAN     Take 12.5 mg by mouth as needed        rosuvastatin 20 MG tablet    CRESTOR    90 tablet    TAKE 1 TABLET(20 MG) BY MOUTH DAILY    Hyperlipidemia LDL goal <100       SALINE      to clean port every other month    Bronchitis       senna-docusate 8.6-50 " MG per tablet    SENOKOT-S;PERICOLACE    100 tablet    1-2 po bid prn constipation    Constipation, unspecified constipation type       sertraline 50 MG tablet    ZOLOFT    30 tablet    Take 1/2 tablet (25 mg) for 1-2 weeks, then increase to 1 tablet orally daily    Anxiety       tiZANidine 2 MG tablet    ZANAFLEX    30 tablet    Take 1 tablet (2 mg) by mouth 3 times daily as needed for muscle spasms    Muscle pain       zolpidem 5 MG tablet    AMBIEN    30 tablet    Take 1 tablet (5 mg) by mouth nightly as needed for sleep    Insomnia, unspecified type       * Notice:  This list has 2 medication(s) that are the same as other medications prescribed for you. Read the directions carefully, and ask your doctor or other care provider to review them with you.

## 2018-05-04 NOTE — PROGRESS NOTES
The diabetes test is a little better. We would ideally like the hemoglobin a1c to be about 7.    Keep working on healthy diet/exercise and weight loss.  Increase insulin doses as needed.  Then see us in about 4-6 weeks to recheck.    Other labs here are okay.    Kevin Esquivel MD

## 2018-05-13 ENCOUNTER — TRANSFERRED RECORDS (OUTPATIENT)
Dept: HEALTH INFORMATION MANAGEMENT | Facility: CLINIC | Age: 73
End: 2018-05-13

## 2018-05-14 ENCOUNTER — TRANSFERRED RECORDS (OUTPATIENT)
Dept: HEALTH INFORMATION MANAGEMENT | Facility: CLINIC | Age: 73
End: 2018-05-14

## 2018-05-18 ENCOUNTER — TELEPHONE (OUTPATIENT)
Dept: GERIATRIC MEDICINE | Facility: CLINIC | Age: 73
End: 2018-05-18

## 2018-05-18 ENCOUNTER — TELEPHONE (OUTPATIENT)
Dept: FAMILY MEDICINE | Facility: CLINIC | Age: 73
End: 2018-05-18

## 2018-05-18 NOTE — TELEPHONE ENCOUNTER
I called Radha at [facility] and advised her provider approved orders as requested.  Keyanna Carrasquillo RN  [facility]

## 2018-05-18 NOTE — TELEPHONE ENCOUNTER
Reason for Call: Request for an order or referral:    Order or referral being requested: [facility] calling for orders to see patient for 2 days-today they will do a teach and consent visit for infusion, full home care admission tomorrow. They are requesting orders by noon today. Routing as high priority message.    Date needed: as soon as possible    Has the patient been seen by the PCP for this problem? Not Applicable    Additional comments:     Phone number Patient can be reached at:  Other phone number:  [phone]    Best Time:  any    Can we leave a detailed message on this number?  YES    Call taken on 5/18/2018 at 9:17 AM by Latisha Finney

## 2018-05-18 NOTE — TELEPHONE ENCOUNTER
Leah admitted to [facility] 5/13  With a fever and discharged to home 5/17/18. She has an appt with you May 21.  You are receiving this message because this member is on the state government program [facility] [employer] [facility] or [facility] [facility].  are required to notify the primary care physician with all admissions and discharges from hospitals and nursing homes.  If you have further questions please feel free to contact me.    Keyanna Zhao RN N  [facility] Management  [phone]

## 2018-05-21 ENCOUNTER — TELEPHONE (OUTPATIENT)
Dept: FAMILY MEDICINE | Facility: CLINIC | Age: 73
End: 2018-05-21

## 2018-05-23 ENCOUNTER — TELEPHONE (OUTPATIENT)
Dept: FAMILY MEDICINE | Facility: CLINIC | Age: 73
End: 2018-05-23

## 2018-05-23 NOTE — TELEPHONE ENCOUNTER
Forms received from: [employer]   Phone number listed: n/a   Fax listed: [phone]  Date received: 5-23-18  Form description: SN  Once forms are completed, please return to [facility] via fax.  Is patient requesting to be contacted when forms are completed: n/a  Form placed: provider desk  Anisa Conteh

## 2018-05-24 ENCOUNTER — TELEPHONE (OUTPATIENT)
Dept: FAMILY MEDICINE | Facility: CLINIC | Age: 73
End: 2018-05-24

## 2018-05-24 ENCOUNTER — OFFICE VISIT (OUTPATIENT)
Dept: FAMILY MEDICINE | Facility: CLINIC | Age: 73
End: 2018-05-24
Payer: COMMERCIAL

## 2018-05-24 VITALS
WEIGHT: 188 LBS | OXYGEN SATURATION: 97 % | TEMPERATURE: 97.9 F | HEART RATE: 65 BPM | SYSTOLIC BLOOD PRESSURE: 121 MMHG | BODY MASS INDEX: 32.78 KG/M2 | DIASTOLIC BLOOD PRESSURE: 64 MMHG

## 2018-05-24 DIAGNOSIS — Z79.4 TYPE 2 DIABETES MELLITUS WITHOUT COMPLICATION, WITH LONG-TERM CURRENT USE OF INSULIN (H): ICD-10-CM

## 2018-05-24 DIAGNOSIS — F41.9 ANXIETY: ICD-10-CM

## 2018-05-24 DIAGNOSIS — E11.9 TYPE 2 DIABETES MELLITUS WITHOUT COMPLICATION, WITH LONG-TERM CURRENT USE OF INSULIN (H): ICD-10-CM

## 2018-05-24 DIAGNOSIS — G47.00 INSOMNIA, UNSPECIFIED TYPE: ICD-10-CM

## 2018-05-24 DIAGNOSIS — J45.20 INTERMITTENT ASTHMA, UNCOMPLICATED: ICD-10-CM

## 2018-05-24 DIAGNOSIS — R78.81 BACTEREMIA: Primary | ICD-10-CM

## 2018-05-24 DIAGNOSIS — Z00.00 HEALTH CARE MAINTENANCE: ICD-10-CM

## 2018-05-24 DIAGNOSIS — I10 HYPERTENSION GOAL BP (BLOOD PRESSURE) < 140/90: ICD-10-CM

## 2018-05-24 PROCEDURE — 99214 OFFICE O/P EST MOD 30 MIN: CPT | Performed by: FAMILY MEDICINE

## 2018-05-24 RX ORDER — PAROXETINE 10 MG/1
10 TABLET, FILM COATED ORAL AT BEDTIME
Qty: 30 TABLET | Refills: 1 | Status: SHIPPED | OUTPATIENT
Start: 2018-05-24 | End: 2018-09-10

## 2018-05-24 RX ORDER — ALBUTEROL SULFATE 90 UG/1
2 AEROSOL, METERED RESPIRATORY (INHALATION) EVERY 6 HOURS PRN
Qty: 2 INHALER | Refills: 5 | Status: SHIPPED | OUTPATIENT
Start: 2018-05-24 | End: 2018-05-25

## 2018-05-24 RX ORDER — ZOLPIDEM TARTRATE 5 MG/1
5 TABLET ORAL
Qty: 30 TABLET | Refills: 2 | Status: SHIPPED | OUTPATIENT
Start: 2018-05-24 | End: 2020-02-04

## 2018-05-24 RX ORDER — PEN NEEDLE, DIABETIC 30 GX5/16"
NEEDLE, DISPOSABLE MISCELLANEOUS
Qty: 180 EACH | Refills: 11 | Status: SHIPPED | OUTPATIENT
Start: 2018-05-24 | End: 2019-01-17

## 2018-05-24 RX ORDER — CALCIUM POLYCARBOPHIL 625 MG 625 MG/1
2 TABLET ORAL DAILY
Qty: 60 TABLET | Refills: 11 | Status: SHIPPED | OUTPATIENT
Start: 2018-05-24 | End: 2019-08-29

## 2018-05-24 ASSESSMENT — PAIN SCALES - GENERAL: PAINLEVEL: NO PAIN (0)

## 2018-05-24 NOTE — LETTER
[address] [address]  [phone]              May 24, 2018      Order for [facility]    Patient Leah Guerrero  [birth year]    Patient was discharged from [facility].      Patient was sent home to get 1000 mg ceftriaxone IV daily for 12 days.    Patient is giving this herself, but needs next supply from homecare.    She just ran out, so needs 5 days supply of medication.                      Kevin Esquivel MD

## 2018-05-24 NOTE — TELEPHONE ENCOUNTER
Reason for Call:  Other call back    Detailed comments: Patient called requesting to have Dr. Esquivel write her a letter stating she can go back to work on 05/29/18    Phone Number Patient can be reached at: Cell number on file:    Telephone Information:   Mobile [phone]       Best Time: anytime    Can we leave a detailed message on this number? YES    Call taken on 5/24/2018 at 1:45 PM by Lisbet Livingston

## 2018-05-24 NOTE — PROGRESS NOTES
SUBJECTIVE:   Leah Guerrero is a 72 year old female who presents to clinic today for the following health issues:           Hospital Follow-up Visit:    Hospital/Nursing Home/IP Rehab Facility: [facility]  Date of Admission: 05/13/2018  Date of Discharge: 05/17/2018  Reason(s) for Admission: fevers            Problems taking medications regularly:  None       Medication changes since discharge: None       Problems adhering to non-medication therapy:  None     Summary of hospitalization:  CareEverywhere information obtained and reviewed  Diagnostic Tests/Treatments reviewed.  Follow up needed: none  Other Healthcare Providers Involved in Patient s Care:         None  Update since discharge: improved.      Post Discharge Medication Reconciliation: discharge medications reconciled, continue medications without change.  Plan of care communicated with patient     Coding guidelines for this visit:  Type of Medical   Decision Making Face-to-Face Visit       within 7 Days of discharge Face-to-Face Visit        within 14 days of discharge   Moderate Complexity 19932 [id number]   High Complexity 74280 87900                   Problem list and histories reviewed & adjusted, as indicated.  Additional history: as documented         Reviewed and updated as needed this visit by clinical staff       Reviewed and updated as needed this visit by Provider          4 nights in hospital    Ceftriaxone for 12 days 1000 mg daily     Patient giving herself the antibiotic once daily through IV hooked up to her port         Physical Exam   Constitutional: She is oriented to person, place, and time and well-developed, well-nourished, and in no distress. No distress.   HENT:   Head: Normocephalic and atraumatic.   Neck: Carotid bruit is not present.   Cardiovascular: Normal rate, regular rhythm, normal heart sounds and intact distal pulses.  Exam reveals no gallop and no friction rub.    No murmur heard.  Pulmonary/Chest: Effort normal and breath  "sounds normal.   Abdominal: Soft. There is no tenderness.   Musculoskeletal: She exhibits no edema.   Neurological: She is alert and oriented to person, place, and time.   Skin: She is not diaphoretic.   Psychiatric: Mood and affect normal.   iv/ port site looks fine on chest wall    Throat is normal    Vomited on sertraline patient states, so she stopped that        Strep anginosus grew out of blood culture        ASSESSMENT / PLAN:  (R78.81) Bacteremia  (primary encounter diagnosis)  Comment: patient feeling much better   Plan: finish out antibiotics. See letter; apparently patient needed signed orders to complete course.     (G47.00) Insomnia, unspecified type  Comment: needs refill and included explanation for why she needs it  Plan: zolpidem (AMBIEN) 5 MG tablet             (F41.9) Anxiety  Comment: dc sertraline.  Already stopped by patient.   Plan: PARoxetine (PAXIL) 10 MG tablet        Start this in a couple weeks low dose and see us a few weeks after that.  Call sooner if needed.     (E11.9,  Z79.4) Type 2 diabetes mellitus without complication, with long-term current use of insulin (H)  Comment: needs refills.   Plan: Insulin Pen Needle (PEN NEEDLES 5/16\") 31G X 8         MM MISC, blood glucose monitoring (ONETOUCH         ULTRA) test strip        Adjust insulin as needed     (J45.20) Intermittent asthma, uncomplicated  Comment: stable  Plan: albuterol (PROAIR HFA/PROVENTIL HFA/VENTOLIN         HFA) 108 (90 Base) MCG/ACT Inhaler        Needs refill     (Z00.00) Health care maintenance  Comment: needs refill   Plan: calcium polycarbophil (FIBERCON) 625 MG tablet             (I10) Hypertension goal BP (blood pressure) < 140/90  Comment: at goal   Plan: no change        I reviewed the patient's medications, allergies, medical history, family history, and social history.    Kevin Esquivel MD      "

## 2018-05-24 NOTE — MR AVS SNAPSHOT
After Visit Summary   5/24/2018    Leah Guerrero    MRN: [id number]           Patient Information     Date Of Birth          1945        Visit Information        Provider Department      5/24/2018 11:20 AM Kevin Esquivel MD [facility]        Today's Diagnoses     Bacteremia    -  1    Insomnia, unspecified type        Anxiety          Care Instructions    Adjust insulin as needed    Stay well hydrated    Finish out the IV antibiotics    In 1-2 weeks when stable, then start paroxetine ( paxil ).  Call with any side effects / problems    See us after about a month after starting it          Follow-ups after your visit        Who to contact     If you have questions or need follow up information about today's clinic visit or your schedule please contact [facility] directly at [phone].  Normal or non-critical lab and imaging results will be communicated to you by MyChart, letter or phone within 4 business days after the clinic has received the results. If you do not hear from us within 7 days, please contact the clinic through MyChart or phone. If you have a critical or abnormal lab result, we will notify you by phone as soon as possible.  Submit refill requests through [employer] ([employer] or call your pharmacy and they will forward the refill request to us. Please allow 3 business days for your refill to be completed.          Additional Information About Your Visit        MyChart Information     [employer] ([employer] gives you secure access to your electronic health record. If you see a primary care provider, you can also send messages to your care team and make appointments. If you have questions, please call your primary care clinic.  If you do not have a primary care provider, please call [phone] and they will assist you.        Care EveryWhere ID     This is your Care EveryWhere ID. This could be used by other organizations to access your [facility]  records  [id number]        Your Vitals Were     Pulse Temperature Pulse Oximetry Breastfeeding? BMI (Body Mass Index)       65 97.9  F (36.6  C) (Oral) 97% No 32.78 kg/m2        Blood Pressure from Last 3 Encounters:   05/24/18 121/64   05/03/18 135/67   04/11/18 171/71    Weight from Last 3 Encounters:   05/24/18 188 lb (85.3 kg)   05/03/18 188 lb (85.3 kg)   04/11/18 188 lb (85.3 kg)              Today, you had the following     No orders found for display         Today's Medication Changes          These changes are accurate as of 5/24/18 12:00 PM.  If you have any questions, ask your nurse or doctor.               Start taking these medicines.        Dose/Directions    PARoxetine 10 MG tablet   Commonly known as:  PAXIL   Used for:  Anxiety   Started by:  Kevin Esquivel MD        Dose:  10 mg   Take 1 tablet (10 mg) by mouth At Bedtime   Quantity:  30 tablet   Refills:  1         Stop taking these medicines if you haven't already. Please contact your care team if you have questions.     sertraline 50 MG tablet   Commonly known as:  ZOLOFT   Stopped by:  Kevin Esquivel MD                Where to get your medicines      Some of these will need a paper prescription and others can be bought over the counter.  Ask your nurse if you have questions.     Bring a paper prescription for each of these medications     PARoxetine 10 MG tablet    zolpidem 5 MG tablet                Primary Care Provider Office Phone # Fax #    Kevin Esquivel -[phone]       [address] [facility] [address]        Goals        General    I will continue the exercises provided by physical therapy for my knee (pt-stated)     Notes - Note created  7/9/2015 10:30 AM by Ray Holland RN    As of today's date 7/9/2015 goal is met at 0 - 25%.   Goal Status:  Active        I will remember to take my insulin before meals especially lunch when I am  away from home. (pt-stated)     Notes - Note created   3/25/2015  3:51 PM by Ray Holland RN      As of today's date 3/25/2015 goal is met at 0 - 25%.   Goal Status:  Active.          I will work on testing my blood sugar and taking my insulin when I am away from home at lunch time. (pt-stated)     Notes - Note created  5/20/2016  2:43 PM by Ray Holland RN    As of today's date 5/20/2016 goal is met at 0 - 25%.   Goal Status:  Active          Equal Access to Services     KRISTEN GUNTER : Hadii aad ku hadasho Soomaali, waaxda luqadaha, qaybta kaalmada adeegyada, waxay idiin hayaan maral [place] . So [facility] [phone].    ATENCIÓN: Si habla español, tiene a verduzco disposición servicios gratuitos de asistencia lingüística. Llame al [phone].    We comply with applicable federal civil rights laws and [place] laws. We do not discriminate on the basis of race, color, national origin, age, disability, sex, sexual orientation, or gender identity.            Thank you!     Thank you for choosing [facility]  for your care. Our goal is always to provide you with excellent care. Hearing back from our patients is one way we can continue to improve our services. Please take a few minutes to complete the written survey that you may receive in the mail after your visit with us. Thank you!             Your Updated Medication List - Protect others around you: Learn how to safely use, store and throw away your medicines at www.disposemymeds.org.          This list is accurate as of 5/24/18 12:00 PM.  Always use your most recent med list.                   Brand Name Dispense Instructions for use Diagnosis    ACE/ARB/ARNI NOT PRESCRIBED (INTENTIONAL)      ACE & ARB not prescribed due to Allergy    Type 2 diabetes mellitus without complication, with long-term current use of insulin (H)       * albuterol (2.5 MG/3ML) 0.083% neb solution     1 Box    Take 3 mLs (2.5 mg) by nebulization 4 times daily as needed    Intermittent asthma       * albuterol 108 (90  Base) MCG/ACT Inhaler    PROAIR HFA/PROVENTIL HFA/VENTOLIN HFA    2 Inhaler    Inhale 2 puffs into the lungs every 6 hours as needed for shortness of breath / dyspnea or wheezing    Intermittent asthma, uncomplicated       aspirin 81 MG tablet      Take 1 tablet by mouth daily. 1 daily        BASAGLAR 100 UNIT/ML injection     75 mL    75 units at bedtime or as directed    Type 2 diabetes mellitus without complication, with long-term current use of insulin (H)       blood glucose monitoring lancets     600 each    TEST 6 TIMES PER DAY    Type 2 diabetes mellitus without complication, with long-term current use of insulin (H)       calcium polycarbophil 625 MG tablet    FIBERCON    60 tablet    Take 2 tablets (1,250 mg) by mouth daily    Health care maintenance       CALCIUM-D PO      Take  by mouth. 1200mg calcium/600mg D3        fish oil-omega-3 fatty acids 1000 MG capsule      Take 1 capsule by mouth daily.        Garlic Caps      Take  by mouth. One daily        ibuprofen 800 MG tablet    ADVIL/MOTRIN    100 tablet    Take 1 tablet (800 mg) by mouth every 8 hours as needed for pain    Arthritis       insulin aspart 100 UNIT/ML injection    NovoLOG PEN    30 mL    Patient uses 100 units daily split up between multiple doses. Take 2 units per 15 grams of carbohydrate eaten and 2 units per 30 mg/dL above 170 mg/dL.    Type 2 diabetes mellitus without complication, with long-term current use of insulin (H)       LORazepam 0.5 MG tablet    ATIVAN    40 tablet    Take 1 tablet (0.5 mg) by mouth every 8 hours as needed    Anxiety       meperidine 50 MG tablet    DEMEROL    20 tablet    Take 1 tablet (50 mg) by mouth every 4 hours as needed    Idiopathic chronic pancreatitis (H)       MULTIVITAMIN TABS   OR      1 TABLET DAILY        ONETOUCH ULTRA test strip   Generic drug:  blood glucose monitoring     600 strip    TEST 6 TIMES PER DAY        oxyCODONE-acetaminophen 5-325 MG per tablet    PERCOCET     Take by mouth  "every 4 hours as needed for moderate to severe pain        PARoxetine 10 MG tablet    PAXIL    30 tablet    Take 1 tablet (10 mg) by mouth At Bedtime    Anxiety       pen needles 5/16\" 31G X 8 MM Misc     180 each    Patient does 4 doses novolog and 2 lantus shots  insulin daily.  Needs 180 needles per month. Okay refills for one year.    Type 2 diabetes mellitus without complication, with long-term current use of insulin (H)       promethazine 12.5 MG tablet    PHENERGAN     Take 12.5 mg by mouth as needed        rosuvastatin 20 MG tablet    CRESTOR    90 tablet    TAKE 1 TABLET(20 MG) BY MOUTH DAILY    Hyperlipidemia LDL goal <100       SALINE      to clean port every other month    Bronchitis       senna-docusate 8.6-50 MG per tablet    SENOKOT-S;PERICOLACE    100 tablet    1-2 po bid prn constipation    Constipation, unspecified constipation type       tiZANidine 2 MG tablet    ZANAFLEX    30 tablet    Take 1 tablet (2 mg) by mouth 3 times daily as needed for muscle spasms    Muscle pain       zolpidem 5 MG tablet    AMBIEN    30 tablet    Take 1 tablet (5 mg) by mouth nightly as needed for sleep    Insomnia, unspecified type       * Notice:  This list has 2 medication(s) that are the same as other medications prescribed for you. Read the directions carefully, and ask your doctor or other care provider to review them with you.      "

## 2018-05-24 NOTE — LETTER
[address] [id number] [phone]           May 24, 2018      To whom it may concern:    Leah Cesar (  49 ) is okay to return to work on 18.             Sincerely,                     Kevin Esquivel MD

## 2018-05-24 NOTE — PATIENT INSTRUCTIONS
Adjust insulin as needed    Stay well hydrated    Finish out the IV antibiotics    In 1-2 weeks when stable, then start paroxetine ( paxil ).  Call with any side effects / problems    See us after about a month after starting it

## 2018-05-25 NOTE — TELEPHONE ENCOUNTER
Spoke with patient and informed.  She want's it faxed to [phone] at [facility].  Done.

## 2018-05-31 ENCOUNTER — TELEPHONE (OUTPATIENT)
Dept: FAMILY MEDICINE | Facility: CLINIC | Age: 73
End: 2018-05-31

## 2018-05-31 ENCOUNTER — TRANSFERRED RECORDS (OUTPATIENT)
Dept: HEALTH INFORMATION MANAGEMENT | Facility: CLINIC | Age: 73
End: 2018-05-31

## 2018-05-31 NOTE — TELEPHONE ENCOUNTER
Forms received from: [employer]   Phone number listed: [phone]   Fax listed: [phone]  Date received: 5-31-18  Form description: DC  Once forms are completed, please return to [employer] via fax.  Is patient requesting to be contacted when forms are completed: n/a  Form placed: provider desk  Anisa Conteh

## 2018-06-01 ENCOUNTER — TELEPHONE (OUTPATIENT)
Dept: FAMILY MEDICINE | Facility: CLINIC | Age: 73
End: 2018-06-01

## 2018-06-01 NOTE — TELEPHONE ENCOUNTER
Forms received from: [employer]   Phone number listed: [phone]   Fax listed: [phone]  Date received: 6-1-18  Form description: SN   Once forms are completed, please return to [employer] via fax.  Is patient requesting to be contacted when forms are completed: n/a  Form placed: provider desk  Anisa Conteh

## 2018-06-05 ENCOUNTER — TELEPHONE (OUTPATIENT)
Dept: FAMILY MEDICINE | Facility: CLINIC | Age: 73
End: 2018-06-05

## 2018-06-05 ENCOUNTER — MEDICAL CORRESPONDENCE (OUTPATIENT)
Dept: HEALTH INFORMATION MANAGEMENT | Facility: CLINIC | Age: 73
End: 2018-06-05

## 2018-06-05 NOTE — TELEPHONE ENCOUNTER
Forms received from: [employer]   Phone number listed: [phone]   Fax listed: [phone]  Date received: 6-5-18  Form description: POC  Once forms are completed, please return to [employer] via fax.  Is patient requesting to be contacted when forms are completed: n/a  Form placed: provider desk  Anisa Conteh

## 2018-06-11 ENCOUNTER — TRANSFERRED RECORDS (OUTPATIENT)
Dept: HEALTH INFORMATION MANAGEMENT | Facility: CLINIC | Age: 73
End: 2018-06-11

## 2018-06-19 ENCOUNTER — TELEPHONE (OUTPATIENT)
Dept: FAMILY MEDICINE | Facility: CLINIC | Age: 73
End: 2018-06-19

## 2018-06-19 DIAGNOSIS — M79.10 MUSCLE PAIN: ICD-10-CM

## 2018-06-19 RX ORDER — TIZANIDINE 2 MG/1
2 TABLET ORAL 3 TIMES DAILY PRN
Qty: 30 TABLET | Refills: 0 | Status: SHIPPED | OUTPATIENT
Start: 2018-06-19 | End: 2019-02-18

## 2018-06-19 NOTE — TELEPHONE ENCOUNTER
Reason for Call:  Medication or medication refill:    Do you use a [facility]?  Name of the pharmacy and phone number for the current request:       [facility] [address] [place] [address] [address] AT [address] OF [facility] [address]      Name of the medication requested:   tiZANidine,  2 mg      Other request: Patient is out of the medication and needs it filled ASAP    Can we leave a detailed message on this number? YES    Phone number patient can be reached at: Other phone number:  [phone]      Best Time: ASAP      Call taken on 6/19/2018 at 5:05 PM by Martha Real

## 2018-06-19 NOTE — TELEPHONE ENCOUNTER
Requested Prescriptions   Pending Prescriptions Disp Refills     tiZANidine (ZANAFLEX) 2 MG tablet 30 tablet 1     Sig: Take 1 tablet (2 mg) by mouth 3 times daily as needed for muscle spasms    There is no refill protocol information for this order        Last refill 11/9/17 # 30 with one refill  Last OV 5/24/18.    Routing refill request to provider for review/approval because:  Drug not on the [facility] refill protocol   Naida Amaral RN [facility] Triage.

## 2018-06-27 ENCOUNTER — OFFICE VISIT (OUTPATIENT)
Dept: AUDIOLOGY | Facility: CLINIC | Age: 73
End: 2018-06-27
Payer: COMMERCIAL

## 2018-06-27 ENCOUNTER — OFFICE VISIT (OUTPATIENT)
Dept: OTOLARYNGOLOGY | Facility: CLINIC | Age: 73
End: 2018-06-27
Payer: COMMERCIAL

## 2018-06-27 VITALS
SYSTOLIC BLOOD PRESSURE: 193 MMHG | OXYGEN SATURATION: 98 % | WEIGHT: 188 LBS | DIASTOLIC BLOOD PRESSURE: 79 MMHG | BODY MASS INDEX: 32.78 KG/M2 | RESPIRATION RATE: 18 BRPM | HEART RATE: 60 BPM

## 2018-06-27 DIAGNOSIS — H90.3 SENSORINEURAL HEARING LOSS, BILATERAL: Primary | ICD-10-CM

## 2018-06-27 DIAGNOSIS — H90.3 SENSORINEURAL HEARING LOSS (SNHL) OF BOTH EARS: Primary | ICD-10-CM

## 2018-06-27 PROCEDURE — 92557 COMPREHENSIVE HEARING TEST: CPT | Performed by: AUDIOLOGIST

## 2018-06-27 PROCEDURE — 92567 TYMPANOMETRY: CPT | Performed by: AUDIOLOGIST

## 2018-06-27 PROCEDURE — 99207 ZZC NO CHARGE LOS: CPT | Performed by: AUDIOLOGIST

## 2018-06-27 PROCEDURE — 99203 OFFICE O/P NEW LOW 30 MIN: CPT | Performed by: OTOLARYNGOLOGY

## 2018-06-27 ASSESSMENT — PAIN SCALES - GENERAL: PAINLEVEL: NO PAIN (0)

## 2018-06-27 NOTE — MR AVS SNAPSHOT
After Visit Summary   6/27/2018    Leah Guerrero    MRN: [id number]           Patient Information     Date Of Birth          1945        Visit Information        Provider Department      6/27/2018 11:00 AM Aureliano Sanchez AuD [facility]        Today's Diagnoses     Sensorineural hearing loss, bilateral    -  1       Follow-ups after your visit        Your next 10 appointments already scheduled     Jun 27, 2018  1:00 PM CDT   [facility] with Papo Nation MD   [facility]    [address] [place] [id number] [phone]            Jul 05, 2018 11:20 AM CDT   Office Visit with Kevin Esquivel MD   [facility]    [address] [place] [id number] [phone]           Bring a current list of meds and any records pertaining to this visit. For Physicals, please bring immunization records and any forms needing to be filled out. Please arrive 10 minutes early to complete paperwork.              Who to contact     If you have questions or need follow up information about today's clinic visit or your schedule please contact [facility] directly at [phone].  Normal or non-critical lab and imaging results will be communicated to you by J[employer], letter or phone within 4 business days after the clinic has received the results. If you do not hear from us within 7 days, please contact the clinic through [employer] or phone. If you have a critical or abnormal lab result, we will notify you by phone as soon as possible.  Submit refill requests through [employer] or call your pharmacy and they will forward the refill request to us. Please allow 3 business days for your refill to be completed.          Additional Information About Your Visit        Snapt Information     [employer] gives you secure access to your electronic health  record. If you see a primary care provider, you can also send messages to your care team and make appointments. If you have questions, please call your primary care clinic.  If you do not have a primary care provider, please call [phone] and they will assist you.        Care EveryWhere ID     This is your Care EveryWhere ID. This could be used by other organizations to access your [facility] medical records  [id number]         Blood Pressure from Last 3 Encounters:   05/24/18 121/64   05/03/18 135/67   04/11/18 171/71    Weight from Last 3 Encounters:   05/24/18 188 lb (85.3 kg)   05/03/18 188 lb (85.3 kg)   04/11/18 188 lb (85.3 kg)              We Performed the Following     AUDIOGRAM/TYMPANOGRAM - INTERFACE     COMPREHENSIVE HEARING TEST     TYMPANOMETRY        Primary Care Provider Office Phone # Fax #    Kevin Esquivel [phone]       [address] [facility] [address]        Goals        General    I will continue the exercises provided by physical therapy for my knee (pt-stated)     Notes - Note created  7/9/2015 10:30 AM by Ray Holland RN    As of today's date 7/9/2015 goal is met at 0 - 25%.   Goal Status:  Active        I will remember to take my insulin before meals especially lunch when I am  away from home. (pt-stated)     Notes - Note created  3/25/2015  3:51 PM by Ray Holland, JEANIE      As of today's date 3/25/2015 goal is met at 0 - 25%.   Goal Status:  Active.          I will work on testing my blood sugar and taking my insulin when I am away from home at lunch time. (pt-stated)     Notes - Note created  5/20/2016  2:43 PM by Ray Holland, RN    As of today's date 5/20/2016 goal is met at 0 - 25%.   Goal Status:  Active          Equal Access to Services     KRISTEN GUNTER : Sandie Evans, waaxda luqadaha, qaybta kaalmada brinda, jude angela. [facility] [phone].    ATENCIÓN: Si habla español, tiene a verduzco  disposición servicios gratuitos de asistencia lingüística. Ese blancas [phone].    We comply with applicable federal civil rights laws and [place] laws. We do not discriminate on the basis of race, color, national origin, age, disability, sex, sexual orientation, or gender identity.            Thank you!     Thank you for choosing [facility]  for your care. Our goal is always to provide you with excellent care. Hearing back from our patients is one way we can continue to improve our services. Please take a few minutes to complete the written survey that you may receive in the mail after your visit with us. Thank you!             Your Updated Medication List - Protect others around you: Learn how to safely use, store and throw away your medicines at www.disposemymeds.org.          This list is accurate as of 6/27/18 11:10 AM.  Always use your most recent med list.                   Brand Name Dispense Instructions for use Diagnosis    ACE/ARB/ARNI NOT PRESCRIBED (INTENTIONAL)      ACE & ARB not prescribed due to Allergy    Type 2 diabetes mellitus without complication, with long-term current use of insulin (H)       * albuterol (2.5 MG/3ML) 0.083% neb solution     1 Box    Take 3 mLs (2.5 mg) by nebulization 4 times daily as needed    Intermittent asthma       * albuterol 108 (90 Base) MCG/ACT Inhaler    PROAIR HFA/PROVENTIL HFA/VENTOLIN HFA    1 Inhaler    Inhale 2 puffs into the lungs every 6 hours as needed for shortness of breath / dyspnea or wheezing    Mild intermittent asthma without complication       aspirin 81 MG tablet      Take 1 tablet by mouth daily. 1 daily        BASAGLAR 100 UNIT/ML injection     75 mL    75 units at bedtime or as directed    Type 2 diabetes mellitus without complication, with long-term current use of insulin (H)       blood glucose monitoring lancets     600 each    TEST 6 TIMES PER DAY    Type 2 diabetes mellitus without complication, with long-term current use of  "insulin (H)       blood glucose monitoring test strip    ONETOUCH ULTRA    600 strip    TEST 6 TIMES PER DAY    Type 2 diabetes mellitus without complication, with long-term current use of insulin (H)       calcium polycarbophil 625 MG tablet    FIBERCON    60 tablet    Take 2 tablets (1,250 mg) by mouth daily    Health care maintenance       CALCIUM-D PO      Take  by mouth. 1200mg calcium/600mg D3        fish oil-omega-3 fatty acids 1000 MG capsule      Take 1 capsule by mouth daily.        Garlic Caps      Take  by mouth. One daily        ibuprofen 800 MG tablet    ADVIL/MOTRIN    100 tablet    Take 1 tablet (800 mg) by mouth every 8 hours as needed for pain    Arthritis       insulin aspart 100 UNIT/ML injection    NovoLOG PEN    30 mL    Patient uses 100 units daily split up between multiple doses. Take 2 units per 15 grams of carbohydrate eaten and 2 units per 30 mg/dL above 170 mg/dL.    Type 2 diabetes mellitus without complication, with long-term current use of insulin (H)       LORazepam 0.5 MG tablet    ATIVAN    40 tablet    Take 1 tablet (0.5 mg) by mouth every 8 hours as needed    Anxiety       meperidine 50 MG tablet    DEMEROL    20 tablet    Take 1 tablet (50 mg) by mouth every 4 hours as needed    Idiopathic chronic pancreatitis (H)       MULTIVITAMIN TABS   OR      1 TABLET DAILY        oxyCODONE-acetaminophen 5-325 MG per tablet    PERCOCET     Take by mouth every 4 hours as needed for moderate to severe pain        PARoxetine 10 MG tablet    PAXIL    30 tablet    Take 1 tablet (10 mg) by mouth At Bedtime    Anxiety       pen needles 5/16\" 31G X 8 MM Misc     180 each    Patient does 4 doses novolog and 2 lantus shots  insulin daily.  Needs 180 needles per month. Okay refills for one year.    Type 2 diabetes mellitus without complication, with long-term current use of insulin (H)       promethazine 12.5 MG tablet    PHENERGAN     Take 12.5 mg by mouth as needed        rosuvastatin 20 MG tablet    " CRESTOR    90 tablet    TAKE 1 TABLET(20 MG) BY MOUTH DAILY    Hyperlipidemia LDL goal <100       SALINE      to clean port every other month    Bronchitis       senna-docusate 8.6-50 MG per tablet    SENOKOT-S;PERICOLACE    100 tablet    1-2 po bid prn constipation    Constipation, unspecified constipation type       tiZANidine 2 MG tablet    ZANAFLEX    30 tablet    Take 1 tablet (2 mg) by mouth 3 times daily as needed for muscle spasms    Muscle pain       zolpidem 5 MG tablet    AMBIEN    30 tablet    Take 1 tablet (5 mg) by mouth nightly as needed for sleep    Insomnia, unspecified type       * Notice:  This list has 2 medication(s) that are the same as other medications prescribed for you. Read the directions carefully, and ask your doctor or other care provider to review them with you.

## 2018-06-27 NOTE — PATIENT INSTRUCTIONS
General Scheduling Information  To schedule your CT/MRI scan, please contact [place] [facility] at [phone]   [address] [place] [address]    To schedule your Surgery, please contact our Specialty Schedulers at [phone]    ENT Clinic Locations Clinic Hours Telephone Number     [facility]  [address] [place] [address]   Tuesday:       8:00am -- 4:00pm    Wednesday:  8:00am - 4:00pm   To schedule an appointment with   Dr. Nation,   please contact our   Specialty Scheduling Department at:     [phone]       [facility]  [address] [place] [address]   Friday:          8:00am - 4:00pm         Urgent Care Locations Clinic Hours Telephone Numbers     [facility] Barrett  [address] [place] [address]     Monday-Friday:     11:00pm - 9:00pm    Saturday-Sunday:  9:00am - 5:00pm   [phone]     [facility]  [address] [place] [address]     Monday-Friday:      5:00pm - 9:00pm     Saturday-Sunday:  9:00am - 5:00pm   [phone]

## 2018-06-27 NOTE — MR AVS SNAPSHOT
After Visit Summary   6/27/2018    Leah Guerrero    MRN: [id number]           Patient Information     Date Of Birth          1945        Visit Information        Provider Department      6/27/2018 1:00 PM Papo Nation MD [facility]        Today's Diagnoses     Sensorineural hearing loss (SNHL) of both ears    -  1      Care Instructions    General Scheduling Information  To schedule your CT/MRI scan, please contact [place] [facility] at [phone] [address] [place] [address]    To schedule your Surgery, please contact our Specialty Schedulers at [phone]    ENT Clinic Locations Clinic Hours Telephone Number     [facility]  [address]  [place] [address]   Tuesday:       8:00am -- 4:00pm    Wednesday:  8:00am - 4:00pm   To schedule an appointment with   Dr. Nation,   please contact our   Specialty Scheduling Department at:     [phone]       [facility]  [address] [place] [address]   Friday:          8:00am - 4:00pm         Urgent Care Locations Clinic Hours Telephone Numbers     [facility]  [address] [place] [address]     Monday-Friday:     11:00pm - 9:00pm    Saturday-Sunday:  9:00am - 5:00pm   [phone]     [facility]  [address] [place] [address]     Monday-Friday:      5:00pm - 9:00pm     Saturday-Sunday:  9:00am - 5:00pm   [phone]                   Follow-ups after your visit        Additional Services     AUDIOLOGY ADULT REFERRAL       Your provider [place] you to: [facility] [phone]   [email or web address]    Treatment:  Evaluation & Treatment  Specialty Testing:  Audiogram w/Tymps and Reflexes    Please be aware that coverage of these services is subject to the terms and limitations of your health insurance plan.  Call member services at your health plan with any benefit or coverage questions.       Please bring the following to your appointment:  >>   Any x-rays, CTs or MRIs which have been performed.  Contact the facility where they were done to arrange for  prior to your scheduled appointment.   >>   List of current medications  >>   This referral request   >>   Any documents/labs given to you for this referral                  Your next 10 appointments already scheduled     Jun 27, 2018  1:00 PM CDT   [facility]    [address] [place] [id number] [phone]            Jul 05, 2018 11:20 AM CDT   [facility] Visit with Kevin Esquivel MD   [facility]    [address] [place] [id number] [phone]           Bring a current list of meds and any records pertaining to this visit. For Physicals, please bring immunization records and any forms needing to be filled out. Please arrive 10 minutes early to complete paperwork.              Who to contact     If you have questions or need follow up information about today's clinic visit or your schedule please contact [facility] directly at [phone].  Normal or non-critical lab and imaging results will be communicated to you by MyChart, letter or phone within 4 business days after the clinic has received the results. If you do not hear from us within 7 days, please contact the clinic through MyChart or phone. If you have a critical or abnormal lab result, we will notify you by phone as soon as possible.  Submit refill requests through [employer] or call your pharmacy and they will forward the refill request to us. Please allow 3 business days for your refill to be completed.          Additional Information About Your Visit        "[employer] Information     [employer] gives you secure access to your electronic health record. If you see a primary care provider,  you can also send messages to your care team and make appointments. If you have questions, please call your primary care clinic.  If you do not have a primary care provider, please call [phone] and they will assist you.        Care EveryWhere ID     This is your Care EveryWhere ID. This could be used by other organizations to access your [place] medical records  [id number]        Your Vitals Were     Pulse Respirations Pulse Oximetry BMI (Body Mass Index)          60 18 98% 32.78 kg/m2         Blood Pressure from Last 3 Encounters:   06/27/18 193/79   05/24/18 121/64   05/03/18 135/67    Weight from Last 3 Encounters:   06/27/18 85.3 kg (188 lb)   05/24/18 85.3 kg (188 lb)   05/03/18 85.3 kg (188 lb)              We Performed the Following     AUDIOLOGY ADULT REFERRAL        Primary Care Provider Office Phone # Fax #    Kevin Esquivel [phone]       [address] [facility] [address]        Goals        General    I will continue the exercises provided by physical therapy for my knee (pt-stated)     Notes - Note created  7/9/2015 10:30 AM by Ray Holland RN    As of today's date 7/9/2015 goal is met at 0 - 25%.   Goal Status:  Active        I will remember to take my insulin before meals especially lunch when I am  away from home. (pt-stated)     Notes - Note created  3/25/2015  3:51 PM by Ray Holland, JEANIE      As of today's date 3/25/2015 goal is met at 0 - 25%.   Goal Status:  Active.          I will work on testing my blood sugar and taking my insulin when I am away from home at lunch time. (pt-stated)     Notes - Note created  5/20/2016  2:43 PM by Ray Holland, JEANIE    As of today's date 5/20/2016 goal is met at 0 - 25%.   Goal Status:  Active          Equal Access to Services     [facility] Center[facility]KE : Sandie Evans, waaxda luqadaha, qaybta kaalnakul parry, jude livingston . [facility] [phone].    ATENCIÓN: Si guzman graham,  tiene a verduzco disposición servicios gratuitos de asistencia lingüística. Ese blancas [phone].    We comply with applicable federal civil rights laws and [place] laws. We do not discriminate on the basis of race, color, national origin, age, disability, sex, sexual orientation, or gender identity.            Thank you!     Thank you for choosing [facility]  for your care. Our goal is always to provide you with excellent care. Hearing back from our patients is one way we can continue to improve our services. Please take a few minutes to complete the written survey that you may receive in the mail after your visit with us. Thank you!             Your Updated Medication List - Protect others around you: Learn how to safely use, store and throw away your medicines at www.disposemymeds.org.          This list is accurate as of 6/27/18 12:30 PM.  Always use your most recent med list.                   Brand Name Dispense Instructions for use Diagnosis    ACE/ARB/ARNI NOT PRESCRIBED (INTENTIONAL)      ACE & ARB not prescribed due to Allergy    Type 2 diabetes mellitus without complication, with long-term current use of insulin (H)       * albuterol (2.5 MG/3ML) 0.083% neb solution     1 Box    Take 3 mLs (2.5 mg) by nebulization 4 times daily as needed    Intermittent asthma       * albuterol 108 (90 Base) MCG/ACT Inhaler    PROAIR HFA/PROVENTIL HFA/VENTOLIN HFA    1 Inhaler    Inhale 2 puffs into the lungs every 6 hours as needed for shortness of breath / dyspnea or wheezing    Mild intermittent asthma without complication       aspirin 81 MG tablet      Take 1 tablet by mouth daily. 1 daily        BASAGLAR 100 UNIT/ML injection     75 mL    75 units at bedtime or as directed    Type 2 diabetes mellitus without complication, with long-term current use of insulin (H)       blood glucose monitoring lancets     600 each    TEST 6 TIMES PER DAY    Type 2 diabetes mellitus without complication, with long-term  "current use of insulin (H)       blood glucose monitoring test strip    ONETOUCH ULTRA    600 strip    TEST 6 TIMES PER DAY    Type 2 diabetes mellitus without complication, with long-term current use of insulin (H)       calcium polycarbophil 625 MG tablet    FIBERCON    60 tablet    Take 2 tablets (1,250 mg) by mouth daily    Health care maintenance       CALCIUM-D PO      Take  by mouth. 1200mg calcium/600mg D3        fish oil-omega-3 fatty acids 1000 MG capsule      Take 1 capsule by mouth daily.        Garlic Caps      Take  by mouth. One daily        ibuprofen 800 MG tablet    ADVIL/MOTRIN    100 tablet    Take 1 tablet (800 mg) by mouth every 8 hours as needed for pain    Arthritis       insulin aspart 100 UNIT/ML injection    NovoLOG PEN    30 mL    Patient uses 100 units daily split up between multiple doses. Take 2 units per 15 grams of carbohydrate eaten and 2 units per 30 mg/dL above 170 mg/dL.    Type 2 diabetes mellitus without complication, with long-term current use of insulin (H)       LORazepam 0.5 MG tablet    ATIVAN    40 tablet    Take 1 tablet (0.5 mg) by mouth every 8 hours as needed    Anxiety       meperidine 50 MG tablet    DEMEROL    20 tablet    Take 1 tablet (50 mg) by mouth every 4 hours as needed    Idiopathic chronic pancreatitis (H)       MULTIVITAMIN TABS   OR      1 TABLET DAILY        oxyCODONE-acetaminophen 5-325 MG per tablet    PERCOCET     Take by mouth every 4 hours as needed for moderate to severe pain        PARoxetine 10 MG tablet    PAXIL    30 tablet    Take 1 tablet (10 mg) by mouth At Bedtime    Anxiety       pen needles 5/16\" 31G X 8 MM Misc     180 each    Patient does 4 doses novolog and 2 lantus shots  insulin daily.  Needs 180 needles per month. Okay refills for one year.    Type 2 diabetes mellitus without complication, with long-term current use of insulin (H)       promethazine 12.5 MG tablet    PHENERGAN     Take 12.5 mg by mouth as needed        rosuvastatin " 20 MG tablet    CRESTOR    90 tablet    TAKE 1 TABLET(20 MG) BY MOUTH DAILY    Hyperlipidemia LDL goal <100       SALINE      to clean port every other month    Bronchitis       senna-docusate 8.6-50 MG per tablet    SENOKOT-S;PERICOLACE    100 tablet    1-2 po bid prn constipation    Constipation, unspecified constipation type       tiZANidine 2 MG tablet    ZANAFLEX    30 tablet    Take 1 tablet (2 mg) by mouth 3 times daily as needed for muscle spasms    Muscle pain       zolpidem 5 MG tablet    AMBIEN    30 tablet    Take 1 tablet (5 mg) by mouth nightly as needed for sleep    Insomnia, unspecified type       * Notice:  This list has 2 medication(s) that are the same as other medications prescribed for you. Read the directions carefully, and ask your doctor or other care provider to review them with you.

## 2018-06-27 NOTE — PROGRESS NOTES
Chief Complaint - Hearing loss    History of Present Illness - Leah Guerrero is a 72 year old female who presents to me today with hearing loss in both ears, but feels left is a little worse.  It has been present and noticeable for approximately a long time.  It was gradual in onset.  The patient has noticed increased difficulty hearing certain sounds and difficulty in understanding others in noisy or crowded situations. Talks louder. There is no history of chronic ear disease or ear surgery.  With regards to recreational, , and work-related noise exposure had a lot with music when she was younger. She doesn't know family history. The patient notes some mild left otalgia, intermittently. H/o chemo for lung cancer.      Past Medical History -   Patient Active Problem List   Diagnosis     Female stress incontinence     Type 2 diabetes, HbA1C goal < 8% (H)     Fatigue     Hyperlipidemia LDL goal <100     Advanced directives, counseling/discussion     Intermittent asthma     Anxiety state     Pulmonary nodule, left     Abnormal CT of the chest     GERD (gastroesophageal reflux disease)     Mechanical low back pain     Radicular pain of left lower extremity     Insomnia     Contusion of knee     Prepatellar bursitis of left knee     IT band syndrome     Intermittent asthma without complication     Health Care Home     Obesity     Hypertension goal BP (blood pressure) < 140/90     Primary osteoarthritis of left knee     Idiopathic chronic pancreatitis (H)     Insomnia, unspecified type     Anxiety     Chronic pancreatitis, unspecified pancreatitis type (H)     Type 2 diabetes mellitus without complication, with long-term current use of insulin (H)     Malignant neoplasm of lung, unspecified laterality, unspecified part of lung (H)     Mild intermittent asthma without complication       Current Medications -   Current Outpatient Prescriptions:      ACE/ARB NOT PRESCRIBED, INTENTIONAL,, ACE & ARB not  "prescribed due to Allergy, Disp: , Rfl:      albuterol (2.5 MG/3ML) 0.083% nebulizer solution, Take 3 mLs (2.5 mg) by nebulization 4 times daily as needed, Disp: 1 Box, Rfl: 5     albuterol (PROAIR HFA/PROVENTIL HFA/VENTOLIN HFA) 108 (90 Base) MCG/ACT Inhaler, Inhale 2 puffs into the lungs every 6 hours as needed for shortness of breath / dyspnea or wheezing, Disp: 1 Inhaler, Rfl: 11     aspirin 81 MG tablet, Take 1 tablet by mouth daily. 1 daily, Disp: , Rfl:      BASAGLAR 100 UNIT/ML injection, 75 units at bedtime or as directed, Disp: 75 mL, Rfl: 3     blood glucose monitoring (ONE TOUCH ULTRASOFT) lancets, TEST 6 TIMES PER DAY, Disp: 600 each, Rfl: 0     blood glucose monitoring (ONETOUCH ULTRA) test strip, TEST 6 TIMES PER DAY, Disp: 600 strip, Rfl: 11     Calcium Carbonate-Vitamin D (CALCIUM-D PO), Take  by mouth. 1200mg calcium/600mg D3, Disp: , Rfl:      calcium polycarbophil (FIBERCON) 625 MG tablet, Take 2 tablets (1,250 mg) by mouth daily, Disp: 60 tablet, Rfl: 11     fish oil-omega-3 fatty acids (FISH OIL) 1000 MG capsule, Take 1 capsule by mouth daily., Disp: , Rfl:      Garlic CAPS, Take  by mouth. One daily, Disp: , Rfl:      ibuprofen (ADVIL,MOTRIN) 800 MG tablet, Take 1 tablet (800 mg) by mouth every 8 hours as needed for pain, Disp: 100 tablet, Rfl: 0     insulin aspart (NOVOLOG PEN) 100 UNIT/ML injection, Patient uses 100 units daily split up between multiple doses. Take 2 units per 15 grams of carbohydrate eaten and 2 units per 30 mg/dL above 170 mg/dL., Disp: 30 mL, Rfl: 1     Insulin Pen Needle (PEN NEEDLES 5/16\") 31G X 8 MM MISC, Patient does 4 doses novolog and 2 lantus shots  insulin daily.  Needs 180 needles per month. Okay refills for one year., Disp: 180 each, Rfl: 11     LORazepam (ATIVAN) 0.5 MG tablet, Take 1 tablet (0.5 mg) by mouth every 8 hours as needed, Disp: 40 tablet, Rfl: 3     meperidine (DEMEROL) 50 MG tablet, Take 1 tablet (50 mg) by mouth every 4 hours as needed, Disp: 20 " tablet, Rfl: 0     MULTIVITAMIN TABS   OR, 1 TABLET DAILY, Disp: , Rfl:      oxyCODONE-acetaminophen (PERCOCET) 5-325 MG per tablet, Take by mouth every 4 hours as needed for moderate to severe pain, Disp: , Rfl:      PARoxetine (PAXIL) 10 MG tablet, Take 1 tablet (10 mg) by mouth At Bedtime, Disp: 30 tablet, Rfl: 1     promethazine (PHENERGAN) 12.5 MG tablet, Take 12.5 mg by mouth as needed, Disp: , Rfl: 5     rosuvastatin (CRESTOR) 20 MG tablet, TAKE 1 TABLET(20 MG) BY MOUTH DAILY, Disp: 90 tablet, Rfl: 2     SALINE, to clean port every other month, Disp: , Rfl:      senna-docusate (SENOKOT-S;PERICOLACE) 8.6-50 MG per tablet, 1-2 po bid prn constipation, Disp: 100 tablet, Rfl: 1     tiZANidine (ZANAFLEX) 2 MG tablet, Take 1 tablet (2 mg) by mouth 3 times daily as needed for muscle spasms, Disp: 30 tablet, Rfl: 0     zolpidem (AMBIEN) 5 MG tablet, Take 1 tablet (5 mg) by mouth nightly as needed for sleep, Disp: 30 tablet, Rfl: 2    Allergies -   Allergies   Allergen Reactions     Hydralazine Shortness Of Breath     Famotidine Nausea and Vomiting and Unknown     Benadryl Itch Stopping      Gives her more energy, can't sleep     Lisinopril      Tongue swelling       Metoclopramide Nausea and Vomiting     Ondansetron Nausea and Vomiting     Other reaction(s): Headache     Penicillins Hives     Tape [Adhesive Tape]      blisters     Tylenol      Anxiety  Tablets only.       Social History -   Social History     Social History     Marital status: Single     Spouse name: N/A     Number of children: 4     Years of education: 15     Occupational History      Disabled     Social History Main Topics     Smoking status: Never Smoker     Smokeless tobacco: Never Used     Alcohol use No     Drug use: No     Sexual activity: No     Other Topics Concern     Parent/Sibling W/ Cabg, Mi Or Angioplasty Before 65f 55m? No      Service No     Blood Transfusions No     Caffeine Concern No     Occupational Exposure No     Hobby  Hazards No     Sleep Concern Yes     Stress Concern No     Weight Concern Yes     Special Diet Yes     diabetic diet     Back Care No     Exercise Yes     Bike Helmet No     no bike     Seat Belt Yes     Self-Exams No     Social History Narrative       Family History - unknown, she was a foster child    Review of Systems - As per HPI and PMHx, otherwise 7 system review of the head and neck negative.    Physical Exam  /79 (Cuff Size: Adult Large)  Pulse 60  Resp 18  Wt 85.3 kg (188 lb)  SpO2 98%  BMI 32.78 kg/m2  General - The patient is in no distress.  Alert and oriented to person and place, answers questions and cooperates with examination appropriately.   Voice and Breathing - The patient was breathing comfortably without the use of accessory muscles. There was no wheezing, stridor, or stertor.  The patients voice was clear and strong.  Ears - The auricles are normal. The tympanic membranes are normal in appearance, bony landmarks are intact.  No retraction, perforation, or masses.  No fluid or purulence was seen in the external canal or the middle ear. No evidence of infection of the middle ear or external canal, cerumen was normal in appearance.  Eyes - Extraocular movements intact.  Sclera were not icteric or injected.   Neck - Palpation of the occipital, submental, submandibular, internal jugular chain, and supraclavicular nodes did not demonstrate any abnormal lymph nodes or masses. Parotid glands had no masses. Palpation of the thyroid was soft and smooth, with no nodules or goiter appreciated.  The trachea was mobile and midline.  Neurological - Cranial nerves 2 through 12 were grossly intact. House-Brackmann grade 1 out of 6 bilaterally.       Audiologic Studies - An audiogram and tympanogram were performed today as part of the evaluation and personally reviewed. The tympanogram shows a normal Type A curve, with normal canal volume and middle ear pressure.  There is no sign of eustachian tube  dysfunction or middle ear effusion.  The audiogram showed a significant down-sloping mid to high frequency sensorineural hearing loss.       Assessment and Plan - Leah Guerrero is a 72 year old female who presents to me today with hearing loss.  This is most consistent with presbycusis, some noise exposure. I can find no evidence of serious CNS disorders or other complicating factors that could be causing this.  We spent the remainder of today's visit on education. We discussed hearing protection in noisy environments.    The patient will follow up as necessary for worsening symptoms or changes in symptoms. I have also recommended yearly audiograms, and consideration of a hearing aid evaluation.    Papo Nation MD  Otolaryngology  [facility]

## 2018-06-27 NOTE — LETTER
6/27/2018         RE: Leah Guerrero  [address]  [place] [facility] [address]        Dear Colleague,    Thank you for referring your patient, Leah Guerrero, to the [facility]. Please see a copy of my visit note below.    Chief Complaint - Hearing loss    History of Present Illness - Leah Guerrero is a 72 year old female who presents to me today with hearing loss in both ears, but feels left is a little worse.  It has been present and noticeable for approximately a long time.  It was gradual in onset.  The patient has noticed increased difficulty hearing certain sounds and difficulty in understanding others in noisy or crowded situations. Talks louder. There is no history of chronic ear disease or ear surgery.  With regards to recreational, , and work-related noise exposure had a lot with music when she was younger. She doesn't know family history. The patient notes some mild left otalgia, intermittently. H/o chemo for lung cancer.      Past Medical History -   Patient Active Problem List   Diagnosis     Female stress incontinence     Type 2 diabetes, HbA1C goal < 8% (H)     Fatigue     Hyperlipidemia LDL goal <100     Advanced directives, counseling/discussion     Intermittent asthma     Anxiety state     Pulmonary nodule, left     Abnormal CT of the chest     GERD (gastroesophageal reflux disease)     Mechanical low back pain     Radicular pain of left lower extremity     Insomnia     Contusion of knee     Prepatellar bursitis of left knee     IT band syndrome     Intermittent asthma without complication     Health Care Home     Obesity     Hypertension goal BP (blood pressure) < 140/90     Primary osteoarthritis of left knee     Idiopathic chronic pancreatitis (H)     Insomnia, unspecified type     Anxiety     Chronic pancreatitis, unspecified pancreatitis type (H)     Type 2 diabetes mellitus without complication, with long-term current use of insulin (H)  "    Malignant neoplasm of lung, unspecified laterality, unspecified part of lung (H)     Mild intermittent asthma without complication       Current Medications -   Current Outpatient Prescriptions:      ACE/ARB NOT PRESCRIBED, INTENTIONAL,, ACE & ARB not prescribed due to Allergy, Disp: , Rfl:      albuterol (2.5 MG/3ML) 0.083% nebulizer solution, Take 3 mLs (2.5 mg) by nebulization 4 times daily as needed, Disp: 1 Box, Rfl: 5     albuterol (PROAIR HFA/PROVENTIL HFA/VENTOLIN HFA) 108 (90 Base) MCG/ACT Inhaler, Inhale 2 puffs into the lungs every 6 hours as needed for shortness of breath / dyspnea or wheezing, Disp: 1 Inhaler, Rfl: 11     aspirin 81 MG tablet, Take 1 tablet by mouth daily. 1 daily, Disp: , Rfl:      BASAGLAR 100 UNIT/ML injection, 75 units at bedtime or as directed, Disp: 75 mL, Rfl: 3     blood glucose monitoring (ONE TOUCH ULTRASOFT) lancets, TEST 6 TIMES PER DAY, Disp: 600 each, Rfl: 0     blood glucose monitoring (ONETOUCH ULTRA) test strip, TEST 6 TIMES PER DAY, Disp: 600 strip, Rfl: 11     Calcium Carbonate-Vitamin D (CALCIUM-D PO), Take  by mouth. 1200mg calcium/600mg D3, Disp: , Rfl:      calcium polycarbophil (FIBERCON) 625 MG tablet, Take 2 tablets (1,250 mg) by mouth daily, Disp: 60 tablet, Rfl: 11     fish oil-omega-3 fatty acids (FISH OIL) 1000 MG capsule, Take 1 capsule by mouth daily., Disp: , Rfl:      Garlic CAPS, Take  by mouth. One daily, Disp: , Rfl:      ibuprofen (ADVIL,MOTRIN) 800 MG tablet, Take 1 tablet (800 mg) by mouth every 8 hours as needed for pain, Disp: 100 tablet, Rfl: 0     insulin aspart (NOVOLOG PEN) 100 UNIT/ML injection, Patient uses 100 units daily split up between multiple doses. Take 2 units per 15 grams of carbohydrate eaten and 2 units per 30 mg/dL above 170 mg/dL., Disp: 30 mL, Rfl: 1     Insulin Pen Needle (PEN NEEDLES 5/16\") 31G X 8 MM MISC, Patient does 4 doses novolog and 2 lantus shots  insulin daily.  Needs 180 needles per month. Okay refills for one " year., Disp: 180 each, Rfl: 11     LORazepam (ATIVAN) 0.5 MG tablet, Take 1 tablet (0.5 mg) by mouth every 8 hours as needed, Disp: 40 tablet, Rfl: 3     meperidine (DEMEROL) 50 MG tablet, Take 1 tablet (50 mg) by mouth every 4 hours as needed, Disp: 20 tablet, Rfl: 0     MULTIVITAMIN TABS   OR, 1 TABLET DAILY, Disp: , Rfl:      oxyCODONE-acetaminophen (PERCOCET) 5-325 MG per tablet, Take by mouth every 4 hours as needed for moderate to severe pain, Disp: , Rfl:      PARoxetine (PAXIL) 10 MG tablet, Take 1 tablet (10 mg) by mouth At Bedtime, Disp: 30 tablet, Rfl: 1     promethazine (PHENERGAN) 12.5 MG tablet, Take 12.5 mg by mouth as needed, Disp: , Rfl: 5     rosuvastatin (CRESTOR) 20 MG tablet, TAKE 1 TABLET(20 MG) BY MOUTH DAILY, Disp: 90 tablet, Rfl: 2     SALINE, to clean port every other month, Disp: , Rfl:      senna-docusate (SENOKOT-S;PERICOLACE) 8.6-50 MG per tablet, 1-2 po bid prn constipation, Disp: 100 tablet, Rfl: 1     tiZANidine (ZANAFLEX) 2 MG tablet, Take 1 tablet (2 mg) by mouth 3 times daily as needed for muscle spasms, Disp: 30 tablet, Rfl: 0     zolpidem (AMBIEN) 5 MG tablet, Take 1 tablet (5 mg) by mouth nightly as needed for sleep, Disp: 30 tablet, Rfl: 2    Allergies -   Allergies   Allergen Reactions     Hydralazine Shortness Of Breath     Famotidine Nausea and Vomiting and Unknown     Benadryl Itch Stopping      Gives her more energy, can't sleep     Lisinopril      Tongue swelling       Metoclopramide Nausea and Vomiting     Ondansetron Nausea and Vomiting     Other reaction(s): Headache     Penicillins Hives     Tape [Adhesive Tape]      blisters     Tylenol      Anxiety  Tablets only.       Social History -   Social History     Social History     Marital status: Single     Spouse name: N/A     Number of children: 4     Years of education: 15     Occupational History      Disabled     Social History Main Topics     Smoking status: Never Smoker     Smokeless tobacco: Never Used      Alcohol use No     Drug use: No     Sexual activity: No     Other Topics Concern     Parent/Sibling W/ Cabg, Mi Or Angioplasty Before 65f 55m? No      Service No     Blood Transfusions No     Caffeine Concern No     Occupational Exposure No     Hobby Hazards No     Sleep Concern Yes     Stress Concern No     Weight Concern Yes     Special Diet Yes     diabetic diet     Back Care No     Exercise Yes     Bike Helmet No     no bike     Seat Belt Yes     Self-Exams No     Social History Narrative       Family History - unknown, she was a foster child    Review of Systems - As per HPI and PMHx, otherwise 7 system review of the head and neck negative.    Physical Exam  /79 (Cuff Size: Adult Large)  Pulse 60  Resp 18  Wt 85.3 kg (188 lb)  SpO2 98%  BMI 32.78 kg/m2  General - The patient is in no distress.  Alert and oriented to person and place, answers questions and cooperates with examination appropriately.   Voice and Breathing - The patient was breathing comfortably without the use of accessory muscles. There was no wheezing, stridor, or stertor.  The patients voice was clear and strong.  Ears - The auricles are normal. The tympanic membranes are normal in appearance, bony landmarks are intact.  No retraction, perforation, or masses.  No fluid or purulence was seen in the external canal or the middle ear. No evidence of infection of the middle ear or external canal, cerumen was normal in appearance.  Eyes - Extraocular movements intact.  Sclera were not icteric or injected.   Neck - Palpation of the occipital, submental, submandibular, internal jugular chain, and supraclavicular nodes did not demonstrate any abnormal lymph nodes or masses. Parotid glands had no masses. Palpation of the thyroid was soft and smooth, with no nodules or goiter appreciated.  The trachea was mobile and midline.  Neurological - Cranial nerves 2 through 12 were grossly intact. House-Brackmann grade 1 out of 6 bilaterally.        Audiologic Studies - An audiogram and tympanogram were performed today as part of the evaluation and personally reviewed. The tympanogram shows a normal Type A curve, with normal canal volume and middle ear pressure.  There is no sign of eustachian tube dysfunction or middle ear effusion.  The audiogram showed a significant down-sloping mid to high frequency sensorineural hearing loss.       Assessment and Plan - Leah Guerrero is a 72 year old female who presents to me today with hearing loss.  This is most consistent with presbycusis, some noise exposure. I can find no evidence of serious CNS disorders or other complicating factors that could be causing this.  We spent the remainder of today's visit on education. We discussed hearing protection in noisy environments.    The patient will follow up as necessary for worsening symptoms or changes in symptoms. I have also recommended yearly audiograms, and consideration of a hearing aid evaluation.    Papo Nation MD  Otolaryngology  [facility]        Again, thank you for allowing me to participate in the care of your patient.        Sincerely,        Papo Nation MD

## 2018-06-27 NOTE — PROGRESS NOTES
AUDIOLOGY REPORT:    Patient was referred to Audiology from ENT by Papo Nation MD for a hearing examination. She reports decreased hearing, especially on the left ear.    Testing:    Otoscopy:   Otoscopic exam indicates ears are clear of cerumen bilaterally     Tympanograms:    RIGHT: normal eardrum mobility     LEFT:   restricted eardrum mobility       Thresholds:   Pure Tone Thresholds assessed using conventional audiometry with good  reliability from 250-8000 Hz bilaterally using insert earphones     RIGHT:  mild-moderate sensorineural hearing loss    LEFT:    mild and moderate-severe sensorineural hearing loss    Speech Reception Threshold:    RIGHT: 30 dB HL    LEFT:   35 dB HL    Word Recognition Score:     RIGHT: 92% at 70 dB HL using NU-6 recorded word list.    LEFT:   92% at 70 dB HL using NU-6 recorded word list.    Discussed results with the patient. If not medically contraindicated, patient is a good candidate for amplification.  I recommended she schedule a hearing aid consultation and gave her the [facility] hearing aid program packet.    Patient was returned to ENT for follow up.     Ish Sanchez MA, CCC-A  [place] Licensed Audiologist #[id number]  6/27/2018

## 2018-07-05 ENCOUNTER — OFFICE VISIT (OUTPATIENT)
Dept: FAMILY MEDICINE | Facility: CLINIC | Age: 73
End: 2018-07-05
Payer: COMMERCIAL

## 2018-07-05 VITALS
TEMPERATURE: 99.1 F | OXYGEN SATURATION: 97 % | WEIGHT: 188 LBS | HEART RATE: 85 BPM | DIASTOLIC BLOOD PRESSURE: 68 MMHG | BODY MASS INDEX: 32.78 KG/M2 | SYSTOLIC BLOOD PRESSURE: 152 MMHG

## 2018-07-05 DIAGNOSIS — E11.9 TYPE 2 DIABETES MELLITUS WITHOUT COMPLICATION, WITH LONG-TERM CURRENT USE OF INSULIN (H): ICD-10-CM

## 2018-07-05 DIAGNOSIS — Z79.4 TYPE 2 DIABETES MELLITUS WITHOUT COMPLICATION, WITH LONG-TERM CURRENT USE OF INSULIN (H): ICD-10-CM

## 2018-07-05 DIAGNOSIS — L82.0 INFLAMED SEBORRHEIC KERATOSIS: ICD-10-CM

## 2018-07-05 DIAGNOSIS — M54.2 NECK PAIN: ICD-10-CM

## 2018-07-05 DIAGNOSIS — I10 HYPERTENSION GOAL BP (BLOOD PRESSURE) < 140/90: Primary | ICD-10-CM

## 2018-07-05 PROCEDURE — 99213 OFFICE O/P EST LOW 20 MIN: CPT | Mod: 25 | Performed by: FAMILY MEDICINE

## 2018-07-05 PROCEDURE — 17110 DESTRUCTION B9 LES UP TO 14: CPT | Performed by: FAMILY MEDICINE

## 2018-07-05 RX ORDER — HYDROCHLOROTHIAZIDE 25 MG/1
25 TABLET ORAL DAILY
Qty: 30 TABLET | Refills: 1 | Status: SHIPPED | OUTPATIENT
Start: 2018-07-05 | End: 2018-08-24 | Stop reason: ALTCHOICE

## 2018-07-05 NOTE — PATIENT INSTRUCTIONS
Physical therapy for neck    The skin lesions will blister up and scab over, then in a few weeks should look better.  If not let us know    Start hydrochlorothiazide    In a few weeks do lab only visit

## 2018-07-05 NOTE — PROGRESS NOTES
SUBJECTIVE:   Leah Guerrero is a 72 year old female who presents to clinic today for the following health issues:       Neck pain  Check marks on arms    none    Problem list and histories reviewed & adjusted, as indicated.  Additional history: as documented         Reviewed and updated as needed this visit by clinical staff       Reviewed and updated as needed this visit by Provider          neck worse than usual    Patient here with Jeana dog    Pain sharp, pops occasionally      Goes to head    Not down arms    Bothering for a couple months now    In exercise class      Physical Exam   Constitutional: She is oriented to person, place, and time and well-developed, well-nourished, and in no distress. No distress.   HENT:   Head: Normocephalic and atraumatic.   Neck: Carotid bruit is not present.   Cardiovascular: Normal rate, regular rhythm, normal heart sounds and intact distal pulses.  Exam reveals no gallop and no friction rub.    No murmur heard.  Pulmonary/Chest: Effort normal and breath sounds normal.   Musculoskeletal: She exhibits edema (slight pitting).   Neurological: She is alert and oriented to person, place, and time.   Skin: She is not diaphoretic.   Psychiatric: Mood and affect normal.    on left lateral neck is area of tenderness on palpation; only slight subj discomfort over the back of neck    Good sensation and strength in distal upper extremities    On right upper arm are 4 irritated seborrheic keratoses.  She wanted these treated. With consent, froze in the usual fashion.     Range of motion of neck is fair; can't quite twist neck all the way to the left.  No radiculopathy.    ASSESSMENT / PLAN:  (I10) Hypertension goal BP (blood pressure) < 140/90  (primary encounter diagnosis)  Comment: start hctz to help blood pressure and swelling   Plan: hydrochlorothiazide (HYDRODIURIL) 25 MG tablet,        CBC with platelets differential, Comprehensive         metabolic panel             (E11.9,   Z79.4) Type 2 diabetes mellitus without complication, with long-term current use of insulin (H)  Comment: check this in a few weeks; patient had steroids last month so sugars real high for a while   Plan: Hemoglobin A1c             (M54.2) Neck pain  Comment: likely muscular  Plan: [facility], AND CHIROPRACTIC REFERRAL        Patient to call for physical therapy    (L82.0) Inflamed seborrheic keratosis  Comment: treated here   Plan: DESTRUCT BENIGN LESION, UP TO 14        Follow up prn       I reviewed the patient's medications, allergies, medical history, family history, and social history.    Kevin Esquivel MD

## 2018-07-05 NOTE — MR AVS SNAPSHOT
After Visit Summary   7/5/2018    Leah Guerrero    MRN: [id number]           Patient Information     Date Of Birth          1945        Visit Information        Provider Department      7/5/2018 11:20 AM Kevin Esquivel MD [facility]        Today's Diagnoses     Hypertension goal BP (blood pressure) < 140/90    -  1    Type 2 diabetes mellitus without complication, with long-term current use of insulin (H)        Neck pain          Care Instructions    Physical therapy for neck    The skin lesions will blister up and scab over, then in a few weeks should look better.  If not let us know    Start hydrochlorothiazide    In a few weeks do lab only visit           Follow-ups after your visit        Additional Services     [facility], AND CHIR[facility]RACTIC REFERRAL       **This order will print in the [facility] Scheduling Office**    Physical Therapy, Hand Therapy and Chiropractic Care are available through:    *[facility]  *[facility]  *[facility]    Call one number to schedule at any of the above locations: [phone].    Your provider has referred you to: Physical Therapy at [facility] or [facility]    Indication/Reason for Referral: neck pain  Onset of Illness: 2 months  Therapy Orders: Evaluate and Treat  Special Programs: None  Special Request: None    Aidan Bermudez      Additional Comments for the Therapist or Chiropractor: likely muscular pain     Please be aware that coverage of these services is subject to the terms and limitations of your health insurance plan.  Call member services at your health plan with any benefit or coverage questions.      Please bring the following to your appointment:    *Your personal calendar for scheduling future appointments  *Comfortable clothing                  Future tests that were ordered for you today     Open Future Orders        Priority Expected Expires Ordered    Hemoglobin A1c  Routine  12/5/2018 7/5/2018    CBC with platelets differential Routine  12/5/2018 7/5/2018    Comprehensive metabolic panel Routine  12/5/2018 7/5/2018            Who to contact     If you have questions or need follow up information about today's clinic visit or your schedule please contact [facility] directly at [phone].  Normal or non-critical lab and imaging results will be communicated to you by MyChart, letter or phone within 4 business days after the clinic has received the results. If you do not hear from us within 7 days, please contact the clinic through "[employer]hart or phone. If you have a critical or abnormal lab result, we will notify you by phone as soon as possible.  Submit refill requests through [employer] or call your pharmacy and they will forward the refill request to us. Please allow 3 business days for your refill to be completed.          Additional Information About Your Visit        "[employer] Information     [employer] gives you secure access to your electronic health record. If you see a primary care provider, you can also send messages to your care team and make appointments. If you have questions, please call your primary care clinic.  If you do not have a primary care provider, please call [phone] and they will assist you.        Care EveryWhere ID     This is your Care EveryWhere ID. This could be used by other organizations to access your [facility] medical records  [id number]        Your Vitals Were     Pulse Temperature Pulse Oximetry Breastfeeding? BMI (Body Mass Index)       85 99.1  F (37.3  C) (Oral) 97% No 32.78 kg/m2        Blood Pressure from Last 3 Encounters:   07/05/18 152/68   06/27/18 193/79   05/24/18 121/64    Weight from Last 3 Encounters:   07/05/18 188 lb (85.3 kg)   06/27/18 188 lb (85.3 kg)   05/24/18 188 lb (85.3 kg)              We Performed the Following     [facility], AND CH[facility]RACTIC REFERRAL          Today's Medication Changes           These changes are accurate as of 7/5/18 12:09 PM.  If you have any questions, ask your nurse or doctor.               Start taking these medicines.        Dose/Directions    hydrochlorothiazide 25 MG tablet   Commonly known as:  HYDRODIURIL   Used for:  Hypertension goal BP (blood pressure) < 140/90   Started by:  Kevin Esquivel MD        Dose:  25 mg   Take 1 tablet (25 mg) by mouth daily   Quantity:  30 tablet   Refills:  1            Where to get your medicines      These medications were sent to [facility] [id number] [place] [address] - [address]  [address], [facility] [place] [address]    Hours:  Test fax successful 12/11/02  KR Phone:  [phone]     hydrochlorothiazide 25 MG tablet                Primary Care Provider Office Phone # Fax #    Kevin Esquivel [phone]       [address] [address]        Goals        General    I will continue the exercises provided by physical therapy for my knee (pt-stated)     Notes - Note created  7/9/2015 10:30 AM by Ray Holland RN    As of today's date 7/9/2015 goal is met at 0 - 25%.   Goal Status:  Active        I will remember to take my insulin before meals especially lunch when I am  away from home. (pt-stated)     Notes - Note created  3/25/2015  3:51 PM by Ray Holland, RN      As of today's date 3/25/2015 goal is met at 0 - 25%.   Goal Status:  Active.          I will work on testing my blood sugar and taking my insulin when I am away from home at lunch time. (pt-stated)     Notes - Note created  5/20/2016  2:43 PM by Ray Holland, RN    As of today's date 5/20/2016 goal is met at 0 - 25%.   Goal Status:  Active          Equal Access to Services     [facility]: Sandie humphrieso Sotrae, waaxda luqadaha, qaybta kaalmada brinda, jude angela. So [facility] [phone].    ATENCIÓN: Si habla español, tiene a verduzco disposición servicios  ernesto de asistencia lingüística. Ese blancas [phone].    We comply with applicable federal civil rights laws and [place] laws. We do not discriminate on the basis of race, color, national origin, age, disability, sex, sexual orientation, or gender identity.            Thank you!     Thank you for choosing [facility]  for your care. Our goal is always to provide you with excellent care. Hearing back from our patients is one way we can continue to improve our services. Please take a few minutes to complete the written survey that you may receive in the mail after your visit with us. Thank you!             Your Updated Medication List - Protect others around you: Learn how to safely use, store and throw away your medicines at www.disposemymeds.org.          This list is accurate as of 7/5/18 12:09 PM.  Always use your most recent med list.                   Brand Name Dispense Instructions for use Diagnosis    ACE/ARB/ARNI NOT PRESCRIBED (INTENTIONAL)      ACE & ARB not prescribed due to Allergy    Type 2 diabetes mellitus without complication, with long-term current use of insulin (H)       * albuterol (2.5 MG/3ML) 0.083% neb solution     1 Box    Take 3 mLs (2.5 mg) by nebulization 4 times daily as needed    Intermittent asthma       * albuterol 108 (90 Base) MCG/ACT Inhaler    PROAIR HFA/PROVENTIL HFA/VENTOLIN HFA    1 Inhaler    Inhale 2 puffs into the lungs every 6 hours as needed for shortness of breath / dyspnea or wheezing    Mild intermittent asthma without complication       aspirin 81 MG tablet      Take 1 tablet by mouth daily. 1 daily        BASAGLAR 100 UNIT/ML injection     75 mL    75 units at bedtime or as directed    Type 2 diabetes mellitus without complication, with long-term current use of insulin (H)       blood glucose monitoring lancets     600 each    TEST 6 TIMES PER DAY    Type 2 diabetes mellitus without complication, with long-term current use of insulin (H)  "      blood glucose monitoring test strip    ONETOUCH ULTRA    600 strip    TEST 6 TIMES PER DAY    Type 2 diabetes mellitus without complication, with long-term current use of insulin (H)       calcium polycarbophil 625 MG tablet    FIBERCON    60 tablet    Take 2 tablets (1,250 mg) by mouth daily    Health care maintenance       CALCIUM-D PO      Take  by mouth. 1200mg calcium/600mg D3        fish oil-omega-3 fatty acids 1000 MG capsule      Take 1 capsule by mouth daily.        Garlic Caps      Take  by mouth. One daily        hydrochlorothiazide 25 MG tablet    HYDRODIURIL    30 tablet    Take 1 tablet (25 mg) by mouth daily    Hypertension goal BP (blood pressure) < 140/90       ibuprofen 800 MG tablet    ADVIL/MOTRIN    100 tablet    Take 1 tablet (800 mg) by mouth every 8 hours as needed for pain    Arthritis       insulin aspart 100 UNIT/ML injection    NovoLOG PEN    30 mL    Patient uses 100 units daily split up between multiple doses. Take 2 units per 15 grams of carbohydrate eaten and 2 units per 30 mg/dL above 170 mg/dL.    Type 2 diabetes mellitus without complication, with long-term current use of insulin (H)       LORazepam 0.5 MG tablet    ATIVAN    40 tablet    Take 1 tablet (0.5 mg) by mouth every 8 hours as needed    Anxiety       meperidine 50 MG tablet    DEMEROL    20 tablet    Take 1 tablet (50 mg) by mouth every 4 hours as needed    Idiopathic chronic pancreatitis (H)       MULTIVITAMIN TABS   OR      1 TABLET DAILY        oxyCODONE-acetaminophen 5-325 MG per tablet    PERCOCET     Take by mouth every 4 hours as needed for moderate to severe pain        PARoxetine 10 MG tablet    PAXIL    30 tablet    Take 1 tablet (10 mg) by mouth At Bedtime    Anxiety       pen needles 5/16\" 31G X 8 MM Misc     180 each    Patient does 4 doses novolog and 2 lantus shots  insulin daily.  Needs 180 needles per month. Okay refills for one year.    Type 2 diabetes mellitus without complication, with long-term " current use of insulin (H)       promethazine 12.5 MG tablet    PHENERGAN     Take 12.5 mg by mouth as needed        rosuvastatin 20 MG tablet    CRESTOR    90 tablet    TAKE 1 TABLET(20 MG) BY MOUTH DAILY    Hyperlipidemia LDL goal <100       SALINE      to clean port every other month    Bronchitis       senna-docusate 8.6-50 MG per tablet    SENOKOT-S;PERICOLACE    100 tablet    1-2 po bid prn constipation    Constipation, unspecified constipation type       tiZANidine 2 MG tablet    ZANAFLEX    30 tablet    Take 1 tablet (2 mg) by mouth 3 times daily as needed for muscle spasms    Muscle pain       zolpidem 5 MG tablet    AMBIEN    30 tablet    Take 1 tablet (5 mg) by mouth nightly as needed for sleep    Insomnia, unspecified type       * Notice:  This list has 2 medication(s) that are the same as other medications prescribed for you. Read the directions carefully, and ask your doctor or other care provider to review them with you.

## 2018-07-06 ASSESSMENT — ASTHMA QUESTIONNAIRES: ACT_TOTALSCORE: 20

## 2018-07-10 ENCOUNTER — THERAPY VISIT (OUTPATIENT)
Dept: PHYSICAL THERAPY | Facility: CLINIC | Age: 73
End: 2018-07-10
Payer: COMMERCIAL

## 2018-07-10 DIAGNOSIS — M54.2 CERVICALGIA: Primary | ICD-10-CM

## 2018-07-10 PROCEDURE — G0283 ELEC STIM OTHER THAN WOUND: HCPCS | Mod: GP | Performed by: PHYSICAL THERAPIST

## 2018-07-10 PROCEDURE — G8985 CARRY GOAL STATUS: HCPCS | Mod: GP | Performed by: PHYSICAL THERAPIST

## 2018-07-10 PROCEDURE — 97110 THERAPEUTIC EXERCISES: CPT | Mod: GP | Performed by: PHYSICAL THERAPIST

## 2018-07-10 PROCEDURE — 97161 PT EVAL LOW COMPLEX 20 MIN: CPT | Mod: GP | Performed by: PHYSICAL THERAPIST

## 2018-07-10 PROCEDURE — G8984 CARRY CURRENT STATUS: HCPCS | Mod: GP | Performed by: PHYSICAL THERAPIST

## 2018-07-10 NOTE — PROGRESS NOTES
"[facility] Initial Evaluation  Subjective:  Patient is a 72 year old female presenting with rehab cervical spine hpi. The history is provided by the patient.   Leah Guerrero is a 72 year old female with a cervical spine condition.  Condition occurred with:  Other reason.  Condition occurred: at home.  This is a new condition  Pt reports that after a surgery last year to remove a lymph node in her neck, posterior left she has had pain/tightness. She reports HAs, tightness worsening in the last few months. Now the pain is severe, very limiting and highly sensative to the touch.  Small 1 \" scar at C4 left para.  .    Patient reports pain:  Cervical left side.    Pain is described as sharp and aching and is intermittent and reported as 3/10.  Associated symptoms:  Loss of motion/stiffness and headache. Pain is the same all the time.  Symptoms are exacerbated by change of position, looking up or down and driving   Since onset symptoms are unchanged.  Special tests:  Other and MRI.      General health as reported by patient is good.  Pertinent medical history includes:  Cancer, overweight and diabetes.    Other surgeries include:  Cancer surgery and orthopedic surgery.  Current medications:  Sleep medication and pain medication.  Current occupation is Retired .        Barriers include:  None as reported by the patient.    Red flags:  None as reported by the patient.                        Objective:  System              Cervical/Thoracic Evaluation    AROM:  AROM Cervical:    Flexion:          Nil   Extension:       Retrcn 50% , ext 50% ++++   Rotation:         Left: 60% loss +++      Right: nil   Side Bend:      Left:     Right:       Headaches: cervical  Cervical Myotomes:  not assessed                  DTR's:  not assessed          Cervical Dermatomes:  not assessed                    Cervical Palpation:    Tenderness present at Left:    Scalenes; Upper Trap; Levator; Erector Spinae and " Suboccipitals  Tenderness present at Right:    Upper Trap        Cord Sign:  not assessed                                            General     ROS    Assessment/Plan:    Patient is a 72 year old female with cervical complaints.    Patient has the following significant findings with corresponding treatment plan.                Diagnosis 1:  Neck pain   Pain -  hot/cold therapy, US, electric stimulation, manual therapy, self management and home program  Decreased ROM/flexibility - manual therapy, therapeutic exercise and home program  Decreased joint mobility - manual therapy, therapeutic exercise and home program  Decreased proprioception - neuro re-education, therapeutic activities and home program  Inflammation - cold therapy, US and electric stimulation  Impaired muscle performance - neuro re-education and home program  Decreased function - therapeutic activities    Therapy Evaluation Codes:   1) History comprised of:   Personal factors that impact the plan of care:      Coping style, Overall behavior pattern and Past/current experiences.    Comorbidity factors that impact the plan of care are:      Cancer, Diabetes, Implanted device, Osteoarthritis and Overweight.     Medications impacting care: Pain and Sleep.  2) Examination of Body Systems comprised of:   Body structures and functions that impact the plan of care:      Cervical spine.   Activity limitations that impact the plan of care are:      Bathing, Driving, Dressing, Lifting, Reading/Computer work and Sleeping.  3) Clinical presentation characteristics are:   Stable/Uncomplicated.  4) Decision-Making    Low complexity using standardized patient assessment instrument and/or measureable assessment of functional outcome.  Cumulative Therapy Evaluation is: Low complexity.    Previous and current functional limitations:  (See Goal Flow Sheet for this information)    Short term and Long term goals: (See Goal Flow Sheet for this information)     Communication  ability:  Patient appears to be able to clearly communicate and understand verbal and written communication and follow directions correctly.  Treatment Explanation - The following has been discussed with the patient:   RX ordered/plan of care  Anticipated outcomes  Possible risks and side effects  This patient would benefit from PT intervention to resume normal activities.   Rehab potential is good.    Frequency:  1 X week, once daily  Duration:  for 6 weeks  Discharge Plan:  Achieve all LTG.  Independent in home treatment program.  Reach maximal therapeutic benefit.    Please refer to the daily flowsheet for treatment today, total treatment time and time spent performing 1:1 timed codes.

## 2018-07-10 NOTE — LETTER
"[facility]  [address]  [address]  [place] [address]  [phone]    2018    Re: Leah Guerrero   :   1945  MRN:  [id number]   REFERRING PHYSICIAN:   MD [facility]  Date of Initial Evaluation:  7/10/2018  Visits:  Rxs Used: 1  Reason for Referral:  Cervicalgia    EVALUATION SUMMARY    [facility] Initial Evaluation  Subjective:  Patient is a 72 year old female presenting with rehab cervical spine hpi. The history is provided by the patient.   Leah Guerrero is a 72 year old female with a cervical spine condition.  Condition occurred with:  Other reason.  Condition occurred: at home.  This is a new condition  Pt reports that after a surgery last year to remove a lymph node in her neck, posterior left she has had pain/tightness. She reports HAs, tightness worsening in the last few months. Now the pain is severe, very limiting and highly sensative to the touch.  Small 1 \" scar at C4 left para.    Patient reports pain:  Cervical left side. Pain is described as sharp and aching and is intermittent and reported as 3/10.  Associated symptoms:  Loss of motion/stiffness and headache. Pain is the same all the time. Symptoms are exacerbated by change of position, looking up or down and driving  Since onset symptoms are unchanged.  Special tests:  Other and MRI.  General health as reported by patient is good.  Pertinent medical history includes:  Cancer, overweight and diabetes.    Other surgeries include:  Cancer surgery and orthopedic surgery.  Current medications:  Sleep medication and pain medication.  Current occupation is Retired .        Barriers include:  None as reported by the patient.  Red flags:  None as reported by the patient.              Objective:  Cervical/Thoracic Evaluation  AROM:  AROM Cervical:  Flexion:          Nil   Extension:       Retrcn 50% , ext 50% ++++   Rotation:         Left: 60% loss +++      Right: nil "   Side Bend:      Left:     Right:     Headaches: cervical  Cervical Myotomes:  not assessed  DTR's:  not assessed  Re: Leah Guerrero   :   1945    Cervical Dermatomes:  not assessed    Cervical Palpation:    Tenderness present at Left:    Scalenes; Upper Trap; Levator; Erector Spinae and Suboccipitals  Tenderness present at Right:    Upper Trap    Cord Sign:  not assessed    Assessment/Plan:    Patient is a 72 year old female with cervical complaints.    Patient has the following significant findings with corresponding treatment plan.                Diagnosis 1:  Neck pain   Pain -  hot/cold therapy, US, electric stimulation, manual therapy, self management and home program  Decreased ROM/flexibility - manual therapy, therapeutic exercise and home program  Decreased joint mobility - manual therapy, therapeutic exercise and home program  Decreased proprioception - neuro re-education, therapeutic activities and home program  Inflammation - cold therapy, US and electric stimulation  Impaired muscle performance - neuro re-education and home program  Decreased function - therapeutic activities    Therapy Evaluation Codes:   1) History comprised of:   Personal factors that impact the plan of care:      Coping style, Overall behavior pattern and Past/current experiences.    Comorbidity factors that impact the plan of care are:      Cancer, Diabetes, Implanted device, Osteoarthritis and Overweight.     Medications impacting care: Pain and Sleep.  2) Examination of Body Systems comprised of:   Body structures and functions that impact the plan of care:      Cervical spine.   Activity limitations that impact the plan of care are:      Bathing, Driving, Dressing, Lifting, Reading/Computer work and Sleeping.  3) Clinical presentation characteristics are:   Stable/Uncomplicated.  4) Decision-Making    Low complexity using standardized patient assessment instrument and/or measureable assessment of functional  outcome.  Cumulative Therapy Evaluation is: Low complexity.    Previous and current functional limitations:  (See Goal Flow Sheet for this information)    Short term and Long term goals: (See Goal Flow Sheet for this information)     Communication ability:  Patient appears to be able to clearly communicate and understand verbal and written communication and follow directions correctly.  Treatment Explanation - The following has been discussed with the patient:   RX ordered/plan of care  Anticipated outcomes  Possible risks and side effects  This patient would benefit from PT intervention to resume normal activities.   Re: Leah Guerrero   :   1945    Rehab potential is good.    Frequency:  1 X week, once daily  Duration:  for 6 weeks  Discharge Plan:  Achieve all LTG.  Independent in home treatment program.  Reach maximal therapeutic benefit.    Please refer to the daily flowsheet for treatment today, total treatment time and time spent performing 1:1 timed codes.         Thank you for your referral.    INQUIRIES  Therapist: Les Rivera PT  [facility]  [address] [address]  [place] [address]  Phone: [phone]  Fax: [phone]

## 2018-07-10 NOTE — MR AVS SNAPSHOT
After Visit Summary   7/10/2018    Leah Guerrero    MRN: [id number]           Patient Information     Date Of Birth          1945        Visit Information        Provider Department      7/10/2018 2:40 PM Les Rivera, PT [facility]        Today's Diagnoses     Cervicalgia    -  1       Follow-ups after your visit        Who to contact     If you have questions or need follow up information about today's clinic visit or your schedule please contact [facility] directly at [phone].  Normal or non-critical lab and imaging results will be communicated to you by [employer], letter or phone within 4 business days after the clinic has received the results. If you do not hear from us within 7 days, please contact the clinic through [employer] or phone. If you have a critical or abnormal lab result, we will notify you by phone as soon as possible.  Submit refill requests through [employer] or call your pharmacy and they will forward the refill request to us. Please allow 3 business days for your refill to be completed.          Additional Information About Your Visit        MyChart Information     [employer] gives you secure access to your electronic health record. If you see a primary care provider, you can also send messages to your care team and make appointments. If you have questions, please call your primary care clinic.  If you do not have a primary care provider, please call [phone] and they will assist you.        Care EveryWhere ID     This is your Care EveryWhere ID. This could be used by other organizations to access your [place] medical records  [id number]         Blood Pressure from Last 3 Encounters:   07/05/18 152/68   06/27/18 193/79   05/24/18 121/64    Weight from Last 3 Encounters:   07/05/18 85.3 kg (188 lb)   06/27/18 85.3 kg (188 lb)   05/24/18 85.3 kg (188 lb)              We Performed the Following     ELECTRIC STIMULATION THERAPY     HC PT EVAL, LOW  COMPLEXITY     [facility] INITIAL EVAL REPORT     THERAPEUTIC EXERCISES        Primary Care Provider Office Phone # Fax #    Kevin Esquivel [phone]       [address] [facility] [address]        Goals        General    I will continue the exercises provided by physical therapy for my knee (pt-stated)     Notes - Note created  7/9/2015 10:30 AM by Ray Holland RN    As of today's date 7/9/2015 goal is met at 0 - 25%.   Goal Status:  Active        I will remember to take my insulin before meals especially lunch when I am  away from home. (pt-stated)     Notes - Note created  3/25/2015  3:51 PM by Ray Holland RN      As of today's date 3/25/2015 goal is met at 0 - 25%.   Goal Status:  Active.          I will work on testing my blood sugar and taking my insulin when I am away from home at lunch time. (pt-stated)     Notes - Note created  5/20/2016  2:43 PM by Ray Holland RN    As of today's date 5/20/2016 goal is met at 0 - 25%.   Goal Status:  Active          Equal Access to Services     [facility]: Hadii aad ku hadasho Sokellieali, waaxda luqadaha, qaybta kaalmada adeadamyamaxine, jude livingston . So [facility] [phone].    ATENCIÓN: Si habla español, tiene a verduzoc disposición servicios gratuitos de asistencia lingüística. Llame al [phone].    We comply with applicable federal civil rights laws and [place] laws. We do not discriminate on the basis of race, color, national origin, age, disability, sex, sexual orientation, or gender identity.            Thank you!     Thank you for choosing [facility]  for your care. Our goal is always to provide you with excellent care. Hearing back from our patients is one way we can continue to improve our services. Please take a few minutes to complete the written survey that you may receive in the mail after your visit with us. Thank you!             Your Updated Medication List - Protect others around you: Learn how to  safely use, store and throw away your medicines at www.disposemymeds.org.          This list is accurate as of 7/10/18  3:40 PM.  Always use your most recent med list.                   Brand Name Dispense Instructions for use Diagnosis    ACE/ARB/ARNI NOT PRESCRIBED (INTENTIONAL)      ACE & ARB not prescribed due to Allergy    Type 2 diabetes mellitus without complication, with long-term current use of insulin (H)       * albuterol (2.5 MG/3ML) 0.083% neb solution     1 Box    Take 3 mLs (2.5 mg) by nebulization 4 times daily as needed    Intermittent asthma       * albuterol 108 (90 Base) MCG/ACT Inhaler    PROAIR HFA/PROVENTIL HFA/VENTOLIN HFA    1 Inhaler    Inhale 2 puffs into the lungs every 6 hours as needed for shortness of breath / dyspnea or wheezing    Mild intermittent asthma without complication       aspirin 81 MG tablet      Take 1 tablet by mouth daily. 1 daily        BASAGLAR 100 UNIT/ML injection     75 mL    75 units at bedtime or as directed    Type 2 diabetes mellitus without complication, with long-term current use of insulin (H)       blood glucose monitoring lancets     600 each    TEST 6 TIMES PER DAY    Type 2 diabetes mellitus without complication, with long-term current use of insulin (H)       blood glucose monitoring test strip    ONETOUCH ULTRA    600 strip    TEST 6 TIMES PER DAY    Type 2 diabetes mellitus without complication, with long-term current use of insulin (H)       calcium polycarbophil 625 MG tablet    FIBERCON    60 tablet    Take 2 tablets (1,250 mg) by mouth daily    Health care maintenance       CALCIUM-D PO      Take  by mouth. 1200mg calcium/600mg D3        fish oil-omega-3 fatty acids 1000 MG capsule      Take 1 capsule by mouth daily.        Garlic Caps      Take  by mouth. One daily        hydrochlorothiazide 25 MG tablet    HYDRODIURIL    30 tablet    Take 1 tablet (25 mg) by mouth daily    Hypertension goal BP (blood pressure) < 140/90       ibuprofen 800 MG  "tablet    ADVIL/MOTRIN    100 tablet    Take 1 tablet (800 mg) by mouth every 8 hours as needed for pain    Arthritis       insulin aspart 100 UNIT/ML injection    NovoLOG PEN    30 mL    Patient uses 100 units daily split up between multiple doses. Take 2 units per 15 grams of carbohydrate eaten and 2 units per 30 mg/dL above 170 mg/dL.    Type 2 diabetes mellitus without complication, with long-term current use of insulin (H)       LORazepam 0.5 MG tablet    ATIVAN    40 tablet    Take 1 tablet (0.5 mg) by mouth every 8 hours as needed    Anxiety       meperidine 50 MG tablet    DEMEROL    20 tablet    Take 1 tablet (50 mg) by mouth every 4 hours as needed    Idiopathic chronic pancreatitis (H)       MULTIVITAMIN TABS   OR      1 TABLET DAILY        oxyCODONE-acetaminophen 5-325 MG per tablet    PERCOCET     Take by mouth every 4 hours as needed for moderate to severe pain        PARoxetine 10 MG tablet    PAXIL    30 tablet    Take 1 tablet (10 mg) by mouth At Bedtime    Anxiety       pen needles 5/16\" 31G X 8 MM Misc     180 each    Patient does 4 doses novolog and 2 lantus shots  insulin daily.  Needs 180 needles per month. Okay refills for one year.    Type 2 diabetes mellitus without complication, with long-term current use of insulin (H)       promethazine 12.5 MG tablet    PHENERGAN     Take 12.5 mg by mouth as needed        rosuvastatin 20 MG tablet    CRESTOR    90 tablet    TAKE 1 TABLET(20 MG) BY MOUTH DAILY    Hyperlipidemia LDL goal <100       SALINE      to clean port every other month    Bronchitis       senna-docusate 8.6-50 MG per tablet    SENOKOT-S;PERICOLACE    100 tablet    1-2 po bid prn constipation    Constipation, unspecified constipation type       tiZANidine 2 MG tablet    ZANAFLEX    30 tablet    Take 1 tablet (2 mg) by mouth 3 times daily as needed for muscle spasms    Muscle pain       zolpidem 5 MG tablet    AMBIEN    30 tablet    Take 1 tablet (5 mg) by mouth nightly as needed for " sleep    Insomnia, unspecified type       * Notice:  This list has 2 medication(s) that are the same as other medications prescribed for you. Read the directions carefully, and ask your doctor or other care provider to review them with you.

## 2018-07-18 ENCOUNTER — RADIANT APPOINTMENT (OUTPATIENT)
Dept: GENERAL RADIOLOGY | Facility: CLINIC | Age: 73
End: 2018-07-18
Attending: PHYSICIAN ASSISTANT
Payer: COMMERCIAL

## 2018-07-18 ENCOUNTER — OFFICE VISIT (OUTPATIENT)
Dept: ORTHOPEDICS | Facility: CLINIC | Age: 73
End: 2018-07-18
Payer: COMMERCIAL

## 2018-07-18 VITALS
BODY MASS INDEX: 32.15 KG/M2 | DIASTOLIC BLOOD PRESSURE: 73 MMHG | SYSTOLIC BLOOD PRESSURE: 150 MMHG | WEIGHT: 184.4 LBS | RESPIRATION RATE: 16 BRPM

## 2018-07-18 DIAGNOSIS — M17.12 PRIMARY OSTEOARTHRITIS OF LEFT KNEE: Primary | ICD-10-CM

## 2018-07-18 DIAGNOSIS — M17.12 PRIMARY OSTEOARTHRITIS OF LEFT KNEE: ICD-10-CM

## 2018-07-18 PROCEDURE — 99214 OFFICE O/P EST MOD 30 MIN: CPT | Performed by: ORTHOPAEDIC SURGERY

## 2018-07-18 PROCEDURE — 73562 X-RAY EXAM OF KNEE 3: CPT | Mod: LT

## 2018-07-18 ASSESSMENT — PAIN SCALES - GENERAL: PAINLEVEL: EXTREME PAIN (8)

## 2018-07-18 NOTE — MR AVS SNAPSHOT
After Visit Summary   7/18/2018    Leah Guerrero    MRN: [id number]           Patient Information     Date Of Birth          1945        Visit Information        Provider Department      7/18/2018 9:30 AM José Vogel MD [facility]        Today's Diagnoses     Primary osteoarthritis of left knee    -  1      Care Instructions    Patient to follow up with Primary Care provider regarding elevated blood pressure.            Follow-ups after your visit        Follow-up notes from your care team     Return for Postop Visit.      Your next 10 appointments already scheduled     Jul 19, 2018 12:40 PM CDT   [facility] with Subha Correa, PTA   [facility] ([facility]    [address] [facility] [address]  [place] [address]   [phone]            Jul 31, 2018 11:00 AM CDT   New Visit with Saba Lewis   [facility] ([facility]    [address] [facility] [address]   [phone]              Who to contact     If you have questions or need follow up information about today's clinic visit or your schedule please contact [facility] directly at [phone].  Normal or non-critical lab and imaging results will be communicated to you by MyChart, letter or phone within 4 business days after the clinic has received the results. If you do not hear from us within 7 days, please contact the clinic through MyChart or phone. If you have a critical or abnormal lab result, we will notify you by phone as soon as possible.  Submit refill requests through e-Tag or call your pharmacy and they will forward the refill request to us. Please allow 3 business days for your refill to be completed.          Additional Information About Your Visit        Dizmohart Information     e-Tag gives you secure access to your electronic health record. If you see a primary care provider, you can also send messages to your care team and  make appointments. If you have questions, please call your primary care clinic.  If you do not have a primary care provider, please call [phone] and they will assist you.        Care EveryWhere ID     This is your Care EveryWhere ID. This could be used by other organizations to access your [place] medical records  [id number]        Your Vitals Were     Respirations BMI (Body Mass Index)                16 32.15 kg/m2           Blood Pressure from Last 3 Encounters:   07/18/18 150/73   07/05/18 152/68   06/27/18 193/79    Weight from Last 3 Encounters:   07/18/18 184 lb 6.4 oz (83.6 kg)   07/05/18 188 lb (85.3 kg)   06/27/18 188 lb (85.3 kg)               Primary Care Provider Office Phone # Fax #    Kevin Esquivel [phone]       [address] [facility] [address]        Goals        General    I will continue the exercises provided by physical therapy for my knee (pt-stated)     Notes - Note created  7/9/2015 10:30 AM by Ray Holland, JEANIE    As of today's date 7/9/2015 goal is met at 0 - 25%.   Goal Status:  Active        I will remember to take my insulin before meals especially lunch when I am  away from home. (pt-stated)     Notes - Note created  3/25/2015  3:51 PM by Ray Holland, RN      As of today's date 3/25/2015 goal is met at 0 - 25%.   Goal Status:  Active.          I will work on testing my blood sugar and taking my insulin when I am away from home at lunch time. (pt-stated)     Notes - Note created  5/20/2016  2:43 PM by Ray Holland, RN    As of today's date 5/20/2016 goal is met at 0 - 25%.   Goal Status:  Active          Equal Access to Services     [facility] LYRIC : Hadstan Evans, wakevinda amee, qaybta kaalmajude rangel. [facility] [phone].    ATENCIÓN: Si habla español, tiene a verduzco disposición servicios gratuitos de asistencia lingüística. Ese blancas [phone].    We comply with applicable  federal civil rights laws and [place] laws. We do not discriminate on the basis of race, color, national origin, age, disability, sex, sexual orientation, or gender identity.            Thank you!     Thank you for choosing [facility]  for your care. Our goal is always to provide you with excellent care. Hearing back from our patients is one way we can continue to improve our services. Please take a few minutes to complete the written survey that you may receive in the mail after your visit with us. Thank you!             Your Updated Medication List - Protect others around you: Learn how to safely use, store and throw away your medicines at www.disposemymeds.org.          This list is accurate as of 7/18/18  2:45 PM.  Always use your most recent med list.                   Brand Name Dispense Instructions for use Diagnosis    ACE/ARB/ARNI NOT PRESCRIBED (INTENTIONAL)      ACE & ARB not prescribed due to Allergy    Type 2 diabetes mellitus without complication, with long-term current use of insulin (H)       * albuterol (2.5 MG/3ML) 0.083% neb solution     1 Box    Take 3 mLs (2.5 mg) by nebulization 4 times daily as needed    Intermittent asthma       * albuterol 108 (90 Base) MCG/ACT Inhaler    PROAIR HFA/PROVENTIL HFA/VENTOLIN HFA    1 Inhaler    Inhale 2 puffs into the lungs every 6 hours as needed for shortness of breath / dyspnea or wheezing    Mild intermittent asthma without complication       aspirin 81 MG tablet      Take 1 tablet by mouth daily. 1 daily        BASAGLAR 100 UNIT/ML injection     75 mL    75 units at bedtime or as directed    Type 2 diabetes mellitus without complication, with long-term current use of insulin (H)       blood glucose monitoring lancets     600 each    TEST 6 TIMES PER DAY    Type 2 diabetes mellitus without complication, with long-term current use of insulin (H)       blood glucose monitoring test strip    ONETOUCH ULTRA    600 strip    TEST 6 TIMES PER DAY  "   Type 2 diabetes mellitus without complication, with long-term current use of insulin (H)       calcium polycarbophil 625 MG tablet    FIBERCON    60 tablet    Take 2 tablets (1,250 mg) by mouth daily    Health care maintenance       CALCIUM-D PO      Take  by mouth. 1200mg calcium/600mg D3        fish oil-omega-3 fatty acids 1000 MG capsule      Take 1 capsule by mouth daily.        Garlic Caps      Take  by mouth. One daily        hydrochlorothiazide 25 MG tablet    HYDRODIURIL    30 tablet    Take 1 tablet (25 mg) by mouth daily    Hypertension goal BP (blood pressure) < 140/90       ibuprofen 800 MG tablet    ADVIL/MOTRIN    100 tablet    Take 1 tablet (800 mg) by mouth every 8 hours as needed for pain    Arthritis       insulin aspart 100 UNIT/ML injection    NovoLOG PEN    30 mL    Patient uses 100 units daily split up between multiple doses. Take 2 units per 15 grams of carbohydrate eaten and 2 units per 30 mg/dL above 170 mg/dL.    Type 2 diabetes mellitus without complication, with long-term current use of insulin (H)       LORazepam 0.5 MG tablet    ATIVAN    40 tablet    Take 1 tablet (0.5 mg) by mouth every 8 hours as needed    Anxiety       meperidine 50 MG tablet    DEMEROL    20 tablet    Take 1 tablet (50 mg) by mouth every 4 hours as needed    Idiopathic chronic pancreatitis (H)       MULTIVITAMIN TABS   OR      1 TABLET DAILY        oxyCODONE-acetaminophen 5-325 MG per tablet    PERCOCET     Take by mouth every 4 hours as needed for moderate to severe pain        PARoxetine 10 MG tablet    PAXIL    30 tablet    Take 1 tablet (10 mg) by mouth At Bedtime    Anxiety       pen needles 5/16\" 31G X 8 MM Misc     180 each    Patient does 4 doses novolog and 2 lantus shots  insulin daily.  Needs 180 needles per month. Okay refills for one year.    Type 2 diabetes mellitus without complication, with long-term current use of insulin (H)       promethazine 12.5 MG tablet    PHENERGAN     Take 12.5 mg by " mouth as needed        rosuvastatin 20 MG tablet    CRESTOR    90 tablet    TAKE 1 TABLET(20 MG) BY MOUTH DAILY    Hyperlipidemia LDL goal <100       SALINE      to clean port every other month    Bronchitis       senna-docusate 8.6-50 MG per tablet    SENOKOT-S;PERICOLACE    100 tablet    1-2 po bid prn constipation    Constipation, unspecified constipation type       tiZANidine 2 MG tablet    ZANAFLEX    30 tablet    Take 1 tablet (2 mg) by mouth 3 times daily as needed for muscle spasms    Muscle pain       zolpidem 5 MG tablet    AMBIEN    30 tablet    Take 1 tablet (5 mg) by mouth nightly as needed for sleep    Insomnia, unspecified type       * Notice:  This list has 2 medication(s) that are the same as other medications prescribed for you. Read the directions carefully, and ask your doctor or other care provider to review them with you.

## 2018-07-18 NOTE — LETTER
"    7/18/2018         RE: Leah Guerrero  [address]  [facility] [address]        Dear Colleague,    Thank you for referring your patient, Leah Guerrero, to the [facility]. Please see a copy of my visit note below.    Chief Complaint   Patient presents with     Left Knee - Pain     Left knee pain/oa. Here for TOTAL KNEE ARTHROPLASTY final consult.       HISTORY OF PRESENT ILLNESS:  Leah \"Fina\" ALEK Guerrero is a 72 year old female seen for follow up chronic left knee pain, advanced primary osteoarthritis. She had prior left knee arthroscopy on 10/10/2014. She returns today with extreme pain today, rated a 8/10. She can only be up an hour before having pain. Today her pain is located over the medial and lateral aspects of the knee as well as the posterior knee. Most of her pain comes on with getting up and walking on it. She would like to discuss total knee arthroplasty surgery today.    She has done Physical Therapy, injections (cortisone and viscosupplementation), medications (ibuprofen,  opioids), activity modification in the past.        Past Medical History:   Diagnosis Date     Abnormal CT of the chest 10/12    Nodular consolidation right lobe 1.6 cm. Needs repeat CT Jan 2013     Bleeding disorder (H)     in bowels x2      Chronic pancreatitis (H)      CVA (cerebral infarction)      Diabetes      Female stress incontinence      Hyperlipidemia LDL goal <100      Hypertension      Intermittent asthma 12/30/2011     Lateral epicondylitis (tennis elbow) - left 3/30/2011     OA (osteoarthritis) of knee 3/22/2013     Pulmonary nodule, left 10/12    0.5 cm; needs f/u chest CT scan Jan 2013     Surgical complications      Unspecified asthma(493.90)      Patient Active Problem List    Diagnosis Date Noted     Cervicalgia 07/10/2018     Priority: Medium     Mild intermittent asthma without complication 02/22/2018     Priority: Medium     Malignant neoplasm of lung, " unspecified laterality, unspecified part of lung (H) 2017     Priority: Medium     Chronic pancreatitis, unspecified pancreatitis type (H) 2016     Priority: Medium     Type 2 diabetes mellitus without complication, with long-term current use of insulin (H) 2016     Priority: Medium     Anxiety 2016     Priority: Medium     Insomnia, unspecified type 2016     Priority: Medium     Idiopathic chronic pancreatitis (H) 2015     Priority: Medium     Primary osteoarthritis of left knee 10/28/2015     Priority: Medium     Hypertension goal BP (blood pressure) < 140/90 2015     Priority: Medium     Obesity 2015     Priority: Medium     Health Care Home 10/22/2014     Priority: Medium     [facility]   Guerline Zhao RN  [phone]    [facility] CARE PLAN SUMMARY    Client Name:  Leah Guerrero  Address:  [address] [place] [address] Phone: [phone] (home)    :  1945 Date of Assessment: HRA telephone assessment 18,  Previous LTCC 17   Health Plan:  [employer]  Health Plan Number: [id number] Medical Assistance Number: [id number]  Financial Worker:  Savanna Collins  [phone]  Case #:  [id number]   [facility] :  Guerline Zhao RN  Phone:  [phone]   Fax:  [phone]   [facility] Enrollment Date: 18 Case Mix:  L  Rate Cell:  B  Waiver Type: EW    Emergency Contact:   Primary Emergency Contact: Paul GASTON Cesar  [place] Phone: [phone]  Relation: Son  Secondary Emergency Contact: Hiram Guerrero   Mobile Phone: [phone]  Relation: Son Language:  English  :  No   Health Care Agent/POA:   Advanced Directives/Living Will:  On file   Primary Care Clinic/Phone/Fax:  [facility]/(p) [phone], (f) [phone] Primary Dx:  Diabetes:E11.9  Secondary Dx: Osteoarthritis: M19.91    Primary Physician:  Kevin Esquivel   Height:    Weight:      Specialty Physician:    Audiologist:     Eye Care Provider:   Dental Care Provider:    [facility]: [facility] [phone]   Other:        Homemaking/[employer]  [phone]  Fax: [phone] 1/1/18-12/31/18 weekly 4 hrs/week  (16 units)  $4.61/unit      Transportation/[employer]  [facility] 1/1/18-12/31/18 monthly Rush:10/m  Non Rush: 10/m Rush $4/ticket  ($40/m)  Non rush  $3/ticket ($30/m)     [employer]Mental Health   Aditi Paragon:[phone]  Fax: [phone] Ongoing Monthly visits              Intermittent asthma without complication 07/11/2014     Priority: Medium     IT band syndrome 01/13/2014     Priority: Medium     Contusion of knee 11/20/2013     Priority: Medium     Prepatellar bursitis of left knee 11/20/2013     Priority: Medium     Insomnia 05/30/2013     Priority: Medium     Mechanical low back pain 03/22/2013     Priority: Medium     Radicular pain of left lower extremity 03/22/2013     Priority: Medium     GERD (gastroesophageal reflux disease) 02/06/2013     Priority: Medium     Pulmonary nodule, left      Priority: Medium     0.5 cm; needs f/u chest CT scan Jan 2013       Abnormal CT of the chest      Priority: Medium     Nodular consolidation right lobe 1.6 cm. Needs repeat CT Jan 2013       Anxiety state 06/21/2012     Priority: Medium     Problem list name updated by automated process. Provider to review       Intermittent asthma 12/30/2011     Priority: Medium     Advanced directives, counseling/discussion 05/06/2011     Priority: Medium     04/29/2013  Advance Care Planning:   Receipt of ACP document:  Received: Health Care Directive which was witnessed or notarized on 07/06/2011.  Document previously scanned on 07/07/2011.  Validation form completed and sent to be scanned.  Code Status reflects choices in most recent ACP document.  Confirmed/documented designated decision maker(s). See permanent comments section of demographics in clinical tab. View document(s) and details by  clicking on code status.   Added by Lacie Canchola on 4/29/2013.          Advance Care Planning:   ACP Review and Resources Provided:  Reviewed chart for advance care plan.  Leah Guerrero has no plan or code status on file however states presence of ACP document. Copy requested. Confirmed code status reflects current choices pending receipt of document/advance care plan review. Confirmed/documented designated decision maker(s). See permanent comments section of demographics in clinical tab.   Added by Emma Medina on 4/24/2013          Planning  Advance Directive Initial Visit  Leah Guerrero presents in person for initial session regarding completion of advanced directive form. She was referred to the facilitator by self.  She currently has an advance directive from 1980 & wants to fill out a new updated one.  Plan:  Advanced directive form, healthcare agent information card, advanced care planning information booklet provided to patient.   Follow up meeting: to be arranged when patient calls back to make an appointment after reviewing documents in one week or so. Patient instructed to bring healthcare agent to this meeting.  Flores Gerard RN  Letter sent to patient for [employer] follow up.  6/22/2011  Flores Gerard RN  Advance Directive Follow-up Visit  Leah Guerrero presents for advanced care planning session accompanied by no one.  Reviewed definitions of advanced care planning and advance directive form, and informational handouts given to patient previously.  Patient has questions and /or concerns about acp process or form .  Reviewed advance directive form with patient & answered all of her questions. Designated healthcare agent is identified. Healthcare agent s name is Paul Macielbanks. My Hopes and Wishes reviewed.  Finalized wishes are clear to patient Yes  Patient and designated healthcare agent are aware that document may be changed at any time in the future.  Advanced  directive form: completed at this visit.  Advanced directive form: verified with notary signature. Original and two copies of advanced directive form given to patient copy sent to  for scanning into EMR and original given to patient and instructed to give copy to designated [facility] and non-[facility] physician where applicable.  Flores Gerard RN  7/6/2011  Advance Directive Problem List Overview:   Name Relationship Phone    Primary Health Care Agent Paul Bro [phone]         Alternative Health Care Agent Hiram Bro c [phone]  [phone]                      Hyperlipidemia LDL goal <100 12/16/2010     Priority: Medium     Fatigue 12/15/2010     Priority: Medium     Type 2 diabetes, HbA1C goal < 8% (H) 11/24/2010     Priority: Medium     Female stress incontinence 10/28/2010     Priority: Medium     Past Surgical History:   Procedure Laterality Date     APPENDECTOMY  2012     C HAND/FINGER SURGERY UNLISTED       C SHOULDER SURG PROC UNLISTED       C STOMACH SURGERY PROCEDURE UNLISTED       CHOLECYSTECTOMY  1969     COLONOSCOPY  6-2-08    Neg. At [facility]     COLONOSCOPY  9-3-13     EYE SURGERY       HYSTERECTOMY TOTAL ABD, HEIDI SALPINGO-OOPHORECTOMY, NODE DISSECTION, TUMOR DEBULKING, COMBINED  1988    fibroids?     KNEE SURGERY  x5     KNEE SURGERY      kneecap procedure     LUNG SURGERY  10-7-16    removal lung cancer     SHOULDER SURGERY  2005    right shoulder, bone spurs     WRIST SURGERY      right wrist       Medications:   Current Outpatient Prescriptions:      ACE/ARB NOT PRESCRIBED, INTENTIONAL,, ACE & ARB not prescribed due to Allergy, Disp: , Rfl:      albuterol (2.5 MG/3ML) 0.083% nebulizer solution, Take 3 mLs (2.5 mg) by nebulization 4 times daily as needed, Disp: 1 Box, Rfl: 5     albuterol (PROAIR HFA/PROVENTIL HFA/VENTOLIN HFA) 108 (90 Base) MCG/ACT Inhaler, Inhale 2 puffs into the lungs every 6 hours as needed for shortness of breath / dyspnea or wheezing, Disp: 1  "Inhaler, Rfl: 11     aspirin 81 MG tablet, Take 1 tablet by mouth daily. 1 daily, Disp: , Rfl:      BASAGLAR 100 UNIT/ML injection, 75 units at bedtime or as directed, Disp: 75 mL, Rfl: 3     blood glucose monitoring (ONE TOUCH ULTRASOFT) lancets, TEST 6 TIMES PER DAY, Disp: 600 each, Rfl: 0     blood glucose monitoring (ONETOUCH ULTRA) test strip, TEST 6 TIMES PER DAY, Disp: 600 strip, Rfl: 11     Calcium Carbonate-Vitamin D (CALCIUM-D PO), Take  by mouth. 1200mg calcium/600mg D3, Disp: , Rfl:      calcium polycarbophil (FIBERCON) 625 MG tablet, Take 2 tablets (1,250 mg) by mouth daily, Disp: 60 tablet, Rfl: 11     fish oil-omega-3 fatty acids (FISH OIL) 1000 MG capsule, Take 1 capsule by mouth daily., Disp: , Rfl:      Garlic CAPS, Take  by mouth. One daily, Disp: , Rfl:      hydrochlorothiazide (HYDRODIURIL) 25 MG tablet, Take 1 tablet (25 mg) by mouth daily, Disp: 30 tablet, Rfl: 1     ibuprofen (ADVIL,MOTRIN) 800 MG tablet, Take 1 tablet (800 mg) by mouth every 8 hours as needed for pain, Disp: 100 tablet, Rfl: 0     insulin aspart (NOVOLOG PEN) 100 UNIT/ML injection, Patient uses 100 units daily split up between multiple doses. Take 2 units per 15 grams of carbohydrate eaten and 2 units per 30 mg/dL above 170 mg/dL., Disp: 30 mL, Rfl: 1     Insulin Pen Needle (PEN NEEDLES 5/16\") 31G X 8 MM MISC, Patient does 4 doses novolog and 2 lantus shots  insulin daily.  Needs 180 needles per month. Okay refills for one year., Disp: 180 each, Rfl: 11     LORazepam (ATIVAN) 0.5 MG tablet, Take 1 tablet (0.5 mg) by mouth every 8 hours as needed, Disp: 40 tablet, Rfl: 3     meperidine (DEMEROL) 50 MG tablet, Take 1 tablet (50 mg) by mouth every 4 hours as needed, Disp: 20 tablet, Rfl: 0     MULTIVITAMIN TABS   OR, 1 TABLET DAILY, Disp: , Rfl:      oxyCODONE-acetaminophen (PERCOCET) 5-325 MG per tablet, Take by mouth every 4 hours as needed for moderate to severe pain, Disp: , Rfl:      PARoxetine (PAXIL) 10 MG tablet, Take 1 " tablet (10 mg) by mouth At Bedtime, Disp: 30 tablet, Rfl: 1     promethazine (PHENERGAN) 12.5 MG tablet, Take 12.5 mg by mouth as needed, Disp: , Rfl: 5     rosuvastatin (CRESTOR) 20 MG tablet, TAKE 1 TABLET(20 MG) BY MOUTH DAILY, Disp: 90 tablet, Rfl: 2     SALINE, to clean port every other month, Disp: , Rfl:      senna-docusate (SENOKOT-S;PERICOLACE) 8.6-50 MG per tablet, 1-2 po bid prn constipation, Disp: 100 tablet, Rfl: 1     tiZANidine (ZANAFLEX) 2 MG tablet, Take 1 tablet (2 mg) by mouth 3 times daily as needed for muscle spasms, Disp: 30 tablet, Rfl: 0     zolpidem (AMBIEN) 5 MG tablet, Take 1 tablet (5 mg) by mouth nightly as needed for sleep, Disp: 30 tablet, Rfl: 2    Allergies:   Allergies   Allergen Reactions     Hydralazine Shortness Of Breath     Famotidine Nausea and Vomiting and Unknown     Benadryl Itch Stopping      Gives her more energy, can't sleep     Lisinopril      Tongue swelling       Metoclopramide Nausea and Vomiting     Ondansetron Nausea and Vomiting     Other reaction(s): Headache     Penicillins Hives     Tape [Adhesive Tape]      blisters     Tylenol      Anxiety  Tablets only.       REVIEW OF SYSTEMS:   CONSTITUTIONAL:NEGATIVE for fever, chills, night sweats  INTEGUMENTARY/SKIN: NEGATIVE for worrisome wound problems or redness.  MUSCULOSKELETAL:See HPI above  NEURO: NEGATIVE for weakness, dizziness or paresthesias    This document serves as a record of the services and decisions personally performed and made by José Vogel MD. It was created on his behalf by Nely Pratt, a trained medical scribe. The creation of this document is based the provider's statements to the medical scribe.    Kirillibkaty Pratt 9:46 AM 7/18/2018       PHYSICAL EXAM:  /73  Resp 16  Wt 184 lb 6.4 oz (83.6 kg)  BMI 32.15 kg/m2   GENERAL APPEARANCE: healthy, alert, no distress  SKIN: no suspicious lesions or rashes  NEURO: Normal strength and tone, mentation intact and speech normal  PSYCH:   mentation appears normal and affect normal, not anxious  RESPIRATORY: No increased work of breathing.    left  LOWER EXTREMITY:  Gait: antalgic left.  mild Quad atrophy, strength weakened.  Intact sensation deep peroneal nerve, superficial peroneal nerve, med/lat tibial nerve, sural nerve, saphenous nerve  Intact EHL, EDL, TA, FHL, GS, quadriceps hamstrings and hip flexors  Toes warm and well perfused, brisk capillary refill. Palpable 2+ dp pulses.  calf soft and nttp or squeeze.  Edema: trace    left  KNEE EXAM:    Skin: intact, no ecchymosis or erythema; mild swelling.  ROM: 5 degrees extension to 95 degrees flexion, discomfort at extremes; positive crepitus  Effusion: trace  Tender: med/lat joint line, anterior/posterior knee.  Positive patello-femoral crepitus and grind.    X-RAY: no new xrays today.  3 views left knee 7/18/2018 reviewed: Bone on bone medial loss of joint space, subchondral sclerosis, marginal osteohytes with medial articular flattening. Possible lucency under medial femoral condyle subchondral bone. Patello-femoral marginal osteophytes are more prominent. Arthritis progression since prior xrays. Consistent with Kellgren stage IV arthritis.    LEFT KNEE INTRAOP ARTHROSCOPY FINDINGS   Trace effusion. Moderate synovitis. Grade 4 chondrosis of the patella and grade 3 chondrosis of the femoral trochlea. Prominent medial plica with rubbing along the anterior aspect of the medial femoral condyle. No loose bodies in the media or lateral gutter but small marginal osteophytes. Intact ACL, within the notch. There was some fraying or partial tearing of fibers of the PCL with PCL grossly otherwise intact. Lateral compartment with a small free edge flap tear off of the posterior horn of the lateral meniscus and a partial thickness undersurface tear towards the periphery of the posterior horn body junction. Lateral compartment with an area of grade 4 chondrosis of the weight-bearing portion of the femoral  "condyle, as well as grade 4 chondrosis of the posterior medial aspect of the lateral tibia. Complex tearing of the posterior horn and body of the medial meniscus with horizontal cleavage and radial tears with a displaced flap off of the medial aspect of the body of the meniscus. Medial compartment with grade 4 chondrosis of the majority of the medial femoral condyle and grade 3 diffuse chondrosis of the medial tibia with areas of grade 4 chondrosis      Impression: Leah Guerrero is a 72 year old female with chronic left knee pain, advanced primary osteoarthritis.       Plan:   * reviewed imaging studies with patient, showing arthritis. Arthritis is wearing of the cartilage due to longstanding \"wear and tear\" or can follow an injury to the joint.    Discussed typical symptoms of arthritic pain is pain aggravated with weight bearing activities, stiffness, relieved by sitting or rest. Swelling may be associated. It is common to have some grinding and popping in the knee with arthritis.    Discussed various treatments for degenerative arthrosis of the knee, including nonoperative and operative approaches. Nonoperative approaches, which are exhausted prior to operative treatment, include doing nothing and living with the pain as patient has been doing, activity modification (avoid aggravating activities), physical therapy and strengthening exercises, weight loss, anti-inflammatory medications, bracing, ambulatory aids (cane, walker) and injections. Once these have been tried and are deemed unsuccessful with a good effort, and the patient is an appropriate candidate, the next treatment would be knee arthroplasty or replacement. Depending on the location of the arthritis, knee replacement can be partial (one side of the knee affected by arthritis) or a total knee arthroplasty (all 3 compartments). The risks, perceived benefits and perioperative rehabilitation expectations of knee arthroplasty were discussed in " "detail. Also discussed that approximately 10% of patients that undergo knee arthroplasty are not happy following surgery and may have worse pain or no improvement in pain, contrary to their preoperative expectations.    ** Risks of surgery include, but not limited to: bleeding (possibly requiring transfusion), infection, pain, scar, damage to adjacent structures (e.g. Nerves, blood vessels, bone, cartilage), temporary or permanent nerve damage, recurrence of symptoms, implant dislocation, instability, implant failure, implant infection, unequal limb lengths, stiffness, need for further surgery, blood clots, pulmonary embolism, risks of anesthesia, and death.  * the knee will not feel \"normal\" as it won't be \"normal\" after knee replacement. It may feel \"clunky\" due to the nature of the hard metal and plastic implant.  * the expectation is for improved pain, not necessarily complete pain relief.    ** understanding these risks, the patient would like to proceed with surgery: left total knee arthroplasty.    * will arrange left total knee arthroplasty at a mutual convenience in the near future  * discussed will plan hospitalization anywhere from 1-3 days (average 2 days), with twice daily Physical Therapy starting either the day of or day after surgery, with discharge to home or short term rehabilitation facility, depending on postoperative recovery and progress during hospitalization. She is planning on TCU postoperative, [facility] per her preference.  * will plan postoperative deep vein thrombosis prophylaxis x4 weeks. Additionally, graduated compression stockings x4 weeks, and SCDs while in the hospital.  * postoperative pain control will be oral medications upon discharge from hospital  * postoperative Physical Therapy to start upon discharge from the hospital.  * patient will need preoperative H+P prior to surgery from PCP.  * will see patient 2 weeks postoperative, " sooner as needed, for wound check, suture removal, and xrays. Will obtain AP, lateral and sunrise views of the knee at that time.  * Patient to follow up with Primary Care provider regarding elevated blood pressure     * patient encouraged to call in interim if any new questions or concerns arise.    * recommend no elective dental procedures for 4-6 months after surgery. Any emergency dental procedures, mouth infections, abscesses, etc must be taken care of immediately with preventive antibiotics.   * Patient will need longterm prophylactic antibiotics use with dental procedures or other invasive procedures (2 g amoxicilin or 450mg ([id number]) clindamycin) 1 hour prior to dental procedures for a minimum of 2 years postoperative.    * baseline oxfords completed today.    The information in this document, created by a scribe for me, accurately reflects the services I personally performed and the decisions made by me. I have reviewed and approved this document for accuracy.      José Vogel M.D., M.S.  Dept. of Orthopaedic Surgery  [facility]      Again, thank you for allowing me to participate in the care of your patient.        Sincerely,        José Vogel MD

## 2018-07-18 NOTE — PROGRESS NOTES
"Chief Complaint   Patient presents with     Left Knee - Pain     Left knee pain/oa. Here for TOTAL KNEE ARTHROPLASTY final consult.       HISTORY OF PRESENT ILLNESS:  Leah \"Fina\" ALEK Guerrero is a 72 year old female seen for follow up chronic left knee pain, advanced primary osteoarthritis. She had prior left knee arthroscopy on 10/10/2014. She returns today with extreme pain today, rated a 8/10. She can only be up an hour before having pain. Today her pain is located over the medial and lateral aspects of the knee as well as the posterior knee. Most of her pain comes on with getting up and walking on it. She would like to discuss total knee arthroplasty surgery today.    She has done Physical Therapy, injections (cortisone and viscosupplementation), medications (ibuprofen,  opioids), activity modification in the past.        Past Medical History:   Diagnosis Date     Abnormal CT of the chest 10/12    Nodular consolidation right lobe 1.6 cm. Needs repeat CT Jan 2013     Bleeding disorder (H)     in bowels x2      Chronic pancreatitis (H)      CVA (cerebral infarction)      Diabetes      Female stress incontinence      Hyperlipidemia LDL goal <100      Hypertension      Intermittent asthma 12/30/2011     Lateral epicondylitis (tennis elbow) - left 3/30/2011     OA (osteoarthritis) of knee 3/22/2013     Pulmonary nodule, left 10/12    0.5 cm; needs f/u chest CT scan Jan 2013     Surgical complications      Unspecified asthma(493.90)      Patient Active Problem List    Diagnosis Date Noted     Cervicalgia 07/10/2018     Priority: Medium     Mild intermittent asthma without complication 02/22/2018     Priority: Medium     Malignant neoplasm of lung, unspecified laterality, unspecified part of lung (H) 04/21/2017     Priority: Medium     Chronic pancreatitis, unspecified pancreatitis type (H) 11/16/2016     Priority: Medium     Type 2 diabetes mellitus without complication, with long-term current use of insulin (H) " 2016     Priority: Medium     Anxiety 2016     Priority: Medium     Insomnia, unspecified type 2016     Priority: Medium     Idiopathic chronic pancreatitis (H) 2015     Priority: Medium     Primary osteoarthritis of left knee 10/28/2015     Priority: Medium     Hypertension goal BP (blood pressure) < 140/90 2015     Priority: Medium     Obesity 2015     Priority: Medium     Health Care Home 10/22/2014     Priority: Medium     [facility]   Guerline Zhao RN  [phone]    [facility] CARE PLAN SUMMARY    Client Name:  Leah Guerrero  Address:  [address] [place] [address]   [facility] [address] Phone: [phone] (home)    :  1945 Date of Assessment: HRA telephone assessment 18,  Previous LTCC 17   Health Plan:  [facility]  Health Plan Number: [id number] Medical Assistance Number: [id number]  Financial Worker:  Savanna Collins  [phone]  Case #:  [id number]   [facility] :  Guerline Zhao RN  Phone:  [phone]  CM Fax:  [phone]   [facility] Enrollment Date: 18 Case Mix:  L  Rate Cell:  B  Waiver Type: EW    Emergency Contact:   Primary Emergency Contact: Paul Guerrero  [place] Phone: [phone]  Relation: Son  Secondary Emergency Contact: Hiram Guerrero   Mobile Phone: [phone]  Relation: Son Language:  English  :  No   Health Care Agent/POA:   Advanced Directives/Living Will:  On file   Primary Care Clinic/Phone/Fax:  [facility]/(p) [phone], (f) [phone] Primary Dx:  Diabetes:E11.9  Secondary Dx: Osteoarthritis: M19.91    Primary Physician:  Kevin Esquivel   Height:    Weight:     Specialty Physician:    Audiologist:     Eye Care Provider:   Dental Care Provider:    [facility] [phone]   Other:        Homemaking/[employer]  [phone]  Fax: [phone] 18-18 weekly 4 hrs/week  (16 units)  $4.61/unit      Transportation/[employer]  Mobility  [facility] 1/1/18-12/31/18 monthly Rush:10/m  Non Rush: 10/m Rush $4/ticket  ($40/m)  Non rush  $3/ticket ($30/m)     [employer]Mental Health   Aditi Rich:[phone]  Fax: [phone] Ongoing Monthly visits              Intermittent asthma without complication 07/11/2014     Priority: Medium     IT band syndrome 01/13/2014     Priority: Medium     Contusion of knee 11/20/2013     Priority: Medium     Prepatellar bursitis of left knee 11/20/2013     Priority: Medium     Insomnia 05/30/2013     Priority: Medium     Mechanical low back pain 03/22/2013     Priority: Medium     Radicular pain of left lower extremity 03/22/2013     Priority: Medium     GERD (gastroesophageal reflux disease) 02/06/2013     Priority: Medium     Pulmonary nodule, left      Priority: Medium     0.5 cm; needs f/u chest CT scan Jan 2013       Abnormal CT of the chest      Priority: Medium     Nodular consolidation right lobe 1.6 cm. Needs repeat CT Jan 2013       Anxiety state 06/21/2012     Priority: Medium     Problem list name updated by automated process. Provider to review       Intermittent asthma 12/30/2011     Priority: Medium     Advanced directives, counseling/discussion 05/06/2011     Priority: Medium     04/29/2013  Advance Care Planning:   Receipt of ACP document:  Received: Health Care Directive which was witnessed or notarized on 07/06/2011.  Document previously scanned on 07/07/2011.  Validation form completed and sent to be scanned.  Code Status reflects choices in most recent ACP document.  Confirmed/documented designated decision maker(s). See permanent comments section of demographics in clinical tab. View document(s) and details by clicking on code status.   Added by Lacie Canchola on 4/29/2013.          Advance Care Planning:   ACP Review and Resources Provided:  Reviewed chart for advance care plan.  Leah Guerrero has no plan or code status on file however states presence of ACP document.  Copy requested. Confirmed code status reflects current choices pending receipt of document/advance care plan review. Confirmed/documented designated decision maker(s). See permanent comments section of demographics in clinical tab.   Added by Emma Medina on 4/24/2013          Planning  Advance Directive Initial Visit  Leah Guerrero presents in person for initial session regarding completion of advanced directive form. She was referred to the facilitator by self.  She currently has an advance directive from 1980 & wants to fill out a new updated one.  Plan:  Advanced directive form, healthcare agent information card, advanced care planning information booklet provided to patient.   Follow up meeting: to be arranged when patient calls back to make an appointment after reviewing documents in one week or so. Patient instructed to bring healthcare agent to this meeting.  Flores Gerard RN  Letter sent to patient for Honoring Choices follow up.  6/22/2011  Flores Gerard RN  Advance Directive Follow-up Visit  Leah Guerrero presents for advanced care planning session accompanied by no one.  Reviewed definitions of advanced care planning and advance directive form, and informational handouts given to patient previously.  Patient has questions and /or concerns about acp process or form .  Reviewed advance directive form with patient & answered all of her questions. Designated healthcare agent is identified. Healthcare agent s name is Paul Guerrero. My Hopes and Wishes reviewed.  Finalized wishes are clear to patient Yes  Patient and designated healthcare agent are aware that document may be changed at any time in the future.  Advanced directive form: completed at this visit.  Advanced directive form: verified with notary signature. Original and two copies of advanced directive form given to patient copy sent to  for scanning into EMR and original given to patient and instructed to give copy to  designated [facility] and non-[place] physician where applicable.  Flores Gerard, RN  7/6/2011  Advance Directive Problem List Overview:   Name Relationship Phone    Primary Health Care Agent Paul Bro [phone]         Alternative Health Care Agent Hiram Bro c [phone]  [phone]                      Hyperlipidemia LDL goal <100 12/16/2010     Priority: Medium     Fatigue 12/15/2010     Priority: Medium     Type 2 diabetes, HbA1C goal < 8% (H) 11/24/2010     Priority: Medium     Female stress incontinence 10/28/2010     Priority: Medium     Past Surgical History:   Procedure Laterality Date     APPENDECTOMY  2012     C HAND/FINGER SURGERY UNLISTED       C SHOULDER SURG PROC UNLISTED       C STOMACH SURGERY PROCEDURE UNLISTED       CHOLECYSTECTOMY  1969     COLONOSCOPY  6-2-08    Neg. At [facility]     COLONOSCOPY  9-3-13     EYE SURGERY       HYSTERECTOMY TOTAL ABD, HEIDI SALPINGO-OOPHORECTOMY, NODE DISSECTION, TUMOR DEBULKING, COMBINED  1988    fibroids?     KNEE SURGERY  x5     KNEE SURGERY      kneecap procedure     LUNG SURGERY  10-7-16    removal lung cancer     SHOULDER SURGERY  2005    right shoulder, bone spurs     WRIST SURGERY      right wrist       Medications:   Current Outpatient Prescriptions:      ACE/ARB NOT PRESCRIBED, INTENTIONAL,, ACE & ARB not prescribed due to Allergy, Disp: , Rfl:      albuterol (2.5 MG/3ML) 0.083% nebulizer solution, Take 3 mLs (2.5 mg) by nebulization 4 times daily as needed, Disp: 1 Box, Rfl: 5     albuterol (PROAIR HFA/PROVENTIL HFA/VENTOLIN HFA) 108 (90 Base) MCG/ACT Inhaler, Inhale 2 puffs into the lungs every 6 hours as needed for shortness of breath / dyspnea or wheezing, Disp: 1 Inhaler, Rfl: 11     aspirin 81 MG tablet, Take 1 tablet by mouth daily. 1 daily, Disp: , Rfl:      BASAGLAR 100 UNIT/ML injection, 75 units at bedtime or as directed, Disp: 75 mL, Rfl: 3     blood glucose monitoring (ONE TOUCH ULTRASOFT) lancets, TEST 6 TIMES PER DAY, Disp:  "600 each, Rfl: 0     blood glucose monitoring (ONETOUCH ULTRA) test strip, TEST 6 TIMES PER DAY, Disp: 600 strip, Rfl: 11     Calcium Carbonate-Vitamin D (CALCIUM-D PO), Take  by mouth. 1200mg calcium/600mg D3, Disp: , Rfl:      calcium polycarbophil (FIBERCON) 625 MG tablet, Take 2 tablets (1,250 mg) by mouth daily, Disp: 60 tablet, Rfl: 11     fish oil-omega-3 fatty acids (FISH OIL) 1000 MG capsule, Take 1 capsule by mouth daily., Disp: , Rfl:      Garlic CAPS, Take  by mouth. One daily, Disp: , Rfl:      hydrochlorothiazide (HYDRODIURIL) 25 MG tablet, Take 1 tablet (25 mg) by mouth daily, Disp: 30 tablet, Rfl: 1     ibuprofen (ADVIL,MOTRIN) 800 MG tablet, Take 1 tablet (800 mg) by mouth every 8 hours as needed for pain, Disp: 100 tablet, Rfl: 0     insulin aspart (NOVOLOG PEN) 100 UNIT/ML injection, Patient uses 100 units daily split up between multiple doses. Take 2 units per 15 grams of carbohydrate eaten and 2 units per 30 mg/dL above 170 mg/dL., Disp: 30 mL, Rfl: 1     Insulin Pen Needle (PEN NEEDLES 5/16\") 31G X 8 MM MISC, Patient does 4 doses novolog and 2 lantus shots  insulin daily.  Needs 180 needles per month. Okay refills for one year., Disp: 180 each, Rfl: 11     LORazepam (ATIVAN) 0.5 MG tablet, Take 1 tablet (0.5 mg) by mouth every 8 hours as needed, Disp: 40 tablet, Rfl: 3     meperidine (DEMEROL) 50 MG tablet, Take 1 tablet (50 mg) by mouth every 4 hours as needed, Disp: 20 tablet, Rfl: 0     MULTIVITAMIN TABS   OR, 1 TABLET DAILY, Disp: , Rfl:      oxyCODONE-acetaminophen (PERCOCET) 5-325 MG per tablet, Take by mouth every 4 hours as needed for moderate to severe pain, Disp: , Rfl:      PARoxetine (PAXIL) 10 MG tablet, Take 1 tablet (10 mg) by mouth At Bedtime, Disp: 30 tablet, Rfl: 1     promethazine (PHENERGAN) 12.5 MG tablet, Take 12.5 mg by mouth as needed, Disp: , Rfl: 5     rosuvastatin (CRESTOR) 20 MG tablet, TAKE 1 TABLET(20 MG) BY MOUTH DAILY, Disp: 90 tablet, Rfl: 2     SALINE, to clean " port every other month, Disp: , Rfl:      senna-docusate (SENOKOT-S;PERICOLACE) 8.6-50 MG per tablet, 1-2 po bid prn constipation, Disp: 100 tablet, Rfl: 1     tiZANidine (ZANAFLEX) 2 MG tablet, Take 1 tablet (2 mg) by mouth 3 times daily as needed for muscle spasms, Disp: 30 tablet, Rfl: 0     zolpidem (AMBIEN) 5 MG tablet, Take 1 tablet (5 mg) by mouth nightly as needed for sleep, Disp: 30 tablet, Rfl: 2    Allergies:   Allergies   Allergen Reactions     Hydralazine Shortness Of Breath     Famotidine Nausea and Vomiting and Unknown     Benadryl Itch Stopping      Gives her more energy, can't sleep     Lisinopril      Tongue swelling       Metoclopramide Nausea and Vomiting     Ondansetron Nausea and Vomiting     Other reaction(s): Headache     Penicillins Hives     Tape [Adhesive Tape]      blisters     Tylenol      Anxiety  Tablets only.       REVIEW OF SYSTEMS:   CONSTITUTIONAL:NEGATIVE for fever, chills, night sweats  INTEGUMENTARY/SKIN: NEGATIVE for worrisome wound problems or redness.  MUSCULOSKELETAL:See HPI above  NEURO: NEGATIVE for weakness, dizziness or paresthesias    This document serves as a record of the services and decisions personally performed and made by José Vogel MD. It was created on his behalf by Nely Pratt, a trained medical scribe. The creation of this document is based the provider's statements to the medical scribe.    Kirillibkaty Pratt 9:46 AM 7/18/2018       PHYSICAL EXAM:  /73  Resp 16  Wt 184 lb 6.4 oz (83.6 kg)  BMI 32.15 kg/m2   GENERAL APPEARANCE: healthy, alert, no distress  SKIN: no suspicious lesions or rashes  NEURO: Normal strength and tone, mentation intact and speech normal  PSYCH:  mentation appears normal and affect normal, not anxious  RESPIRATORY: No increased work of breathing.    left  LOWER EXTREMITY:  Gait: antalgic left.  mild Quad atrophy, strength weakened.  Intact sensation deep peroneal nerve, superficial peroneal nerve, med/lat tibial nerve, sural  nerve, saphenous nerve  Intact EHL, EDL, TA, FHL, GS, quadriceps hamstrings and hip flexors  Toes warm and well perfused, brisk capillary refill. Palpable 2+ dp pulses.  calf soft and nttp or squeeze.  Edema: trace    left  KNEE EXAM:    Skin: intact, no ecchymosis or erythema; mild swelling.  ROM: 5 degrees extension to 95 degrees flexion, discomfort at extremes; positive crepitus  Effusion: trace  Tender: med/lat joint line, anterior/posterior knee.  Positive patello-femoral crepitus and grind.    X-RAY: no new xrays today.  3 views left knee 7/18/2018 reviewed: Bone on bone medial loss of joint space, subchondral sclerosis, marginal osteohytes with medial articular flattening. Possible lucency under medial femoral condyle subchondral bone. Patello-femoral marginal osteophytes are more prominent. Arthritis progression since prior xrays. Consistent with Kellgren stage IV arthritis.    LEFT KNEE INTRAOP ARTHROSCOPY FINDINGS   Trace effusion. Moderate synovitis. Grade 4 chondrosis of the patella and grade 3 chondrosis of the femoral trochlea. Prominent medial plica with rubbing along the anterior aspect of the medial femoral condyle. No loose bodies in the media or lateral gutter but small marginal osteophytes. Intact ACL, within the notch. There was some fraying or partial tearing of fibers of the PCL with PCL grossly otherwise intact. Lateral compartment with a small free edge flap tear off of the posterior horn of the lateral meniscus and a partial thickness undersurface tear towards the periphery of the posterior horn body junction. Lateral compartment with an area of grade 4 chondrosis of the weight-bearing portion of the femoral condyle, as well as grade 4 chondrosis of the posterior medial aspect of the lateral tibia. Complex tearing of the posterior horn and body of the medial meniscus with horizontal cleavage and radial tears with a displaced flap off of the medial aspect of the body of the meniscus. Medial  "compartment with grade 4 chondrosis of the majority of the medial femoral condyle and grade 3 diffuse chondrosis of the medial tibia with areas of grade 4 chondrosis      Impression: Leah Guerrero is a 72 year old female with chronic left knee pain, advanced primary osteoarthritis.       Plan:   * reviewed imaging studies with patient, showing arthritis. Arthritis is wearing of the cartilage due to longstanding \"wear and tear\" or can follow an injury to the joint.    Discussed typical symptoms of arthritic pain is pain aggravated with weight bearing activities, stiffness, relieved by sitting or rest. Swelling may be associated. It is common to have some grinding and popping in the knee with arthritis.    Discussed various treatments for degenerative arthrosis of the knee, including nonoperative and operative approaches. Nonoperative approaches, which are exhausted prior to operative treatment, include doing nothing and living with the pain as patient has been doing, activity modification (avoid aggravating activities), physical therapy and strengthening exercises, weight loss, anti-inflammatory medications, bracing, ambulatory aids (cane, walker) and injections. Once these have been tried and are deemed unsuccessful with a good effort, and the patient is an appropriate candidate, the next treatment would be knee arthroplasty or replacement. Depending on the location of the arthritis, knee replacement can be partial (one side of the knee affected by arthritis) or a total knee arthroplasty (all 3 compartments). The risks, perceived benefits and perioperative rehabilitation expectations of knee arthroplasty were discussed in detail. Also discussed that approximately 10% of patients that undergo knee arthroplasty are not happy following surgery and may have worse pain or no improvement in pain, contrary to their preoperative expectations.    ** Risks of surgery include, but not limited to: bleeding (possibly " "requiring transfusion), infection, pain, scar, damage to adjacent structures (e.g. Nerves, blood vessels, bone, cartilage), temporary or permanent nerve damage, recurrence of symptoms, implant dislocation, instability, implant failure, implant infection, unequal limb lengths, stiffness, need for further surgery, blood clots, pulmonary embolism, risks of anesthesia, and death.  * the knee will not feel \"normal\" as it won't be \"normal\" after knee replacement. It may feel \"clunky\" due to the nature of the hard metal and plastic implant.  * the expectation is for improved pain, not necessarily complete pain relief.    ** understanding these risks, the patient would like to proceed with surgery: left total knee arthroplasty.    * will arrange left total knee arthroplasty at a mutual convenience in the near future  * discussed will plan hospitalization anywhere from 1-3 days (average 2 days), with twice daily Physical Therapy starting either the day of or day after surgery, with discharge to home or short term rehabilitation facility, depending on postoperative recovery and progress during hospitalization. She is planning on TCU postoperative, likely [facility] per her preference.  * will plan postoperative deep vein thrombosis prophylaxis x4 weeks. Additionally, graduated compression stockings x4 weeks, and SCDs while in the hospital.  * postoperative pain control will be oral medications upon discharge from hospital  * postoperative Physical Therapy to start upon discharge from the hospital.  * patient will need preoperative H+P prior to surgery from PCP.  * will see patient 2 weeks postoperative, sooner as needed, for wound check, suture removal, and xrays. Will obtain AP, lateral and sunrise views of the knee at that time.  * Patient to follow up with Primary Care provider regarding elevated blood pressure     * patient encouraged to call in interim if any new questions or concerns " arise.    * recommend no elective dental procedures for 4-6 months after surgery. Any emergency dental procedures, mouth infections, abscesses, etc must be taken care of immediately with preventive antibiotics.   * Patient will need longterm prophylactic antibiotics use with dental procedures or other invasive procedures (2 g amoxicilin or 450mg ([id number]) clindamycin) 1 hour prior to dental procedures for a minimum of 2 years postoperative.    * baseline oxfords completed today.    The information in this document, created by a scribe for me, accurately reflects the services I personally performed and the decisions made by me. I have reviewed and approved this document for accuracy.      José Vogel M.D., M.S.  Dept. of Orthopaedic Surgery  [facility]

## 2018-07-19 ENCOUNTER — PATIENT OUTREACH (OUTPATIENT)
Dept: GERIATRIC MEDICINE | Facility: CLINIC | Age: 73
End: 2018-07-19

## 2018-07-19 ASSESSMENT — ACTIVITIES OF DAILY LIVING (ADL): DEPENDENT_IADLS:: CLEANING;COOKING;LAUNDRY;TRANSPORTATION

## 2018-07-19 NOTE — PROGRESS NOTES
[facility] Care Coordination Contact    [facility] Six-Month Telephone Assessment    6 month telephone assessment completed on 7/19/18.    ER visits: Yes -  [facility]  Hospitalizations: Yes -  [facility]  TCU stays: No  Significant health status changes: none  Falls/Injuries: No  ADL/IADL changes: No  Changes in services: No    Caregiver Assessment follow up:  NA    Goals: See POC in chart for goal progress documentation.      Will see member in 6 months for an annual health risk assessment.   Encouraged member to call CC with any questions or concerns in the meantime.   Keyanna Zhao RN BA PHN  [facility] Case Management  [phone]

## 2018-07-22 ENCOUNTER — TRANSFERRED RECORDS (OUTPATIENT)
Dept: HEALTH INFORMATION MANAGEMENT | Facility: CLINIC | Age: 73
End: 2018-07-22

## 2018-07-23 ENCOUNTER — TELEPHONE (OUTPATIENT)
Dept: FAMILY MEDICINE | Facility: CLINIC | Age: 73
End: 2018-07-23

## 2018-07-23 NOTE — TELEPHONE ENCOUNTER
Forms [facility] from[facility]   Phone number listed: [phone]   Fax listed: [phone]  Date received: 07/23/2018  Form description: Disability and Need Reasonable Accommodation/Modification Form  Once forms are completed, please return to [facility] via fax.  Is patient requesting to be contacted when forms are completed: NA    Form placed: IN provider's basket  Latisha Finney

## 2018-07-25 ENCOUNTER — TRANSFERRED RECORDS (OUTPATIENT)
Dept: HEALTH INFORMATION MANAGEMENT | Facility: CLINIC | Age: 73
End: 2018-07-25

## 2018-07-25 ENCOUNTER — TELEPHONE (OUTPATIENT)
Dept: ORTHOPEDICS | Facility: CLINIC | Age: 73
End: 2018-07-25

## 2018-07-25 DIAGNOSIS — M17.12 PRIMARY OSTEOARTHRITIS OF LEFT KNEE: Primary | ICD-10-CM

## 2018-07-25 NOTE — TELEPHONE ENCOUNTER
I faxed paperwork to [facility].     Thank you,   Ebony Estrada   [facility] Department  [phone]

## 2018-07-25 NOTE — TELEPHONE ENCOUNTER
Reason for Call:  Other call back    Detailed comments: Patient is calling to schedule surgery orders are needed.    Phone Number Patient can be reached at: Home number on file [phone] (home)    Best Time: any    Can we leave a detailed message on this number? YES    Call taken on 7/25/2018 at 11:48 AM by Iram Mascorro

## 2018-07-25 NOTE — TELEPHONE ENCOUNTER
Type of surgery: LEFT TOTAL KNEE ARTHROPLASTY  CPT 41796  Primary osteoarthritis of left knee [M17.12]  - Primary   Location of surgery: [facility]  Date and time of surgery: 08/14/2018 / 7:30 AM  Surgeon: ELIZABETH MARCOS   Pre-Op Appt Date: 08/03/2018  Post-Op Appt Date: 08/29/2018   Packet sent out: Yes  Pre-cert/Authorization completed:  No pre cert needed for [employer] pre online prior auth form.   Date: 07/25/2018

## 2018-08-10 ENCOUNTER — OFFICE VISIT (OUTPATIENT)
Dept: ORTHOPEDICS | Facility: CLINIC | Age: 73
End: 2018-08-10
Payer: COMMERCIAL

## 2018-08-10 ENCOUNTER — TELEPHONE (OUTPATIENT)
Dept: FAMILY MEDICINE | Facility: CLINIC | Age: 73
End: 2018-08-10

## 2018-08-10 VITALS
SYSTOLIC BLOOD PRESSURE: 155 MMHG | BODY MASS INDEX: 33.06 KG/M2 | HEIGHT: 63 IN | RESPIRATION RATE: 16 BRPM | DIASTOLIC BLOOD PRESSURE: 68 MMHG | WEIGHT: 186.6 LBS

## 2018-08-10 DIAGNOSIS — M17.12 PRIMARY OSTEOARTHRITIS OF LEFT KNEE: Primary | ICD-10-CM

## 2018-08-10 PROCEDURE — 99213 OFFICE O/P EST LOW 20 MIN: CPT | Performed by: ORTHOPAEDIC SURGERY

## 2018-08-10 ASSESSMENT — PAIN SCALES - GENERAL: PAINLEVEL: MODERATE PAIN (4)

## 2018-08-10 NOTE — LETTER
"    8/10/2018         RE: Leah Guerrero  [address]  [facility] [address]        Dear Colleague,    Thank you for referring your patient, Leah Guerrero, to the [facility]. Please see a copy of my visit note below.    Chief Complaint   Patient presents with     Left Knee - Pain     Scheduled for left total knee arthroplasty on 8/14/18. Has pre-op scheduled for 8/13/18. She has anxiety about the surgery and some questions.        HISTORY OF PRESENT ILLNESS:  Leah \"Fina\" ALEK Guerrero is a 72 year old female seen for follow up chronic left knee pain, advanced primary osteoarthritis. She had prior left knee arthroscopy on 10/10/2014. Moderate knee pain today, 4/10. Patient is scheduled for surgery on 8/14/2018. Pre-op will be done 8/13/2018. She presents today to further discuss surgery and anesthesia.    She has done Physical Therapy, injections (cortisone and viscosupplementation), medications (ibuprofen,  opioids), activity modification in the past.        Past Medical History:   Diagnosis Date     Abnormal CT of the chest 10/12    Nodular consolidation right lobe 1.6 cm. Needs repeat CT Jan 2013     Bleeding disorder (H)     in bowels x2      Chronic pancreatitis (H)      CVA (cerebral infarction)      Diabetes      Female stress incontinence      Hyperlipidemia LDL goal <100      Hypertension      Intermittent asthma 12/30/2011     Lateral epicondylitis (tennis elbow) - left 3/30/2011     OA (osteoarthritis) of knee 3/22/2013     Pulmonary nodule, left 10/12    0.5 cm; needs f/u chest CT scan Jan 2013     Surgical complications      Unspecified asthma(493.90)      Patient Active Problem List    Diagnosis Date Noted     Cervicalgia 07/10/2018     Priority: Medium     Mild intermittent asthma without complication 02/22/2018     Priority: Medium     Malignant neoplasm of lung, unspecified laterality, unspecified part of lung (H) 04/21/2017     Priority: Medium     " Chronic pancreatitis, unspecified pancreatitis type (H) 2016     Priority: Medium     Type 2 diabetes mellitus without complication, with long-term current use of insulin (H) 2016     Priority: Medium     Anxiety 2016     Priority: Medium     Insomnia, unspecified type 2016     Priority: Medium     Idiopathic chronic pancreatitis (H) 2015     Priority: Medium     Primary osteoarthritis of left knee 10/28/2015     Priority: Medium     Hypertension goal BP (blood pressure) < 140/90 2015     Priority: Medium     Obesity 2015     Priority: Medium     Health Care Home 10/22/2014     Priority: Medium     [facility]   Guerline Zhao RN  [phone]    [facility] CARE PLAN SUMMARY    Client Name:  Leah Guerrero  Address:  [address] [place] [id number] Phone: [phone] (home)    :  1945 Date of Assessment: HRA telephone assessment 18,  Previous LTCC 17   Health Plan:  [employer]  Health Plan Number: [id number] Medical Assistance Number: [id number]  Financial Worker:  Savanna Collins  [phone]  Case #:  [id number]   [facility] :  Guerline Zhao RN  Phone:  [phone]   Fax:  [phone]   [facility] Enrollment Date: 18 Case Mix:  L  Rate Cell:  B  Waiver Type: EW    Emergency Contact:   Primary Emergency Contact: Paul Guerrero  [place] Phone: [phone]  Relation: Son  Secondary Emergency Contact: Hiram Guerrero   Mobile Phone: [phone]  Relation: Son Language:  English  :  No   Health Care Agent/POA:   Advanced Directives/Living Will:  On file   Primary Care Clinic/Phone/Fax:  [facility]/(p) [phone], (f) [phone] Primary Dx:  Diabetes:E11.9  Secondary Dx: Osteoarthritis: M19.91    Primary Physician:  Kevin Esquivel   Height:    Weight:     Specialty Physician:    Audiologist:     Eye Care Provider:   Dental Care Provider:    [facility]:  [facility] [phone]   Other:        Homemaking/[employer]  [phone]  Fax: [phone] 1/1/18-12/31/18 weekly 4 hrs/week  (16 units)  $4.61/unit      Transportation/[employer]  [employer] 1/1/18-12/31/18 monthly Rush:10/m  Non Rush: 10/m Rush $4/ticket  ($40/m)  Non rush  $3/ticket ($30/m)     [employer]Mental Health   Aditi Rich:[phone]  Fax: [phone] Ongoing Monthly visits              Intermittent asthma without complication 07/11/2014     Priority: Medium     IT band syndrome 01/13/2014     Priority: Medium     Contusion of knee 11/20/2013     Priority: Medium     Prepatellar bursitis of left knee 11/20/2013     Priority: Medium     Insomnia 05/30/2013     Priority: Medium     Mechanical low back pain 03/22/2013     Priority: Medium     Radicular pain of left lower extremity 03/22/2013     Priority: Medium     GERD (gastroesophageal reflux disease) 02/06/2013     Priority: Medium     Pulmonary nodule, left      Priority: Medium     0.5 cm; needs f/u chest CT scan Jan 2013       Abnormal CT of the chest      Priority: Medium     Nodular consolidation right lobe 1.6 cm. Needs repeat CT Jan 2013       Anxiety state 06/21/2012     Priority: Medium     Problem list name updated by automated process. Provider to review       Intermittent asthma 12/30/2011     Priority: Medium     Advanced directives, counseling/discussion 05/06/2011     Priority: Medium     04/29/2013  Advance Care Planning:   Receipt of ACP document:  Received: Health Care Directive which was witnessed or notarized on 07/06/2011.  Document previously scanned on 07/07/2011.  Validation form completed and sent to be scanned.  Code Status reflects choices in most recent ACP document.  Confirmed/documented designated decision maker(s). See permanent comments section of demographics in clinical tab. View document(s) and details by clicking on code status.   Added by Lacie Canchola on 4/29/2013.          Advance Care Planning:    ACP Review and Resources Provided:  Reviewed chart for advance care plan.  Leah Guerrero has no plan or code status on file however states presence of ACP document. Copy requested. Confirmed code status reflects current choices pending receipt of document/advance care plan review. Confirmed/documented designated decision maker(s). See permanent comments section of demographics in clinical tab.   Added by Emma Medina on 4/24/2013          Planning  Advance Directive Initial Visit  Leah Guerrero presents in person for initial session regarding completion of advanced directive form. She was referred to the facilitator by self.  She currently has an advance directive from 1980 & wants to fill out a new updated one.  Plan:  Advanced directive form, healthcare agent information card, advanced care planning information booklet provided to patient.   Follow up meeting: to be arranged when patient calls back to make an appointment after reviewing documents in one week or so. Patient instructed to bring healthcare agent to this meeting.  Flores Gerard RN  Letter sent to patient for [employer] Cho[employer] follow up.  6/22/2011  Flores Gerard RN  Advance Directive Follow-up Visit  Leah Guerrero presents for advanced care planning session accompanied by no one.  Reviewed definitions of advanced care planning and advance directive form, and informational handouts given to patient previously.  Patient has questions and /or concerns about acp process or form .  Reviewed advance directive form with patient & answered all of her questions. Designated healthcare agent is identified. Healthcare agent s name is Paul Macielbanks. My Hopes and Wishes reviewed.  Finalized wishes are clear to patient Yes  Patient and designated healthcare agent are aware that document may be changed at any time in the future.  Advanced directive form: completed at this visit.  Advanced directive form: verified with notary signature. Original  and two copies of advanced directive form given to patient copy sent to  for scanning into EMR and original given to patient and instructed to give copy to designated [facility] and non-[facility] physician where applicable.  Flores Gerard RN  7/6/2011  Advance Directive Problem List Overview:   Name Relationship Phone    Primary Health Care Agent Paul Bro [phone]         Alternative Health Care Agent Hiram Bro c [phone]  [phone]                      Hyperlipidemia LDL goal <100 12/16/2010     Priority: Medium     Fatigue 12/15/2010     Priority: Medium     Type 2 diabetes, HbA1C goal < 8% (H) 11/24/2010     Priority: Medium     Female stress incontinence 10/28/2010     Priority: Medium     Past Surgical History:   Procedure Laterality Date     APPENDECTOMY  2012     C HAND/FINGER SURGERY UNLISTED       C SHOULDER SURG PROC UNLISTED       C STOMACH SURGERY PROCEDURE UNLISTED       CHOLECYSTECTOMY  1969     COLONOSCOPY  6-2-08    Neg. At [facility]     COLONOSCOPY  9-3-13     EYE SURGERY       HYSTERECTOMY TOTAL ABD, HEIDI SALPINGO-OOPHORECTOMY, NODE DISSECTION, TUMOR DEBULKING, COMBINED  1988    fibroids?     KNEE SURGERY  x5     KNEE SURGERY      kneecap procedure     LUNG SURGERY  10-7-16    removal lung cancer     SHOULDER SURGERY  2005    right shoulder, bone spurs     WRIST SURGERY      right wrist       Medications:   Current Outpatient Prescriptions:      ACE/ARB NOT PRESCRIBED, INTENTIONAL,, ACE & ARB not prescribed due to Allergy, Disp: , Rfl:      albuterol (2.5 MG/3ML) 0.083% nebulizer solution, Take 3 mLs (2.5 mg) by nebulization 4 times daily as needed, Disp: 1 Box, Rfl: 5     albuterol (PROAIR HFA/PROVENTIL HFA/VENTOLIN HFA) 108 (90 Base) MCG/ACT Inhaler, Inhale 2 puffs into the lungs every 6 hours as needed for shortness of breath / dyspnea or wheezing, Disp: 1 Inhaler, Rfl: 11     aspirin 81 MG tablet, Take 1 tablet by mouth daily. 1 daily, Disp: , Rfl:      BASAGLAR 100  "UNIT/ML injection, 75 units at bedtime or as directed, Disp: 75 mL, Rfl: 3     blood glucose monitoring (ONE TOUCH ULTRASOFT) lancets, TEST 6 TIMES PER DAY, Disp: 600 each, Rfl: 0     blood glucose monitoring (ONETOUCH ULTRA) test strip, TEST 6 TIMES PER DAY, Disp: 600 strip, Rfl: 11     Calcium Carbonate-Vitamin D (CALCIUM-D PO), Take  by mouth. 1200mg calcium/600mg D3, Disp: , Rfl:      calcium polycarbophil (FIBERCON) 625 MG tablet, Take 2 tablets (1,250 mg) by mouth daily, Disp: 60 tablet, Rfl: 11     fish oil-omega-3 fatty acids (FISH OIL) 1000 MG capsule, Take 1 capsule by mouth daily., Disp: , Rfl:      Garlic CAPS, Take  by mouth. One daily, Disp: , Rfl:      hydrochlorothiazide (HYDRODIURIL) 25 MG tablet, Take 1 tablet (25 mg) by mouth daily, Disp: 30 tablet, Rfl: 1     ibuprofen (ADVIL,MOTRIN) 800 MG tablet, Take 1 tablet (800 mg) by mouth every 8 hours as needed for pain, Disp: 100 tablet, Rfl: 0     insulin aspart (NOVOLOG PEN) 100 UNIT/ML injection, Patient uses 100 units daily split up between multiple doses. Take 2 units per 15 grams of carbohydrate eaten and 2 units per 30 mg/dL above 170 mg/dL., Disp: 30 mL, Rfl: 1     Insulin Pen Needle (PEN NEEDLES 5/16\") 31G X 8 MM MISC, Patient does 4 doses novolog and 2 lantus shots  insulin daily.  Needs 180 needles per month. Okay refills for one year., Disp: 180 each, Rfl: 11     LORazepam (ATIVAN) 0.5 MG tablet, Take 1 tablet (0.5 mg) by mouth every 8 hours as needed, Disp: 40 tablet, Rfl: 3     meperidine (DEMEROL) 50 MG tablet, Take 1 tablet (50 mg) by mouth every 4 hours as needed, Disp: 20 tablet, Rfl: 0     MULTIVITAMIN TABS   OR, 1 TABLET DAILY, Disp: , Rfl:      oxyCODONE-acetaminophen (PERCOCET) 5-325 MG per tablet, Take by mouth every 4 hours as needed for moderate to severe pain, Disp: , Rfl:      PARoxetine (PAXIL) 10 MG tablet, Take 1 tablet (10 mg) by mouth At Bedtime, Disp: 30 tablet, Rfl: 1     promethazine (PHENERGAN) 12.5 MG tablet, Take " "12.5 mg by mouth as needed, Disp: , Rfl: 5     rosuvastatin (CRESTOR) 20 MG tablet, TAKE 1 TABLET(20 MG) BY MOUTH DAILY, Disp: 90 tablet, Rfl: 2     SALINE, to clean port every other month, Disp: , Rfl:      senna-docusate (SENOKOT-S;PERICOLACE) 8.6-50 MG per tablet, 1-2 po bid prn constipation, Disp: 100 tablet, Rfl: 1     tiZANidine (ZANAFLEX) 2 MG tablet, Take 1 tablet (2 mg) by mouth 3 times daily as needed for muscle spasms, Disp: 30 tablet, Rfl: 0     zolpidem (AMBIEN) 5 MG tablet, Take 1 tablet (5 mg) by mouth nightly as needed for sleep, Disp: 30 tablet, Rfl: 2    Allergies:   Allergies   Allergen Reactions     Hydralazine Shortness Of Breath     Famotidine Nausea and Vomiting and Unknown     Benadryl Itch Stopping      Gives her more energy, can't sleep     Lisinopril      Tongue swelling       Metoclopramide Nausea and Vomiting     Ondansetron Nausea and Vomiting     Other reaction(s): Headache     Penicillins Hives     Tape [Adhesive Tape]      blisters     Tylenol      Anxiety  Tablets only.       REVIEW OF SYSTEMS:   CONSTITUTIONAL:NEGATIVE for fever, chills, night sweats  INTEGUMENTARY/SKIN: NEGATIVE for worrisome wound problems or redness.  MUSCULOSKELETAL:See HPI above  NEURO: NEGATIVE for weakness, dizziness or paresthesias    This document serves as a record of the services and decisions personally performed and made by José Vogel MD. It was created on his behalf by Nely Partt, a trained medical scribe. The creation of this document is based the provider's statements to the medical scribe.    Scribe Nely Pratt 11:34 AM 8/10/2018           PHYSICAL EXAM:  /68  Resp 16  Ht 5' 3\" (1.6 m)  Wt 186 lb 9.6 oz (84.6 kg)  BMI 33.05 kg/m2   GENERAL APPEARANCE: healthy, alert, no distress  SKIN: no suspicious lesions or rashes  NEURO: Normal strength and tone, mentation intact and speech normal  PSYCH:  mentation appears normal and affect normal, not anxious  RESPIRATORY: No increased work of " breathing.    left  LOWER EXTREMITY:  Gait: antalgic left.  mild Quad atrophy, strength weakened.  Intact sensation deep peroneal nerve, superficial peroneal nerve, med/lat tibial nerve, sural nerve, saphenous nerve  Intact EHL, EDL, TA, FHL, GS, quadriceps hamstrings and hip flexors  Toes warm and well perfused, brisk capillary refill. Palpable 2+ dp pulses.  calf soft and nttp or squeeze.  Edema: trace    left  KNEE EXAM:    Skin: intact, no ecchymosis or erythema; mild swelling.  ROM: 5 degrees extension to 95 degrees flexion, discomfort at extremes; positive crepitus  Effusion: trace  Tender: med/lat joint line, anterior/posterior knee.  Positive patello-femoral crepitus and grind.    X-RAY: no new xrays today.  3 views left knee 7/18/2018 reviewed: Bone on bone medial loss of joint space, subchondral sclerosis, marginal osteohytes with medial articular flattening. Possible lucency under medial femoral condyle subchondral bone. Patello-femoral marginal osteophytes are more prominent. Arthritis progression since prior xrays. Consistent with Kellgren stage IV arthritis.    LEFT KNEE INTRAOP ARTHROSCOPY FINDINGS   Trace effusion. Moderate synovitis. Grade 4 chondrosis of the patella and grade 3 chondrosis of the femoral trochlea. Prominent medial plica with rubbing along the anterior aspect of the medial femoral condyle. No loose bodies in the media or lateral gutter but small marginal osteophytes. Intact ACL, within the notch. There was some fraying or partial tearing of fibers of the PCL with PCL grossly otherwise intact. Lateral compartment with a small free edge flap tear off of the posterior horn of the lateral meniscus and a partial thickness undersurface tear towards the periphery of the posterior horn body junction. Lateral compartment with an area of grade 4 chondrosis of the weight-bearing portion of the femoral condyle, as well as grade 4 chondrosis of the posterior medial aspect of the lateral tibia.  "Complex tearing of the posterior horn and body of the medial meniscus with horizontal cleavage and radial tears with a displaced flap off of the medial aspect of the body of the meniscus. Medial compartment with grade 4 chondrosis of the majority of the medial femoral condyle and grade 3 diffuse chondrosis of the medial tibia with areas of grade 4 chondrosis      Impression: Leah Guerrero is a 72 year old female with chronic left knee pain, advanced primary osteoarthritis.       Plan:   * reviewed imaging studies with patient, showing arthritis. Arthritis is wearing of the cartilage due to longstanding \"wear and tear\" or can follow an injury to the joint.  * showed sample implants again.  * explained it is ok to be anxious before a significant surgery such as this.  * regarding anesthesia, explained she can be completely under if she would like. Just discuss this with the anesthesiologist before surgery.    ** After this discussion, the patient would like to still proceed with surgery: left total knee arthroplasty.    * will arrange left total knee arthroplasty at a mutual convenience in the near future  * discussed will plan hospitalization anywhere from 1-3 days (average 2 days), with twice daily Physical Therapy starting either the day of or day after surgery, with discharge to home or short term rehabilitation facility, depending on postoperative recovery and progress during hospitalization. She is planning on TCU postoperative, likely [facility] per her preference.  * will plan postoperative deep vein thrombosis prophylaxis x4 weeks. Additionally, graduated compression stockings x4 weeks, and SCDs while in the hospital.  * postoperative pain control will be oral medications upon discharge from hospital  * postoperative Physical Therapy to start upon discharge from the hospital.  * patient will need preoperative H+P prior to surgery from PCP.  * will see patient 2 weeks " postoperative, sooner as needed, for wound check, suture removal, and xrays. Will obtain AP, lateral and sunrise views of the knee at that time.  * Patient to follow up with Primary Care provider regarding elevated blood pressure     * patient encouraged to call in interim if any new questions or concerns arise.    * recommend no elective dental procedures for 4-6 months after surgery. Any emergency dental procedures, mouth infections, abscesses, etc must be taken care of immediately with preventive antibiotics.   * Patient will need longterm prophylactic antibiotics use with dental procedures or other invasive procedures (2 g amoxicilin or 450mg ([id number]) clindamycin) 1 hour prior to dental procedures for a minimum of 2 years postoperative.    * baseline oxfords completed today.    The information in this document, created by a scribe for me, accurately reflects the services I personally performed and the decisions made by me. I have reviewed and approved this document for accuracy.      José Vogel M.D., M.S.  Dept. of Orthopaedic Surgery  [facility]      Again, thank you for allowing me to participate in the care of your patient.        Sincerely,        José Vogel MD

## 2018-08-10 NOTE — PROGRESS NOTES
"Chief Complaint   Patient presents with     Left Knee - Pain     Scheduled for left total knee arthroplasty on 8/14/18. Has pre-op scheduled for 8/13/18. She has anxiety about the surgery and some questions.        HISTORY OF PRESENT ILLNESS:  Leah \"Fina\" ALEK Guerrero is a 72 year old female seen for follow up chronic left knee pain, advanced primary osteoarthritis. She had prior left knee arthroscopy on 10/10/2014. Moderate knee pain today, 4/10. Patient is scheduled for surgery on 8/14/2018. Pre-op will be done 8/13/2018. She presents today to further discuss surgery and anesthesia.    She has done Physical Therapy, injections (cortisone and viscosupplementation), medications (ibuprofen,  opioids), activity modification in the past.        Past Medical History:   Diagnosis Date     Abnormal CT of the chest 10/12    Nodular consolidation right lobe 1.6 cm. Needs repeat CT Jan 2013     Bleeding disorder (H)     in bowels x2      Chronic pancreatitis (H)      CVA (cerebral infarction)      Diabetes      Female stress incontinence      Hyperlipidemia LDL goal <100      Hypertension      Intermittent asthma 12/30/2011     Lateral epicondylitis (tennis elbow) - left 3/30/2011     OA (osteoarthritis) of knee 3/22/2013     Pulmonary nodule, left 10/12    0.5 cm; needs f/u chest CT scan Jan 2013     Surgical complications      Unspecified asthma(493.90)      Patient Active Problem List    Diagnosis Date Noted     Cervicalgia 07/10/2018     Priority: Medium     Mild intermittent asthma without complication 02/22/2018     Priority: Medium     Malignant neoplasm of lung, unspecified laterality, unspecified part of lung (H) 04/21/2017     Priority: Medium     Chronic pancreatitis, unspecified pancreatitis type (H) 11/16/2016     Priority: Medium     Type 2 diabetes mellitus without complication, with long-term current use of insulin (H) 11/16/2016     Priority: Medium     Anxiety 07/20/2016     Priority: Medium     Insomnia, " unspecified type 2016     Priority: Medium     Idiopathic chronic pancreatitis (H) 2015     Priority: Medium     Primary osteoarthritis of left knee 10/28/2015     Priority: Medium     Hypertension goal BP (blood pressure) < 140/90 2015     Priority: Medium     Obesity 2015     Priority: Medium     Health Care Home 10/22/2014     Priority: Medium     [facility]   Guerline Zhao RN  [phone]    [facility] CARE PLAN SUMMARY    Client Name:  Leah Guerrero  Address:  [address] [place] [address]   [facility] [address] Phone: [phone] (home)    :  1945 Date of Assessment: HRA telephone assessment 18,  Previous LTCC 17   Health Plan:  [facility]  Health Plan Number: [id number] Medical Assistance Number: [id number]  Financial Worker:  Savanna Collins  [phone]  Case #:  [id number]   [facility] :  Guerline Zhao RN  Phone:  [phone]   Fax:  [phone]   [facility] Enrollment Date: 18 Case Mix:  L  Rate Cell:  B  Waiver Type: EW    Emergency Contact:   Primary Emergency Contact: Paul Guerrero  [place] Phone: [phone]  Relation: Son  Secondary Emergency Contact: Hiram Guerrero   Mobile Phone: [phone]  Relation: Son Language:  English  :  No   Health Care Agent/POA:   Advanced Directives/Living Will:  On file   Primary Care Clinic/Phone/Fax:  [facility]/(p) [phone], (f) [phone] Primary Dx:  Diabetes:E11.9  Secondary Dx: Osteoarthritis: M19.91    Primary Physician:  Kevin Esquivel   Height:    Weight:     Specialty Physician:    Audiologist:     Eye Care Provider:   Dental Care Provider:    [facility]: [facility] [phone]   Other:        Homemaking/[employer]  [phone]  Fax: [phone] 18-18 weekly 4 hrs/week  (16 units)  $4.61/unit      Transportation/[employer]  [facility] 18-18 monthly Rush:10/m  Non Rush: 10/m Rush $4/ticket  ($40/m)  Non  rush  $3/ticket ($30/m)     [employer]Mental Health   Aditi Tivoli:[phone]  Fax: [phone] Ongoing Monthly visits              Intermittent asthma without complication 07/11/2014     Priority: Medium     IT band syndrome 01/13/2014     Priority: Medium     Contusion of knee 11/20/2013     Priority: Medium     Prepatellar bursitis of left knee 11/20/2013     Priority: Medium     Insomnia 05/30/2013     Priority: Medium     Mechanical low back pain 03/22/2013     Priority: Medium     Radicular pain of left lower extremity 03/22/2013     Priority: Medium     GERD (gastroesophageal reflux disease) 02/06/2013     Priority: Medium     Pulmonary nodule, left      Priority: Medium     0.5 cm; needs f/u chest CT scan Jan 2013       Abnormal CT of the chest      Priority: Medium     Nodular consolidation right lobe 1.6 cm. Needs repeat CT Jan 2013       Anxiety state 06/21/2012     Priority: Medium     Problem list name updated by automated process. Provider to review       Intermittent asthma 12/30/2011     Priority: Medium     Advanced directives, counseling/discussion 05/06/2011     Priority: Medium     04/29/2013  Advance Care Planning:   Receipt of ACP document:  Received: Health Care Directive which was witnessed or notarized on 07/06/2011.  Document previously scanned on 07/07/2011.  Validation form completed and sent to be scanned.  Code Status reflects choices in most recent ACP document.  Confirmed/documented designated decision maker(s). See permanent comments section of demographics in clinical tab. View document(s) and details by clicking on code status.   Added by Lacie Canchola on 4/29/2013.          Advance Care Planning:   ACP Review and Resources Provided:  Reviewed chart for advance care plan.  Leah Guerrero has no plan or code status on file however states presence of ACP document. Copy requested. Confirmed code status reflects current choices pending receipt of document/advance  care plan review. Confirmed/documented designated decision maker(s). See permanent comments section of demographics in clinical tab.   Added by Emma Medina on 4/24/2013          Planning  Advance Directive Initial Visit  Leah Guerrero presents in person for initial session regarding completion of advanced directive form. She was referred to the facilitator by self.  She currently has an advance directive from 1980 & wants to fill out a new updated one.  Plan:  Advanced directive form, healthcare agent information card, advanced care planning information booklet provided to patient.   Follow up meeting: to be arranged when patient calls back to make an appointment after reviewing documents in one week or so. Patient instructed to bring healthcare agent to this meeting.  Flores Gerard RN  Letter sent to patient for [employer] Cho[employer] follow up.  6/22/2011  Flores Gerard RN  Advance Directive Follow-up Visit  Leah Guerrero presents for advanced care planning session accompanied by no one.  Reviewed definitions of advanced care planning and advance directive form, and informational handouts given to patient previously.  Patient has questions and /or concerns about acp process or form .  Reviewed advance directive form with patient & answered all of her questions. Designated healthcare agent is identified. Healthcare agent s name is Paul Guerrero. My Hopes and Wishes reviewed.  Finalized wishes are clear to patient Yes  Patient and designated healthcare agent are aware that document may be changed at any time in the future.  Advanced directive form: completed at this visit.  Advanced directive form: verified with notary signature. Original and two copies of advanced directive form given to patient copy sent to  for scanning into EMR and original given to patient and instructed to give copy to designated [facility] and non-[place] physician where applicable.  Flores Gerard RN  7/6/2011  Advance  Directive Problem List Overview:   Name Relationship Phone    Primary Health Care Agent Paul Bro [phone]         Alternative Health Care Agent Hiram Bro c [phone]  [phone]                      Hyperlipidemia LDL goal <100 12/16/2010     Priority: Medium     Fatigue 12/15/2010     Priority: Medium     Type 2 diabetes, HbA1C goal < 8% (H) 11/24/2010     Priority: Medium     Female stress incontinence 10/28/2010     Priority: Medium     Past Surgical History:   Procedure Laterality Date     APPENDECTOMY  2012     C HAND/FINGER SURGERY UNLISTED       C SHOULDER SURG PROC UNLISTED       C STOMACH SURGERY PROCEDURE UNLISTED       CHOLECYSTECTOMY  1969     COLONOSCOPY  6-2-08    Neg. At [facility]     COLONOSCOPY  9-3-13     EYE SURGERY       HYSTERECTOMY TOTAL ABD, HEIDI SALPINGO-OOPHORECTOMY, NODE DISSECTION, TUMOR DEBULKING, COMBINED  1988    fibroids?     KNEE SURGERY  x5     KNEE SURGERY      kneecap procedure     LUNG SURGERY  10-7-16    removal lung cancer     SHOULDER SURGERY  2005    right shoulder, bone spurs     WRIST SURGERY      right wrist       Medications:   Current Outpatient Prescriptions:      ACE/ARB NOT PRESCRIBED, INTENTIONAL,, ACE & ARB not prescribed due to Allergy, Disp: , Rfl:      albuterol (2.5 MG/3ML) 0.083% nebulizer solution, Take 3 mLs (2.5 mg) by nebulization 4 times daily as needed, Disp: 1 Box, Rfl: 5     albuterol (PROAIR HFA/PROVENTIL HFA/VENTOLIN HFA) 108 (90 Base) MCG/ACT Inhaler, Inhale 2 puffs into the lungs every 6 hours as needed for shortness of breath / dyspnea or wheezing, Disp: 1 Inhaler, Rfl: 11     aspirin 81 MG tablet, Take 1 tablet by mouth daily. 1 daily, Disp: , Rfl:      BASAGLAR 100 UNIT/ML injection, 75 units at bedtime or as directed, Disp: 75 mL, Rfl: 3     blood glucose monitoring (ONE TOUCH ULTRASOFT) lancets, TEST 6 TIMES PER DAY, Disp: 600 each, Rfl: 0     blood glucose monitoring (ONETOUCH ULTRA) test strip, TEST 6 TIMES PER DAY,  "Disp: 600 strip, Rfl: 11     Calcium Carbonate-Vitamin D (CALCIUM-D PO), Take  by mouth. 1200mg calcium/600mg D3, Disp: , Rfl:      calcium polycarbophil (FIBERCON) 625 MG tablet, Take 2 tablets (1,250 mg) by mouth daily, Disp: 60 tablet, Rfl: 11     fish oil-omega-3 fatty acids (FISH OIL) 1000 MG capsule, Take 1 capsule by mouth daily., Disp: , Rfl:      Garlic CAPS, Take  by mouth. One daily, Disp: , Rfl:      hydrochlorothiazide (HYDRODIURIL) 25 MG tablet, Take 1 tablet (25 mg) by mouth daily, Disp: 30 tablet, Rfl: 1     ibuprofen (ADVIL,MOTRIN) 800 MG tablet, Take 1 tablet (800 mg) by mouth every 8 hours as needed for pain, Disp: 100 tablet, Rfl: 0     insulin aspart (NOVOLOG PEN) 100 UNIT/ML injection, Patient uses 100 units daily split up between multiple doses. Take 2 units per 15 grams of carbohydrate eaten and 2 units per 30 mg/dL above 170 mg/dL., Disp: 30 mL, Rfl: 1     Insulin Pen Needle (PEN NEEDLES 5/16\") 31G X 8 MM MISC, Patient does 4 doses novolog and 2 lantus shots  insulin daily.  Needs 180 needles per month. Okay refills for one year., Disp: 180 each, Rfl: 11     LORazepam (ATIVAN) 0.5 MG tablet, Take 1 tablet (0.5 mg) by mouth every 8 hours as needed, Disp: 40 tablet, Rfl: 3     meperidine (DEMEROL) 50 MG tablet, Take 1 tablet (50 mg) by mouth every 4 hours as needed, Disp: 20 tablet, Rfl: 0     MULTIVITAMIN TABS   OR, 1 TABLET DAILY, Disp: , Rfl:      oxyCODONE-acetaminophen (PERCOCET) 5-325 MG per tablet, Take by mouth every 4 hours as needed for moderate to severe pain, Disp: , Rfl:      PARoxetine (PAXIL) 10 MG tablet, Take 1 tablet (10 mg) by mouth At Bedtime, Disp: 30 tablet, Rfl: 1     promethazine (PHENERGAN) 12.5 MG tablet, Take 12.5 mg by mouth as needed, Disp: , Rfl: 5     rosuvastatin (CRESTOR) 20 MG tablet, TAKE 1 TABLET(20 MG) BY MOUTH DAILY, Disp: 90 tablet, Rfl: 2     SALINE, to clean port every other month, Disp: , Rfl:      senna-docusate (SENOKOT-S;PERICOLACE) 8.6-50 MG per " "tablet, 1-2 po bid prn constipation, Disp: 100 tablet, Rfl: 1     tiZANidine (ZANAFLEX) 2 MG tablet, Take 1 tablet (2 mg) by mouth 3 times daily as needed for muscle spasms, Disp: 30 tablet, Rfl: 0     zolpidem (AMBIEN) 5 MG tablet, Take 1 tablet (5 mg) by mouth nightly as needed for sleep, Disp: 30 tablet, Rfl: 2    Allergies:   Allergies   Allergen Reactions     Hydralazine Shortness Of Breath     Famotidine Nausea and Vomiting and Unknown     Benadryl Itch Stopping      Gives her more energy, can't sleep     Lisinopril      Tongue swelling       Metoclopramide Nausea and Vomiting     Ondansetron Nausea and Vomiting     Other reaction(s): Headache     Penicillins Hives     Tape [Adhesive Tape]      blisters     Tylenol      Anxiety  Tablets only.       REVIEW OF SYSTEMS:   CONSTITUTIONAL:NEGATIVE for fever, chills, night sweats  INTEGUMENTARY/SKIN: NEGATIVE for worrisome wound problems or redness.  MUSCULOSKELETAL:See HPI above  NEURO: NEGATIVE for weakness, dizziness or paresthesias    This document serves as a record of the services and decisions personally performed and made by José Vogel MD. It was created on his behalf by Nely Pratt, a trained medical scribe. The creation of this document is based the provider's statements to the medical scribe.    Scribe Nely Pratt 11:34 AM 8/10/2018           PHYSICAL EXAM:  /68  Resp 16  Ht 5' 3\" (1.6 m)  Wt 186 lb 9.6 oz (84.6 kg)  BMI 33.05 kg/m2   GENERAL APPEARANCE: healthy, alert, no distress  SKIN: no suspicious lesions or rashes  NEURO: Normal strength and tone, mentation intact and speech normal  PSYCH:  mentation appears normal and affect normal, not anxious  RESPIRATORY: No increased work of breathing.    left  LOWER EXTREMITY:  Gait: antalgic left.  mild Quad atrophy, strength weakened.  Intact sensation deep peroneal nerve, superficial peroneal nerve, med/lat tibial nerve, sural nerve, saphenous nerve  Intact EHL, EDL, TA, FHL, GS, quadriceps " hamstrings and hip flexors  Toes warm and well perfused, brisk capillary refill. Palpable 2+ dp pulses.  calf soft and nttp or squeeze.  Edema: trace    left  KNEE EXAM:    Skin: intact, no ecchymosis or erythema; mild swelling.  ROM: 5 degrees extension to 95 degrees flexion, discomfort at extremes; positive crepitus  Effusion: trace  Tender: med/lat joint line, anterior/posterior knee.  Positive patello-femoral crepitus and grind.    X-RAY: no new xrays today.  3 views left knee 7/18/2018 reviewed: Bone on bone medial loss of joint space, subchondral sclerosis, marginal osteohytes with medial articular flattening. Possible lucency under medial femoral condyle subchondral bone. Patello-femoral marginal osteophytes are more prominent. Arthritis progression since prior xrays. Consistent with Kellgren stage IV arthritis.    LEFT KNEE INTRAOP ARTHROSCOPY FINDINGS   Trace effusion. Moderate synovitis. Grade 4 chondrosis of the patella and grade 3 chondrosis of the femoral trochlea. Prominent medial plica with rubbing along the anterior aspect of the medial femoral condyle. No loose bodies in the media or lateral gutter but small marginal osteophytes. Intact ACL, within the notch. There was some fraying or partial tearing of fibers of the PCL with PCL grossly otherwise intact. Lateral compartment with a small free edge flap tear off of the posterior horn of the lateral meniscus and a partial thickness undersurface tear towards the periphery of the posterior horn body junction. Lateral compartment with an area of grade 4 chondrosis of the weight-bearing portion of the femoral condyle, as well as grade 4 chondrosis of the posterior medial aspect of the lateral tibia. Complex tearing of the posterior horn and body of the medial meniscus with horizontal cleavage and radial tears with a displaced flap off of the medial aspect of the body of the meniscus. Medial compartment with grade 4 chondrosis of the majority of the medial  "femoral condyle and grade 3 diffuse chondrosis of the medial tibia with areas of grade 4 chondrosis      Impression: Leah Guerrero is a 72 year old female with chronic left knee pain, advanced primary osteoarthritis.       Plan:   * reviewed imaging studies with patient, showing arthritis. Arthritis is wearing of the cartilage due to longstanding \"wear and tear\" or can follow an injury to the joint.  * showed sample implants again.  * explained it is ok to be anxious before a significant surgery such as this.  * regarding anesthesia, explained she can be completely under if she would like. Just discuss this with the anesthesiologist before surgery.    ** After this discussion, the patient would like to still proceed with surgery: left total knee arthroplasty.    * will arrange left total knee arthroplasty at a mutual convenience in the near future  * discussed will plan hospitalization anywhere from 1-3 days (average 2 days), with twice daily Physical Therapy starting either the day of or day after surgery, with discharge to home or short term rehabilitation facility, depending on postoperative recovery and progress during hospitalization. She is planning on TCU postoperative, [facility] per her preference.  * will plan postoperative deep vein thrombosis prophylaxis x4 weeks. Additionally, graduated compression stockings x4 weeks, and SCDs while in the hospital.  * postoperative pain control will be oral medications upon discharge from hospital  * postoperative Physical Therapy to start upon discharge from the hospital.  * patient will need preoperative H+P prior to surgery from PCP.  * will see patient 2 weeks postoperative, sooner as needed, for wound check, suture removal, and xrays. Will obtain AP, lateral and sunrise views of the knee at that time.  * Patient to follow up with Primary Care provider regarding elevated blood pressure     * patient encouraged to " call in interim if any new questions or concerns arise.    * recommend no elective dental procedures for 4-6 months after surgery. Any emergency dental procedures, mouth infections, abscesses, etc must be taken care of immediately with preventive antibiotics.   * Patient will need longterm prophylactic antibiotics use with dental procedures or other invasive procedures (2 g amoxicilin or 450mg ([id number]) clindamycin) 1 hour prior to dental procedures for a minimum of 2 years postoperative.    * baseline oxfords completed today.    The information in this document, created by a scribe for me, accurately reflects the services I personally performed and the decisions made by me. I have reviewed and approved this document for accuracy.      José Vogel M.D., M.S.  Dept. of Orthopaedic Surgery  [facility]

## 2018-08-10 NOTE — MR AVS SNAPSHOT
After Visit Summary   8/10/2018    Leah Guerrero    MRN: [id number]           Patient Information     Date Of Birth          1945        Visit Information        Provider Department      8/10/2018 11:00 AM José Vogel MD [facility]        Today's Diagnoses     Primary osteoarthritis of left knee    -  1       Follow-ups after your visit        Follow-up notes from your care team     Return for Postop Visit.      Your next 10 appointments already scheduled     Aug 13, 2018 11:40 AM CDT   Pre-Op physical with Kevin Esquivel MD   [facility] ([facility]    [address] [facility] [id number] [phone]            Aug 29, 2018 11:00 AM CDT   Return Visit with José Vogel MD   [facility] ([address] [id number] [phone]              Who to contact     If you have questions or need follow up information about today's clinic visit or your schedule please contact [facility] directly at [phone].  Normal or non-critical lab and imaging results will be communicated to you by MyChart, letter or phone within 4 business days after the clinic has received the results. If you do not hear from us within 7 days, please contact the clinic through Gedd[employer] or phone. If you have a critical or abnormal lab result, we will notify you by phone as soon as possible.  Submit refill requests through [employer] or call your pharmacy and they will forward the refill request to us. Please allow 3 business days for your refill to be completed.          Additional Information About Your Visit        MyChart Information     [employer] gives you secure access to your electronic health record. If you see a primary care provider, you can also send messages to your care team and make appointments. If you have questions, please call your  "primary care clinic.  If you do not have a primary care provider, please call [phone] and they will assist you.        Care EveryWhere ID     This is your Care EveryWhere ID. This could be used by other organizations to access your [facility] medical records  [id number]        Your Vitals Were     Respirations Height BMI (Body Mass Index)             16 5' 3\" (1.6 m) 33.05 kg/m2          Blood Pressure from Last 3 Encounters:   08/10/18 155/68   07/18/18 150/73   07/05/18 152/68    Weight from Last 3 Encounters:   08/10/18 186 lb 9.6 oz (84.6 kg)   07/18/18 184 lb 6.4 oz (83.6 kg)   07/05/18 188 lb (85.3 kg)              Today, you had the following     No orders found for display       Primary Care Provider Office Phone # Fax #    Kevin Esquivel [phone]       [address] [address]        Goals        General    I will continue the exercises provided by physical therapy for my knee (pt-stated)     Notes - Note created  7/9/2015 10:30 AM by Ray Holland, JEANIE    As of today's date 7/9/2015 goal is met at 0 - 25%.   Goal Status:  Active        I will remember to take my insulin before meals especially lunch when I am  away from home. (pt-stated)     Notes - Note created  3/25/2015  3:51 PM by Ray Holland, RN      As of today's date 3/25/2015 goal is met at 0 - 25%.   Goal Status:  Active.          I will work on testing my blood sugar and taking my insulin when I am away from home at lunch time. (pt-stated)     Notes - Note created  5/20/2016  2:43 PM by Ray Holland, JEANIE    As of today's date 5/20/2016 goal is met at 0 - 25%.   Goal Status:  Active          Equal Access to Services     FABRICIO GUNTER : Sandie Evans, wacharmaine luqadaha, qaybta kaaljude king. [facility] [phone].    ATENCIÓN: Si habla español, tiene a verduzco disposición servicios gratuitos de asistencia lingüística. Llame al " [phone].    We comply with applicable federal civil rights laws and [place] laws. We do not discriminate on the basis of race, color, national origin, age, disability, sex, sexual orientation, or gender identity.            Thank you!     Thank you for choosing [facility]  for your care. Our goal is always to provide you with excellent care. Hearing back from our patients is one way we can continue to improve our services. Please take a few minutes to complete the written survey that you may receive in the mail after your visit with us. Thank you!             Your Updated Medication List - Protect others around you: Learn how to safely use, store and throw away your medicines at www.disposemymeds.org.          This list is accurate as of 8/10/18 11:59 PM.  Always use your most recent med list.                   Brand Name Dispense Instructions for use Diagnosis    ACE/ARB/ARNI NOT PRESCRIBED (INTENTIONAL)      ACE & ARB not prescribed due to Allergy    Type 2 diabetes mellitus without complication, with long-term current use of insulin (H)       * albuterol (2.5 MG/3ML) 0.083% neb solution     1 Box    Take 3 mLs (2.5 mg) by nebulization 4 times daily as needed    Intermittent asthma       * albuterol 108 (90 Base) MCG/ACT inhaler    PROAIR HFA/PROVENTIL HFA/VENTOLIN HFA    1 Inhaler    Inhale 2 puffs into the lungs every 6 hours as needed for shortness of breath / dyspnea or wheezing    Mild intermittent asthma without complication       aspirin 81 MG tablet      Take 1 tablet by mouth daily. 1 daily        BASAGLAR 100 UNIT/ML injection     75 mL    75 units at bedtime or as directed    Type 2 diabetes mellitus without complication, with long-term current use of insulin (H)       blood glucose monitoring lancets     600 each    TEST 6 TIMES PER DAY    Type 2 diabetes mellitus without complication, with long-term current use of insulin (H)       blood glucose monitoring test strip    ONETOUCH  "ULTRA    600 strip    TEST 6 TIMES PER DAY    Type 2 diabetes mellitus without complication, with long-term current use of insulin (H)       calcium polycarbophil 625 MG tablet    FIBERCON    60 tablet    Take 2 tablets (1,250 mg) by mouth daily    Health care maintenance       CALCIUM-D PO      Take  by mouth. 1200mg calcium/600mg D3        fish oil-omega-3 fatty acids 1000 MG capsule      Take 1 capsule by mouth daily.        Garlic Caps      Take  by mouth. One daily        hydrochlorothiazide 25 MG tablet    HYDRODIURIL    30 tablet    Take 1 tablet (25 mg) by mouth daily    Hypertension goal BP (blood pressure) < 140/90       ibuprofen 800 MG tablet    ADVIL/MOTRIN    100 tablet    Take 1 tablet (800 mg) by mouth every 8 hours as needed for pain    Arthritis       insulin aspart 100 UNIT/ML injection    NovoLOG PEN    30 mL    Patient uses 100 units daily split up between multiple doses. Take 2 units per 15 grams of carbohydrate eaten and 2 units per 30 mg/dL above 170 mg/dL.    Type 2 diabetes mellitus without complication, with long-term current use of insulin (H)       LORazepam 0.5 MG tablet    ATIVAN    40 tablet    Take 1 tablet (0.5 mg) by mouth every 8 hours as needed    Anxiety       meperidine 50 MG tablet    DEMEROL    20 tablet    Take 1 tablet (50 mg) by mouth every 4 hours as needed    Idiopathic chronic pancreatitis (H)       MULTIVITAMIN TABS   OR      1 TABLET DAILY        oxyCODONE-acetaminophen 5-325 MG per tablet    PERCOCET     Take by mouth every 4 hours as needed for moderate to severe pain        PARoxetine 10 MG tablet    PAXIL    30 tablet    Take 1 tablet (10 mg) by mouth At Bedtime    Anxiety       pen needles 5/16\" 31G X 8 MM Misc     180 each    Patient does 4 doses novolog and 2 lantus shots  insulin daily.  Needs 180 needles per month. Okay refills for one year.    Type 2 diabetes mellitus without complication, with long-term current use of insulin (H)       promethazine 12.5 MG " tablet    PHENERGAN     Take 12.5 mg by mouth as needed        rosuvastatin 20 MG tablet    CRESTOR    90 tablet    TAKE 1 TABLET(20 MG) BY MOUTH DAILY    Hyperlipidemia LDL goal <100       SALINE      to clean port every other month    Bronchitis       senna-docusate 8.6-50 MG per tablet    SENOKOT-S;PERICOLACE    100 tablet    1-2 po bid prn constipation    Constipation, unspecified constipation type       tiZANidine 2 MG tablet    ZANAFLEX    30 tablet    Take 1 tablet (2 mg) by mouth 3 times daily as needed for muscle spasms    Muscle pain       zolpidem 5 MG tablet    AMBIEN    30 tablet    Take 1 tablet (5 mg) by mouth nightly as needed for sleep    Insomnia, unspecified type       * Notice:  This list has 2 medication(s) that are the same as other medications prescribed for you. Read the directions carefully, and ask your doctor or other care provider to review them with you.

## 2018-08-10 NOTE — TELEPHONE ENCOUNTER
Reason for Call:  Other Forms    Detailed comments: [facility] is calling to get History and Physical, labs and EKG for the patient who is having surgery.  Please fax forms to [phone]    Phone Number Patient can be reached at: Other phone number:  [phone]  Daisy    Best Time: Any    Can we leave a detailed message on this number? YES    Call taken on 8/10/2018 at 12:50 PM by Martha Real

## 2018-08-13 ENCOUNTER — TRANSFERRED RECORDS (OUTPATIENT)
Dept: HEALTH INFORMATION MANAGEMENT | Facility: CLINIC | Age: 73
End: 2018-08-13

## 2018-08-13 ENCOUNTER — TELEPHONE (OUTPATIENT)
Dept: FAMILY MEDICINE | Facility: CLINIC | Age: 73
End: 2018-08-13

## 2018-08-13 ENCOUNTER — OFFICE VISIT (OUTPATIENT)
Dept: FAMILY MEDICINE | Facility: CLINIC | Age: 73
End: 2018-08-13
Payer: COMMERCIAL

## 2018-08-13 VITALS
RESPIRATION RATE: 15 BRPM | HEIGHT: 63 IN | DIASTOLIC BLOOD PRESSURE: 62 MMHG | WEIGHT: 185.6 LBS | OXYGEN SATURATION: 98 % | HEART RATE: 75 BPM | BODY MASS INDEX: 32.89 KG/M2 | SYSTOLIC BLOOD PRESSURE: 140 MMHG | TEMPERATURE: 98.6 F

## 2018-08-13 DIAGNOSIS — R91.1 PULMONARY NODULE, LEFT: Chronic | ICD-10-CM

## 2018-08-13 DIAGNOSIS — E11.65 UNCONTROLLED TYPE 2 DIABETES MELLITUS WITH HYPERGLYCEMIA, WITH LONG-TERM CURRENT USE OF INSULIN (H): ICD-10-CM

## 2018-08-13 DIAGNOSIS — I10 HYPERTENSION GOAL BP (BLOOD PRESSURE) < 140/90: ICD-10-CM

## 2018-08-13 DIAGNOSIS — Z79.4 UNCONTROLLED TYPE 2 DIABETES MELLITUS WITH HYPERGLYCEMIA, WITH LONG-TERM CURRENT USE OF INSULIN (H): ICD-10-CM

## 2018-08-13 DIAGNOSIS — Z01.818 PREOP GENERAL PHYSICAL EXAM: Primary | ICD-10-CM

## 2018-08-13 DIAGNOSIS — J45.20 MILD INTERMITTENT ASTHMA, UNSPECIFIED WHETHER COMPLICATED: ICD-10-CM

## 2018-08-13 DIAGNOSIS — E78.5 HYPERLIPIDEMIA LDL GOAL <100: ICD-10-CM

## 2018-08-13 DIAGNOSIS — Z85.118 HISTORY OF LUNG CANCER: ICD-10-CM

## 2018-08-13 DIAGNOSIS — M17.12 ARTHRITIS OF LEFT KNEE: ICD-10-CM

## 2018-08-13 PROCEDURE — 93000 ELECTROCARDIOGRAM COMPLETE: CPT | Performed by: FAMILY MEDICINE

## 2018-08-13 PROCEDURE — 99214 OFFICE O/P EST MOD 30 MIN: CPT | Performed by: FAMILY MEDICINE

## 2018-08-13 NOTE — TELEPHONE ENCOUNTER
No prior auth required. Called 08/13/2018 and was told [employer] doesn't require prior auth. Reference number [id number].     Called Aminah back and let her know it needed no prior auth.

## 2018-08-13 NOTE — PATIENT INSTRUCTIONS
[facility]    If you have any questions regarding to your visit please contact your care team:       Team Purple:   Clinic Hours Telephone Number   Dr. Lorin Roman   7am-7pm  Monday - Thursday   7am-5pm  Fridays  [phone]  (Appointment scheduling available 24/7)    Questions about your recent visit?   Team Line:  [phone]   Urgent Care - [place] and [facility] - 11am-9pm Monday-Friday Saturday-Sunday- 9am-5pm   [place] - 5pm-9pm Monday-Friday Saturday-Sunday- 9am-5pm  [phone] - [place]  [phone] [facility]       What options do I have for a visit other than an office visit? We offer electronic visits (e-visits) and telephone visits, when medically appropriate.  Please check with your medical insurance to see if these types of visits are covered, as you will be responsible for any charges that are not paid by your insurance.      You can use [employer] (secure electronic communication) to access to your chart, send your primary care provider a message, or make an appointment. Ask a team member how to get started.     For a price quote for your services, please call our Consumer Price Line at [phone] or our Imaging Cost estimation line at [phone] (for imaging tests).    Jeff Gusman    Before Your Surgery      Call your surgeon if there is any change in your health. This includes signs of a cold or flu (such as a sore throat, runny nose, cough, rash or fever).    Do not smoke, drink alcohol or take over the counter medicine (unless your surgeon or primary care doctor tells you to) for the 24 hours before and after surgery.    If you take prescribed drugs: Follow your doctor s orders about which medicines to take and which to stop until after surgery.    Eating and drinking prior to surgery: follow the instructions from your surgeon    Take a shower or bath the night before surgery. Use the soap your  surgeon gave you to gently clean your skin. If you do not have soap from your surgeon, use your regular soap. Do not shave or scrub the surgery site.  Wear clean pajamas and have clean sheets on your bed.

## 2018-08-13 NOTE — TELEPHONE ENCOUNTER
Reason for Call:  Other    Detailed comments: Patient calling stating that her ride didn't show up for her 11:40AM Pre Op today. Her surgery is tomorrow. TC did set patient up with appointment for 6:20PM tonight at [facility]. Patient is wondering if PCP would work her in yet today for a pre op.     Phone Number Patient can be reached at: Home number on file [phone] (home)    Best Time: any    Can we leave a detailed message on this number? YES    Call taken on 8/13/2018 at 11:46 AM by Latisha Finney

## 2018-08-13 NOTE — MR AVS SNAPSHOT
After Visit Summary   8/13/2018    Leah Guerrero    MRN: [id number]           Patient Information     Date Of Birth          1945        Visit Information        Provider Department      8/13/2018 6:20 PM Genaro Dawson MD [facility]        Today's Diagnoses     Preop general physical exam    -  1    Arthritis of left knee        Uncontrolled type 2 diabetes mellitus with hyperglycemia, with long-term current use of insulin (H)        Mild intermittent asthma, unspecified whether complicated        Asymptomatic postmenopausal status        Screening for diabetic peripheral neuropathy        Need for prophylactic vaccination against Streptococcus pneumoniae (pneumococcus)        Type 2 diabetes mellitus without complication, with long-term current use of insulin (H)        Hypertension goal BP (blood pressure) < 140/90          Care Instructions    [facility]    If you have any questions regarding to your visit please contact your care team:       Team Purple:   Clinic Hours Telephone Number   Dr. Lorin Roman   7am-7pm  Monday - Thursday   7am-5pm  Fridays  [phone]  (Appointment scheduling available 24/7)    Questions about your recent visit?   Team Line:  [phone]   Urgent Care - [place] and [facility] - 11am-9pm Monday-Friday Saturday-Sunday- 9am-5pm   [place] - 5pm-9pm Monday-Friday Saturday-Sunday- 9am-5pm  [phone] - [place]  [phone] - [place]       What options do I have for a visit other than an office visit? We offer electronic visits (e-visits) and telephone visits, when medically appropriate.  Please check with your medical insurance to see if these types of visits are covered, as you will be responsible for any charges that are not paid by your insurance.      You can use [employer] (secure electronic communication) to access to your chart, send your primary  care provider a message, or make an appointment. Ask a team member how to get started.     For a price quote for your services, please call our Consumer Price Line at [phone] or our Imaging Cost estimation line at [phone] (for imaging tests).    Jeff Gusman    Before Your Surgery      Call your surgeon if there is any change in your health. This includes signs of a cold or flu (such as a sore throat, runny nose, cough, rash or fever).    Do not smoke, drink alcohol or take over the counter medicine (unless your surgeon or primary care doctor tells you to) for the 24 hours before and after surgery.    If you take prescribed drugs: Follow your doctor s orders about which medicines to take and which to stop until after surgery.    Eating and drinking prior to surgery: follow the instructions from your surgeon    Take a shower or bath the night before surgery. Use the soap your surgeon gave you to gently clean your skin. If you do not have soap from your surgeon, use your regular soap. Do not shave or scrub the surgery site.  Wear clean pajamas and have clean sheets on your bed.           Follow-ups after your visit        Your next 10 appointments already scheduled     Aug 29, 2018 11:00 AM CDT   Return Visit with José Vogel MD   [facility]    [address] [place] [place] [address] [id number] [phone]              Who to contact     If you have questions or need follow up information about today's clinic visit or your schedule please contact [facility] directly at [phone].  Normal or non-critical lab and imaging results will be communicated to you by MyChart, letter or phone within 4 business days after the clinic has received the results. If you do not hear from us within 7 days, please contact the clinic through MyChart or phone. If you have a critical or abnormal lab result, we will notify you by phone as soon as possible.  Submit  "refill requests through [employer] or call your pharmacy and they will forward the refill request to us. Please allow 3 business days for your refill to be completed.          Additional Information About Your Visit        I[employer] Information     [employer] gives you secure access to your electronic health record. If you see a primary care provider, you can also send messages to your care team and make appointments. If you have questions, please call your primary care clinic.  If you do not have a primary care provider, please call [phone] and they will assist you.        Care EveryWhere ID     This is your Care EveryWhere ID. This could be used by other organizations to access your [place] medical records  [id number]        Your Vitals Were     Pulse Temperature Respirations Height Pulse Oximetry Breastfeeding?    75 98.6  F (37  C) (Oral) 15 5' 3\" (1.6 m) 98% No    BMI (Body Mass Index)                   32.88 kg/m2            Blood Pressure from Last 3 Encounters:   08/13/18 140/62   08/10/18 155/68   07/18/18 150/73    Weight from Last 3 Encounters:   08/13/18 185 lb 9.6 oz (84.2 kg)   08/10/18 186 lb 9.6 oz (84.6 kg)   07/18/18 184 lb 6.4 oz (83.6 kg)              We Performed the Following     CBC with platelets differential     Comprehensive metabolic panel     EKG 12-lead complete w/read - Clinics     Hemoglobin A1c     INR        Primary Care Provider Office Phone # Fax #    Kevin Esquivel [phone]       [address] [facility] [address]        Goals        General    I will continue the exercises provided by physical therapy for my knee (pt-stated)     Notes - Note created  7/9/2015 10:30 AM by Ray Holland, RN    As of today's date 7/9/2015 goal is met at 0 - 25%.   Goal Status:  Active        I will remember to take my insulin before meals especially lunch when I am  away from home. (pt-stated)     Notes - Note created  3/25/2015  3:51 PM by Ray Holland, RN      As " of today's date 3/25/2015 goal is met at 0 - 25%.   Goal Status:  Active.          I will work on testing my blood sugar and taking my insulin when I am away from home at lunch time. (pt-stated)     Notes - Note created  5/20/2016  2:43 PM by Ray Holland RN    As of today's date 5/20/2016 goal is met at 0 - 25%.   Goal Status:  Active          Equal Access to Services     [facility] : Hadii aad ku hadasho Soomaali, waaxda luqadaha, qaybta kaalmada adeegyada, waxay idiin hayaan adeeg marbin laroberto carlosn . So [facility] [phone].    ATENCIÓN: Si guzman graham, tiene a verduzco disposición servicios gratuitos de asistencia lingüística. Llame al [phone].    We comply with applicable federal civil rights laws and [place] laws. We do not discriminate on the basis of race, color, national origin, age, disability, sex, sexual orientation, or gender identity.            Thank you!     Thank you for choosing [facility]  for your care. Our goal is always to provide you with excellent care. Hearing back from our patients is one way we can continue to improve our services. Please take a few minutes to complete the written survey that you may receive in the mail after your visit with us. Thank you!             Your Updated Medication List - Protect others around you: Learn how to safely use, store and throw away your medicines at www.disposemymeds.org.          This list is accurate as of 8/13/18  7:00 PM.  Always use your most recent med list.                   Brand Name Dispense Instructions for use Diagnosis    ACE/ARB/ARNI NOT PRESCRIBED (INTENTIONAL)      ACE & ARB not prescribed due to Allergy    Type 2 diabetes mellitus without complication, with long-term current use of insulin (H)       * albuterol (2.5 MG/3ML) 0.083% neb solution     1 Box    Take 3 mLs (2.5 mg) by nebulization 4 times daily as needed    Intermittent asthma       * albuterol 108 (90 Base) MCG/ACT inhaler    PROAIR HFA/PROVENTIL  HFA/VENTOLIN HFA    1 Inhaler    Inhale 2 puffs into the lungs every 6 hours as needed for shortness of breath / dyspnea or wheezing    Mild intermittent asthma without complication       aspirin 81 MG tablet      Take 1 tablet by mouth daily. 1 daily        BASAGLAR 100 UNIT/ML injection     75 mL    75 units at bedtime or as directed    Type 2 diabetes mellitus without complication, with long-term current use of insulin (H)       blood glucose monitoring lancets     600 each    TEST 6 TIMES PER DAY    Type 2 diabetes mellitus without complication, with long-term current use of insulin (H)       blood glucose monitoring test strip    ONETOUCH ULTRA    600 strip    TEST 6 TIMES PER DAY    Type 2 diabetes mellitus without complication, with long-term current use of insulin (H)       calcium polycarbophil 625 MG tablet    FIBERCON    60 tablet    Take 2 tablets (1,250 mg) by mouth daily    Health care maintenance       CALCIUM-D PO      Take  by mouth. 1200mg calcium/600mg D3        fish oil-omega-3 fatty acids 1000 MG capsule      Take 1 capsule by mouth daily.        Garlic Caps      Take  by mouth. One daily        hydrochlorothiazide 25 MG tablet    HYDRODIURIL    30 tablet    Take 1 tablet (25 mg) by mouth daily    Hypertension goal BP (blood pressure) < 140/90       ibuprofen 800 MG tablet    ADVIL/MOTRIN    100 tablet    Take 1 tablet (800 mg) by mouth every 8 hours as needed for pain    Arthritis       insulin aspart 100 UNIT/ML injection    NovoLOG PEN    30 mL    Patient uses 100 units daily split up between multiple doses. Take 2 units per 15 grams of carbohydrate eaten and 2 units per 30 mg/dL above 170 mg/dL.    Type 2 diabetes mellitus without complication, with long-term current use of insulin (H)       LORazepam 0.5 MG tablet    ATIVAN    40 tablet    Take 1 tablet (0.5 mg) by mouth every 8 hours as needed    Anxiety       meperidine 50 MG tablet    DEMEROL    20 tablet    Take 1 tablet (50 mg) by  "mouth every 4 hours as needed    Idiopathic chronic pancreatitis (H)       MULTIVITAMIN TABS   OR      1 TABLET DAILY        oxyCODONE-acetaminophen 5-325 MG per tablet    PERCOCET     Take by mouth every 4 hours as needed for moderate to severe pain        PARoxetine 10 MG tablet    PAXIL    30 tablet    Take 1 tablet (10 mg) by mouth At Bedtime    Anxiety       pen needles 5/16\" 31G X 8 MM Misc     180 each    Patient does 4 doses novolog and 2 lantus shots  insulin daily.  Needs 180 needles per month. Okay refills for one year.    Type 2 diabetes mellitus without complication, with long-term current use of insulin (H)       promethazine 12.5 MG tablet    PHENERGAN     Take 12.5 mg by mouth as needed        rosuvastatin 20 MG tablet    CRESTOR    90 tablet    TAKE 1 TABLET(20 MG) BY MOUTH DAILY    Hyperlipidemia LDL goal <100       SALINE      to clean port every other month    Bronchitis       senna-docusate 8.6-50 MG per tablet    SENOKOT-S;PERICOLACE    100 tablet    1-2 po bid prn constipation    Constipation, unspecified constipation type       tiZANidine 2 MG tablet    ZANAFLEX    30 tablet    Take 1 tablet (2 mg) by mouth 3 times daily as needed for muscle spasms    Muscle pain       zolpidem 5 MG tablet    AMBIEN    30 tablet    Take 1 tablet (5 mg) by mouth nightly as needed for sleep    Insomnia, unspecified type       * Notice:  This list has 2 medication(s) that are the same as other medications prescribed for you. Read the directions carefully, and ask your doctor or other care provider to review them with you.      "

## 2018-08-13 NOTE — TELEPHONE ENCOUNTER
Attempt # 1  Called patient at home number.  Was call answered?  Yes, relayed below message from provider. Patient verbalized understanding and agreement with plan and asked what time her appointment is scheduled. Relayed appointment time and encouraged to come early to [facility].    Treva Tyson RN  [facility]

## 2018-08-13 NOTE — TELEPHONE ENCOUNTER
Received a call from Aminah at [facility] stating that she spoke with William at [facility] who is stating a pre authorization is needed for this surgery Wednesday. Please call Aminah at [phone].    Thank you.

## 2018-08-13 NOTE — NURSING NOTE
"Chief Complaint   Patient presents with     Pre-Op Exam     Initial Pulse 75  Temp 98.6  F (37  C) (Oral)  Resp 15  Ht 5' 3\" (1.6 m)  Wt 185 lb 9.6 oz (84.2 kg)  SpO2 98%  Breastfeeding? No  BMI 32.88 kg/m2 Estimated body mass index is 32.88 kg/(m^2) as calculated from the following:    Height as of this encounter: 5' 3\" (1.6 m).    Weight as of this encounter: 185 lb 9.6 oz (84.2 kg).  BP completed using cuff size: justo Gusman  "

## 2018-08-14 ENCOUNTER — ALLIED HEALTH/NURSE VISIT (OUTPATIENT)
Dept: NURSING | Facility: CLINIC | Age: 73
End: 2018-08-14
Payer: COMMERCIAL

## 2018-08-14 DIAGNOSIS — Z95.828 PORT-A-CATH IN PLACE: Primary | ICD-10-CM

## 2018-08-14 DIAGNOSIS — I10 HYPERTENSION GOAL BP (BLOOD PRESSURE) < 140/90: ICD-10-CM

## 2018-08-14 DIAGNOSIS — Z79.4 TYPE 2 DIABETES MELLITUS WITHOUT COMPLICATION, WITH LONG-TERM CURRENT USE OF INSULIN (H): ICD-10-CM

## 2018-08-14 DIAGNOSIS — Z01.818 PREOP GENERAL PHYSICAL EXAM: ICD-10-CM

## 2018-08-14 DIAGNOSIS — E11.9 TYPE 2 DIABETES MELLITUS WITHOUT COMPLICATION, WITH LONG-TERM CURRENT USE OF INSULIN (H): ICD-10-CM

## 2018-08-14 LAB
ALBUMIN SERPL-MCNC: 3.5 G/DL (ref 3.4–5)
ALP SERPL-CCNC: 126 U/L (ref 40–150)
ALT SERPL W P-5'-P-CCNC: 35 U/L (ref 0–50)
ANION GAP SERPL CALCULATED.3IONS-SCNC: 9 MMOL/L (ref 3–14)
AST SERPL W P-5'-P-CCNC: 27 U/L (ref 0–45)
BASOPHILS # BLD AUTO: 0 10E9/L (ref 0–0.2)
BASOPHILS NFR BLD AUTO: 0.1 %
BILIRUB SERPL-MCNC: 0.3 MG/DL (ref 0.2–1.3)
BUN SERPL-MCNC: 16 MG/DL (ref 7–30)
CALCIUM SERPL-MCNC: 9 MG/DL (ref 8.5–10.1)
CHLORIDE SERPL-SCNC: 103 MMOL/L (ref 94–109)
CO2 SERPL-SCNC: 27 MMOL/L (ref 20–32)
CREAT SERPL-MCNC: 0.58 MG/DL (ref 0.52–1.04)
DIFFERENTIAL METHOD BLD: NORMAL
EOSINOPHIL # BLD AUTO: 0.2 10E9/L (ref 0–0.7)
EOSINOPHIL NFR BLD AUTO: 3.1 %
ERYTHROCYTE [DISTWIDTH] IN BLOOD BY AUTOMATED COUNT: 14.3 % (ref 10–15)
GFR SERPL CREATININE-BSD FRML MDRD: >90 ML/MIN/1.7M2
GLUCOSE SERPL-MCNC: 199 MG/DL (ref 70–99)
HBA1C MFR BLD: 9.3 % (ref 0–5.6)
HCT VFR BLD AUTO: 38.5 % (ref 35–47)
HGB BLD-MCNC: 13.1 G/DL (ref 11.7–15.7)
INR PPP: 1.02 (ref 0.86–1.14)
LYMPHOCYTES # BLD AUTO: 2.1 10E9/L (ref 0.8–5.3)
LYMPHOCYTES NFR BLD AUTO: 26.1 %
MCH RBC QN AUTO: 28.6 PG (ref 26.5–33)
MCHC RBC AUTO-ENTMCNC: 34 G/DL (ref 31.5–36.5)
MCV RBC AUTO: 84 FL (ref 78–100)
MONOCYTES # BLD AUTO: 0.7 10E9/L (ref 0–1.3)
MONOCYTES NFR BLD AUTO: 8.7 %
NEUTROPHILS # BLD AUTO: 4.9 10E9/L (ref 1.6–8.3)
NEUTROPHILS NFR BLD AUTO: 62 %
PLATELET # BLD AUTO: 227 10E9/L (ref 150–450)
POTASSIUM SERPL-SCNC: 3.9 MMOL/L (ref 3.4–5.3)
PROT SERPL-MCNC: 7.3 G/DL (ref 6.8–8.8)
RBC # BLD AUTO: 4.58 10E12/L (ref 3.8–5.2)
SODIUM SERPL-SCNC: 139 MMOL/L (ref 133–144)
WBC # BLD AUTO: 7.8 10E9/L (ref 4–11)

## 2018-08-14 PROCEDURE — 99207 ZZC NO CHARGE NURSE ONLY: CPT

## 2018-08-14 PROCEDURE — 85025 COMPLETE CBC W/AUTO DIFF WBC: CPT | Performed by: INTERNAL MEDICINE

## 2018-08-14 PROCEDURE — 80053 COMPREHEN METABOLIC PANEL: CPT | Performed by: INTERNAL MEDICINE

## 2018-08-14 PROCEDURE — 85610 PROTHROMBIN TIME: CPT | Performed by: INTERNAL MEDICINE

## 2018-08-14 PROCEDURE — 36415 COLL VENOUS BLD VENIPUNCTURE: CPT | Performed by: INTERNAL MEDICINE

## 2018-08-14 PROCEDURE — 83036 HEMOGLOBIN GLYCOSYLATED A1C: CPT | Performed by: INTERNAL MEDICINE

## 2018-08-14 NOTE — Clinical Note
"Anne-Marie Morse was a late add on to the RN schedule for port flush and labs; has pre-op at [facility] tomorrow.   Her port flush order was , I tried to \"extend\" it but that did not require your signature so I cued up a new port flush order.   Please sign and then leave encounter open so I can release a current order. Thanks! Guerline Carrasquillo RN [facility] "

## 2018-08-14 NOTE — PROGRESS NOTES
[facility]  [address]  [place] [address]  [phone]  Dept: [phone]    PRE-OP EVALUATION:  Today's date: 2018    Leah Guerrero (: 1945) presents for pre-operative evaluation assessment as requested by Dr. Vogel.  She requires evaluation and anesthesia risk assessment prior to undergoing surgery/procedure for treatment of Left knee.    Proposed Surgery/ Procedure: Left knee replacement  Date of Surgery/ Procedure: 2018  Time of Surgery/ Procedure: arrive at 530am, for a 730am procedure  Hospital/Surgical Facility: [facility]  Fax number for surgical facility:   Primary Physician: Kevin Esquivel  Type of Anesthesia Anticipated: to be determined    Patient has a Health Care Directive or Living Will:  YES     1. NO - Do you have a history of heart attack, stroke, stent, bypass or surgery on an artery in the head, neck, heart or legs?  2. NO - Do you ever have any pain or discomfort in your chest?  3. NO - Do you have a history of  Heart Failure?  4. YES - ARE YOUR TROUBLED BY SHORTNESS OF BREATH WHEN WALKING ON THE LEVEL, UP A SLIGHT HILL OR AT NIGHT? Has one lung following surgery for lung cancer (2 yrs ago)  5. NO - Do you currently have a cold, bronchitis or other respiratory infection?  6. NO - Do you have a cough, shortness of breath or wheezing?  7. NO - Do you sometimes get pains in the calves of your legs when you walk?  8. NO - Do you or anyone in your family have previous history of blood clots?  9. NO - Do you or does anyone in your family have a serious bleeding problem such as prolonged bleeding following surgeries or cuts?  10. NO - Have you ever had problems with anemia or been told to take iron pills?  11. YES - HAVE YOU HAD ANY ABNORMAL BLOOD LOSS SUCH AS BLACK, TARRY OR BLOODY STOOLS, OR ABNORMAL VAGINAL BLEEDING? 2018 was in the ICU due to bleeding.  12. YES - HAVE YOU EVER HAD A BLOOD TRANSFUSION? A long time  ago  13. NO - Have you or any of your relatives ever had problems with anesthesia?  14. NO - Do you have sleep apnea, excessive snoring or daytime drowsiness?  15. NO - Do you have any prosthetic heart valves?  16. NO - Do you have prosthetic joints?  17. NO - Is there any chance that you may be pregnant?      HPI:     HPI related to upcoming procedure: Left Knee Replacement      See problem list for active medical problems.  Problems all longstanding and stable, except as noted/documented.  See ROS for pertinent symptoms related to these conditions.                                                                                                                                                          .  ASTHMA - Patient has a longstanding history of Asthma . Patient has been doing well overall noting NO SYMPTOMS and continues on medication regimen consisting of Albuterol without adverse reactions or side effects.                                                                                                                                               .  CAD - Patient has a longstanding history of moderate-severe CAD. Patient denies recent chest pain or NTG use, denies exercise induced dyspnea or PND. Last Stress test: Ordered in 5/2018 but not completed, EKG: Unremarkable.                                                                                                                                                    .  DIABETES - Patient has a longstanding history of DiabetesType Type II . Patient is being treated with diet, oral agents and insulin injections and denies significant side effects. Control has been poor. Complicating factors include but are not limited to: hypertension and hyperlipidemia. Other: History of Lung Cancer and possible recurrence.                                                                                                                            .  HYPERLIPIDEMIA - Patient has a  long history of significant Hyperlipidemia requiring medication for treatment with recent good control. Patient reports no problems or side effects with the medication.                                                                                                                                                       .  HYPERTENSION - Patient has longstanding history of HTN , currently denies any symptoms referable to elevated blood pressure. Specifically denies chest pain, palpitations, dyspnea, orthopnea, PND or peripheral edema. Blood pressure readings have been in normal range. Current medication regimen is as listed below. Patient denies any side effects of medication.                                                                                                                                                                                          .  SLEEP PROBLEM - Patient has a longstanding history of persistent insomnia.. Patient has tried OTC medications with limited success.                                                                                                                          History of Lung Cancer: Has Lobe resection 2 yrs ago  Pulmonary Nodule: ? Lung Cancer recurrence                 .    MEDICAL HISTORY:     Patient Active Problem List    Diagnosis Date Noted     Cervicalgia 07/10/2018     Priority: Medium     Mild intermittent asthma without complication 02/22/2018     Priority: Medium     Malignant neoplasm of lung, unspecified laterality, unspecified part of lung (H) 04/21/2017     Priority: Medium     Chronic pancreatitis, unspecified pancreatitis type (H) 11/16/2016     Priority: Medium     Type 2 diabetes mellitus without complication, with long-term current use of insulin (H) 11/16/2016     Priority: Medium     Anxiety 07/20/2016     Priority: Medium     Insomnia, unspecified type 06/07/2016     Priority: Medium     Idiopathic chronic pancreatitis (H) 11/04/2015      Priority: Medium     Primary osteoarthritis of left knee 10/28/2015     Priority: Medium     Hypertension goal BP (blood pressure) < 140/90 2015     Priority: Medium     Obesity 2015     Priority: Medium     Health Care Home 10/22/2014     Priority: Medium     [facility]   Guerline Zhao RN  [phone]    [facility] CARE PLAN SUMMARY    Client Name:  Leah Guerrero  Address:  [address] [place] [address] Phone: [phone] (home)    :  1945 Date of Assessment: HRA telephone assessment 18,  Previous LTCC 17   Health Plan:  [employer]  Health Plan Number: [id number] Medical Assistance Number: [id number]  Financial Worker:  Savanna Collins  [phone]  Case #:  [id number]   [facility] :  Guerline Zhao RN  Phone:  [phone]   Fax:  [phone]   [facility] Enrollment Date: 18 Case Mix:  L  Rate Cell:  B  Waiver Type: EW    Emergency Contact:   Primary Emergency Contact: Paul Guerrero  [place] Phone: [phone]  Relation: Son  Secondary Emergency Contact: Hiram Guerrero   Mobile Phone: [phone]  Relation: Son Language:  English  :  No   Health Care Agent/POA:   Advanced Directives/Living Will:  On file   Primary Care Clinic/Phone/Fax:  [facility]/(p) [phone], (f) [phone] Primary Dx:  Diabetes:E11.9  Secondary Dx: Osteoarthritis: M19.91    Primary Physician:  Kevin Esquivel   Height:    Weight:     Specialty Physician:    Audiologist:     Eye Care Provider:   Dental Care Provider:    [facility]: [facility] [phone]   Other:        Homemaking/[employer]  [phone]  Fax: [phone] 18-18 weekly 4 hrs/week  (16 units)  $4.61/unit      Transportation/[employer]  [employer] 18-18 monthly Rush:10/m  Non Rush: 10/m Rush $4/ticket  ($40/m)  Non rush  $3/ticket ($30/m)     [employer]Mental Health   Aditi Villanueva:[phone]  Fax:  [phone] Ongoing Monthly visits              Intermittent asthma without complication 07/11/2014     Priority: Medium     IT band syndrome 01/13/2014     Priority: Medium     Contusion of knee 11/20/2013     Priority: Medium     Prepatellar bursitis of left knee 11/20/2013     Priority: Medium     Insomnia 05/30/2013     Priority: Medium     Mechanical low back pain 03/22/2013     Priority: Medium     Radicular pain of left lower extremity 03/22/2013     Priority: Medium     GERD (gastroesophageal reflux disease) 02/06/2013     Priority: Medium     Pulmonary nodule, left      Priority: Medium     0.5 cm; needs f/u chest CT scan Jan 2013       Abnormal CT of the chest      Priority: Medium     Nodular consolidation right lobe 1.6 cm. Needs repeat CT Jan 2013       Anxiety state 06/21/2012     Priority: Medium     Problem list name updated by automated process. Provider to review       Intermittent asthma 12/30/2011     Priority: Medium     Advanced directives, counseling/discussion 05/06/2011     Priority: Medium     04/29/2013  Advance Care Planning:   Receipt of ACP document:  Received: Health Care Directive which was witnessed or notarized on 07/06/2011.  Document previously scanned on 07/07/2011.  Validation form completed and sent to be scanned.  Code Status reflects choices in most recent ACP document.  Confirmed/documented designated decision maker(s). See permanent comments section of demographics in clinical tab. View document(s) and details by clicking on code status.   Added by Lacie Canchola on 4/29/2013.          Advance Care Planning:   ACP Review and Resources Provided:  Reviewed chart for advance care plan.  Leah GASTON [place] has no plan or code status on file however states presence of ACP document. Copy requested. Confirmed code status reflects current choices pending receipt of document/advance care plan review. Confirmed/documented designated decision maker(s). See permanent comments  section of demographics in clinical tab.   Added by Emma Medina on 4/24/2013          Planning  Advance Directive Initial Visit  Leah Guerrero presents in person for initial session regarding completion of advanced directive form. She was referred to the facilitator by self.  She currently has an advance directive from 1980 & wants to fill out a new updated one.  Plan:  Advanced directive form, healthcare agent information card, advanced care planning information booklet provided to patient.   Follow up meeting: to be arranged when patient calls back to make an appointment after reviewing documents in one week or so. Patient instructed to bring healthcare agent to this meeting.  Flores Gerard RN  Letter sent to patient for [employer] follow up.  6/22/2011  Flores Gerard RN  Advance Directive Follow-up Visit  Leah Guerrero presents for advanced care planning session accompanied by no one.  Reviewed definitions of advanced care planning and advance directive form, and informational handouts given to patient previously.  Patient has questions and /or concerns about acp process or form .  Reviewed advance directive form with patient & answered all of her questions. Designated healthcare agent is identified. Healthcare agent s name is Paul Guerrero. My Hopes and Wishes reviewed.  Finalized wishes are clear to patient Yes  Patient and designated healthcare agent are aware that document may be changed at any time in the future.  Advanced directive form: completed at this visit.  Advanced directive form: verified with notary signature. Original and two copies of advanced directive form given to patient copy sent to  for scanning into EMR and original given to patient and instructed to give copy to designated [facility] and non-[place] physician where applicable.  Flores Gerard RN  7/6/2011  Advance Directive Problem List Overview:   Name Relationship Phone    Primary Health Care Agent Paul  Cesar Son [phone]         Alternative Health Care Agent Hiram Bro c [phone]  [phone]                      Hyperlipidemia LDL goal <100 12/16/2010     Priority: Medium     Fatigue 12/15/2010     Priority: Medium     Type 2 diabetes, HbA1C goal < 8% (H) 11/24/2010     Priority: Medium     Female stress incontinence 10/28/2010     Priority: Medium      Past Medical History:   Diagnosis Date     Abnormal CT of the chest 10/12    Nodular consolidation right lobe 1.6 cm. Needs repeat CT Jan 2013     Bleeding disorder (H)     in bowels x2      Chronic pancreatitis (H)      CVA (cerebral infarction)      Diabetes      Female stress incontinence      Hyperlipidemia LDL goal <100      Hypertension      Intermittent asthma 12/30/2011     Lateral epicondylitis (tennis elbow) - left 3/30/2011     OA (osteoarthritis) of knee 3/22/2013     Pulmonary nodule, left 10/12    0.5 cm; needs f/u chest CT scan Jan 2013     Surgical complications      Unspecified asthma(493.90)      Past Surgical History:   Procedure Laterality Date     APPENDECTOMY  2012     C HAND/FINGER SURGERY UNLISTED       C SHOULDER SURG PROC UNLISTED       C STOMACH SURGERY PROCEDURE UNLISTED       CHOLECYSTECTOMY  1969     COLONOSCOPY  6-2-08    Neg. At [facility]     COLONOSCOPY  9-3-13     EYE SURGERY       HYSTERECTOMY TOTAL ABD, HEIDI SALPINGO-OOPHORECTOMY, NODE DISSECTION, TUMOR DEBULKING, COMBINED  1988    fibroids?     KNEE SURGERY  x5     KNEE SURGERY      kneecap procedure     LUNG SURGERY  10-7-16    removal lung cancer     SHOULDER SURGERY  2005    right shoulder, bone spurs     WRIST SURGERY      right wrist     Current Outpatient Prescriptions   Medication Sig Dispense Refill     ACE/ARB NOT PRESCRIBED, INTENTIONAL, ACE & ARB not prescribed due to Allergy       albuterol (2.5 MG/3ML) 0.083% nebulizer solution Take 3 mLs (2.5 mg) by nebulization 4 times daily as needed 1 Box 5     albuterol (PROAIR HFA/PROVENTIL HFA/VENTOLIN HFA)  "108 (90 Base) MCG/ACT Inhaler Inhale 2 puffs into the lungs every 6 hours as needed for shortness of breath / dyspnea or wheezing 1 Inhaler 11     aspirin 81 MG tablet Take 1 tablet by mouth daily. 1 daily       BASAGLAR 100 UNIT/ML injection 75 units at bedtime or as directed 75 mL 3     blood glucose monitoring (ONE TOUCH ULTRASOFT) lancets TEST 6 TIMES PER  each 0     blood glucose monitoring (ONETOUCH ULTRA) test strip TEST 6 TIMES PER  strip 11     Calcium Carbonate-Vitamin D (CALCIUM-D PO) Take  by mouth. 1200mg calcium/600mg D3       calcium polycarbophil (FIBERCON) 625 MG tablet Take 2 tablets (1,250 mg) by mouth daily 60 tablet 11     fish oil-omega-3 fatty acids (FISH OIL) 1000 MG capsule Take 1 capsule by mouth daily.       Garlic CAPS Take  by mouth. One daily       hydrochlorothiazide (HYDRODIURIL) 25 MG tablet Take 1 tablet (25 mg) by mouth daily 30 tablet 1     ibuprofen (ADVIL,MOTRIN) 800 MG tablet Take 1 tablet (800 mg) by mouth every 8 hours as needed for pain 100 tablet 0     insulin aspart (NOVOLOG PEN) 100 UNIT/ML injection Patient uses 100 units daily split up between multiple doses. Take 2 units per 15 grams of carbohydrate eaten and 2 units per 30 mg/dL above 170 mg/dL. 30 mL 1     Insulin Pen Needle (PEN NEEDLES 5/16\") 31G X 8 MM MISC Patient does 4 doses novolog and 2 lantus shots  insulin daily.  Needs 180 needles per month. Okay refills for one year. 180 each 11     LORazepam (ATIVAN) 0.5 MG tablet Take 1 tablet (0.5 mg) by mouth every 8 hours as needed 40 tablet 3     meperidine (DEMEROL) 50 MG tablet Take 1 tablet (50 mg) by mouth every 4 hours as needed 20 tablet 0     MULTIVITAMIN TABS   OR 1 TABLET DAILY       oxyCODONE-acetaminophen (PERCOCET) 5-325 MG per tablet Take by mouth every 4 hours as needed for moderate to severe pain       PARoxetine (PAXIL) 10 MG tablet Take 1 tablet (10 mg) by mouth At Bedtime 30 tablet 1     promethazine (PHENERGAN) 12.5 MG tablet Take " "12.5 mg by mouth as needed  5     rosuvastatin (CRESTOR) 20 MG tablet TAKE 1 TABLET(20 MG) BY MOUTH DAILY 90 tablet 2     SALINE to clean port every other month       senna-docusate (SENOKOT-S;PERICOLACE) 8.6-50 MG per tablet 1-2 po bid prn constipation 100 tablet 1     tiZANidine (ZANAFLEX) 2 MG tablet Take 1 tablet (2 mg) by mouth 3 times daily as needed for muscle spasms 30 tablet 0     zolpidem (AMBIEN) 5 MG tablet Take 1 tablet (5 mg) by mouth nightly as needed for sleep 30 tablet 2     OTC products: None, except as noted above    Allergies   Allergen Reactions     Hydralazine Shortness Of Breath     Famotidine Nausea and Vomiting and Unknown     Benadryl Itch Stopping      Gives her more energy, can't sleep     Lisinopril      Tongue swelling       Metoclopramide Nausea and Vomiting     Ondansetron Nausea and Vomiting     Other reaction(s): Headache     Penicillins Hives     Tape [Adhesive Tape]      blisters     Tylenol      Anxiety  Tablets only.      Latex Allergy: NO    Social History   Substance Use Topics     Smoking status: Never Smoker     Smokeless tobacco: Never Used     Alcohol use No     History   Drug Use No       REVIEW OF SYSTEMS:   Constitutional, HEENT, cardiovascular, pulmonary, gi and gu systems are negative, except as otherwise noted.    EXAM:   /62 (BP Location: Right arm, Patient Position: Chair, Cuff Size: Adult Regular)  Pulse 75  Temp 98.6  F (37  C) (Oral)  Resp 15  Ht 5' 3\" (1.6 m)  Wt 185 lb 9.6 oz (84.2 kg)  SpO2 98%  Breastfeeding? No  BMI 32.88 kg/m2    GENERAL APPEARANCE: healthy, alert and no distress.     HENT: ear canals and TM's normal and nose and mouth without ulcers or lesions     NECK: no adenopathy, no asymmetry, masses, or scars and thyroid normal to palpation     RESP: lungs clear to auscultation - no rales, rhonchi or wheezes     CV: regular rates and rhythm, normal S1 S2, no S3 or S4 and no murmur, click or rub     ABDOMEN:  soft, nontender, no HSM or " masses and bowel sounds normal      SKIN: no suspicious lesions or rashes     NEURO: Normal strength and tone, sensory exam grossly normal, mentation intact and speech normal     PSYCH: mentation appears normal. and affect normal/bright.    DIAGNOSTICS:   EKG: appears normal, NSR, normal axis, normal intervals, no acute ST/T changes c/w ischemia    Hemoglobin A1C 9.3 (H) 0 - 5.6 % Final 08/14/2018  8:46 AM CP   Comment:   Normal <5.7% Prediabetes 5.7-6.4%  Diabetes 6.5% or higher - adopted from ADA   consensus guidelines.      Ref Range Units Status Collected Lab      Sodium 139  133 - 144 mmol/L Final 08/14/2018  8:46 AM MG   Potassium 3.9  3.4 - 5.3 mmol/L Final 08/14/2018  8:46 AM MG   Chloride 103  94 - 109 mmol/L Final 08/14/2018  8:46 AM MG   Carbon Dioxide 27  20 - 32 mmol/L Final 08/14/2018  8:46 AM MG   Anion Gap 9  3 - 14 mmol/L Final 08/14/2018  8:46 AM MG   Glucose 199 (H) 70 - 99 mg/dL Final 08/14/2018  8:46 AM MG   Comment:   Non Fasting   Urea Nitrogen 16  7 - 30 mg/dL Final 08/14/2018  8:46 AM MG   Creatinine 0.58  0.52 - 1.04 mg/dL Final 08/14/2018  8:46 AM MG   GFR Estimate >90  >60 mL/min/1.7m2 Final 08/14/2018  8:46 AM MG   Comment:   Non  GFR Calc   GFR Estimate If Black >90  >60 mL/min/1.7m2 Final 08/14/2018  8:46 AM MG   Comment:   African American GFR Calc   Calcium 9.0  8.5 - 10.1 mg/dL Final 08/14/2018  8:46 AM MG   Bilirubin Total 0.3  0.2 - 1.3 mg/dL Final 08/14/2018  8:46 AM MG   Albumin 3.5  3.4 - 5.0 g/dL Final 08/14/2018  8:46 AM MG   Protein Total 7.3  6.8 - 8.8 g/dL Final 08/14/2018  8:46 AM MG   Alkaline Phosphatase 126  40 - 150 U/L Final 08/14/2018  8:46 AM MG   ALT 35  0 - 50 U/L Final 08/14/2018  8:46 AM MG   AST 27  0 - 45 U/L Final 08/14/2018  8:46 AM MG     Component Value Flag Ref Range Units Status Collected Lab   WBC 7.8  4.0 - 11.0 10e9/L Final 08/14/2018  8:46 AM CP   RBC Count 4.58  3.8 - 5.2 10e12/L Final 08/14/2018  8:46 AM CP   Hemoglobin 13.1  11.7  - 15.7 g/dL Final 08/14/2018  8:46 AM CP   Hematocrit 38.5  35.0 - 47.0 % Final 08/14/2018  8:46 AM CP   MCV 84  78 - 100 fl Final 08/14/2018  8:46 AM CP   MCH 28.6  26.5 - 33.0 pg Final 08/14/2018  8:46 AM CP   MCHC 34.0  31.5 - 36.5 g/dL Final 08/14/2018  8:46 AM CP   RDW 14.3  10.0 - 15.0 % Final 08/14/2018  8:46 AM CP   Platelet Count 227  150 - 450 10e9/L Final 08/14/2018  8:46 AM CP   Diff Method Automated Method    Final 08/14/2018  8:46 AM CP   % Neutrophils 62.0   % Final 08/14/2018  8:46 AM CP   % Lymphocytes 26.1   % Final 08/14/2018  8:46 AM CP   % Monocytes 8.7   % Final 08/14/2018  8:46 AM CP   % Eosinophils 3.1   % Final 08/14/2018  8:46 AM CP   % Basophils 0.1   % Final 08/14/2018  8:46 AM CP   Absolute Neutrophil 4.9  1.6 - 8.3 10e9/L Final 08/14/2018  8:46 AM CP   Absolute Lymphocytes 2.1  0.8 - 5.3 10e9/L Final 08/14/2018  8:46 AM CP   Absolute Monocytes 0.7  0.0 - 1.3 10e9/L Final 08/14/2018  8:46 AM CP   Absolute Eosinophils 0.2  0.0 - 0.7 10e9/L Final 08/14/2018  8:46 AM CP   Absolute Basophils 0.0  0.0 - 0.2 10e9/L Final 08/14/2018  8:46 AM CP     INR 1.02  0.86 - 1.14  Final 08/14/2018  8:46 AM MG     IMPRESSION:   Reason for surgery/procedure: Osteoarthritis Lt Knee/Total Knee Arthroplasty; Lt  Diagnosis/reason for consult: Osteoarthritis Lt Knee/Pre OP    The proposed surgical procedure is considered INTERMEDIATE risk.    REVISED CARDIAC RISK INDEX  The patient has the following serious cardiovascular risks for perioperative complications such as (MI, PE, VFib and 3  AV Block):  Diabetes Mellitus (on Insulin)  INTERPRETATION: 2 risks: Class III (moderate risk - 6.6% complication rate)    The patient has the following additional risks for perioperative complications:  No identified additional risks      ICD-10-CM    1. Preop general physical exam Z01.818 EKG 12-lead complete w/read - Clinics     INR     DIABETES EDUCATOR REFERRAL     CANCELED: INR   2. Arthritis of left knee M17.12 DIABETES  EDUCATOR REFERRAL   3. Uncontrolled type 2 diabetes mellitus with hyperglycemia, with long-term current use of insulin (H) E11.65 DIABETES EDUCATOR REFERRAL    Z79.4    4. Hypertension goal BP (blood pressure) < 140/90 I10 Comprehensive metabolic panel     CBC with platelets differential     CANCELED: CBC with platelets differential     CANCELED: Comprehensive metabolic panel   5. Hyperlipidemia LDL goal <100 E78.5    6. Mild intermittent asthma, unspecified whether complicated J45.20    7. Pulmonary nodule, left R91.1    8. History of lung cancer Z85.118        RECOMMENDATIONS:       --Patient is to take all scheduled medications on the day of surgery EXCEPT for modifications listed below.    Diabetes Medication Use    -----Hold usual oral and non-insulin diabetic meds (e.g. Metformin, Actos, Glipizide) while NPO.   -----Take 80% of long acting insulin (e.g. Lantus, NPH) while NPO (fasting)  -----Hold short acting insulin (e.g. Novolog, Humalog) while NPO (fasting)  -----Use usual sliding scale correction of insulin while NPO (fasting)    Anticoagulant or Antiplatelet Medication Use  ASPIRIN: Discontinue ASA 7-10 days prior to procedure to reduce bleeding risk.  It should be resumed post-operatively.      Surgery is NOT recommended due to uncontrolled diabetes (A1C >8.5%, Glucose >200 mg/dl). Stabilization required prior to elective surgery. Referral to Diabetes Educator (Preoperative Intensive Glucose Management)        Signed Electronically by: Genaro Dawson MD    Copy of this evaluation report is provided to requesting physician.    [facility] Preop Guidelines    Revised Cardiac Risk Index

## 2018-08-14 NOTE — Clinical Note
Dr. Esquivel, I went ahead and signed the order with you to sign off.  I did not want to forget to do the order. Thanks! Guerline

## 2018-08-14 NOTE — MR AVS SNAPSHOT
After Visit Summary   8/14/2018    Leah Guerrero    MRN: [id number]           Patient Information     Date Of Birth          1945        Visit Information        Provider Department      8/14/2018 8:30 AM CP RN [facility]        Today's Diagnoses     Port-a-cath in place    -  1    Preop general physical exam        Hypertension goal BP (blood pressure) < 140/90        Type 2 diabetes mellitus without complication, with long-term current use of insulin (H)           Follow-ups after your visit        Your next 10 appointments already scheduled     Aug 15, 2018 10:40 AM CDT   Office Visit with Genaro Dawson MD   [facility] ([facility])    [address] [address]   [phone]           Bring a current list of meds and any records pertaining to this visit. For Physicals, please bring immunization records and any forms needing to be filled out. Please arrive 10 minutes early to complete paperwork.            Aug 29, 2018 11:00 AM CDT   Return Visit with José Vogel MD   [facility] ([facility])    [address] [place] [address]   [phone]              Future tests that were ordered for you today     Open Standing Orders        Priority Remaining Interval Expires Ordered    PORT FLUSH ONLY Routine 17/18 monthly and PRN 8/14/2019 8/14/2018            Who to contact     If you have questions or need follow up information about today's clinic visit or your schedule please contact [facility] directly at [phone].  Normal or non-critical lab and imaging results will be communicated to you by MyChart, letter or phone within 4 business days after the clinic has received the results. If you do not hear from us within 7 days, please contact the clinic through MyChart or phone. If you have a critical or abnormal lab result, we will notify you by  phone as soon as possible.  Submit refill requests through [employer] or call your pharmacy and they will forward the refill request to us. Please allow 3 business days for your refill to be completed.          Additional Information About Your Visit        [email or web address] Information     [employer] gives you secure access to your electronic health record. If you see a primary care provider, you can also send messages to your care team and make appointments. If you have questions, please call your primary care clinic.  If you do not have a primary care provider, please call [phone] and they will assist you.        Care EveryWhere ID     This is your Care EveryWhere ID. This could be used by other organizations to access your [place] medical records  [id number]         Blood Pressure from Last 3 Encounters:   08/13/18 140/62   08/10/18 155/68   07/18/18 150/73    Weight from Last 3 Encounters:   08/13/18 185 lb 9.6 oz (84.2 kg)   08/10/18 186 lb 9.6 oz (84.6 kg)   07/18/18 184 lb 6.4 oz (83.6 kg)              We Performed the Following     CBC with platelets differential     Comprehensive metabolic panel     Hemoglobin A1c     HEPARIN SODIUM PER 10 U     INR        Primary Care Provider Office Phone # Fax #    Kevin Esquivel [phone]       [address] [facility] [address]        Goals        General    I will continue the exercises provided by physical therapy for my knee (pt-stated)     Notes - Note created  7/9/2015 10:30 AM by Ray Holland RN    As of today's date 7/9/2015 goal is met at 0 - 25%.   Goal Status:  Active        I will remember to take my insulin before meals especially lunch when I am  away from home. (pt-stated)     Notes - Note created  3/25/2015  3:51 PM by Ray Holland RN      As of today's date 3/25/2015 goal is met at 0 - 25%.   Goal Status:  Active.          I will work on testing my blood sugar and taking my insulin when I am away from home at lunch time.  (pt-stated)     Notes - Note created  5/20/2016  2:43 PM by Ray Holland RN    As of today's date 5/20/2016 goal is met at 0 - 25%.   Goal Status:  Active          Equal Access to Services     FABRICIO COOPERMIKE : Hadstan rye ku kevin Evans, waaxda luqadaha, qaybta kaalmada adeegyada, jude worleysergio angela. So [facility] [phone].    ATENCIÓN: Si habla español, tiene a verduzco disposición servicios gratuitos de asistencia lingüística. Llame al [phone].    We comply with applicable federal civil rights laws and [place] laws. We do not discriminate on the basis of race, color, national origin, age, disability, sex, sexual orientation, or gender identity.            Thank you!     Thank you for choosing [facility]  for your care. Our goal is always to provide you with excellent care. Hearing back from our patients is one way we can continue to improve our services. Please take a few minutes to complete the written survey that you may receive in the mail after your visit with us. Thank you!             Your Updated Medication List - Protect others around you: Learn how to safely use, store and throw away your medicines at www.disposemymeds.org.          This list is accurate as of 8/14/18  4:34 PM.  Always use your most recent med list.                   Brand Name Dispense Instructions for use Diagnosis    ACE/ARB/ARNI NOT PRESCRIBED (INTENTIONAL)      ACE & ARB not prescribed due to Allergy    Type 2 diabetes mellitus without complication, with long-term current use of insulin (H)       * albuterol (2.5 MG/3ML) 0.083% neb solution     1 Box    Take 3 mLs (2.5 mg) by nebulization 4 times daily as needed    Intermittent asthma       * albuterol 108 (90 Base) MCG/ACT inhaler    PROAIR HFA/PROVENTIL HFA/VENTOLIN HFA    1 Inhaler    Inhale 2 puffs into the lungs every 6 hours as needed for shortness of breath / dyspnea or wheezing    Mild intermittent asthma without complication        aspirin 81 MG tablet      Take 1 tablet by mouth daily. 1 daily        BASAGLAR 100 UNIT/ML injection     75 mL    75 units at bedtime or as directed    Type 2 diabetes mellitus without complication, with long-term current use of insulin (H)       blood glucose monitoring lancets     600 each    TEST 6 TIMES PER DAY    Type 2 diabetes mellitus without complication, with long-term current use of insulin (H)       blood glucose monitoring test strip    ONETOUCH ULTRA    600 strip    TEST 6 TIMES PER DAY    Type 2 diabetes mellitus without complication, with long-term current use of insulin (H)       calcium polycarbophil 625 MG tablet    FIBERCON    60 tablet    Take 2 tablets (1,250 mg) by mouth daily    Health care maintenance       CALCIUM-D PO      Take  by mouth. 1200mg calcium/600mg D3        fish oil-omega-3 fatty acids 1000 MG capsule      Take 1 capsule by mouth daily.        Garlic Caps      Take  by mouth. One daily        hydrochlorothiazide 25 MG tablet    HYDRODIURIL    30 tablet    Take 1 tablet (25 mg) by mouth daily    Hypertension goal BP (blood pressure) < 140/90       ibuprofen 800 MG tablet    ADVIL/MOTRIN    100 tablet    Take 1 tablet (800 mg) by mouth every 8 hours as needed for pain    Arthritis       insulin aspart 100 UNIT/ML injection    NovoLOG PEN    30 mL    Patient uses 100 units daily split up between multiple doses. Take 2 units per 15 grams of carbohydrate eaten and 2 units per 30 mg/dL above 170 mg/dL.    Type 2 diabetes mellitus without complication, with long-term current use of insulin (H)       LORazepam 0.5 MG tablet    ATIVAN    40 tablet    Take 1 tablet (0.5 mg) by mouth every 8 hours as needed    Anxiety       meperidine 50 MG tablet    DEMEROL    20 tablet    Take 1 tablet (50 mg) by mouth every 4 hours as needed    Idiopathic chronic pancreatitis (H)       MULTIVITAMIN TABS   OR      1 TABLET DAILY        oxyCODONE-acetaminophen 5-325 MG per tablet    PERCOCET     Take  "by mouth every 4 hours as needed for moderate to severe pain        PARoxetine 10 MG tablet    PAXIL    30 tablet    Take 1 tablet (10 mg) by mouth At Bedtime    Anxiety       pen needles 5/16\" 31G X 8 MM Misc     180 each    Patient does 4 doses novolog and 2 lantus shots  insulin daily.  Needs 180 needles per month. Okay refills for one year.    Type 2 diabetes mellitus without complication, with long-term current use of insulin (H)       promethazine 12.5 MG tablet    PHENERGAN     Take 12.5 mg by mouth as needed        rosuvastatin 20 MG tablet    CRESTOR    90 tablet    TAKE 1 TABLET(20 MG) BY MOUTH DAILY    Hyperlipidemia LDL goal <100       SALINE      to clean port every other month    Bronchitis       senna-docusate 8.6-50 MG per tablet    SENOKOT-S;PERICOLACE    100 tablet    1-2 po bid prn constipation    Constipation, unspecified constipation type       tiZANidine 2 MG tablet    ZANAFLEX    30 tablet    Take 1 tablet (2 mg) by mouth 3 times daily as needed for muscle spasms    Muscle pain       zolpidem 5 MG tablet    AMBIEN    30 tablet    Take 1 tablet (5 mg) by mouth nightly as needed for sleep    Insomnia, unspecified type       * Notice:  This list has 2 medication(s) that are the same as other medications prescribed for you. Read the directions carefully, and ask your doctor or other care provider to review them with you.      "

## 2018-08-14 NOTE — PROGRESS NOTES
Port flush orders dated 5/16/17, not current, patient has not had port flush at our clinic since 5/3/18 and was a late add on to RN schedule.   Chart routed to Dr. Esquivel to get updated port flush standing order.  Patient requires port access to obtain pre-op labs, will see Dr. Dawson tomorrow for pre-op for left knee surgery.    Port site without redness, swelling, or breakdown, mild old bruise noted.  Port site prepped with 3 betadine swabs, air dried, followed by 3 alcohol wipes, air dried.  Stabilized port and accessed with 20 G 3/4 inch bello gripper non-coring needle without difficulty on 1st attempt.  Easy flush, good blood return observed.  Flushed with 10 ml of sterile saline for infusion, 6 ml waste blood returned and discarded, sample obtained and placed in appropriate lab tubes.   Flushed with another 20 ml of sterile saline for infusion followed by 5 ml of 100 unit/ml heparin flush.  De-accessed and gauze pad applied, patient tolerated well.  Sample tubes labeled and transported to lab.    The following medication was given:     MEDICATION: saline 30 ml  ROUTE: IV  SITE: via port-a-cath  DOSE: 20 ml  LOT #: [id number]  :  [employer]  EXPIRATION DATE:  08/2019  NDC#: 63323-186-10    The following medication was given:     MEDICATION: heparin 100 unit/ml  ROUTE: IV  SITE: port-a-cath  DOSE: 5 ml  LOT #: [id number]  :  [employer]  EXPIRATION DATE:  05/2020  NDC#: 63843-128-72    Keyanna Carrasquillo RN

## 2018-08-15 ENCOUNTER — ALLIED HEALTH/NURSE VISIT (OUTPATIENT)
Dept: EDUCATION SERVICES | Facility: CLINIC | Age: 73
End: 2018-08-15
Payer: COMMERCIAL

## 2018-08-15 VITALS — WEIGHT: 185 LBS | BODY MASS INDEX: 32.77 KG/M2

## 2018-08-15 DIAGNOSIS — E11.9 TYPE 2 DIABETES, HBA1C GOAL < 8% (H): Primary | ICD-10-CM

## 2018-08-15 PROCEDURE — G0108 DIAB MANAGE TRN  PER INDIV: HCPCS

## 2018-08-15 NOTE — MR AVS SNAPSHOT
After Visit Summary   8/15/2018    Leah Guerrero    MRN: [id number]           Patient Information     Date Of Birth          1945        Visit Information        Provider Department      8/15/2018 11:30 AM  DIABETIC ED RESOURCE [facility]        Care Instructions    Call insurance company and ask which glucose meter and testing supplies are best covered for you under your insurance.  Also can check on coverage for Jose Eduardo  [employer].  Call in your blood glucose levels/ insulin doses  on Monday Aug 20 -  [phone]          Follow-ups after your visit        Your next 10 appointments already scheduled     Aug 23, 2018  1:00 PM CDT   Diabetic Education with  DIABETIC ED RESOURCE   [facility] ([facility])    [address] [facility] [address] [phone]            Aug 29, 2018 11:00 AM CDT   Return Visit with José Vogel MD   [facility] ([facility]    [address] [place] [id number] [phone]              Future tests that were ordered for you today     Open Standing Orders        Priority Remaining Interval Expires Ordered    PORT FLUSH ONLY Routine 17/18 monthly and PRN 8/14/2019 8/14/2018            Who to contact     If you have questions or need follow up information about today's clinic visit or your schedule please contact [facility] directly at [phone].  Normal or non-critical lab and imaging results will be communicated to you by MyChart, letter or phone within 4 business days after the clinic has received the results. If you do not hear from us within 7 days, please contact the clinic through MyChart or phone. If you have a critical or abnormal lab result, we will notify you by phone as soon as possible.  Submit refill requests through [employer] or call your pharmacy and they will forward  the refill request to us. Please allow 3 business days for your refill to be completed.          Additional Information About Your Visit        [employer] Information     [employer] gives you secure access to your electronic health record. If you see a primary care provider, you can also send messages to your care team and make appointments. If you have questions, please call your primary care clinic.  If you do not have a primary care provider, please call [phone] and they will assist you.        Care EveryWhere ID     This is your Care EveryWhere ID. This could be used by other organizations to access your [place] medical records  [id number]        Your Vitals Were     BMI (Body Mass Index)                   32.77 kg/m2            Blood Pressure from Last 3 Encounters:   08/13/18 140/62   08/10/18 155/68   07/18/18 150/73    Weight from Last 3 Encounters:   08/15/18 83.9 kg (185 lb)   08/13/18 84.2 kg (185 lb 9.6 oz)   08/10/18 84.6 kg (186 lb 9.6 oz)              Today, you had the following     No orders found for display       Primary Care Provider Office Phone # Fax #    Kevin Esquivel [phone]       [address] [facility] [address]        Goals        General    I will continue the exercises provided by physical therapy for my knee (pt-stated)     Notes - Note created  7/9/2015 10:30 AM by Ray Holland, RN    As of today's date 7/9/2015 goal is met at 0 - 25%.   Goal Status:  Active        I will remember to take my insulin before meals especially lunch when I am  away from home. (pt-stated)     Notes - Note created  3/25/2015  3:51 PM by Ray Holland, RN      As of today's date 3/25/2015 goal is met at 0 - 25%.   Goal Status:  Active.          I will work on testing my blood sugar and taking my insulin when I am away from home at lunch time. (pt-stated)     Notes - Note created  5/20/2016  2:43 PM by Ray Holland, RN    As of today's date 5/20/2016 goal is met at 0 -  25%.   Goal Status:  Active          Equal Access to Services     : Hadii aad ku kevin Evans, wakevinda luqmaura, qapilarta kadi marybethstone, jude yoniin hayaascarlet ruedaadam ortegakiera yara angela. So [facility] [phone].    ATENCIÓN: Si habla español, tiene a verduzco disposición servicios gratuitos de asistencia lingüística. Llame al [phone].    We comply with applicable federal civil rights laws and [place] laws. We do not discriminate on the basis of race, color, national origin, age, disability, sex, sexual orientation, or gender identity.            Thank you!     Thank you for choosing [facility]  for your care. Our goal is always to provide you with excellent care. Hearing back from our patients is one way we can continue to improve our services. Please take a few minutes to complete the written survey that you may receive in the mail after your visit with us. Thank you!             Your Updated Medication List - Protect others around you: Learn how to safely use, store and throw away your medicines at www.disposemymeds.org.          This list is accurate as of 8/15/18 12:46 PM.  Always use your most recent med list.                   Brand Name Dispense Instructions for use Diagnosis    ACE/ARB/ARNI NOT PRESCRIBED (INTENTIONAL)      ACE & ARB not prescribed due to Allergy    Type 2 diabetes mellitus without complication, with long-term current use of insulin (H)       * albuterol (2.5 MG/3ML) 0.083% neb solution     1 Box    Take 3 mLs (2.5 mg) by nebulization 4 times daily as needed    Intermittent asthma       * albuterol 108 (90 Base) MCG/ACT inhaler    PROAIR HFA/PROVENTIL HFA/VENTOLIN HFA    1 Inhaler    Inhale 2 puffs into the lungs every 6 hours as needed for shortness of breath / dyspnea or wheezing    Mild intermittent asthma without complication       aspirin 81 MG tablet      Take 1 tablet by mouth daily. 1 daily        BASAGLAR 100 UNIT/ML injection     75 mL    75 units  at bedtime or as directed    Type 2 diabetes mellitus without complication, with long-term current use of insulin (H)       blood glucose monitoring lancets     600 each    TEST 6 TIMES PER DAY    Type 2 diabetes mellitus without complication, with long-term current use of insulin (H)       blood glucose monitoring test strip    ONETOUCH ULTRA    600 strip    TEST 6 TIMES PER DAY    Type 2 diabetes mellitus without complication, with long-term current use of insulin (H)       calcium polycarbophil 625 MG tablet    FIBERCON    60 tablet    Take 2 tablets (1,250 mg) by mouth daily    Health care maintenance       CALCIUM-D PO      Take  by mouth. 1200mg calcium/600mg D3        fish oil-omega-3 fatty acids 1000 MG capsule      Take 1 capsule by mouth daily.        Garlic Caps      Take  by mouth. One daily        hydrochlorothiazide 25 MG tablet    HYDRODIURIL    30 tablet    Take 1 tablet (25 mg) by mouth daily    Hypertension goal BP (blood pressure) < 140/90       ibuprofen 800 MG tablet    ADVIL/MOTRIN    100 tablet    Take 1 tablet (800 mg) by mouth every 8 hours as needed for pain    Arthritis       insulin aspart 100 UNIT/ML injection    NovoLOG PEN    30 mL    Patient uses 100 units daily split up between multiple doses. Take 2 units per 15 grams of carbohydrate eaten and 2 units per 30 mg/dL above 170 mg/dL.    Type 2 diabetes mellitus without complication, with long-term current use of insulin (H)       LORazepam 0.5 MG tablet    ATIVAN    40 tablet    Take 1 tablet (0.5 mg) by mouth every 8 hours as needed    Anxiety       meperidine 50 MG tablet    DEMEROL    20 tablet    Take 1 tablet (50 mg) by mouth every 4 hours as needed    Idiopathic chronic pancreatitis (H)       MULTIVITAMIN TABS   OR      1 TABLET DAILY        oxyCODONE-acetaminophen 5-325 MG per tablet    PERCOCET     Take by mouth every 4 hours as needed for moderate to severe pain        PARoxetine 10 MG tablet    PAXIL    30 tablet    Take 1  "tablet (10 mg) by mouth At Bedtime    Anxiety       pen needles 5/16\" 31G X 8 MM Misc     180 each    Patient does 4 doses novolog and 2 lantus shots  insulin daily.  Needs 180 needles per month. Okay refills for one year.    Type 2 diabetes mellitus without complication, with long-term current use of insulin (H)       promethazine 12.5 MG tablet    PHENERGAN     Take 12.5 mg by mouth as needed        rosuvastatin 20 MG tablet    CRESTOR    90 tablet    TAKE 1 TABLET(20 MG) BY MOUTH DAILY    Hyperlipidemia LDL goal <100       SALINE      to clean port every other month    Bronchitis       senna-docusate 8.6-50 MG per tablet    SENOKOT-S;PERICOLACE    100 tablet    1-2 po bid prn constipation    Constipation, unspecified constipation type       tiZANidine 2 MG tablet    ZANAFLEX    30 tablet    Take 1 tablet (2 mg) by mouth 3 times daily as needed for muscle spasms    Muscle pain       zolpidem 5 MG tablet    AMBIEN    30 tablet    Take 1 tablet (5 mg) by mouth nightly as needed for sleep    Insomnia, unspecified type       * Notice:  This list has 2 medication(s) that are the same as other medications prescribed for you. Read the directions carefully, and ask your doctor or other care provider to review them with you.      "

## 2018-08-15 NOTE — PROGRESS NOTES
Diabetes Self Management Training: Intensive Pre op insulin adjustment    Leah Guerrero presents today for education, evaluation of glucose control and intensive adjustment  related to Type 2 diabetes.    She is accompanied by self    Patient's diabetes management related comments/concerns: I cannot get my A1C down no matter what I do....  I need to have surgery really soon.  I need to be healed so that [place].    Patient's emotional response to diabetes: expresses readiness to learn    Patient would like this visit to be focused around the following diabetes-related behaviors and goals: improving blood glucose control    ASSESSMENT:  Patient Problem List and Family Medical History reviewed for relevant medical history, current medical status, and diabetes risk factors.  [employer] competitor  Patient anxious and talking a lot. Seems overwhelmed with the large amount of insulin units that she takes and still is not controlled. Patient anxious to get her surgery done.  Patient able to count CHO correctly.  Patient reports that she splits her Basaglar dose in half and takes 40 units about noon and 40 units about midnight.  She has been taking only 1 unit for each carb choice and then using a sliding scale that she just does on her own and is not consistent.  She will take sliding scale before and 2 -3 hours after meals as well.  Patient can benefit from recalculation of her insulin doses and sliding scales and to only use sliding scale before meals.    Patient felt low at end of appointment today.  BG tested at 82 mg/dl with office glucose meter.  Patient drank 5.5 oz of apple juice and stated she needed to leave as her  was waiting.    Current Diabetes Management per Patient:  Taking diabetes medications?   yes:     Diabetes Medication(s)     Insulin Sig    BASAGLAR 100 UNIT/ML injection 75 units at bedtime or as directed    insulin aspart (NOVOLOG PEN) 100 UNIT/ML injection Patient  "uses 100 units daily split up between multiple doses. Take 2 units per 15 grams of carbohydrate eaten and 2 units per 30 mg/dL above 170 mg/dL.      Patient reports that she takes Basaglar 40 units bid- 12 hours apart.  Reports sliding scale as:  BG  200-250- 8 units                                             251-300- 18-20 units                                             301-350-  22 units                                             351-400- 26 units    Do you have any difficulty affording your medications or glucose monitoring supplies?     Not assessed     *Abbreviated insulin dose documentation key: Insulin trade name (kvcbnaywp-ulnmn-zrakuy-bedtime) - i.e. Humalog 5-5-5-0 (Humalog 5 units at breakfast, 5 units at lunch, and 5 units at dinner).     Patient glucose self monitoring as follows: 6 times daily.   BG meter: One Touch Ultra Mini meter- meter not here today. Needs meter update.  BG results: Per patient recall:  7 am-Fasting 230, 275     11-12 noon- 180-197  2 pm- none stated  4-5 pm- none stated   6-7 pm- 200's  9 pm- 200's  12 midnight- 200's    BG values are: Not in goal  Patient's most recent   Lab Results   Component Value Date    A1C 9.3 08/14/2018    is not meeting goal of <8.0    Nutrition:  Patient : per recall  Gets up at 6-8 am  Breakfast - 11 am- bowl of cereal  1 cup with 1 cup milk 2 %  Or Boost  Lunch - 4 -6 pm- TV dinner frozen bowl meal about 45 grams  Healthy Choice or Banquet bowl or hot dog and beans, diet pop or grilled cheese sandwich  Dinner -  May eat a cookie about 10 pm- 3 \" cookie  Snacks - 12 midnight- drinks milk 2 cups thru the night    Beverages: Splash , water, 2% milk, Boost, Gatorade  Orange juice for lows,    Cultural/Baptism diet restrictions: not assessed     Biggest Challenge to Healthy Eating: not assessed    Physical Activity:    Not assessed    Diabetes Risk Factors:  Not assessed    Relevant co-morbidities and related health problems:  Significant " "for:  dyslipidemia and hypertension    Diabetes Complications:  Acute Complications: At risk for hypoglycemia? yes  Symptoms of low blood sugar? shaking and dizziness  Patient carries a carbohydrate source with them regularly: not consistently    1 time per month has low blood glucose due to taking insiulin for high blood glucose and then not eating any food or increased exercise.    Recent health service and resource utilization related to diabetes (hyperglycemia, hypoglycemia, etc):   None    Vitals:  Wt 83.9 kg (185 lb)  BMI 32.77 kg/m2  Estimated body mass index is 32.88 kg/(m^2) as calculated from the following:    Height as of 8/13/18: 1.6 m (5' 3\").    Weight as of 8/13/18: 84.2 kg (185 lb 9.6 oz).   Last 3 BP:   BP Readings from Last 3 Encounters:   08/13/18 140/62   08/10/18 155/68   07/18/18 150/73       History   Smoking Status     Never Smoker   Smokeless Tobacco     Never Used       Labs:  Lab Results   Component Value Date    A1C 9.3 08/14/2018     Lab Results   Component Value Date     08/14/2018     Lab Results   Component Value Date    LDL 16 03/23/2018     HDL Cholesterol   Date Value Ref Range Status   03/23/2018 41 (L) >49 mg/dL Final   ]  GFR Estimate   Date Value Ref Range Status   08/14/2018 >90 >60 mL/min/1.7m2 Final     Comment:     Non  GFR Calc     GFR Estimate If Black   Date Value Ref Range Status   08/14/2018 >90 >60 mL/min/1.7m2 Final     Comment:      GFR Calc     Lab Results   Component Value Date    CR 0.58 08/14/2018     No results found for: MICROALBUMIN    Socio/Economic/Cultural Considerations:    Support system: not assessed    Cultural Influences/Ethnic Background:  American      Health Literacy/Numeracy:  \"With diabetes, it's helpful to use forms and log books to write down blood sugars and what you're eating at times to help understand how foods affect your blood sugars. With this in mind how confident are you at filling " out medical forms, such as these, by yourself?  Not Assessed    Health Beliefs and Attitudes:   Patient Activation Measure Survey Score:  No flowsheet data found.    Stage of Change: PREPARATION (Decided to change - considering how)      Diabetes knowledge and skills assessment:     Patient is knowledgeable in diabetes management concepts related to: unsure    Patient needs further education on the following diabetes management concepts: Healthy Eating, Monitoring, Taking Medication, Problem Solving and Healthy Coping    Barriers to Learning Assessment: No Barriers identified    Based on learning assessment above, most appropriate setting for further diabetes education would be: Individual setting.    INTERVENTION:   Education provided today on:  AADE Self-Care Behaviors:  Taking Medication: action of prescribed medication  Problem Solving: high blood glucose - causes, signs/symptoms, treatment and prevention, low blood glucose - causes, signs/symptoms, treatment and prevention and carrying a carbohydrate source at all times  Healthy Coping: Encouraged and support given today.    Opportunities for ongoing education and support in diabetes-self management were discussed.    Pt verbalized understanding of concepts discussed and recommendations provided today.       Education Materials Provided:  No new materials provided today    PLAN:  See Patient Instructions for co-developed, patient-stated behavior change goals.  AVS printed and provided to patient today.  Patient needs to have her insulin adjusted but unable today as I need more to review.  I will call patient to get blood glucose values for review.    FOLLOW-UP:  Follow-up appointment scheduled on 8/23/18.  Chart routed to referring provider.    Ongoing plan for education and support: Follow-up visit with diabetes educator in 1 week    Negrita De La Garza RN  BSN CDE    Time Spent: 60 minutes  Encounter Type: Individual    Any diabetes medication dose changes were  made via the CDE Protocol and Collaborative Practice Agreement with the patient's referring provider. A copy of this encounter was shared with the provider.

## 2018-08-15 NOTE — PATIENT INSTRUCTIONS
Call insurance company and ask which glucose meter and testing supplies are best covered for you under your insurance.  Also can check on coverage for Jose Eduardo  Freestyle.  Call in your blood glucose levels/ insulin doses  on Monday Aug 20 - [phone]  Call if questions

## 2018-08-16 ENCOUNTER — TELEPHONE (OUTPATIENT)
Dept: EDUCATION SERVICES | Facility: OTHER | Age: 73
End: 2018-08-16

## 2018-08-17 NOTE — TELEPHONE ENCOUNTER
Call out to patient to follow up with her re her blood glucose control.  Patient gave me her blood glucose values for review but was unable to tell me how much insulin she had taken during the day today or yesterday.  She states that she is taking a more insulin but unable to quantify how much.   8/16- no values taken  8/15 8 am- 201, 12 noon- 90  8/14  10:30- 132, 2:50 pm 126, 4 pm- 162, 11:39- 179  8/13   8:39- 158, 5 pm- 247, 11:15 245, 11:50 400  8/12 11:40 245 9 pm- 267    Patient needs to keep complete records.  Recommend patient start to keep complete records as starting Aug 20 th  Until her appointment on 8/23/18 ( even if she has not received the forms I have mailed to her)  Patient verbalizes understanding.  Record sheets put in the mail to patient.    Negrita De La Garza RN  BSN CDE

## 2018-08-24 ENCOUNTER — TELEPHONE (OUTPATIENT)
Dept: EDUCATION SERVICES | Facility: OTHER | Age: 73
End: 2018-08-24

## 2018-08-24 ENCOUNTER — OFFICE VISIT (OUTPATIENT)
Dept: FAMILY MEDICINE | Facility: CLINIC | Age: 73
End: 2018-08-24
Payer: COMMERCIAL

## 2018-08-24 VITALS
DIASTOLIC BLOOD PRESSURE: 76 MMHG | BODY MASS INDEX: 33.1 KG/M2 | HEIGHT: 63 IN | OXYGEN SATURATION: 96 % | WEIGHT: 186.8 LBS | RESPIRATION RATE: 13 BRPM | TEMPERATURE: 98.2 F | HEART RATE: 65 BPM | SYSTOLIC BLOOD PRESSURE: 150 MMHG

## 2018-08-24 DIAGNOSIS — I10 HTN, GOAL BELOW 140/90: ICD-10-CM

## 2018-08-24 DIAGNOSIS — F41.0 ANXIETY ATTACK: ICD-10-CM

## 2018-08-24 DIAGNOSIS — Z79.4 TYPE 2 DIABETES MELLITUS WITH HYPERGLYCEMIA, WITH LONG-TERM CURRENT USE OF INSULIN (H): ICD-10-CM

## 2018-08-24 DIAGNOSIS — Z13.89 SCREENING FOR DIABETIC PERIPHERAL NEUROPATHY: ICD-10-CM

## 2018-08-24 DIAGNOSIS — Z23 NEED FOR PROPHYLACTIC VACCINATION AGAINST STREPTOCOCCUS PNEUMONIAE (PNEUMOCOCCUS): ICD-10-CM

## 2018-08-24 DIAGNOSIS — R91.1 LUNG NODULE: Primary | ICD-10-CM

## 2018-08-24 DIAGNOSIS — Z78.0 ASYMPTOMATIC POSTMENOPAUSAL STATUS: ICD-10-CM

## 2018-08-24 DIAGNOSIS — E11.65 TYPE 2 DIABETES MELLITUS WITH HYPERGLYCEMIA, WITH LONG-TERM CURRENT USE OF INSULIN (H): ICD-10-CM

## 2018-08-24 PROCEDURE — 99207 C FOOT EXAM  NO CHARGE: CPT | Performed by: FAMILY MEDICINE

## 2018-08-24 PROCEDURE — 99214 OFFICE O/P EST MOD 30 MIN: CPT | Performed by: FAMILY MEDICINE

## 2018-08-24 RX ORDER — ATENOLOL AND CHLORTHALIDONE TABLET 50; 25 MG/1; MG/1
1 TABLET ORAL DAILY
Qty: 30 TABLET | Refills: 1 | Status: SHIPPED | OUTPATIENT
Start: 2018-08-24 | End: 2018-09-13

## 2018-08-24 RX ORDER — HYDROXYZINE PAMOATE 50 MG/1
50 CAPSULE ORAL
Qty: 30 CAPSULE | Refills: 0 | Status: SHIPPED | OUTPATIENT
Start: 2018-08-24 | End: 2019-08-29

## 2018-08-24 NOTE — NURSING NOTE
"Chief Complaint   Patient presents with     RECHECK     followup for cancer tests      Initial Pulse 65  Temp 98.2  F (36.8  C) (Oral)  Resp 13  Ht 5' 3\" (1.6 m)  Wt 186 lb 12.8 oz (84.7 kg)  SpO2 96%  Breastfeeding? No  BMI 33.09 kg/m2 Estimated body mass index is 33.09 kg/(m^2) as calculated from the following:    Height as of this encounter: 5' 3\" (1.6 m).    Weight as of this encounter: 186 lb 12.8 oz (84.7 kg).  BP completed using cuff size: justo Gusman  "

## 2018-08-24 NOTE — TELEPHONE ENCOUNTER
Call out to patient to follow up with her re her blood glucose control as patient cancelled her appointment for yesterday.  Patient was referred for Pre surgery Intensive glucose management program.  She does have an appointment today with her MD.  I left her a message requesting that she contact me with her blood glucose / insulin levels for review.    Negrita De La Garza RN  BSN CDE

## 2018-08-24 NOTE — PATIENT INSTRUCTIONS
[facility]    If you have any questions regarding to your visit please contact your care team:       Team Purple:   Clinic Hours Telephone Number   Dr. Lorin Roman   7am-7pm  Monday - Thursday   7am-5pm  Fridays  [phone]  (Appointment scheduling available 24/7)    Questions about your recent visit?   Team Line:  [phone]   Urgent Care - [place] and [facility] - 11am-9pm Monday-Friday Saturday-Sunday- 9am-5pm   [place] - 5pm-9pm Monday-Friday Saturday-Sunday- 9am-5pm  [phone] - [place]  [phone] - [place]       What options do I have for a visit other than an office visit? We offer electronic visits (e-visits) and telephone visits, when medically appropriate.  Please check with your medical insurance to see if these types of visits are covered, as you will be responsible for any charges that are not paid by your insurance.      You can use [employer] (secure electronic communication) to access to your chart, send your primary care provider a message, or make an appointment. Ask a team member how to get started.     For a price quote for your services, please call our Consumer Price Line at [phone] or our Imaging Cost estimation line at [phone] (for imaging tests).    Fozia Sanchez MA

## 2018-08-24 NOTE — MR AVS SNAPSHOT
After Visit Summary   8/24/2018    Leah Guerrero    MRN: [id number]           Patient Information     Date Of Birth          1945        Visit Information        Provider Department      8/24/2018 12:40 PM Genaro Dawson MD [facility]        Today's Diagnoses     Lung nodule    -  1    Anxiety attack        Type 2 diabetes mellitus with hyperglycemia, with long-term current use of insulin (H)        Asymptomatic postmenopausal status        Screening for diabetic peripheral neuropathy        Need for prophylactic vaccination against Streptococcus pneumoniae (pneumococcus)        HTN, goal below 140/90          Care Instructions    [facility]    If you have any questions regarding to your visit please contact your care team:       Team Purple:   Clinic Hours Telephone Number   Dr. Lorin Roman   7am-7pm  Monday - Thursday   7am-5pm  Fridays  [phone]  (Appointment scheduling available 24/7)    Questions about your recent visit?   Team Line:  [phone]   Urgent Care - [place] and [facility] - 11am-9pm Monday-Friday Saturday-Sunday- 9am-5pm   [place] - 5pm-9pm Monday-Friday Saturday-Sunday- 9am-5pm  [phone] - [place]  [phone] [facility]       What options do I have for a visit other than an office visit? We offer electronic visits (e-visits) and telephone visits, when medically appropriate.  Please check with your medical insurance to see if these types of visits are covered, as you will be responsible for any charges that are not paid by your insurance.      You can use [employer] (secure electronic communication) to access to your chart, send your primary care provider a message, or make an appointment. Ask a team member how to get started.     For a price quote for your services, please call our Consumer Price Line at [phone] or our Imaging Cost estimation line at  [phone] (for imaging tests).    Fozia Sanchez MA            Follow-ups after your visit        Follow-up notes from your care team     Return in about 1 week (around 8/31/2018) for BP Recheck.      Your next 10 appointments already scheduled     Aug 29, 2018 11:00 AM CDT   Return Visit with José Vogel MD   [facility] ([facility]    [address]  [place] [id number] [phone]              Future tests that were ordered for you today     Open Future Orders        Priority Expected Expires Ordered    DEXA HIP/PELVIS/SPINE - Future Routine  8/24/2019 8/24/2018            Who to contact     If you have questions or need follow up information about today's clinic visit or your schedule please contact [facility] directly at [phone].  Normal or non-critical lab and imaging results will be communicated to you by MyChart, letter or phone within 4 business days after the clinic has received the results. If you do not hear from us within 7 days, please contact the clinic through [employer] or phone. If you have a critical or abnormal lab result, we will notify you by phone as soon as possible.  Submit refill requests through [employer] or call your pharmacy and they will forward the refill request to us. Please allow 3 business days for your refill to be completed.          Additional Information About Your Visit        MyChart Information     [employer] gives you secure access to your electronic health record. If you see a primary care provider, you can also send messages to your care team and make appointments. If you have questions, please call your primary care clinic.  If you do not have a primary care provider, please call [phone] and they will assist you.        Care EveryWhere ID     This is your Care EveryWhere ID. This could be used by other organizations to access your [facility] medical records  [id number]        Your Vitals Were     Pulse  "Temperature Respirations Height Pulse Oximetry Breastfeeding?    65 98.2  F (36.8  C) (Oral) 13 5' 3\" (1.6 m) 96% No    BMI (Body Mass Index)                   33.09 kg/m2            Blood Pressure from Last 3 Encounters:   08/24/18 150/76   08/13/18 140/62   08/10/18 155/68    Weight from Last 3 Encounters:   08/24/18 186 lb 12.8 oz (84.7 kg)   08/15/18 185 lb (83.9 kg)   08/13/18 185 lb 9.6 oz (84.2 kg)              We Performed the Following     FOOT EXAM  NO CHARGE [[id number]]          Today's Medication Changes          These changes are accurate as of 8/24/18  1:16 PM.  If you have any questions, ask your nurse or doctor.               Start taking these medicines.        Dose/Directions    atenolol-chlorthalidone 50-25 MG per tablet   Commonly known as:  TENORETIC 50   Used for:  HTN, goal below 140/90   Started by:  Genaro Dawson MD        Dose:  1 tablet   Take 1 tablet by mouth daily   Quantity:  30 tablet   Refills:  1       hydrOXYzine 50 MG capsule   Commonly known as:  VISTARIL   Used for:  Anxiety attack   Started by:  Genaro Dawson MD        Dose:  50 mg   Take 1 capsule (50 mg) by mouth nightly as needed for anxiety or other   Quantity:  30 capsule   Refills:  0         Stop taking these medicines if you haven't already. Please contact your care team if you have questions.     hydrochlorothiazide 25 MG tablet   Commonly known as:  HYDRODIURIL   Stopped by:  Genaro Dawson MD           LORazepam 0.5 MG tablet   Commonly known as:  ATIVAN   Stopped by:  Genaro Dawson MD                Where to get your medicines      These medications were sent to [id number] - [place]  [address] SEC OF [address]  [address], [place] [address]    Hours:  Test fax successful 12/11/02  KR Phone:  [phone]     atenolol-chlorthalidone 50-25 MG per tablet    hydrOXYzine 50 MG capsule                Primary Care Provider Office " Phone # Fax #    Genaro Dawson [phone] [address] [place] [address]        Goals        General    I will continue the exercises provided by physical therapy for my knee (pt-stated)     Notes - Note created  7/9/2015 10:30 AM by Ray Holland RN    As of today's date 7/9/2015 goal is met at 0 - 25%.   Goal Status:  Active        I will remember to take my insulin before meals especially lunch when I am  away from home. (pt-stated)     Notes - Note created  3/25/2015  3:51 PM by Ray Holland RN      As of today's date 3/25/2015 goal is met at 0 - 25%.   Goal Status:  Active.          I will work on testing my blood sugar and taking my insulin when I am away from home at lunch time. (pt-stated)     Notes - Note created  5/20/2016  2:43 PM by Ray Holland RN    As of today's date 5/20/2016 goal is met at 0 - 25%.   Goal Status:  Active          Equal Access to Services     [facility]: Hadii aad ku hadasho Soomaali, waaxda luqadaha, qaybta kaalmada adestone, jude livingston . So [facility] [phone].    ATENCIÓN: Si habla español, tiene a verduzco disposición servicios gratuitos de asistencia lingüística. Llame al [phone].    We comply with applicable federal civil rights laws and [place] laws. We do not discriminate on the basis of race, color, national origin, age, disability, sex, sexual orientation, or gender identity.            Thank you!     Thank you for choosing [facility]  for your care. Our goal is always to provide you with excellent care. Hearing back from our patients is one way we can continue to improve our services. Please take a few minutes to complete the written survey that you may receive in the mail after your visit with us. Thank you!             Your Updated Medication List - Protect others around you: Learn how to safely use, store and throw away your medicines at www.disposemymeds.org.          This  list is accurate as of 8/24/18  1:16 PM.  Always use your most recent med list.                   Brand Name Dispense Instructions for use Diagnosis    ACE/ARB/ARNI NOT PRESCRIBED (INTENTIONAL)      ACE & ARB not prescribed due to Allergy    Type 2 diabetes mellitus without complication, with long-term current use of insulin (H)       * albuterol (2.5 MG/3ML) 0.083% neb solution     1 Box    Take 3 mLs (2.5 mg) by nebulization 4 times daily as needed    Intermittent asthma       * albuterol 108 (90 Base) MCG/ACT inhaler    PROAIR HFA/PROVENTIL HFA/VENTOLIN HFA    1 Inhaler    Inhale 2 puffs into the lungs every 6 hours as needed for shortness of breath / dyspnea or wheezing    Mild intermittent asthma without complication       aspirin 81 MG tablet      Take 1 tablet by mouth daily. 1 daily        atenolol-chlorthalidone 50-25 MG per tablet    TENORETIC 50    30 tablet    Take 1 tablet by mouth daily    HTN, goal below 140/90       BASAGLAR 100 UNIT/ML injection     75 mL    75 units at bedtime or as directed    Type 2 diabetes mellitus without complication, with long-term current use of insulin (H)       blood glucose monitoring lancets     600 each    TEST 6 TIMES PER DAY    Type 2 diabetes mellitus without complication, with long-term current use of insulin (H)       blood glucose monitoring test strip    ONETOUCH ULTRA    600 strip    TEST 6 TIMES PER DAY    Type 2 diabetes mellitus without complication, with long-term current use of insulin (H)       calcium polycarbophil 625 MG tablet    FIBERCON    60 tablet    Take 2 tablets (1,250 mg) by mouth daily    Health care maintenance       CALCIUM-D PO      Take  by mouth. 1200mg calcium/600mg D3        fish oil-omega-3 fatty acids 1000 MG capsule      Take 1 capsule by mouth daily.        Garlic Caps      Take  by mouth. One daily        hydrOXYzine 50 MG capsule    VISTARIL    30 capsule    Take 1 capsule (50 mg) by mouth nightly as needed for anxiety or other     "Anxiety attack       ibuprofen 800 MG tablet    ADVIL/MOTRIN    100 tablet    Take 1 tablet (800 mg) by mouth every 8 hours as needed for pain    Arthritis       insulin aspart 100 UNIT/ML injection    NovoLOG PEN    30 mL    Patient uses 100 units daily split up between multiple doses. Take 2 units per 15 grams of carbohydrate eaten and 2 units per 30 mg/dL above 170 mg/dL.    Type 2 diabetes mellitus without complication, with long-term current use of insulin (H)       meperidine 50 MG tablet    DEMEROL    20 tablet    Take 1 tablet (50 mg) by mouth every 4 hours as needed    Idiopathic chronic pancreatitis (H)       MULTIVITAMIN TABS   OR      1 TABLET DAILY        oxyCODONE-acetaminophen 5-325 MG per tablet    PERCOCET     Take by mouth every 4 hours as needed for moderate to severe pain        PARoxetine 10 MG tablet    PAXIL    30 tablet    Take 1 tablet (10 mg) by mouth At Bedtime    Anxiety       pen needles 5/16\" 31G X 8 MM Misc     180 each    Patient does 4 doses novolog and 2 lantus shots  insulin daily.  Needs 180 needles per month. Okay refills for one year.    Type 2 diabetes mellitus without complication, with long-term current use of insulin (H)       promethazine 12.5 MG tablet    PHENERGAN     Take 12.5 mg by mouth as needed        rosuvastatin 20 MG tablet    CRESTOR    90 tablet    TAKE 1 TABLET(20 MG) BY MOUTH DAILY    Hyperlipidemia LDL goal <100       SALINE      to clean port every other month    Bronchitis       senna-docusate 8.6-50 MG per tablet    SENOKOT-S;PERICOLACE    100 tablet    1-2 po bid prn constipation    Constipation, unspecified constipation type       tiZANidine 2 MG tablet    ZANAFLEX    30 tablet    Take 1 tablet (2 mg) by mouth 3 times daily as needed for muscle spasms    Muscle pain       zolpidem 5 MG tablet    AMBIEN    30 tablet    Take 1 tablet (5 mg) by mouth nightly as needed for sleep    Insomnia, unspecified type       * Notice:  This list has 2 medication(s) " that are the same as other medications prescribed for you. Read the directions carefully, and ask your doctor or other care provider to review them with you.

## 2018-08-24 NOTE — PROGRESS NOTES
SUBJECTIVE:   Leah Guerrero is a 72 year old female who presents to clinic today for the following health issues:    Lung Nodule   Seth is a 19-year-old female with a history of lung cancer and being followed for lung nodules.  She also had reports that she had a nodule in the lung which has grown from 4 mm in May to 6 mm in August. However she did see her oncologist who recommended a repeat CT scan in 3 months for follow-up.  She is anxious about this change in size.    Anxiety:  Her anxiety has increased to these findings on CT scan given her previous history of lung cancer.  She was given Paxil but this caused her to have jitteriness and stopped taking as a result.    Coronary atherosclerosis:   On  CT scan scan showing that she has heavy coronary atherosclerosis.  She however does not have any chest pain or shortness of breath or any new symptoms.    HTN   Thinks hydrochlorothiazide is not working since her blood pressures have been high.  She had been on lisinopril but developed swelling of the time and was discontinued.  Chief Complaint   Patient presents with     RECHECK     followup for cancer tests      BP Readings from Last 6 Encounters:   08/24/18 150/76   08/13/18 140/62   08/10/18 155/68   07/18/18 150/73   07/05/18 152/68   06/27/18 193/79     DM2:  Poorly controlled was due for surgery but this was different because of the high blood sugars, and is followed with diabetes education to control the blood sugars prior to surgery.    Problem list and histories reviewed & adjusted, as indicated.  Additional history: as documented    Patient Active Problem List   Diagnosis     Female stress incontinence     Type 2 diabetes, HbA1C goal < 8% (H)     Fatigue     Hyperlipidemia LDL goal <100     Advanced directives, counseling/discussion     Intermittent asthma     Anxiety state     Pulmonary nodule, left     Abnormal CT of the chest     GERD (gastroesophageal reflux disease)     Mechanical low back  "pain     Radicular pain of left lower extremity     Insomnia     Contusion of knee     Prepatellar bursitis of left knee     IT band syndrome     Intermittent asthma without complication     Health Care Home     Obesity     Hypertension goal BP (blood pressure) < 140/90     Primary osteoarthritis of left knee     Idiopathic chronic pancreatitis (H)     Insomnia, unspecified type     Anxiety     Chronic pancreatitis, unspecified pancreatitis type (H)     Type 2 diabetes mellitus without complication, with long-term current use of insulin (H)     Malignant neoplasm of lung, unspecified laterality, unspecified part of lung (H)     Mild intermittent asthma without complication     Cervicalgia     Past Surgical History:   Procedure Laterality Date     APPENDECTOMY  2012     C HAND/FINGER SURGERY UNLISTED       C SHOULDER SURG PROC UNLISTED       C STOMACH SURGERY PROCEDURE UNLISTED       CHOLECYSTECTOMY  1969     COLONOSCOPY  6-2-08    Neg. At [facility]     COLONOSCOPY  9-3-13     EYE SURGERY       HYSTERECTOMY TOTAL ABD, HEIDI SALPINGO-OOPHORECTOMY, NODE DISSECTION, TUMOR DEBULKING, COMBINED  1988    fibroids?     KNEE SURGERY  x5     KNEE SURGERY      kneecap procedure     LUNG SURGERY  10-7-16    removal lung cancer     SHOULDER SURGERY  2005    right shoulder, bone spurs     WRIST SURGERY      right wrist       Social History   Substance Use Topics     Smoking status: Never Smoker     Smokeless tobacco: Never Used     Alcohol use No     History reviewed. No pertinent family history.      Reviewed and updated as needed this visit by clinical staff  Tobacco  Allergies  Meds  Med Hx  Surg Hx  Fam Hx  Soc Hx      ROS:  Constitutional, cardiovascular, pulmonary, gi and gu systems are negative, except as otherwise noted.    OBJECTIVE:     /76 (BP Location: Left arm, Patient Position: Chair, Cuff Size: Adult Regular)  Pulse 65  Temp 98.2  F (36.8  C) (Oral)  Resp 13  Ht 5' 3\" (1.6 m)  Wt 186 lb 12.8 oz (84.7 kg) "  SpO2 96%  Breastfeeding? No  BMI 33.09 kg/m2  Body mass index is 33.09 kg/(m^2).  GENERAL: healthy, alert and no distress  NECK: no adenopathy and thyroid normal to palpation  RESP: lungs clear to auscultation - no rales, rhonchi or wheezes  CV: regular rate and rhythm, normal S1 S2, no S3 or S4, no murmur, click or rub, no peripheral edema   ABDOMEN: soft, nontender, no masses and bowel sounds normal  MS: no gross musculoskeletal defects noted, no edema  Diabetic foot exam: weak DP and PT pulses, no trophic changes or ulcerative lesions and patchy sensory loss    Diagnostic Test Results:       ASSESSMENT/PLAN:     (R91.1) Lung nodule  (primary encounter diagnosis)  Comment: Lung nodule has increased in size, reviewed chart with patient and this has been evaluated by oncology recommended follow-up CT scan in the next 3-4 months.  Plan: Follow-up was recommended by oncology    (F41.0) Anxiety attack  Comment: Anxiety related to her medical conditions especially lung nodule.  Since been evaluated by oncology and a follow-up set in place reassured but should follow up with any new symptoms.  Will do hydroxyzine as needed, start tonight because she stays up at night because of his anxiety.  Plan: hydrOXYzine (VISTARIL) 50 MG capsule patient    (E11.65,  Z79.4) Type 2 diabetes mellitus with hyperglycemia, with long-term current use of insulin (H)  Comment: Uncontrolled.  Being closely monitored by diabetic education with insulin adjustments in preparation for knee replacement    (Z78.0) Asymptomatic postmenopausal status  Plan: DEXA HIP/PELVIS/SPINE - Future    (Z13.89) Screening for diabetic peripheral neuropathy  Plan: FOOT EXAM  NO CHARGE [21344.114]        (I10) HTN, goal below 140/90  Comment: Uncontrolled.  Discussed doing a combination of beta-blocker and diuretic and recheck the blood pressure in 1 week  Plan: atenolol-chlorthalidone (TENORETIC 50) 50-25 MG        per tablet    Follow up in 1 week or sooner  with concerns    Genaro Dawson MD  [facility] [place]`

## 2018-08-27 ENCOUNTER — TELEPHONE (OUTPATIENT)
Dept: FAMILY MEDICINE | Facility: CLINIC | Age: 73
End: 2018-08-27

## 2018-08-27 NOTE — TELEPHONE ENCOUNTER
Prior Authorization Retail Medication Request    Medication/Dose: hydrOXYzine (VISTARIL) 50 MG capsule  ICD code (if different than what is on RX): Anxiety attack [F41.0]    Previously Tried and Failed:    Rationale:      Insurance Name:    Insurance ID:  [id number]      Pharmacy Information (if different than what is on RX)  Name:  [facility]  Phone:  [phone]  Fax: [phone]

## 2018-08-28 NOTE — TELEPHONE ENCOUNTER
PA Initiation    Medication: hydrOXYzine (VISTARIL) 50 MG capsule -   Insurance Company: [employer] - Phone [phone] Fax [phone]  Pharmacy Filling the Rx: [facility] [address] [place] [address] [address]  Filling Pharmacy Phone: [phone]  Filling Pharmacy Fax:    Start Date: 8/28/2018

## 2018-08-29 ENCOUNTER — TELEPHONE (OUTPATIENT)
Dept: FAMILY MEDICINE | Facility: CLINIC | Age: 73
End: 2018-08-29

## 2018-08-29 NOTE — TELEPHONE ENCOUNTER
Faxed paperwork to [facility].Thank you,   Ebony Estrada   [facility] Department  [phone]

## 2018-08-29 NOTE — TELEPHONE ENCOUNTER
Reason for call:  request  Patient called regarding (reason for call): Patient would like a call back to discuss a date for surgery  Additional comments:please call    Phone number to reach patient:  Other phone number:  [phone]*    Best Time: anytime    Can we leave a detailed message on this number?  YES

## 2018-08-29 NOTE — TELEPHONE ENCOUNTER
Called and spoke to patient. She would like to schedule surgery. I told her she would have to call our surgery scheduler's to do that. I gave her the number to call.   Lisbet Jacob Certified Medical Assistant

## 2018-08-29 NOTE — TELEPHONE ENCOUNTER
Patient has called to reschedule surgery     Type of surgery: Left total knee arthroplasty   Location of surgery: [facility]  Date and time of surgery: 09/18/2018 / 7:30 am  Surgeon: Taran   Pre-Op Appt Date: 09/10/2018  Post-Op Appt Date: 10/03/2018   Packet sent out: Yes  Pre-cert/Authorization completed:  Per previous notes, no pre cert is needed  Date: 08/29/2018

## 2018-08-30 ENCOUNTER — TELEPHONE (OUTPATIENT)
Dept: FAMILY MEDICINE | Facility: CLINIC | Age: 73
End: 2018-08-30

## 2018-08-30 NOTE — TELEPHONE ENCOUNTER
Prior Authorization Approval    Authorization Effective Date: 5/31/2018  Authorization Expiration Date: 8/29/2019  Medication: hydrOXYzine (VISTARIL) 50 MG capsule - APPROVED  Approved Dose/Quantity: #30  Reference #:     Insurance Company: [employer] - Phone [phone] Fax [phone]  Expected CoPay:       CoPay Card Available:      Foundation Assistance Needed:    Which Pharmacy is filling the prescription (Not needed for infusion/clinic administered): [facility] [address] [place] [address] [address] [place] [id number]  Pharmacy Notified: Yes  Patient Notified: Yes

## 2018-09-03 DIAGNOSIS — I10 HYPERTENSION GOAL BP (BLOOD PRESSURE) < 140/90: ICD-10-CM

## 2018-09-04 NOTE — TELEPHONE ENCOUNTER
"Requested Prescriptions   Pending Prescriptions Disp Refills     hydrochlorothiazide (HYDRODIURIL) 25 MG tablet [Pharmacy Med Name: HYDROCHLOROTHIAZIDE 25MG TABLETS] 30 tablet 0          Last Written Prescription Date:  na  Last Fill Quantity: na,   # refills: na  Last Office Visit: 7/5/18  Future Office visit:    Next 5 appointments (look out 90 days)     Sep 10, 2018 11:00 AM CDT   Pre-Op physical with Kevin Esquivel MD   [facility]    [address] [address] [phone]            Oct 03, 2018 11:00 AM CDT   Return Visit with José Vogel MD   [facility] ([address] [address] [phone]                   Routing refill request to provider for review/approval because:  Drug not active on patient's medication list   Sig: TAKE 1 TABLET(25 MG) BY MOUTH DAILY    Diuretics (Including Combos) Protocol Failed    9/3/2018  3:47 AM       Failed - Blood pressure under 140/90 in past 12 months    BP Readings from Last 3 Encounters:   08/24/18 150/76   08/13/18 140/62   08/10/18 155/68                Passed - Recent (12 mo) or future (30 days) visit within the authorizing provider's specialty    Patient had office visit in the last 12 months or has a visit in the next 30 days with authorizing provider or within the authorizing provider's specialty.  See \"Patient Info\" tab in inbasket, or \"Choose Columns\" in Meds & Orders section of the refill encounter.           Passed - Patient is age 18 or older       Passed - No active pregancy on record       Passed - Normal serum creatinine on file in past 12 months    Recent Labs   Lab Test  08/14/18   0846   CR  0.58             Passed - Normal serum potassium on file in past 12 months    Recent Labs   Lab Test  08/14/18   0846   POTASSIUM  3.9                   Passed - Normal serum sodium on file in past 12 " months    Recent Labs   Lab Test  08/14/18   0846   NA  139             Passed - No positive pregnancy test in past 12 months

## 2018-09-05 RX ORDER — HYDROCHLOROTHIAZIDE 25 MG/1
TABLET ORAL
Qty: 30 TABLET | Refills: 0 | Status: SHIPPED | OUTPATIENT
Start: 2018-09-05 | End: 2018-09-10

## 2018-09-05 NOTE — TELEPHONE ENCOUNTER
Prescription approved per [facility] Refill Protocol.  Marcia Lopez,Clinic Rn  Ravalli Healy

## 2018-09-07 ENCOUNTER — TELEPHONE (OUTPATIENT)
Dept: EDUCATION SERVICES | Facility: OTHER | Age: 73
End: 2018-09-07

## 2018-09-07 ENCOUNTER — PATIENT OUTREACH (OUTPATIENT)
Dept: EDUCATION SERVICES | Facility: OTHER | Age: 73
End: 2018-09-07

## 2018-09-07 NOTE — TELEPHONE ENCOUNTER
Call out to patient to follow up with her re her blood glucose control.  There was no answer.  I left a message requesting that she call in her last 4 days of blood glucose levels for review and that I hoped she was doing better.      Negrita De La Garza RN  BSN CDE

## 2018-09-10 ENCOUNTER — TELEPHONE (OUTPATIENT)
Dept: FAMILY MEDICINE | Facility: CLINIC | Age: 73
End: 2018-09-10

## 2018-09-10 ENCOUNTER — OFFICE VISIT (OUTPATIENT)
Dept: FAMILY MEDICINE | Facility: CLINIC | Age: 73
End: 2018-09-10
Payer: COMMERCIAL

## 2018-09-10 VITALS
SYSTOLIC BLOOD PRESSURE: 158 MMHG | BODY MASS INDEX: 33.17 KG/M2 | HEART RATE: 59 BPM | OXYGEN SATURATION: 96 % | WEIGHT: 187.25 LBS | TEMPERATURE: 99.2 F | DIASTOLIC BLOOD PRESSURE: 66 MMHG

## 2018-09-10 DIAGNOSIS — Z79.4 TYPE 2 DIABETES MELLITUS WITHOUT COMPLICATION, WITH LONG-TERM CURRENT USE OF INSULIN (H): ICD-10-CM

## 2018-09-10 DIAGNOSIS — R06.09 DYSPNEA ON EXERTION: ICD-10-CM

## 2018-09-10 DIAGNOSIS — I10 HYPERTENSION GOAL BP (BLOOD PRESSURE) < 140/90: ICD-10-CM

## 2018-09-10 DIAGNOSIS — E11.9 TYPE 2 DIABETES MELLITUS WITHOUT COMPLICATION, WITH LONG-TERM CURRENT USE OF INSULIN (H): ICD-10-CM

## 2018-09-10 DIAGNOSIS — Z95.828 PORT-A-CATH IN PLACE: ICD-10-CM

## 2018-09-10 DIAGNOSIS — Z01.818 PREOP GENERAL PHYSICAL EXAM: Primary | ICD-10-CM

## 2018-09-10 DIAGNOSIS — F41.9 ANXIETY: ICD-10-CM

## 2018-09-10 LAB
ALBUMIN SERPL-MCNC: 3.6 G/DL (ref 3.4–5)
ALP SERPL-CCNC: 125 U/L (ref 40–150)
ALT SERPL W P-5'-P-CCNC: 40 U/L (ref 0–50)
ANION GAP SERPL CALCULATED.3IONS-SCNC: 11 MMOL/L (ref 3–14)
AST SERPL W P-5'-P-CCNC: 34 U/L (ref 0–45)
BASOPHILS # BLD AUTO: 0 10E9/L (ref 0–0.2)
BASOPHILS NFR BLD AUTO: 0.1 %
BILIRUB SERPL-MCNC: 0.4 MG/DL (ref 0.2–1.3)
BUN SERPL-MCNC: 19 MG/DL (ref 7–30)
CALCIUM SERPL-MCNC: 9 MG/DL (ref 8.5–10.1)
CHLORIDE SERPL-SCNC: 105 MMOL/L (ref 94–109)
CO2 SERPL-SCNC: 24 MMOL/L (ref 20–32)
CREAT SERPL-MCNC: 0.62 MG/DL (ref 0.52–1.04)
DIFFERENTIAL METHOD BLD: NORMAL
EOSINOPHIL # BLD AUTO: 0.2 10E9/L (ref 0–0.7)
EOSINOPHIL NFR BLD AUTO: 1.8 %
ERYTHROCYTE [DISTWIDTH] IN BLOOD BY AUTOMATED COUNT: 13.8 % (ref 10–15)
GFR SERPL CREATININE-BSD FRML MDRD: >90 ML/MIN/1.7M2
GLUCOSE SERPL-MCNC: 143 MG/DL (ref 70–99)
HBA1C MFR BLD: 8.4 % (ref 0–5.6)
HCT VFR BLD AUTO: 41.3 % (ref 35–47)
HGB BLD-MCNC: 14 G/DL (ref 11.7–15.7)
LYMPHOCYTES # BLD AUTO: 2.4 10E9/L (ref 0.8–5.3)
LYMPHOCYTES NFR BLD AUTO: 26.8 %
MCH RBC QN AUTO: 28.3 PG (ref 26.5–33)
MCHC RBC AUTO-ENTMCNC: 33.9 G/DL (ref 31.5–36.5)
MCV RBC AUTO: 84 FL (ref 78–100)
MONOCYTES # BLD AUTO: 0.6 10E9/L (ref 0–1.3)
MONOCYTES NFR BLD AUTO: 7.2 %
NEUTROPHILS # BLD AUTO: 5.7 10E9/L (ref 1.6–8.3)
NEUTROPHILS NFR BLD AUTO: 64.1 %
PLATELET # BLD AUTO: 246 10E9/L (ref 150–450)
POTASSIUM SERPL-SCNC: 3.8 MMOL/L (ref 3.4–5.3)
PROT SERPL-MCNC: 7.8 G/DL (ref 6.8–8.8)
RBC # BLD AUTO: 4.94 10E12/L (ref 3.8–5.2)
SODIUM SERPL-SCNC: 140 MMOL/L (ref 133–144)
WBC # BLD AUTO: 8.9 10E9/L (ref 4–11)

## 2018-09-10 PROCEDURE — 99215 OFFICE O/P EST HI 40 MIN: CPT | Performed by: FAMILY MEDICINE

## 2018-09-10 PROCEDURE — 83036 HEMOGLOBIN GLYCOSYLATED A1C: CPT | Performed by: FAMILY MEDICINE

## 2018-09-10 PROCEDURE — 85025 COMPLETE CBC W/AUTO DIFF WBC: CPT | Performed by: FAMILY MEDICINE

## 2018-09-10 PROCEDURE — 36415 COLL VENOUS BLD VENIPUNCTURE: CPT | Performed by: FAMILY MEDICINE

## 2018-09-10 PROCEDURE — 80053 COMPREHEN METABOLIC PANEL: CPT | Performed by: FAMILY MEDICINE

## 2018-09-10 RX ORDER — CHLORTHALIDONE 25 MG/1
25 TABLET ORAL DAILY
Qty: 2 TABLET | Refills: 1 | Status: SHIPPED | OUTPATIENT
Start: 2018-09-10 | End: 2018-09-18

## 2018-09-10 RX ORDER — AMLODIPINE BESYLATE 5 MG/1
5 TABLET ORAL DAILY
Qty: 30 TABLET | Refills: 0 | Status: SHIPPED | OUTPATIENT
Start: 2018-09-10 | End: 2018-09-24

## 2018-09-10 ASSESSMENT — PAIN SCALES - GENERAL: PAINLEVEL: NO PAIN (0)

## 2018-09-10 NOTE — TELEPHONE ENCOUNTER
Requested Prescriptions   Pending Prescriptions Disp Refills     PARoxetine (PAXIL) 10 MG tablet 30 tablet 1     Sig: Take 1 tablet (10 mg) by mouth At Bedtime    There is no refill protocol information for this order   Last Written Prescription Date:  5-24-18  Last Fill Quantity: 30,  # refills: 1   Last office visit: No previous visit found with prescribing provider:  9-10-18   Future Office Visit:   Next 5 appointments (look out 90 days)     Sep 24, 2018 11:00 AM CDT   SHORT with Kevin Esquivel MD   [facility]    [address] [facility] [address]   [phone]            Nov 02, 2018 11:00 AM CDT   Return Visit with José Vogel MD   [facility] ([facility]    [address]  [place] [address]   [phone]

## 2018-09-10 NOTE — TELEPHONE ENCOUNTER
"Requested Prescriptions   Pending Prescriptions Disp Refills     amLODIPine (NORVASC) 5 MG tablet [Pharmacy Med Name: AMLODIPINE BESYLATE 5MG TABLETS] 90 tablet 0    Last Written Prescription Date:  9-10-18  Last Fill Quantity: 30,  # refills: 0   Last office visit: No previous visit found with prescribing provider:  9-10-18   Future Office Visit:   Next 5 appointments (look out 90 days)     Sep 24, 2018 11:00 AM CDT   SHORT with Kevin Esquivel MD   [facility] ([facility]    [address] [facility] [address]   [phone]            Nov 02, 2018 11:00 AM CDT   Return Visit with José Vogel MD   [facility] ([address] [address] [phone]                  Sig: TAKE 1 TABLET(5 MG) BY MOUTH DAILY    Calcium Channel Blockers Protocol  Failed    9/10/2018 12:15 PM       Failed - Blood pressure under 140/90 in past 12 months    BP Readings from Last 3 Encounters:   09/10/18 158/66   08/24/18 150/76   08/13/18 140/62                Passed - Recent (12 mo) or future (30 days) visit within the authorizing provider's specialty    Patient had office visit in the last 12 months or has a visit in the next 30 days with authorizing provider or within the authorizing provider's specialty.  See \"Patient Info\" tab in inbasket, or \"Choose Columns\" in Meds & Orders section of the refill encounter.           Passed - Patient is age 18 or older       Passed - No active pregnancy on record       Passed - Normal serum creatinine on file in past 12 months    Recent Labs   Lab Test  08/14/18   0846   CR  0.58            Passed - No positive pregnancy test in past 12 months          "

## 2018-09-10 NOTE — NURSING NOTE
Port flush orders dated 8/14/18, current.    Dr. Esquivel requesting port access for pre-op labs.    Port site without redness, swelling, or breakdown.  Port site prepped with 3 betadine swabs, air dried, followed by 3 alcohol wipes, air dried.  Stabilized port and accessed with 20 G 3/4 inch bello gripper non-coring needle without difficulty on 1st attempt.  Easy flush, good blood return observed. 6 ml waste blood returned, then obtained sample blood and placed in appropriate tubes per Dr. Esquivel's request/orders.  Flushed with 20 ml of sterile saline for infusion followed by 5 ml of 100 unit/ml heparin flush.  De-accessed and gauze pad applied, patient tolerated well.    Samples labeled and transported to lab.    The following medication was given:     MEDICATION: saline 20 ml  ROUTE: IV  SITE: via port-a-cath  DOSE: 20 ml  LOT #: [id number]  :  [employer]  EXPIRATION DATE:  03/2020  NDC#: 63323-186-10    The following medication was given:     MEDICATION: heparin 100 unit/ml  ROUTE: IV  SITE: port-a-cath  DOSE: 5 ml  LOT #: [id number]  :  [employer]  EXPIRATION DATE:  05/2020  NDC#: 27845-532-54    Keyanna Carrasquillo RN

## 2018-09-10 NOTE — PROGRESS NOTES
[address] [address] [phone]  Dept: [phone]    PRE-OP EVALUATION:  Today's date: 9/10/2018    Leah Guerrero (: 1945) presents for pre-operative evaluation assessment as requested by Dr. Camejo.  She requires evaluation and anesthesia risk assessment prior to undergoing surgery/procedure for treatment of left knee .    Fax number for surgical facility: [phone]  Primary Physician: Genaro Dawson  Type of Anesthesia Anticipated: General    Patient has a Health Care Directive or Living Will:  YES     Preop Questions 9/10/2018   Who is doing your surgery? dr camejo   What are you having done? complete knee replacement   Date of Surgery/Procedure: 18   Facility or Hospital where procedure/surgery will be performed: [facility]   1.  Do you have a history of Heart attack, stroke, stent, coronary bypass surgery, or other heart surgery? No   2.  Do you ever have any pain or discomfort in your chest? YES - off and on   3.  Do you have a history of  Heart Failure? No   4.   Are you troubled by shortness of breath when:  walking on a level surface, or up a slight hill, or at night? YES -    5.  Do you currently have a cold, bronchitis or other respiratory infection? No   6.  Do you have a cough, shortness of breath, or wheezing? YES -    7.  Do you sometimes get pains in the calves of your legs when you walk? No   8. Do you or anyone in your family have previous history of blood clots? No   9.  Do you or does anyone in your family have a serious bleeding problem such as prolonged bleeding following surgeries or cuts? No   10. Have you ever had problems with anemia or been told to take iron pills? No   11. Have you had any abnormal blood loss such as black, tarry or bloody stools, or abnormal vaginal bleeding? YES - In the past   12. Have you ever had a blood transfusion? YES -    13. Have you or any of  your relatives ever had problems with anesthesia? No   14. Do you have sleep apnea, excessive snoring or daytime drowsiness? No   15. Do you have any prosthetic heart valves? No   16. Do you have prosthetic joints? No   17. Is there any chance that you may be pregnant? No         HPI:     HPI related to upcoming procedure: 72 year old female with left knee pain/ arthritis. To have left TKA.    She was to have this a few months ago but sugars too apolinar at that time.  Sugars now doing better.    To have the procedure next 18, 8 days from now.           MEDICAL HISTORY:     Patient Active Problem List    Diagnosis Date Noted     Cervicalgia 07/10/2018     Priority: Medium     Mild intermittent asthma without complication 2018     Priority: Medium     Malignant neoplasm of lung, unspecified laterality, unspecified part of lung (H) 2017     Priority: Medium     Chronic pancreatitis, unspecified pancreatitis type (H) 2016     Priority: Medium     Type 2 diabetes mellitus without complication, with long-term current use of insulin (H) 2016     Priority: Medium     Anxiety 2016     Priority: Medium     Insomnia, unspecified type 2016     Priority: Medium     Idiopathic chronic pancreatitis (H) 2015     Priority: Medium     Primary osteoarthritis of left knee 10/28/2015     Priority: Medium     Hypertension goal BP (blood pressure) < 140/90 2015     Priority: Medium     Obesity 2015     Priority: Medium     Health Care Home 10/22/2014     Priority: Medium     [facility]   Guerline Zhao, JEANIE  [phone]    [facility] CARE PLAN SUMMARY    Client Name:  Leah Guerrero  Address:  [address] [place] [address] Phone: [phone] (home)    :  1945 Date of Assessment:    Health Plan:  [facility]  Health Plan Number: [id number] Medical Assistance Number: [id number]  Financial Worker:  [place]  "EDWIGE/[place]  [phone]  Case #:  [id number]   [facility] :  Guerline Zhao RN  Phone:  [phone]  CM Fax:  [phone]   [facility] Enrollment Date: 1/1/18 Case Mix:  L  Rate Cell:  B  Waiver Type: EW    Emergency Contact:   Primary Emergency Contact: Paul Guerrero  Home Phone: [phone]  Relation: Son  Secondary Emergency Contact: Hiram Guerrero   Mobile Phone: [phone]  Relation: Son Language:  English  :  No   Health Care Agent/POA:   Advanced Directives/Living Will:  On file   Primary Care Clinic/Phone/Fax:  [facility]/(p) [phone], (f) [phone] Primary Dx:  Diabetes:E11.9  Secondary Dx: Osteoarthritis: M19.91    Primary Physician:  Kevin Esquivel   Height:  5' 3\"  Weight:  186 lbs   Specialty Physician:    Audiologist:     Eye Care Provider:   Dental Care Provider:    [facility] [phone]   Other:        [employer]  [phone]  Fax: [phone] 1/1/18-12/31/18 weekly 4 hrs/week  (16 units)  $4.61/unit      Transportation/[employer]  [facility] 1/1/18-12/31/18 monthly Rush:10/m  Non Rush: 10/m Rush $4/ticket  ($40/m)  Non rush  $3/ticket ($30/m)     [employer]Mental Health   Aditi Prob:[phone]  Fax: [phone] Ongoing Monthly visits              Intermittent asthma without complication 07/11/2014     Priority: Medium     IT band syndrome 01/13/2014     Priority: Medium     Contusion of knee 11/20/2013     Priority: Medium     Prepatellar bursitis of left knee 11/20/2013     Priority: Medium     Insomnia 05/30/2013     Priority: Medium     Mechanical low back pain 03/22/2013     Priority: Medium     Radicular pain of left lower extremity 03/22/2013     Priority: Medium     GERD (gastroesophageal reflux disease) 02/06/2013     Priority: Medium     Pulmonary nodule, left      Priority: Medium     0.5 cm; needs f/u chest CT scan Jan 2013       Abnormal CT of the chest      Priority: Medium     Nodular consolidation right " lobe 1.6 cm. Needs repeat CT Jan 2013       Anxiety state 06/21/2012     Priority: Medium     Problem list name updated by automated process. Provider to review       Intermittent asthma 12/30/2011     Priority: Medium     Advanced directives, counseling/discussion 05/06/2011     Priority: Medium     04/29/2013  Advance Care Planning:   Receipt of ACP document:  Received: Health Care Directive which was witnessed or notarized on 07/06/2011.  Document previously scanned on 07/07/2011.  Validation form completed and sent to be scanned.  Code Status reflects choices in most recent ACP document.  Confirmed/documented designated decision maker(s). See permanent comments section of demographics in clinical tab. View document(s) and details by clicking on code status.   Added by Lacie Canchola on 4/29/2013.          Advance Care Planning:   ACP Review and Resources Provided:  Reviewed chart for advance care plan.  Leah Guerrero has no plan or code status on file however states presence of ACP document. Copy requested. Confirmed code status reflects current choices pending receipt of document/advance care plan review. Confirmed/documented designated decision maker(s). See permanent comments section of demographics in clinical tab.   Added by Emma Medina on 4/24/2013          Planning  Advance Directive Initial Visit  Leah Guerrero presents in person for initial session regarding completion of advanced directive form. She was referred to the facilitator by self.  She currently has an advance directive from 1980 & wants to fill out a new updated one.  Plan:  Advanced directive form, healthcare agent information card, advanced care planning information booklet provided to patient.   Follow up meeting: to be arranged when patient calls back to make an appointment after reviewing documents in one week or so. Patient instructed to bring healthcare agent to this meeting.  Flores Gerard RN  Letter sent to patient  for [employer] follow up.  6/22/2011  Flores Gerard RN  Advance Directive Follow-up Visit  Leah Guerrero presents for advanced care planning session accompanied by no one.  Reviewed definitions of advanced care planning and advance directive form, and informational handouts given to patient previously.  Patient has questions and /or concerns about acp process or form .  Reviewed advance directive form with patient & answered all of her questions. Designated healthcare agent is identified. Healthcare agent s name is Paul Guerrero. My Hopes and Wishes reviewed.  Finalized wishes are clear to patient Yes  Patient and designated healthcare agent are aware that document may be changed at any time in the future.  Advanced directive form: completed at this visit.  Advanced directive form: verified with notary signature. Original and two copies of advanced directive form given to patient copy sent to  for scanning into EMR and original given to patient and instructed to give copy to designated [facility] and non-[facility] physician where applicable.  Flores Gerard RN  7/6/2011  Advance Directive Problem List Overview:   Name Relationship Phone    Primary Health Care Agent Paul Guerrero Son [phone]         Alternative Health Care Agent Hiram Guerrero Son c [phone]  [phone]                      Hyperlipidemia LDL goal <100 12/16/2010     Priority: Medium     Fatigue 12/15/2010     Priority: Medium     Type 2 diabetes, HbA1C goal < 8% (H) 11/24/2010     Priority: Medium     Female stress incontinence 10/28/2010     Priority: Medium      Past Medical History:   Diagnosis Date     Abnormal CT of the chest 10/12    Nodular consolidation right lobe 1.6 cm. Needs repeat CT Jan 2013     Bleeding disorder (H)     in bowels x2      Chronic pancreatitis (H)      CVA (cerebral infarction)      Diabetes      Female stress incontinence      Hyperlipidemia LDL goal <100      Hypertension      Intermittent  asthma 12/30/2011     Lateral epicondylitis (tennis elbow) - left 3/30/2011     OA (osteoarthritis) of knee 3/22/2013     Pulmonary nodule, left 10/12    0.5 cm; needs f/u chest CT scan Jan 2013     Surgical complications      Unspecified asthma(493.90)      Past Surgical History:   Procedure Laterality Date     APPENDECTOMY  2012     C HAND/FINGER SURGERY UNLISTED       C SHOULDER SURG PROC UNLISTED       C STOMACH SURGERY PROCEDURE UNLISTED       CHOLECYSTECTOMY  1969     COLONOSCOPY  6-2-08    Neg. At [facility]     COLONOSCOPY  9-3-13     EYE SURGERY       HYSTERECTOMY TOTAL ABD, HEIDI SALPINGO-OOPHORECTOMY, NODE DISSECTION, TUMOR DEBULKING, COMBINED  1988    fibroids?     KNEE SURGERY  x5     KNEE SURGERY      kneecap procedure     LUNG SURGERY  10-7-16    removal lung cancer     SHOULDER SURGERY  2005    right shoulder, bone spurs     WRIST SURGERY      right wrist     Current Outpatient Prescriptions   Medication Sig Dispense Refill     ACE/ARB NOT PRESCRIBED, INTENTIONAL, ACE & ARB not prescribed due to Allergy       albuterol (2.5 MG/3ML) 0.083% nebulizer solution Take 3 mLs (2.5 mg) by nebulization 4 times daily as needed 1 Box 5     albuterol (PROAIR HFA/PROVENTIL HFA/VENTOLIN HFA) 108 (90 Base) MCG/ACT Inhaler Inhale 2 puffs into the lungs every 6 hours as needed for shortness of breath / dyspnea or wheezing 1 Inhaler 11     aspirin 81 MG tablet Take 1 tablet by mouth daily. 1 daily       atenolol-chlorthalidone (TENORETIC 50) 50-25 MG per tablet Take 1 tablet by mouth daily 30 tablet 1     BASAGLAR 100 UNIT/ML injection 75 units at bedtime or as directed 75 mL 3     blood glucose monitoring (ONE TOUCH ULTRASOFT) lancets TEST 6 TIMES PER  each 0     blood glucose monitoring (ONETOUCH ULTRA) test strip TEST 6 TIMES PER  strip 11     Calcium Carbonate-Vitamin D (CALCIUM-D PO) Take  by mouth. 1200mg calcium/600mg D3       calcium polycarbophil (FIBERCON) 625 MG tablet Take 2 tablets (1,250 mg) by  "mouth daily 60 tablet 11     fish oil-omega-3 fatty acids (FISH OIL) 1000 MG capsule Take 1 capsule by mouth daily.       Garlic CAPS Take  by mouth. One daily       hydrochlorothiazide (HYDRODIURIL) 25 MG tablet TAKE 1 TABLET(25 MG) BY MOUTH DAILY 30 tablet 0     hydrOXYzine (VISTARIL) 50 MG capsule Take 1 capsule (50 mg) by mouth nightly as needed for anxiety or other 30 capsule 0     ibuprofen (ADVIL,MOTRIN) 800 MG tablet Take 1 tablet (800 mg) by mouth every 8 hours as needed for pain 100 tablet 0     insulin aspart (NOVOLOG PEN) 100 UNIT/ML injection Patient uses 100 units daily split up between multiple doses. Take 2 units per 15 grams of carbohydrate eaten and 2 units per 30 mg/dL above 170 mg/dL. 30 mL 1     Insulin Pen Needle (PEN NEEDLES 5/16\") 31G X 8 MM MISC Patient does 4 doses novolog and 2 lantus shots  insulin daily.  Needs 180 needles per month. Okay refills for one year. 180 each 11     meperidine (DEMEROL) 50 MG tablet Take 1 tablet (50 mg) by mouth every 4 hours as needed 20 tablet 0     MULTIVITAMIN TABS   OR 1 TABLET DAILY       oxyCODONE-acetaminophen (PERCOCET) 5-325 MG per tablet Take by mouth every 4 hours as needed for moderate to severe pain       PARoxetine (PAXIL) 10 MG tablet Take 1 tablet (10 mg) by mouth At Bedtime 30 tablet 1     promethazine (PHENERGAN) 12.5 MG tablet Take 12.5 mg by mouth as needed  5     rosuvastatin (CRESTOR) 20 MG tablet TAKE 1 TABLET(20 MG) BY MOUTH DAILY 90 tablet 2     SALINE to clean port every other month       senna-docusate (SENOKOT-S;PERICOLACE) 8.6-50 MG per tablet 1-2 po bid prn constipation 100 tablet 1     tiZANidine (ZANAFLEX) 2 MG tablet Take 1 tablet (2 mg) by mouth 3 times daily as needed for muscle spasms 30 tablet 0     zolpidem (AMBIEN) 5 MG tablet Take 1 tablet (5 mg) by mouth nightly as needed for sleep 30 tablet 2   Note- just to clarify, patient not taking the hydrochlorothiazide currently.  She was started on the atenolol/chlorthalidone " combination pill instead.      OTC products: None, except as noted above    Allergies   Allergen Reactions     Hydralazine Shortness Of Breath     Famotidine Nausea and Vomiting and Unknown     Benadryl Itch Stopping      Gives her more energy, can't sleep     Lisinopril      Tongue swelling       Metoclopramide Nausea and Vomiting     Ondansetron Nausea and Vomiting     Other reaction(s): Headache     Penicillins Hives     Tape [Adhesive Tape]      blisters     Tylenol      Anxiety  Tablets only.      Latex Allergy: NO    Social History   Substance Use Topics     Smoking status: Never Smoker     Smokeless tobacco: Never Used     Alcohol use No     History   Drug Use No       REVIEW OF SYSTEMS:   Constitutional, neuro, ENT, endocrine, pulmonary, cardiac, gastrointestinal, genitourinary, musculoskeletal, integument and psychiatric systems are negative, except as otherwise noted.    No recent illnesses    Past history of recurrent idiopathic pancreatitis.  Not bad recently.    Right thumb jammed the other day.  Now it catches some.     EXAM:   /65 (BP Location: Left arm, Patient Position: Chair, Cuff Size: Adult Regular)  Pulse 59  Temp 99.2  F (37.3  C) (Oral)  Wt 187 lb 4 oz (84.9 kg)  SpO2 96%  Breastfeeding? No  BMI 33.17 kg/m2    GENERAL APPEARANCE: healthy, alert and no distress     HENT: ear canals and TM's normal and nose and mouth without ulcers or lesions     NECK: no adenopathy, no asymmetry, masses, or scars and thyroid normal to palpation     RESP: lungs clear to auscultation - no rales, rhonchi or wheezes     CV: regular rates and rhythm, normal S1 S2, no S3 or S4 and no murmur, click or rub     ABDOMEN: soft, mildly tender in epigastrium     MS: extremities normal- no gross deformities noted, no evidence of inflammation in joints, FROM in all extremities.     SKIN: no suspicious lesions or rashes     NEURO: Normal strength and tone, sensory exam grossly normal, mentation intact and speech  normal     PSYCH: mentation appears normal. and affect normal/bright     LYMPHATICS: No cervical adenopathy    DIAGNOSTICS:   Labs drawn, pending    Recent Labs   Lab Test  08/14/18   0846  05/03/18   1158  03/23/18   1207   HGB  13.1  12.8   --    PLT  227  207   --    INR  1.02  0.99   --    NA  139   --   140   POTASSIUM  3.9   --   3.8   CR  0.58   --   0.51*   A1C  9.3*  8.3*  9.1*        IMPRESSION:   Reason for surgery/procedure: left knee pain/ oa  Diagnosis/reason for consult: pre-op clearance for tka    The proposed surgical procedure is considered INTERMEDIATE risk.    REVISED CARDIAC RISK INDEX  The patient has the following serious cardiovascular risks for perioperative complications such as (MI, PE, VFib and 3  AV Block):  Diabetes Mellitus (on Insulin)  INTERPRETATION: 1 risks: Class II (low risk - 0.9% complication rate)    The patient has the following additional risks for perioperative complications:  See explanation below         ICD-10-CM    1. Preop general physical exam Z01.818        RECOMMENDATIONS:     --Consult hospital rounder / IM to assist post-op medical management    We did not approve patient yet for surgery.    We delayed her procedure.    Back in May I had ordered a lexiscan test knowing that she may be having a tka at some point.  The surgery was initially delayed due to poor diabetes control.    Of note, the lexiscan was not done back in May or June.  She is still having dyspnea on exertion.  Needs the stress test prior to clearance for surgery.     Also, her blood pressure is not well controlled.  We added amlodipine to regimen.  Continue other blood pressure meds.      Complicating this is that she needs to be off the beta blocker day before and day of stress test. She is on a combination atenolol/ chlorthalidone.  Thus she will hold the combo pill for those two days but take a plain chlorthalidone pill each day ( I gave her a prescription for 2 tablets).  Take other meds  also.    She will see us back in clinic a few days after stress test.      If blood pressure, diabetes control, other labs, and heart stress test all come back okay we will do an addendum to this pre-op at that time.    Our team did contact surgeon and delayed tentative surgery until next month.         Signed Electronically by: Kevin Esquivel MD    Copy of this evaluation report is provided to requesting physician.    [facility] Preop Guidelines    Revised Cardiac Risk Index

## 2018-09-10 NOTE — PATIENT INSTRUCTIONS
Before Your Surgery      Call your surgeon if there is any change in your health. This includes signs of a cold or flu (such as a sore throat, runny nose, cough, rash or fever).    Do not smoke, drink alcohol or take over the counter medicine (unless your surgeon or primary care doctor tells you to) for the 24 hours before and after surgery.    If you take prescribed drugs: Follow your doctor s orders about which medicines to take and which to stop until after surgery.    Eating and drinking prior to surgery: follow the instructions from your surgeon    Take a shower or bath the night before surgery. Use the soap your surgeon gave you to gently clean your skin. If you do not have soap from your surgeon, use your regular soap. Do not shave or scrub the surgery site.  Wear clean pajamas and have clean sheets on your bed.         We will delay your procedure    Start amlodipine 5 mg once day ( additional blood pressure med )    Stay on the atenolol/ chlorthalidone combination pill.  However, we need you off the atenolol the day before and day of the heart stress test.    Thus the day before and the day of the test, take the plain chlorthalidone pill ( see prescription for 2 tablets only ) and skip the combination pill. Take all your other pills those days.    We will schedule the stress test.    See Dr. Esquivel after your heart stress test.    Call with problems/ questions    We will send you lab results

## 2018-09-10 NOTE — TELEPHONE ENCOUNTER
Reason for Call:  Other call back    Detailed comments: Patient called wondering if it was ok to get a flu shot before her surgery or should she wait till after     Phone Number Patient can be reached at: Home number on file [phone] (home)    Best Time: anytime    Can we leave a detailed message on this number? YES    Call taken on 9/10/2018 at 2:37 PM by Lisbet Livingston

## 2018-09-10 NOTE — MR AVS SNAPSHOT
After Visit Summary   9/10/2018    Leah Guerrero    MRN: [id number]           Patient Information     Date Of Birth          1945        Visit Information        Provider Department      9/10/2018 11:00 AM Kevin Esquivel MD [facility]        Today's Diagnoses     Preop general physical exam    -  1    Type 2 diabetes mellitus without complication, with long-term current use of insulin (H)        Dyspnea on exertion        Hypertension goal BP (blood pressure) < 140/90        Port-a-cath in place          Care Instructions      Before Your Surgery      Call your surgeon if there is any change in your health. This includes signs of a cold or flu (such as a sore throat, runny nose, cough, rash or fever).    Do not smoke, drink alcohol or take over the counter medicine (unless your surgeon or primary care doctor tells you to) for the 24 hours before and after surgery.    If you take prescribed drugs: Follow your doctor s orders about which medicines to take and which to stop until after surgery.    Eating and drinking prior to surgery: follow the instructions from your surgeon    Take a shower or bath the night before surgery. Use the soap your surgeon gave you to gently clean your skin. If you do not have soap from your surgeon, use your regular soap. Do not shave or scrub the surgery site.  Wear clean pajamas and have clean sheets on your bed.         We will delay your procedure    Start amlodipine 5 mg once day ( additional blood pressure med )    Stay on the atenolol/ chlorthalidone combination pill.  However, we need you off the atenolol the day before and day of the heart stress test.    Thus the day before and the day of the test, take the plain chlorthalidone pill ( see prescription for 2 tablets only ) and skip the combination pill. Take all your other pills those days.    We will schedule the stress test.    See Dr. Esquivel after your heart stress test.    Call  with problems/ questions    We will send you lab results              Follow-ups after your visit        Your next 10 appointments already scheduled     Sep 20, 2018  1:00 PM CDT   NM INJECTION with MGNMINJ1   [facility])    [address] [place] [address] [id number] [phone]            Sep 20, 2018  1:15 PM CDT   NM SCAN3 with MGNM1   [facility])    [address] [place] [address] [id number] [phone]            Sep 20, 2018  2:30 PM CDT   Ekg Stress Nm Lexiscan with MG STRESS RM, MG IMAGING NURSE, MG PC CARD, MG CV TECH   [facility])    [address] [place] [address] [id number] [phone]            Sep 20, 2018  3:00 PM CDT   NM MPI WITH LEXISCAN with MGNM1   [facility])    [address] [place] [address] [id number] [phone]           How do I prepare for my exam? (Food and drink instructions) Day 1 & Day 2: Stop all caffeine 12 hours before the test. This includes coffee, tea, soda pop, chocolate and certain medicines (such as Anacin, Excedrin and NoDoz). Also avoid decaf coffee and tea, as these contain small amounts of caffeine. Stop eating 4 hours before the test. You may drink water.  How do I prepare for my exam? (Other instructions) You may need to stop some medicines before the test. Follow your doctor s orders. Day 1 & Day 2: *If you take a beta blocker: Do not take your beta-blocker on the day before your test, unless specifically told to by your doctor. And do not take it on the day of your test. Bring it with you to take after the test.  *If you take Aggrenox or dipyridamole (Persantine, Permole), stop taking it 48 hours before your test. *If you take Viagra, Cialis or Levitra, stop taking it 48 hours before your test. *If you take theophylline or aminophylline, stop taking  it 12 hours before your test.  For patients with diabetes: *If you take insulin, call your diabetes care team. Ask if you should take a 1/2 dose the morning of your test. *If you take diabetes medicine by mouth, don t take it on the morning of your test. Bring it with you to take after the test. (If you have questions, call your diabetes care team.)  *Do not take nitrates on the day of your test. Do not wear your Nitro-Patch. *No alcohol, smoking or other tobacco for 12 hours before the test.  What should I wear: Please wear a loose two-piece outfit. If you will have an exercise test, bring rubber-soled walking shoes.  How long does the exam take: *This test can take 1-2 days.* ONE day exam: Allow 3-4 hours for test. IF TWO day exam: Allow 30-60 minutes PER day for test.  What should I bring: Please bring a list of your medicines (including vitamins, minerals and over-the-counter drugs). Leave your valuables at home.  Do I need a :  No  is needed.  What do I need to tell my doctor? When you arrive, please tell us if you: * Have diabetes * Are breastfeeding * May be pregnant * Have a pacemaker of ICD (implantable defibrillator).  What should I do after the exam: No restrictions, You may resume normal activities.  What is this test: Your doctor has ordered a nuclear stress test to check how well blood is flowing through your heart. You will either exercise or take a medicine that mimics exercise; we will watch your heart.  Who should I call with questions: If you have any questions, please call the Imaging Department where you will have your exam. Directions, parking instructions, and other information is available on our website, [email or web address].            Sep 24, 2018 11:00 AM CDT   [facility] MD   [facility]    [address] [place] [id number] [phone]            Oct 03, 2018 11:00 AM CDT    Return Visit with José Vogel MD   [facility] ([facility])    [address] [facility] [facility]  [place] [address] [phone]              Future tests that were ordered for you today     Open Future Orders        Priority Expected Expires Ordered    NM Lexiscan stress test Routine  9/10/2019 9/10/2018            Who to contact     If you have questions or need follow up information about today's clinic visit or your schedule please contact [facility] directly at [phone].  Normal or non-critical lab and imaging results will be communicated to you by LiveQoShart, letter or phone within 4 business days after the clinic has received the results. If you do not hear from us within 7 days, please contact the clinic through LiveQoShart or phone. If you have a critical or abnormal lab result, we will notify you by phone as soon as possible.  Submit refill requests through [facility] or call your pharmacy and they will forward the refill request to us. Please allow 3 business days for your refill to be completed.          Additional Information About Your Visit        MyChart Information     [facility] gives you secure access to your electronic health record. If you see a primary care provider, you can also send messages to your care team and make appointments. If you have questions, please call your primary care clinic.  If you do not have a primary care provider, please call [phone] and they will assist you.        Care EveryWhere ID     This is your Care EveryWhere ID. This could be used by other organizations to access your [place] medical records  [id number]        Your Vitals Were     Pulse Temperature Pulse Oximetry Breastfeeding? BMI (Body Mass Index)       59 99.2  F (37.3  C) (Oral) 96% No 33.17 kg/m2        Blood Pressure from Last 3 Encounters:   09/10/18 158/66   08/24/18 150/76   08/13/18 140/62    Weight from Last 3 Encounters:   09/10/18 187 lb 4 oz (84.9  kg)   08/24/18 186 lb 12.8 oz (84.7 kg)   08/15/18 185 lb (83.9 kg)              We Performed the Following     CBC with platelets differential     Comprehensive metabolic panel     Hemoglobin A1c     HEPARIN SODIUM PER 10 U     PORT FLUSH ONLY          Today's Medication Changes          These changes are accurate as of 9/10/18 12:53 PM.  If you have any questions, ask your nurse or doctor.               Start taking these medicines.        Dose/Directions    amLODIPine 5 MG tablet   Commonly known as:  NORVASC   Used for:  Hypertension goal BP (blood pressure) < 140/90   Started by:  Kevin Esquivel MD        Dose:  5 mg   Take 1 tablet (5 mg) by mouth daily   Quantity:  30 tablet   Refills:  0       chlorthalidone 25 MG tablet   Commonly known as:  HYGROTON   Used for:  Hypertension goal BP (blood pressure) < 140/90   Started by:  Kevin Esquivel MD        Dose:  25 mg   Take 1 tablet (25 mg) by mouth daily   Quantity:  2 tablet   Refills:  1         Stop taking these medicines if you haven't already. Please contact your care team if you have questions.     hydrochlorothiazide 25 MG tablet   Commonly known as:  HYDRODIURIL   Stopped by:  Kevin Esquivel MD                Where to get your medicines      These medications were sent to [facility] [address] [place] [address] [address] [facility] [address]    Hours:  Test fax successful 12/11/02  KR Phone:  [phone]     amLODIPine 5 MG tablet         Some of these will need a paper prescription and others can be bought over the counter.  Ask your nurse if you have questions.     Bring a paper prescription for each of these medications     chlorthalidone 25 MG tablet                Primary Care Provider Office Phone # Fax #    Genaro Dawson [phone] [address] [facility] [place] [address]        Goals        General    I will continue the exercises provided  by physical therapy for my knee (pt-stated)     Notes - Note created  7/9/2015 10:30 AM by Ray Holland RN    As of today's date 7/9/2015 goal is met at 0 - 25%.   Goal Status:  Active        I will remember to take my insulin before meals especially lunch when I am  away from home. (pt-stated)     Notes - Note created  3/25/2015  3:51 PM by Ray Holland RN      As of today's date 3/25/2015 goal is met at 0 - 25%.   Goal Status:  Active.          I will work on testing my blood sugar and taking my insulin when I am away from home at lunch time. (pt-stated)     Notes - Note created  5/20/2016  2:43 PM by Ray Holland RN    As of today's date 5/20/2016 goal is met at 0 - 25%.   Goal Status:  Active          Equal Access to Services     [facility]: Hadii candido santos hadashkeyur Sotrae, waaxda luqadaha, qaybta kaalmada adeadamyamaxine, jude livingston . So [facility] [phone].    ATENCIÓN: Si habla español, tiene a verduzco disposición servicios gratuitos de asistencia lingüística. Llame al [phone].    We comply with applicable federal civil rights laws and [place] laws. We do not discriminate on the basis of race, color, national origin, age, disability, sex, sexual orientation, or gender identity.            Thank you!     Thank you for choosing [facility]  for your care. Our goal is always to provide you with excellent care. Hearing back from our patients is one way we can continue to improve our services. Please take a few minutes to complete the written survey that you may receive in the mail after your visit with us. Thank you!             Your Updated Medication List - Protect others around you: Learn how to safely use, store and throw away your medicines at www.disposemymeds.org.          This list is accurate as of 9/10/18 12:53 PM.  Always use your most recent med list.                   Brand Name Dispense Instructions for use Diagnosis    ACE/ARB/ARNI NOT PRESCRIBED  (INTENTIONAL)      ACE & ARB not prescribed due to Allergy    Type 2 diabetes mellitus without complication, with long-term current use of insulin (H)       * albuterol (2.5 MG/3ML) 0.083% neb solution     1 Box    Take 3 mLs (2.5 mg) by nebulization 4 times daily as needed    Intermittent asthma       * albuterol 108 (90 Base) MCG/ACT inhaler    PROAIR HFA/PROVENTIL HFA/VENTOLIN HFA    1 Inhaler    Inhale 2 puffs into the lungs every 6 hours as needed for shortness of breath / dyspnea or wheezing    Mild intermittent asthma without complication       amLODIPine 5 MG tablet    NORVASC    30 tablet    Take 1 tablet (5 mg) by mouth daily    Hypertension goal BP (blood pressure) < 140/90       aspirin 81 MG tablet      Take 1 tablet by mouth daily. 1 daily        atenolol-chlorthalidone 50-25 MG per tablet    TENORETIC 50    30 tablet    Take 1 tablet by mouth daily    HTN, goal below 140/90       BASAGLAR 100 UNIT/ML injection     75 mL    75 units at bedtime or as directed    Type 2 diabetes mellitus without complication, with long-term current use of insulin (H)       blood glucose monitoring lancets     600 each    TEST 6 TIMES PER DAY    Type 2 diabetes mellitus without complication, with long-term current use of insulin (H)       blood glucose monitoring test strip    ONETOUCH ULTRA    600 strip    TEST 6 TIMES PER DAY    Type 2 diabetes mellitus without complication, with long-term current use of insulin (H)       calcium polycarbophil 625 MG tablet    FIBERCON    60 tablet    Take 2 tablets (1,250 mg) by mouth daily    Health care maintenance       CALCIUM-D PO      Take  by mouth. 1200mg calcium/600mg D3        chlorthalidone 25 MG tablet    HYGROTON    2 tablet    Take 1 tablet (25 mg) by mouth daily    Hypertension goal BP (blood pressure) < 140/90       fish oil-omega-3 fatty acids 1000 MG capsule      Take 1 capsule by mouth daily.        Garlic Caps      Take  by mouth. One daily        hydrOXYzine 50 MG  "capsule    VISTARIL    30 capsule    Take 1 capsule (50 mg) by mouth nightly as needed for anxiety or other    Anxiety attack       ibuprofen 800 MG tablet    ADVIL/MOTRIN    100 tablet    Take 1 tablet (800 mg) by mouth every 8 hours as needed for pain    Arthritis       insulin aspart 100 UNIT/ML injection    NovoLOG PEN    30 mL    Patient uses 100 units daily split up between multiple doses. Take 2 units per 15 grams of carbohydrate eaten and 2 units per 30 mg/dL above 170 mg/dL.    Type 2 diabetes mellitus without complication, with long-term current use of insulin (H)       meperidine 50 MG tablet    DEMEROL    20 tablet    Take 1 tablet (50 mg) by mouth every 4 hours as needed    Idiopathic chronic pancreatitis (H)       MULTIVITAMIN TABS   OR      1 TABLET DAILY        oxyCODONE-acetaminophen 5-325 MG per tablet    PERCOCET     Take by mouth every 4 hours as needed for moderate to severe pain        PARoxetine 10 MG tablet    PAXIL    30 tablet    Take 1 tablet (10 mg) by mouth At Bedtime    Anxiety       pen needles 5/16\" 31G X 8 MM Misc     180 each    Patient does 4 doses novolog and 2 lantus shots  insulin daily.  Needs 180 needles per month. Okay refills for one year.    Type 2 diabetes mellitus without complication, with long-term current use of insulin (H)       promethazine 12.5 MG tablet    PHENERGAN     Take 12.5 mg by mouth as needed        rosuvastatin 20 MG tablet    CRESTOR    90 tablet    TAKE 1 TABLET(20 MG) BY MOUTH DAILY    Hyperlipidemia LDL goal <100       SALINE      to clean port every other month    Bronchitis       senna-docusate 8.6-50 MG per tablet    SENOKOT-S;PERICOLACE    100 tablet    1-2 po bid prn constipation    Constipation, unspecified constipation type       tiZANidine 2 MG tablet    ZANAFLEX    30 tablet    Take 1 tablet (2 mg) by mouth 3 times daily as needed for muscle spasms    Muscle pain       zolpidem 5 MG tablet    AMBIEN    30 tablet    Take 1 tablet (5 mg) by " mouth nightly as needed for sleep    Insomnia, unspecified type       * Notice:  This list has 2 medication(s) that are the same as other medications prescribed for you. Read the directions carefully, and ask your doctor or other care provider to review them with you.

## 2018-09-11 RX ORDER — PAROXETINE 10 MG/1
10 TABLET, FILM COATED ORAL AT BEDTIME
Qty: 30 TABLET | Refills: 2 | Status: SHIPPED | OUTPATIENT
Start: 2018-09-11 | End: 2018-09-24

## 2018-09-11 NOTE — TELEPHONE ENCOUNTER
Attempt # 1  Called patient at home [phone]  Was call answered?  Yes, relayed below message Dr. Esquivel Patient verbalized understanding and agreement with plan and had no questions.    Treva Tyson RN    [facility] [place]

## 2018-09-11 NOTE — TELEPHONE ENCOUNTER
Yes okay to get before surgery but not during the week prior to surgery    Please inform patient    Kevin Esquivel MD

## 2018-09-11 NOTE — PROGRESS NOTES
The overall diabetes test ( hemoglobin a1c ) is better but still not ideal.    Other labs are okay.    We will see you soon in clinic for follow up, after your heart stress test.    Kevin Esquivel MD    TC - please fax to [facility] preop  Thanks  Kevin Esquivel MD

## 2018-09-12 RX ORDER — AMLODIPINE BESYLATE 5 MG/1
TABLET ORAL
Qty: 90 TABLET | Refills: 0 | OUTPATIENT
Start: 2018-09-12

## 2018-09-12 NOTE — TELEPHONE ENCOUNTER
Routing to Diabetic educator to please try to reach pt again. Thanks.    Katie Fontaine RN  [facility]

## 2018-09-13 ENCOUNTER — TELEPHONE (OUTPATIENT)
Dept: FAMILY MEDICINE | Facility: CLINIC | Age: 73
End: 2018-09-13

## 2018-09-13 DIAGNOSIS — I10 HTN, GOAL BELOW 140/90: ICD-10-CM

## 2018-09-13 RX ORDER — ATENOLOL AND CHLORTHALIDONE TABLET 50; 25 MG/1; MG/1
1 TABLET ORAL DAILY
Qty: 30 TABLET | Refills: 1 | Status: SHIPPED | OUTPATIENT
Start: 2018-09-13 | End: 2018-09-16

## 2018-09-13 NOTE — TELEPHONE ENCOUNTER
Attempt # 1  Called patient at home [phone]  Was call answered?  Yes, patient states took the hydrochlorothiazide with the Amlodipine, did not take the Atenolol, patient alert and oriented, speaking clearly and in full sentences.   Nurse sees   atenolol-chlorthalidone (TENORETIC 50) 50-25 MG per tablet 30 tablet 1 8/24/2018  --      Sig - Route: Take 1 tablet by mouth daily - Oral     Class: E-Prescribe     Order: [id number]     E-Prescribing Status: Receipt confirmed by pharmacy (8/24/2018  1:13 PM CDT)       Printout Tracking      AVS states should be taking Chlorthalidone 25 mg tablet - does not have the chlorthalidone on active medication list as a stand alone medication?    Pulse 56 at this time, feels better today, has not taken any pills this am yet?   Does not have a home BP monitor.    Amlodipine  Hydrochlorothiazide - not supposed to be taking - has thrown them away.    Does not have a bottle of Atenolol or Chlorthalidone ?    DENIES: dyspnea today (could not catch breath last night), no pedal edema, wheezing, fever,   ADMITS: nonproductive cough, SOB with activity is better today than yesterday    Does not have help with medications.    Treva Tyson RN  [facility]

## 2018-09-13 NOTE — TELEPHONE ENCOUNTER
Call out to patient. She states that she has not been feeling well for the last week and the clinic knows this.  She states that she needs to get her B/P in control and needs a stress test.  States that her blood glucose levels are improved now and she does not need to get them any lower before having surgery.  States that she is eating very little now and her sugars are fine. Reports her A1C as 8.4%.    I recommended that it would be better if her blood glucose control/ A1C was less than 8% before surgery and that I would be glad to help her with adjustment of her insulin doses.  I said that if she was interested she should call to make an appointment and bring her meter, 3 days of food, blood glucose and insulin dose records for review.  She responded that she has other health related items that she needs to work on first.    Negrita De La Garza RN  BSN CDE

## 2018-09-13 NOTE — TELEPHONE ENCOUNTER
I called and discussed in detail with patient.     she accidentally took an old hydrochlorothiazide along with amlodipine.    Advised her to try the amlodipine tomorrow by itself, holding the combo atenolol/ chlorthatlidone.  If feeling okay, then add back the combo pill 1-2 days later.    If get bad symptoms such as lightheaded etc then check blood pressure ( good to get home blood pressure monitor ).    Let us know how things go.    Kevin Esquivel MD

## 2018-09-13 NOTE — TELEPHONE ENCOUNTER
Reason for Call:  Other     Detailed comments:  Patient started a new medication, amLODIPine (NORVASC) 5 MG tablet, yesterday and a couple of hours after taking the pill she started not feeling good. Patient took her pulse and it was fluctuating between 42-44 all day.  Patient has not taking the pill today and she is wanting to get something else.  Please call to discuss.    Phone Number Patient can be reached at: Home number on file [phone] (home)    Best Time: any    Can we leave a detailed message on this number? YES    Call taken on 9/13/2018 at 10:39 AM by Desi Hodges

## 2018-09-18 DIAGNOSIS — I10 HYPERTENSION GOAL BP (BLOOD PRESSURE) < 140/90: ICD-10-CM

## 2018-09-19 ENCOUNTER — TELEPHONE (OUTPATIENT)
Dept: CARDIOLOGY | Facility: CLINIC | Age: 73
End: 2018-09-19

## 2018-09-19 DIAGNOSIS — I10 HYPERTENSION GOAL BP (BLOOD PRESSURE) < 140/90: ICD-10-CM

## 2018-09-19 RX ORDER — CHLORTHALIDONE 25 MG/1
TABLET ORAL
Qty: 90 TABLET | Refills: 0 | Status: SHIPPED | OUTPATIENT
Start: 2018-09-19 | End: 2018-09-24

## 2018-09-19 RX ORDER — CHLORTHALIDONE 25 MG/1
TABLET ORAL
Qty: 90 TABLET | Refills: 0 | OUTPATIENT
Start: 2018-09-19

## 2018-09-19 RX ORDER — CHLORTHALIDONE 25 MG/1
TABLET ORAL
Qty: 2 TABLET | Refills: 0 | OUTPATIENT
Start: 2018-09-19

## 2018-09-19 NOTE — TELEPHONE ENCOUNTER
"Requested Prescriptions   Pending Prescriptions Disp Refills     chlorthalidone (HYGROTON) 25 MG tablet [Pharmacy Med Name: CHLORTHALIDONE 25MG TABLETS] 2 tablet 0    Last Written Prescription Date:  9-10-18  Last Fill Quantity: 2,  # refills: 1   Last office visit: 9/10/2018 with prescribing provider:  9-10-18   Future Office Visit:   Next 5 appointments (look out 90 days)     Sep 24, 2018 11:00 AM CDT   SHORT with Kevin Esquivel MD   [facility] ([facility]    [address] [facility] [address]   [phone]            Nov 02, 2018 11:00 AM CDT   Return Visit with José Vogel MD   [facility] ([address] [address]   [phone]                  Sig: TAKE 1 TABLET EVERY DAY    Diuretics (Including Combos) Protocol Failed    9/19/2018  3:47 AM       Failed - Blood pressure under 140/90 in past 12 months    BP Readings from Last 3 Encounters:   09/10/18 158/66   08/24/18 150/76   08/13/18 140/62                Passed - Recent (12 mo) or future (30 days) visit within the authorizing provider's specialty    Patient had office visit in the last 12 months or has a visit in the next 30 days with authorizing provider or within the authorizing provider's specialty.  See \"Patient Info\" tab in inbasket, or \"Choose Columns\" in Meds & Orders section of the refill encounter.           Passed - Patient is age 18 or older       Passed - No active pregancy on record       Passed - Normal serum creatinine on file in past 12 months    Recent Labs   Lab Test  09/10/18   1205   CR  0.62             Passed - Normal serum potassium on file in past 12 months    Recent Labs   Lab Test  09/10/18   1205   POTASSIUM  3.8                   Passed - Normal serum sodium on file in past 12 months    Recent Labs   Lab Test  09/10/18   1205   NA  140             Passed - No positive pregnancy test in " past 12 months

## 2018-09-19 NOTE — TELEPHONE ENCOUNTER
Called patient to review preparation prior to nuclear stress test (Lexiscan) scheduled for Sept 20 and to assess for any questions or concerns. Reminded patient to remain NPO for 3 hours prior to test with the exception of water, to remain free of caffeine (including decaf, chocolate or Excedrin) for 12 hr, to take all medications as scheduled especially for blood pressure. Pt verbalized understanding, all questions answered.

## 2018-09-20 ENCOUNTER — TELEPHONE (OUTPATIENT)
Dept: CARDIOLOGY | Facility: CLINIC | Age: 73
End: 2018-09-20

## 2018-09-20 ENCOUNTER — RADIANT APPOINTMENT (OUTPATIENT)
Dept: NUCLEAR MEDICINE | Facility: CLINIC | Age: 73
End: 2018-09-20
Attending: FAMILY MEDICINE
Payer: COMMERCIAL

## 2018-09-20 ENCOUNTER — TELEPHONE (OUTPATIENT)
Dept: FAMILY MEDICINE | Facility: CLINIC | Age: 73
End: 2018-09-20

## 2018-09-20 ENCOUNTER — OFFICE VISIT (OUTPATIENT)
Dept: CARDIOLOGY | Facility: CLINIC | Age: 73
End: 2018-09-20
Attending: FAMILY MEDICINE
Payer: COMMERCIAL

## 2018-09-20 VITALS — DIASTOLIC BLOOD PRESSURE: 69 MMHG | HEART RATE: 55 BPM | SYSTOLIC BLOOD PRESSURE: 145 MMHG

## 2018-09-20 DIAGNOSIS — R06.09 DOE (DYSPNEA ON EXERTION): Primary | ICD-10-CM

## 2018-09-20 DIAGNOSIS — R06.09 DYSPNEA ON EXERTION: ICD-10-CM

## 2018-09-20 PROCEDURE — 78452 HT MUSCLE IMAGE SPECT MULT: CPT

## 2018-09-20 PROCEDURE — 93017 CV STRESS TEST TRACING ONLY: CPT

## 2018-09-20 PROCEDURE — 93016 CV STRESS TEST SUPVJ ONLY: CPT

## 2018-09-20 PROCEDURE — A9502 TC99M TETROFOSMIN: HCPCS | Performed by: FAMILY MEDICINE

## 2018-09-20 RX ORDER — REGADENOSON 0.08 MG/ML
0.4 INJECTION, SOLUTION INTRAVENOUS ONCE
Status: COMPLETED | OUTPATIENT
Start: 2018-09-20 | End: 2018-09-20

## 2018-09-20 RX ORDER — AMINOPHYLLINE 25 MG/ML
50 INJECTION, SOLUTION INTRAVENOUS ONCE
Status: COMPLETED | OUTPATIENT
Start: 2018-09-20 | End: 2018-09-20

## 2018-09-20 RX ORDER — HEPARIN SODIUM (PORCINE) LOCK FLUSH IV SOLN 100 UNIT/ML 100 UNIT/ML
5 SOLUTION INTRAVENOUS ONCE
Status: COMPLETED | OUTPATIENT
Start: 2018-09-20 | End: 2018-09-20

## 2018-09-20 RX ADMIN — REGADENOSON 0.4 MG: 0.08 INJECTION, SOLUTION INTRAVENOUS at 15:14

## 2018-09-20 RX ADMIN — HEPARIN SODIUM (PORCINE) LOCK FLUSH IV SOLN 100 UNIT/ML 5 ML: 100 SOLUTION at 15:14

## 2018-09-20 RX ADMIN — AMINOPHYLLINE 50 MG: 25 INJECTION, SOLUTION INTRAVENOUS at 15:13

## 2018-09-20 NOTE — PROGRESS NOTES
"IVAD accessed with 20 gauge 3/4 inch bello gripper plus needle.   Flushed with 10 ml Sterile 0.9% NaCl (NDC [address]-10) and dressed with sterile Tegaderm.  Flushed with 10 cc NS (NDC [address]-96)and 5 cc 100 unit/ml Heparin (NDC [address]-02)  Needle: d/c'd intact  Comments: none      Implanted port was accessed and resting images were completed.      Patient's IV site was injected by RN with 0.4mg's of Lexiscan (Regadenoson) over a 15 second period, followed by a 5-mL saline flush.  The Incipient Tech injected the Myoview tracer through the IV site 20 seconds later.      Patient offered C/O: short of breath     Pt appeared to go into atrial flutter during test. Nuclear reading room called and this writer spoke with Dr Hedy Ngo who has looked at the rhythm strips and discussed with cardiologist at the . They feel she did not go into atrial flutter but instructed this writer to have patient call her PCP or go to ED if she has any symptoms of \"irregular HR, uncomfortable, or not feeling right\". Pt verbalized understanding.     Total dose of Lexiscan was 0.4mg's.   Lexiscan NDC# 2816-8018-37   Total dose of Aminophylline was 50 mg  Aminophylline NDC# 2985-0878-61    Dr. Yan provided supervision of the tests performed today.      "

## 2018-09-20 NOTE — MR AVS SNAPSHOT
MRN:[id number]                      After Visit Summary   9/20/2018    Leah Guerrero    MRN: [id number]           Visit Information        Provider Department      9/20/2018 2:15 PM MG CV TECH; MG PC CARD; MG IMAGING NURSE; MG STRESS RM M [facility]        Your next 10 appointments already scheduled     Sep 24, 2018 11:00 AM CDT   SHORT with Kevin Esquivel MD   [facility] ([facility])    [address] [facility] [address] [phone]            Nov 02, 2018 11:00 AM CDT   Return Visit with José Vogel MD   [facility] ([facility])    [address] [place] [address] [phone]              MyChart Information     [employer] gives you secure access to your electronic health record. If you see a primary care provider, you can also send messages to your care team and make appointments. If you have questions, please call your primary care clinic.  If you do not have a primary care provider, please call [phone] and they will assist you.      [employer] is an electronic gateway that provides easy, online access to your medical records. With [employer], you can request a clinic appointment, read your test results, renew a prescription or communicate with your care team.     To access your existing account, please contact your [facility] or call [phone] for assistance.        Care EveryWhere ID     This is your Care EveryWhere ID. This could be used by other organizations to access your [place] medical records  [id number]        Equal Access to Services     FABRICIO GUNTER AH: Hadii aad ku hadasho Soomaali, waaxda luqadaha, qaybta kaalmada adeegyada, waxay viridiana angela. So [facility] [phone].    ATENCIÓN: Si habla español, tiene a verduzco disposición servicios gratuitos de asistencia lingüística. Llame al  [phone].    We comply with applicable federal civil rights laws and [place] laws. We do not discriminate on the basis of race, color, national origin, age, disability, sex, sexual orientation, or gender identity.

## 2018-09-20 NOTE — TELEPHONE ENCOUNTER
Called by nuclear medicine tech that patient had gone into atrial flutter during her lexiscan.  She was given aminophylline for symptoms of headache which had then resolved.  Reviewing EKG, I do not believe she was in atrial flutter and I think this was probably sinus despite the computer reading.  I attempted to call patient on her cell phone but had to leave a message.  I noted that she may follow up with her PCP, but certainly if she does develop symptoms of palpitations, chest discomfort, or dyspnea, to call her provider or go to nearest ER.    Celeste Ngo MD  Cardiology Fellow  [phone]

## 2018-09-20 NOTE — TELEPHONE ENCOUNTER
I left message for patient on confidential voice mail.  Heart test normal.      Kevin Esquivel MD

## 2018-09-24 ENCOUNTER — OFFICE VISIT (OUTPATIENT)
Dept: FAMILY MEDICINE | Facility: CLINIC | Age: 73
End: 2018-09-24
Payer: COMMERCIAL

## 2018-09-24 VITALS
HEART RATE: 51 BPM | BODY MASS INDEX: 33.13 KG/M2 | TEMPERATURE: 97.6 F | DIASTOLIC BLOOD PRESSURE: 64 MMHG | WEIGHT: 187 LBS | SYSTOLIC BLOOD PRESSURE: 146 MMHG | OXYGEN SATURATION: 96 %

## 2018-09-24 DIAGNOSIS — I10 HYPERTENSION GOAL BP (BLOOD PRESSURE) < 140/90: Primary | ICD-10-CM

## 2018-09-24 DIAGNOSIS — M17.12 PRIMARY OSTEOARTHRITIS OF LEFT KNEE: ICD-10-CM

## 2018-09-24 DIAGNOSIS — Z79.4 TYPE 2 DIABETES MELLITUS WITHOUT COMPLICATION, WITH LONG-TERM CURRENT USE OF INSULIN (H): ICD-10-CM

## 2018-09-24 DIAGNOSIS — R00.2 PALPITATIONS: ICD-10-CM

## 2018-09-24 DIAGNOSIS — E11.9 TYPE 2 DIABETES MELLITUS WITHOUT COMPLICATION, WITH LONG-TERM CURRENT USE OF INSULIN (H): ICD-10-CM

## 2018-09-24 PROCEDURE — 99213 OFFICE O/P EST LOW 20 MIN: CPT | Performed by: FAMILY MEDICINE

## 2018-09-24 RX ORDER — CHLORTHALIDONE 25 MG/1
25 TABLET ORAL DAILY
Qty: 30 TABLET | Refills: 0 | Status: SHIPPED | OUTPATIENT
Start: 2018-09-24 | End: 2018-10-10

## 2018-09-24 RX ORDER — ATENOLOL 50 MG/1
50 TABLET ORAL
Qty: 60 TABLET | Refills: 1 | Status: SHIPPED | OUTPATIENT
Start: 2018-09-24 | End: 2018-09-24

## 2018-09-24 RX ORDER — AMLODIPINE BESYLATE 5 MG/1
5 TABLET ORAL DAILY
Qty: 30 TABLET | Refills: 0 | Status: SHIPPED | OUTPATIENT
Start: 2018-09-24 | End: 2018-10-10

## 2018-09-24 ASSESSMENT — PAIN SCALES - GENERAL: PAINLEVEL: NO PAIN (0)

## 2018-09-24 NOTE — PATIENT INSTRUCTIONS
To clarify:    Blood pressure meds to be as follow    3 pills.     Amlodipine 5 mg once daily    Chlorthalidone 25 mg once daily     Atenolol 50 mg 2x daily    If you get side effects on this, let us know    See Dr. Esquivel anywhere from Oct 8 to 16    Stop the combination blood pressure med      increase exercise as able

## 2018-09-24 NOTE — PROGRESS NOTES
SUBJECTIVE:   Leah Guerrero is a 72 year old female who presents to clinic today for the following health issues:       Follow up on stress test results    none    Problem list and histories reviewed & adjusted, as indicated.  Additional history: as documented         Reviewed and updated as needed this visit by clinical staff  Tobacco  Allergies  Meds  Med Hx  Surg Hx  Fam Hx  Soc Hx      Reviewed and updated as needed this visit by Provider            Reviewed stress test results in detail as well as the cardiology fellow's notes    Full physical not done     Mentation and affect are fine    No tremor of speech or extremity    ASSESSMENT / PLAN:  (I10) Hypertension goal BP (blood pressure) < 140/90  (primary encounter diagnosis)  Comment: on recheck blood pressure better but still high systolic.  Since he has the palpitation type symptoms, will increase atenolol to 50 bid.  If bad side effects on that let us know.  Continue on the chlorthalidone and amlodipine at current doses but discontinue the combo pill.  See us in 2-3 weeks.   Plan: chlorthalidone (HYGROTON) 25 MG tablet,         amLODIPine (NORVASC) 5 MG tablet, atenolol         (TENORMIN) 50 MG tablet             (E11.9,  Z79.4) Type 2 diabetes mellitus without complication, with long-term current use of insulin (H)  Comment: plan recheck hemoglobin a1c at the next clinic appointment in 2-3 weeks   Plan: as above     (R00.2) Palpitations  Comment: the overall lexiscan result is very reassuring  Plan: patient will monitor symptoms.  Hopefully with the increased beta blocker this will help.  Also encouraged her to increase activity now that perfusion is normal on testing.     (M17.12) Primary osteoarthritis of left knee  Comment: now scheduled for tka in late Oct.   Plan: as above       I reviewed the patient's medications, allergies, medical history, family history, and social history.    Kevin Esquivel MD

## 2018-09-24 NOTE — MR AVS SNAPSHOT
After Visit Summary   9/24/2018    Leah Guerrero    MRN: [id number]           Patient Information     Date Of Birth          1945        Visit Information        Provider Department      9/24/2018 11:00 AM Kevin Esquivel MD [facility]        Today's Diagnoses     Hypertension goal BP (blood pressure) < 140/90          Care Instructions    To clarify:    Blood pressure meds to be as follow    3 pills.     Amlodipine 5 mg once daily    Chlorthalidone 25 mg once daily     Atenolol 50 mg 2x daily    If you get side effects on this, let us know    See Dr. Esquivel anywhere from Oct 8 to 16    Stop the combination blood pressure med      increase exercise as able           Follow-ups after your visit        Your next 10 appointments already scheduled     Oct 10, 2018 11:20 AM CDT   SHORT with Kevin Esquivel MD   [facility] ([facility])    [address] [place] [phone] [phone]            Nov 02, 2018 11:00 AM CDT   Return Visit with José Vogel MD   [facility] ([address] [id number] [phone]              Who to contact     If you have questions or need follow up information about today's clinic visit or your schedule please contact [facility] directly at [phone].  Normal or non-critical lab and imaging results will be communicated to you by MyChart, letter or phone within 4 business days after the clinic has received the results. If you do not hear from us within 7 days, please contact the clinic through MyChart or phone. If you have a critical or abnormal lab result, we will notify you by phone as soon as possible.  Submit refill requests through [employer] or call your pharmacy and they will forward the refill request to us. Please allow 3 business days for your refill to be completed.           Additional Information About Your Visit        [employer] Information     [employer] gives you secure access to your electronic health record. If you see a primary care provider, you can also send messages to your care team and make appointments. If you have questions, please call your primary care clinic.  If you do not have a primary care provider, please call [phone] and they will assist you.        Care EveryWhere ID     This is your Care EveryWhere ID. This could be used by other organizations to access your [place] medical records  [id number]        Your Vitals Were     Pulse Temperature Pulse Oximetry Breastfeeding? BMI (Body Mass Index)       51 97.6  F (36.4  C) (Oral) 96% No 33.13 kg/m2        Blood Pressure from Last 3 Encounters:   09/24/18 146/64   09/20/18 145/69   09/10/18 158/66    Weight from Last 3 Encounters:   09/24/18 187 lb (84.8 kg)   09/10/18 187 lb 4 oz (84.9 kg)   08/24/18 186 lb 12.8 oz (84.7 kg)              Today, you had the following     No orders found for display         Today's Medication Changes          These changes are accurate as of 9/24/18 11:40 AM.  If you have any questions, ask your nurse or doctor.               Start taking these medicines.        Dose/Directions    atenolol 50 MG tablet   Commonly known as:  TENORMIN   Used for:  Hypertension goal BP (blood pressure) < 140/90   Started by:  Kevin Esquivel MD        Dose:  50 mg   Take 1 tablet (50 mg) by mouth 2 times daily   Quantity:  60 tablet   Refills:  1         These medicines have changed or have updated prescriptions.        Dose/Directions    chlorthalidone 25 MG tablet   Commonly known as:  HYGROTON   This may have changed:  See the new instructions.   Used for:  Hypertension goal BP (blood pressure) < 140/90   Changed by:  Kvein Esquivel MD        Dose:  25 mg   Take 1 tablet (25 mg) by mouth daily   Quantity:  30 tablet   Refills:  0         Stop taking these medicines if you haven't  already. Please contact your care team if you have questions.     atenolol-chlorthalidone 50-25 MG per tablet   Commonly known as:  TENORETIC   Stopped by:  Kevin Esquivel MD                Where to get your medicines      These medications were sent to [id number] - [place], [place] - [address]  [address], [place] [address]    Hours:  Test fax successful 12/11/02  KR Phone:  [phone]     amLODIPine 5 MG tablet    atenolol 50 MG tablet    chlorthalidone 25 MG tablet                Primary Care Provider Office Phone # Fax #    Genaro Dawson [phone]       [address] [address]        Goals        General    I will continue the exercises provided by physical therapy for my knee (pt-stated)     Notes - Note created  7/9/2015 10:30 AM by Ray Holland RN    As of today's date 7/9/2015 goal is met at 0 - 25%.   Goal Status:  Active        I will remember to take my insulin before meals especially lunch when I am  away from home. (pt-stated)     Notes - Note created  3/25/2015  3:51 PM by Ray Holland RN      As of today's date 3/25/2015 goal is met at 0 - 25%.   Goal Status:  Active.          I will work on testing my blood sugar and taking my insulin when I am away from home at lunch time. (pt-stated)     Notes - Note created  5/20/2016  2:43 PM by Ray Holland RN    As of today's date 5/20/2016 goal is met at 0 - 25%.   Goal Status:  Active          Equal Access to Services     [facility]: Hadii candido santos hadcarla Evans, waaxda luqadaha, qaybta kaaljude king. So [facility] [phone].    ATENCIÓN: Si habla español, tiene a verduzco disposición servicios gratuitos de asistencia lingüística. Llame al [phone].    We comply with applicable federal civil rights laws and [place] laws. We do not discriminate on the basis of race, color, national origin, age,  disability, sex, sexual orientation, or gender identity.            Thank you!     Thank you for choosing [facility]  for your care. Our goal is always to provide you with excellent care. Hearing back from our patients is one way we can continue to improve our services. Please take a few minutes to complete the written survey that you may receive in the mail after your visit with us. Thank you!             Your Updated Medication List - Protect others around you: Learn how to safely use, store and throw away your medicines at www.disposemymeds.org.          This list is accurate as of 9/24/18 11:40 AM.  Always use your most recent med list.                   Brand Name Dispense Instructions for use Diagnosis    ACE/ARB/ARNI NOT PRESCRIBED (INTENTIONAL)      ACE & ARB not prescribed due to Allergy    Type 2 diabetes mellitus without complication, with long-term current use of insulin (H)       * albuterol (2.5 MG/3ML) 0.083% neb solution     1 Box    Take 3 mLs (2.5 mg) by nebulization 4 times daily as needed    Intermittent asthma       * albuterol 108 (90 Base) MCG/ACT inhaler    PROAIR HFA/PROVENTIL HFA/VENTOLIN HFA    1 Inhaler    Inhale 2 puffs into the lungs every 6 hours as needed for shortness of breath / dyspnea or wheezing    Mild intermittent asthma without complication       amLODIPine 5 MG tablet    NORVASC    30 tablet    Take 1 tablet (5 mg) by mouth daily    Hypertension goal BP (blood pressure) < 140/90       aspirin 81 MG tablet      Take 1 tablet by mouth daily. 1 daily        atenolol 50 MG tablet    TENORMIN    60 tablet    Take 1 tablet (50 mg) by mouth 2 times daily    Hypertension goal BP (blood pressure) < 140/90       BASAGLAR 100 UNIT/ML injection     75 mL    75 units at bedtime or as directed    Type 2 diabetes mellitus without complication, with long-term current use of insulin (H)       blood glucose monitoring lancets     600 each    TEST 6 TIMES PER DAY    Type  "2 diabetes mellitus without complication, with long-term current use of insulin (H)       blood glucose monitoring test strip    ONETOUCH ULTRA    600 strip    TEST 6 TIMES PER DAY    Type 2 diabetes mellitus without complication, with long-term current use of insulin (H)       calcium polycarbophil 625 MG tablet    FIBERCON    60 tablet    Take 2 tablets (1,250 mg) by mouth daily    Health care maintenance       CALCIUM-D PO      Take  by mouth. 1200mg calcium/600mg D3        chlorthalidone 25 MG tablet    HYGROTON    30 tablet    Take 1 tablet (25 mg) by mouth daily    Hypertension goal BP (blood pressure) < 140/90       fish oil-omega-3 fatty acids 1000 MG capsule      Take 1 capsule by mouth daily.        Garlic Caps      Take  by mouth. One daily        hydrOXYzine 50 MG capsule    VISTARIL    30 capsule    Take 1 capsule (50 mg) by mouth nightly as needed for anxiety or other    Anxiety attack       ibuprofen 800 MG tablet    ADVIL/MOTRIN    100 tablet    Take 1 tablet (800 mg) by mouth every 8 hours as needed for pain    Arthritis       insulin aspart 100 UNIT/ML injection    NovoLOG PEN    30 mL    Patient uses 100 units daily split up between multiple doses. Take 2 units per 15 grams of carbohydrate eaten and 2 units per 30 mg/dL above 170 mg/dL.    Type 2 diabetes mellitus without complication, with long-term current use of insulin (H)       meperidine 50 MG tablet    DEMEROL    20 tablet    Take 1 tablet (50 mg) by mouth every 4 hours as needed    Idiopathic chronic pancreatitis (H)       MULTIVITAMIN TABS   OR      1 TABLET DAILY        oxyCODONE-acetaminophen 5-325 MG per tablet    PERCOCET     Take by mouth every 4 hours as needed for moderate to severe pain        pen needles 5/16\" 31G X 8 MM Misc     180 each    Patient does 4 doses novolog and 2 lantus shots  insulin daily.  Needs 180 needles per month. Okay refills for one year.    Type 2 diabetes mellitus without complication, with long-term " current use of insulin (H)       promethazine 12.5 MG tablet    PHENERGAN     Take 12.5 mg by mouth as needed        rosuvastatin 20 MG tablet    CRESTOR    90 tablet    TAKE 1 TABLET(20 MG) BY MOUTH DAILY    Hyperlipidemia LDL goal <100       SALINE      to clean port every other month    Bronchitis       senna-docusate 8.6-50 MG per tablet    SENOKOT-S;PERICOLACE    100 tablet    1-2 po bid prn constipation    Constipation, unspecified constipation type       tiZANidine 2 MG tablet    ZANAFLEX    30 tablet    Take 1 tablet (2 mg) by mouth 3 times daily as needed for muscle spasms    Muscle pain       zolpidem 5 MG tablet    AMBIEN    30 tablet    Take 1 tablet (5 mg) by mouth nightly as needed for sleep    Insomnia, unspecified type       * Notice:  This list has 2 medication(s) that are the same as other medications prescribed for you. Read the directions carefully, and ask your doctor or other care provider to review them with you.

## 2018-10-02 ENCOUNTER — PATIENT OUTREACH (OUTPATIENT)
Dept: GERIATRIC MEDICINE | Facility: CLINIC | Age: 73
End: 2018-10-02

## 2018-10-02 PROBLEM — M54.2 CERVICALGIA: Status: RESOLVED | Noted: 2018-07-10 | Resolved: 2018-10-02

## 2018-10-02 NOTE — PROGRESS NOTES
Subjective:  HPI                    Objective:  System    Physical Exam    General     ROS    Assessment/Plan:    DISCHARGE REPORT    Progress reporting period is from 7.10 to 10.2.18.     SUBJECTIVE          Initial Pain level: 8/10        ;   ,     Patient has failed to return to therapy so current objective findings are unknown.    OBJECTIVE  Objective: hypersensitive ++++TTP,       ASSESSMENT/PLAN  Updated problem list and treatment plan: Diagnosis 1:  Neck pain     STG/LTGs have been met or progress has been made towards goals:  None  Assessment of Progress: Patient has not returned to therapy.  Current status is unknown and discharge G code cannot be reported.  Self Management Plans:  Patient has been instructed in a home treatment program.  Patient  has been instructed in self management of symptoms.    Leah continues to require the following intervention to meet STG and LTG's:   The patient failed to complete scheduled/ordered appointments so current information is unknown.  We will discharge this patient from PT.    Recommendations:      Please refer to the daily flowsheet for treatment today, total treatment time and time spent performing 1:1 timed codes.

## 2018-10-02 NOTE — PROGRESS NOTES
10/2/18 ARPAN called member to schedule a home visit. No answer,LMTRC.   10/1/18 ARPAN called member to schedule a home visit, LMTRC. CM checked the phone number which is the same number as her medical record.  Keyanna Zhao RN BA N  [facility]  [phone]

## 2018-10-03 ENCOUNTER — PATIENT OUTREACH (OUTPATIENT)
Dept: GERIATRIC MEDICINE | Facility: CLINIC | Age: 73
End: 2018-10-03

## 2018-10-03 DIAGNOSIS — I10 HYPERTENSION GOAL BP (BLOOD PRESSURE) < 140/90: ICD-10-CM

## 2018-10-03 RX ORDER — HYDROCHLOROTHIAZIDE 25 MG/1
TABLET ORAL
Qty: 90 TABLET | Refills: 0 | Status: SHIPPED | OUTPATIENT
Start: 2018-10-03 | End: 2018-10-10

## 2018-10-03 NOTE — PROGRESS NOTES
10/3/18  did reach Leah and scheduled her home visit for next Monday Oct 8 at 1PM.  Leah also states her homemaker has not been out for 2 weeks. She quit and she has not heard from [facility] the homemaking agency.  CC called [facility] and they have been in transition with a new  and have recently hired new staff for Leah. They will be calling her today regarding her new homemaker.  Keyanna Zhao RN BA N  [facility] Case Management  [phone]

## 2018-10-03 NOTE — TELEPHONE ENCOUNTER
Prescription approved per [facility] Refill Protocol.  Madelaine Seo RN

## 2018-10-03 NOTE — TELEPHONE ENCOUNTER
"Requested Prescriptions   Pending Prescriptions Disp Refills     hydrochlorothiazide (HYDRODIURIL) 25 MG tablet [Pharmacy Med Name: HYDROCHLOROTHIAZIDE 25MG TABLETS] 90 tablet 0    Last Written Prescription Date:  8/24/18  Last Fill Quantity: 30,  # refills: 0   Last office visit: 9/24/2018 with prescribing provider:     Future Office Visit:   Next 5 appointments (look out 90 days)     Oct 10, 2018 11:20 AM CDT   SHORT with Kevin Esquivel MD   [facility] ([facility]    [address] [address]   [phone]            Nov 02, 2018 11:00 AM CDT   Return Visit with José Vogel MD   [facility] ([address] [id number] [phone]                  Sig: TAKE 1 TABLET(25 MG) BY MOUTH DAILY    Diuretics (Including Combos) Protocol Failed    10/3/2018  4:01 PM       Failed - Blood pressure under 140/90 in past 12 months    BP Readings from Last 3 Encounters:   09/24/18 146/64   09/20/18 145/69   09/10/18 158/66                Passed - Recent (12 mo) or future (30 days) visit within the authorizing provider's specialty    Patient had office visit in the last 12 months or has a visit in the next 30 days with authorizing provider or within the authorizing provider's specialty.  See \"Patient Info\" tab in inbasket, or \"Choose Columns\" in Meds & Orders section of the refill encounter.           Passed - Patient is age 18 or older       Passed - No active pregancy on record       Passed - Normal serum creatinine on file in past 12 months    Recent Labs   Lab Test  09/10/18   1205   CR  0.62             Passed - Normal serum potassium on file in past 12 months    Recent Labs   Lab Test  09/10/18   1205   POTASSIUM  3.8                   Passed - Normal serum sodium on file in past 12 months    Recent Labs   Lab Test  09/10/18   1205   NA  140             Passed - No positive " pregnancy test in past 12 months

## 2018-10-03 NOTE — PROGRESS NOTES
10/3/18 CM called member to schedule her annual assessment. No answer,[facility].  Keyanna Zhao RN BA PHN  [facility] Case Management  [phone]

## 2018-10-08 ENCOUNTER — TELEPHONE (OUTPATIENT)
Dept: GERIATRIC MEDICINE | Facility: CLINIC | Age: 73
End: 2018-10-08

## 2018-10-08 ENCOUNTER — PATIENT OUTREACH (OUTPATIENT)
Dept: GERIATRIC MEDICINE | Facility: CLINIC | Age: 73
End: 2018-10-08

## 2018-10-08 ASSESSMENT — ACTIVITIES OF DAILY LIVING (ADL): DEPENDENT_IADLS:: CLEANING;TRANSPORTATION

## 2018-10-08 NOTE — PROGRESS NOTES
[facility] Care Coordination Contact    [facility] Home Visit Assessment     Home visit for Health Risk Assessment with Leah Guerrero completed on October 8, 2018    Type of residence:: Apartment - handicap accessible        Assessment completed with:: Patient    Current Care Plan  Member currently receiving the following home care services:     Member currently receiving the following community resources:    Homemaking services    Medication Review  Medication reconciliation completed in Epic: Yes  Medication set-up & administration: Independent and sets up on own monthly.  Self-administers medications.  Medication understanding concerns (by member, family or CC): No  Medication adherence concerns (by member, family or CC): No    Mental/Behavioral Health   Depression Screening: See PHQ assessment flowsheet.   Mental health DX:: Yes   Mental health DX how managed:: Medication, Mental Health Targeted Care Manager  No current MH services-will place referral for Medication, Outpatient counseling and Mental Health Targeted    Member states she had a MH person come to her home today but does not know the name or company this person was from. She said she would follow up with [employer] about getting an [facility] worker. She states she has a MH  Aditi Villanueva from "[employer]" [phone] that she sees about once a month.  called Aditi for an update and to see if she knows who came to see member today and where they are at regarding an [facility] worker. [facility].   Member states she is also supposed to be getting a counselor through [employer]. The person who came today is also going to check into that.      Falls Assessment:   Fallen 2 or more times in the past year?: No   Any fall with injury in the past year?: No    ADL/IADL Dependencies:   Dependent ADLs:: Independent  Dependent IADLs:: Cleaning, Transportation    [facility] Plan sponsored benefits: Shared information re:  Silver Sneakers/gym memberships, ASA, Calcium +D.    PCA Assessment completed at visit: Not applicable     Elderly Waiver Eligibility: Yes-will continue on EW    Care Plan & Recommendations: see care plan    See LTCC for detailed assessment information.    Follow-Up Plan: Member informed of future contact, plan to f/u with member with a 6 month telephone assessment.  Contact information shared with member and family, encouraged member to call with any questions or concerns at any time.    [place] care continuum providers: Please refer to Health Care Home on the Epic Problem List to view this patient's [facility] Care Plan Summary.    Client states that she takes Boost 1-2 cans dailt that she buys herself, she needs a new hand hel shower head and also a new nebulizer machine. The one she had broke. She also is not taking her Chlorthaladone and was going to discuss this with her PCP Dr Esquivel on Wednesday when she sees him.   CM sent a message to Dr Esquivel letting him know about her not taking her Chlorthalidone. Also member is taking Boost daily. Does he want her drinking this or Glucerna since she is Diabetic. Client states she is very aware of her protein intake but does not eat well.  She also needs  New nebulizer machine. CM can oder one through a [employer] if Dr Esquivel wants to sign a prescription for this.  Keyanna Zhao RN BA PHN  [facility] Case Management  [phone]

## 2018-10-09 ENCOUNTER — PATIENT OUTREACH (OUTPATIENT)
Dept: GERIATRIC MEDICINE | Facility: CLINIC | Age: 73
End: 2018-10-09

## 2018-10-09 NOTE — PROGRESS NOTES
10/9/18 ARPAN ordered a new nebulizer machine and a hand held showerhead for enzo through [facility].  Keyanna Zhao RN BA N  [facility] Case Management  [phone]

## 2018-10-10 ENCOUNTER — TELEPHONE (OUTPATIENT)
Dept: FAMILY MEDICINE | Facility: CLINIC | Age: 73
End: 2018-10-10

## 2018-10-10 ENCOUNTER — OFFICE VISIT (OUTPATIENT)
Dept: FAMILY MEDICINE | Facility: CLINIC | Age: 73
End: 2018-10-10
Payer: COMMERCIAL

## 2018-10-10 VITALS
DIASTOLIC BLOOD PRESSURE: 67 MMHG | BODY MASS INDEX: 33.66 KG/M2 | WEIGHT: 190 LBS | HEART RATE: 64 BPM | SYSTOLIC BLOOD PRESSURE: 139 MMHG | TEMPERATURE: 99.2 F | OXYGEN SATURATION: 97 %

## 2018-10-10 DIAGNOSIS — E11.9 TYPE 2 DIABETES MELLITUS WITHOUT COMPLICATION, WITH LONG-TERM CURRENT USE OF INSULIN (H): Primary | ICD-10-CM

## 2018-10-10 DIAGNOSIS — R06.00 DYSPNEA, UNSPECIFIED TYPE: ICD-10-CM

## 2018-10-10 DIAGNOSIS — Z79.4 TYPE 2 DIABETES MELLITUS WITHOUT COMPLICATION, WITH LONG-TERM CURRENT USE OF INSULIN (H): Primary | ICD-10-CM

## 2018-10-10 DIAGNOSIS — I10 HYPERTENSION GOAL BP (BLOOD PRESSURE) < 140/90: ICD-10-CM

## 2018-10-10 DIAGNOSIS — M17.12 PRIMARY OSTEOARTHRITIS OF LEFT KNEE: ICD-10-CM

## 2018-10-10 PROCEDURE — 99214 OFFICE O/P EST MOD 30 MIN: CPT | Performed by: FAMILY MEDICINE

## 2018-10-10 ASSESSMENT — PAIN SCALES - GENERAL: PAINLEVEL: NO PAIN (0)

## 2018-10-10 NOTE — TELEPHONE ENCOUNTER
Forms received from: [facility] [employer]   Phone number listed: [phone]   Fax listed: [phone]  Date received: 10/10/18  Form description: Equipment  Once forms are completed, please return to [facility] [employer] via Fax.  Is patient requesting to be contacted when forms are completed: NA    Form placed: in providers fabienne Patiño

## 2018-10-10 NOTE — MR AVS SNAPSHOT
After Visit Summary   10/10/2018    Leah Guerrero    MRN: [id number]           Patient Information     Date Of Birth          1945        Visit Information        Provider Department      10/10/2018 11:20 AM Kevin Esquivel MD [facility]        Today's Diagnoses     Type 2 diabetes mellitus without complication, with long-term current use of insulin (H)    -  1    Hypertension goal BP (blood pressure) < 140/90          Care Instructions    Stay on same pair of blood pressure meds; take early am of produre    Increase walking/ activity as able    We will send you lab results    If labs okay, I will do addendum to the preop document so you are okay for surgery     I will call with results          Follow-ups after your visit        Your next 10 appointments already scheduled     Nov 02, 2018 11:00 AM CDT   Return Visit with José Vogel MD   [facility]    [address]  [place] [address] [phone]              Who to contact     If you have questions or need follow up information about today's clinic visit or your schedule please contact [facility] directly at [phone].  Normal or non-critical lab and imaging results will be communicated to you by MyChart, letter or phone within 4 business days after the clinic has received the results. If you do not hear from us within 7 days, please contact the clinic through MyChart or phone. If you have a critical or abnormal lab result, we will notify you by phone as soon as possible.  Submit refill requests through [employer] or call your pharmacy and they will forward the refill request to us. Please allow 3 business days for your refill to be completed.          Additional Information About Your Visit        MyChart Information     [employer] gives you secure access to your electronic health record. If you see a primary care provider, you can  also send messages to your care team and make appointments. If you have questions, please call your primary care clinic.  If you do not have a primary care provider, please call [phone] and they will assist you.        Care EveryWhere ID     This is your Care EveryWhere ID. This could be used by other organizations to access your [facility] medical records  [id number]        Your Vitals Were     Pulse Temperature Pulse Oximetry Breastfeeding? BMI (Body Mass Index)       64 99.2  F (37.3  C) (Oral) 97% No 33.66 kg/m2        Blood Pressure from Last 3 Encounters:   10/10/18 139/67   09/24/18 146/64   09/20/18 145/69    Weight from Last 3 Encounters:   10/10/18 190 lb (86.2 kg)   09/24/18 187 lb (84.8 kg)   09/10/18 187 lb 4 oz (84.9 kg)              We Performed the Following     Creatinine     Hemoglobin A1c     Potassium        Primary Care Provider Office Phone # Fax #    Genaro Dawson [phone] [address] [facility] [address]        Goals        General    I will continue the exercises provided by physical therapy for my knee (pt-stated)     Notes - Note created  7/9/2015 10:30 AM by Ray Holland RN    As of today's date 7/9/2015 goal is met at 0 - 25%.   Goal Status:  Active        I will remember to take my insulin before meals especially lunch when I am  away from home. (pt-stated)     Notes - Note created  3/25/2015  3:51 PM by Ray Holland RN      As of today's date 3/25/2015 goal is met at 0 - 25%.   Goal Status:  Active.          I will work on testing my blood sugar and taking my insulin when I am away from home at lunch time. (pt-stated)     Notes - Note created  5/20/2016  2:43 PM by Ray Holland RN    As of today's date 5/20/2016 goal is met at 0 - 25%.   Goal Status:  Active          Equal Access to Services     [facility] LYRIC AH: Hadii candido Evans, mathew lubin, qaybta kaalmada adeegyajude goodman. So [facility]  [phone].    ATENCIÓN: Si guzman graham, tiene a verduzco disposición servicios gratuitos de asistencia lingüística. Ese blancas [phone].    We comply with applicable federal civil rights laws and [place] laws. We do not discriminate on the basis of race, color, national origin, age, disability, sex, sexual orientation, or gender identity.            Thank you!     Thank you for choosing [facility]  for your care. Our goal is always to provide you with excellent care. Hearing back from our patients is one way we can continue to improve our services. Please take a few minutes to complete the written survey that you may receive in the mail after your visit with us. Thank you!             Your Updated Medication List - Protect others around you: Learn how to safely use, store and throw away your medicines at www.disposemymeds.org.          This list is accurate as of 10/10/18 11:59 AM.  Always use your most recent med list.                   Brand Name Dispense Instructions for use Diagnosis    ACE/ARB/ARNI NOT PRESCRIBED (INTENTIONAL)      ACE & ARB not prescribed due to Allergy    Type 2 diabetes mellitus without complication, with long-term current use of insulin (H)       * albuterol (2.5 MG/3ML) 0.083% neb solution     1 Box    Take 3 mLs (2.5 mg) by nebulization 4 times daily as needed    Intermittent asthma       * albuterol 108 (90 Base) MCG/ACT inhaler    PROAIR HFA/PROVENTIL HFA/VENTOLIN HFA    1 Inhaler    Inhale 2 puffs into the lungs every 6 hours as needed for shortness of breath / dyspnea or wheezing    Mild intermittent asthma without complication       amLODIPine 5 MG tablet    NORVASC    30 tablet    TAKE 1 TABLET(5 MG) BY MOUTH DAILY    Hypertension goal BP (blood pressure) < 140/90       aspirin 81 MG tablet      Take 1 tablet by mouth daily. 1 daily        atenolol 50 MG tablet    TENORMIN    180 tablet    TAKE 1 TABLET(50 MG) BY MOUTH TWICE DAILY    Hypertension goal BP  (blood pressure) < 140/90       BASAGLAR 100 UNIT/ML injection     75 mL    75 units at bedtime or as directed    Type 2 diabetes mellitus without complication, with long-term current use of insulin (H)       blood glucose monitoring lancets     600 each    TEST 6 TIMES PER DAY    Type 2 diabetes mellitus without complication, with long-term current use of insulin (H)       blood glucose monitoring test strip    ONETOUCH ULTRA    600 strip    TEST 6 TIMES PER DAY    Type 2 diabetes mellitus without complication, with long-term current use of insulin (H)       calcium polycarbophil 625 MG tablet    FIBERCON    60 tablet    Take 2 tablets (1,250 mg) by mouth daily    Health care maintenance       CALCIUM-D PO      Take  by mouth. 1200mg calcium/600mg D3        chlorthalidone 25 MG tablet    HYGROTON    30 tablet    Take 1 tablet (25 mg) by mouth daily    Hypertension goal BP (blood pressure) < 140/90       fish oil-omega-3 fatty acids 1000 MG capsule      Take 1 capsule by mouth daily.        Garlic Caps      Take  by mouth. One daily        hydrochlorothiazide 25 MG tablet    HYDRODIURIL    90 tablet    TAKE 1 TABLET(25 MG) BY MOUTH DAILY    Hypertension goal BP (blood pressure) < 140/90       hydrOXYzine 50 MG capsule    VISTARIL    30 capsule    Take 1 capsule (50 mg) by mouth nightly as needed for anxiety or other    Anxiety attack       ibuprofen 800 MG tablet    ADVIL/MOTRIN    100 tablet    Take 1 tablet (800 mg) by mouth every 8 hours as needed for pain    Arthritis       insulin aspart 100 UNIT/ML injection    NovoLOG PEN    30 mL    Patient uses 100 units daily split up between multiple doses. Take 2 units per 15 grams of carbohydrate eaten and 2 units per 30 mg/dL above 170 mg/dL.    Type 2 diabetes mellitus without complication, with long-term current use of insulin (H)       meperidine 50 MG tablet    DEMEROL    20 tablet    Take 1 tablet (50 mg) by mouth every 4 hours as needed    Idiopathic chronic  "pancreatitis (H)       MULTIVITAMIN TABS   OR      1 TABLET DAILY        oxyCODONE-acetaminophen 5-325 MG per tablet    PERCOCET     Take by mouth every 4 hours as needed for moderate to severe pain        pen needles 5/16\" 31G X 8 MM Misc     180 each    Patient does 4 doses novolog and 2 lantus shots  insulin daily.  Needs 180 needles per month. Okay refills for one year.    Type 2 diabetes mellitus without complication, with long-term current use of insulin (H)       promethazine 12.5 MG tablet    PHENERGAN     Take 12.5 mg by mouth as needed        rosuvastatin 20 MG tablet    CRESTOR    90 tablet    TAKE 1 TABLET(20 MG) BY MOUTH DAILY    Hyperlipidemia LDL goal <100       SALINE      to clean port every other month    Bronchitis       senna-docusate 8.6-50 MG per tablet    SENOKOT-S;PERICOLACE    100 tablet    1-2 po bid prn constipation    Constipation, unspecified constipation type       tiZANidine 2 MG tablet    ZANAFLEX    30 tablet    Take 1 tablet (2 mg) by mouth 3 times daily as needed for muscle spasms    Muscle pain       zolpidem 5 MG tablet    AMBIEN    30 tablet    Take 1 tablet (5 mg) by mouth nightly as needed for sleep    Insomnia, unspecified type       * Notice:  This list has 2 medication(s) that are the same as other medications prescribed for you. Read the directions carefully, and ask your doctor or other care provider to review them with you.      "

## 2018-10-10 NOTE — PATIENT INSTRUCTIONS
Stay on same pair of blood pressure meds; take early am of produre    Increase walking/ activity as able    We will send you lab results    If labs okay, I will do addendum to the preop document so you are okay for surgery     I will call with results

## 2018-10-10 NOTE — PROGRESS NOTES
SUBJECTIVE:   Leah Guerrero is a 72 year old female who presents to clinic today for the following health issues:       Follow up    none    Problem list and histories reviewed & adjusted, as indicated.  Additional history: as documented         Reviewed and updated as needed this visit by clinical staff  Allergies  Meds       Reviewed and updated as needed this visit by Provider          not breathing well    Patient thinks that is the weather      Patient felt like head would blow up when on the chlorthalidone, face red    Stopped that after one day    Still on atenolol and amlodine     Patient did not check blood pressure at home    The side effects went away next day after stopping chlorthalidone     Sugars good, avg 220 after eating    In 100s or below when fasting    stilll bout 75 units basaglar    Now about 4 units of novolog per carb,so about 12 - 15 units per meals    Usually 2 x  Per day    So thus about 25-30 units of short acting insulin daily      Physical Exam   Constitutional: She is oriented to person, place, and time and well-developed, well-nourished, and in no distress. No distress.   HENT:   Head: Normocephalic and atraumatic.   Neck: Carotid bruit is not present.   Cardiovascular: Normal rate, regular rhythm, normal heart sounds and intact distal pulses.  Exam reveals no gallop and no friction rub.    No murmur heard.  Pulmonary/Chest: Effort normal and breath sounds normal.   Musculoskeletal: She exhibits no edema.   Neurological: She is alert and oriented to person, place, and time.   Skin: She is not diaphoretic.   Psychiatric: Mood and affect normal.     ASSESSMENT / PLAN:  (E11.9,  Z79.4) Type 2 diabetes mellitus without complication, with long-term current use of insulin (H)  (primary encounter diagnosis)  Comment: will check hemoglobin a1c in next few days. Hopefully is better.   Plan: Hemoglobin A1c, CANCELED: Hemoglobin A1c             (I10) Hypertension goal BP (blood  pressure) < 140/90  Comment: blood pressure barely okay here.  Patient did not tolerated the chlorthalidone.  Continue the atenolol and amlodipine for now.  Plan: Potassium, Creatinine, CANCELED: Potassium,         CANCELED: Creatinine        Check potassium and creat.     (M17.12) Primary osteoarthritis of left knee  Comment: patient to have the tka in a couple weeks.    Plan: when I get the updated labs I will do an addendum to the previously done H & P.      (R06.00) Dyspnea, unspecified type  Comment: the stress test was very reassuring  Plan: encouraged patient to increase activity as able.    I will call patient with lab results.      I reviewed the patient's medications, allergies, medical history, family history, and social history.    Kevin Esquivel MD

## 2018-10-11 ENCOUNTER — PATIENT OUTREACH (OUTPATIENT)
Dept: GERIATRIC MEDICINE | Facility: CLINIC | Age: 73
End: 2018-10-11

## 2018-10-11 NOTE — PROGRESS NOTES
10/11/18 CM called [employer] to inquire where they are with finding a homemaker for her. [employer] will look into it and call CM back.  CM also called Aditi Villanueva who is listed as members mental health  [facility] asking her to call to verify if she is the MH  and if she is involved in finding client an [facility] worker.  Keyanna Zhao RN BA N  [facility] Case Management  [phone]

## 2018-10-12 ENCOUNTER — PATIENT OUTREACH (OUTPATIENT)
Dept: GERIATRIC MEDICINE | Facility: CLINIC | Age: 73
End: 2018-10-12

## 2018-10-12 ENCOUNTER — TELEPHONE (OUTPATIENT)
Dept: FAMILY MEDICINE | Facility: CLINIC | Age: 73
End: 2018-10-12

## 2018-10-12 NOTE — PROGRESS NOTES
10/12/18 CM received a VM from Hilary URBANO stating members homemaking services are contnueing.  Keyanna Zhao RN BA N  [facility] Case Management  [phone]

## 2018-10-12 NOTE — TELEPHONE ENCOUNTER
I called and spoke to Anne-Marie, she says she gets her port flushed at [facility] in the [facility] at [address].    She does not know if they do outside provider lab orders.      [facility]  [address], [address]   [place], [place] [address]   Phone: [phone]       I called [facility], they do not do outside orders anymore.    I called patient back and advised her of this.   She says she will just let surgery center do the labs the day of surgery; she says they have done this in the past.    Unfortunately, for the time being, she also cannot get labs done via port when she is in clinic.   She says she may consider just finding a PCP in [facility] in that case.   I advised her I'd reach out to her in the future if we start doing port access in clinic again.    Patient verbalized understanding of and agreement with plan.    Routed to Dr. Esquivel as FYI (future labs won't be done).    Keyanna Carrasquillo RN  [facility]

## 2018-10-12 NOTE — TELEPHONE ENCOUNTER
"Clinic patient care supervisor notified RN that effective immediately, RN's in clinic will NOT be doing port flushes or port flush/lab draw from implanted ports.    Patients who have been having me do this for them will need to be referred to infusion center for port flushes for the immediate future (possible approval for doing this my happen in the future); labs can be drawn via venipuncture or in infusion center.    Anne-Marie is on RN schedule for Monday lab draw.  She last had port flush/lab draw 9/10/18 so is due for monthly maintenance flush.    I did online search and it appears options for infusion centers in  are [place], [place], [place], [place], [place], [place], [facility], [place], [place], [place].      I called and spoke to RN at [place] infusion center, she says any  patient who has a port and lab orders can just call and schedule routine flush and/or labs as \"nurse only\" visit.    Anne-Marie takes public transportation to get to our clinic.  Anne-Marie lives across [place] from [facility], assume they have infusion center.  I called [facility] main number and had call transferred to infusion center.  [facility], [facility]  Phone at [facility] is:  [phone].  Patient would need to have orders for monthly port flush and any labs needed in hand or can fax to [facility] at [phone].     Attempted to call patient at home/mobile number, left message on voicemail; patient was instructed to return call to [facility] RN directly on the RN call back line at [phone]       Keyanna Carrasquillo, JEANIE  [facility]      "

## 2018-10-22 ENCOUNTER — TELEPHONE (OUTPATIENT)
Dept: FAMILY MEDICINE | Facility: CLINIC | Age: 73
End: 2018-10-22

## 2018-10-22 NOTE — TELEPHONE ENCOUNTER
Reason for Call:  Other call back    Detailed comments: Patient is scheduled for a surgery with Dr. Vogel on 10/23/2018 for a total knee replacement at [facility].Patient failed her preop in September, Patient is noting that Dr. Esquivel did her preop on 10/10/2018 but that is not noted in the appointment. Please call patient and advise. Her surgery will need to be canceled if she doesn't have a preop done. Thank you     Phone Number Patient can be reached at: Home number on file [phone] (home)    Best Time: any    Can we leave a detailed message on this number? YES    Call taken on 10/22/2018 at 11:29 AM by Iram Mascorro

## 2018-10-22 NOTE — PROGRESS NOTES
Addendum ( done 10-22-23):    To clarify, patient was seen for pre-op back in early September.  At that time there were 3 concerns.  These have all been addressed and are improved:    1.  The diabetes is better controlled.  Hemoglobin a1c most recently was 8.4.    2.  Blood pressure is improved.  Last reading a couple weeks ago was 139/67.    3.  Patient had been having atypical chest symptoms.  We did a nuclear stress test which came back normal.  Summed stress score of zero.    Thus, it is now okay to proceed with the tka as planned.    Kevin Esquivel MD

## 2018-10-22 NOTE — TELEPHONE ENCOUNTER
See addendum to the September preop.  I printed this and relevant reports.  Please fax.    Kevin Esquivel MD

## 2018-10-23 ENCOUNTER — TRANSFERRED RECORDS (OUTPATIENT)
Dept: HEALTH INFORMATION MANAGEMENT | Facility: CLINIC | Age: 73
End: 2018-10-23

## 2018-10-24 ENCOUNTER — TELEPHONE (OUTPATIENT)
Dept: FAMILY MEDICINE | Facility: CLINIC | Age: 73
End: 2018-10-24

## 2018-10-24 NOTE — TELEPHONE ENCOUNTER
Forms received from: [facility] [employer]   Phone number listed: [phone]   Fax listed: [phone]  Date received: 10/24/18  Form description: Equipment Nebulizer  Once forms are completed, please return to [facility] [employer] via Fax.  Is patient requesting to be contacted when forms are completed: NA    Form placed: in providers fabienne Patiño

## 2018-10-24 NOTE — TELEPHONE ENCOUNTER
Forms received from: [facility] [employer]   Phone number listed: [phone]   Fax listed: [phone]  Date received: 10/24/18  Form description: Face to Face  Once forms are completed, please return to [facility] [employer] via Fax.  Is patient requesting to be contacted when forms are completed: NA    Form placed: in providers fabienne Patiño

## 2018-10-26 ENCOUNTER — PATIENT OUTREACH (OUTPATIENT)
Dept: GERIATRIC MEDICINE | Facility: CLINIC | Age: 73
End: 2018-10-26

## 2018-10-26 NOTE — PROGRESS NOTES
10/15/18 members hand held showerhead was delivered.  Keyanna Zhao RN BA PHN  [facility] Case Management  [phone]

## 2018-10-26 NOTE — PROGRESS NOTES
[facility] Care Coordination Contact    Per APA item delivered, CM notified    Leah Guerrero 1945 hand held shower head 10/9/2018 Deliverd 10/15/18         Lucero Hernandez  Case Management Specialist   [facility]   [phone]

## 2018-10-29 ENCOUNTER — PATIENT OUTREACH (OUTPATIENT)
Dept: GERIATRIC MEDICINE | Facility: CLINIC | Age: 73
End: 2018-10-29

## 2018-10-29 NOTE — PROGRESS NOTES
10/29/18 CM was notified member transferred to [facility] 10/26/18. CM called [facility] for Molly SW. Leaving CM's information to call.  Keyanna Zhao RN BA N  [facility] Case Management  [phone]

## 2018-10-30 ENCOUNTER — PATIENT OUTREACH (OUTPATIENT)
Dept: GERIATRIC MEDICINE | Facility: CLINIC | Age: 73
End: 2018-10-30

## 2018-10-30 NOTE — PROGRESS NOTES
10/30/18 CM received a VM from Molly BARROS at [facility] stating member is still needing assist of 1 with most activities and they are trying to keep her pain under control since her knee surgery. No further discharge plans have been made yet.  Keyanna Zhao RN BA N  [facility] Case Management  [phone]

## 2018-10-31 ENCOUNTER — TELEPHONE (OUTPATIENT)
Dept: ORTHOPEDICS | Facility: CLINIC | Age: 73
End: 2018-10-31

## 2018-10-31 NOTE — TELEPHONE ENCOUNTER
We are seeing her for her first post-op on 11/2/18. Will assess ROM and plan at that time.     Grant Dillon PA-C, CAQ (Ortho)  Supervising Physician: José Vogel M.D., M.S.  Dept. of Orthopaedic Surgery  [facility]

## 2018-10-31 NOTE — TELEPHONE ENCOUNTER
Dina DICK reporting that Leah has had very limited ROM, is not tolerating bending actively, and has been having significant pain. Wondering if they can get an order for a CPM machine.

## 2018-11-06 NOTE — PROGRESS NOTES
Diabetes Follow-up    Subjective/Objective:     Last date of communication was: 8/16/18  Pt has not called in her bg values for review or rescheduled her diabetes education appointment.  Call out to pt to follow up with her re her bg control and insulin doses.  Diabetes is being managed with Lifestyle (diet/activity), Injectable Medications: Novolog and Basaglar.    I was unable to reach pt. I left a message that I would try again to contact her or she could call in her last 4 days of bg values for review today as well.    Plan/Response:  Educator to try again today to contact pt.    Negrita De La Garza RN  BSN CDE      Any diabetes medication dose changes were made via the CDE Protocol and Collaborative Practice Agreement with the patient's referring provider.       
no

## 2018-11-07 NOTE — TELEPHONE ENCOUNTER
Reason for Call:  Other call back    Detailed comments: Andres from [facility] calling to check status on fax. Please advise.     Phone Number Patient can be reached at: Other phone number:  [phone]    Best Time: Anytime    Can we leave a detailed message on this number? YES    Call taken on 11/7/2018 at 4:03 PM by Maryuri Handy

## 2018-11-09 ENCOUNTER — OFFICE VISIT (OUTPATIENT)
Dept: ORTHOPEDICS | Facility: CLINIC | Age: 73
End: 2018-11-09
Payer: COMMERCIAL

## 2018-11-09 ENCOUNTER — RADIANT APPOINTMENT (OUTPATIENT)
Dept: GENERAL RADIOLOGY | Facility: CLINIC | Age: 73
End: 2018-11-09
Attending: ORTHOPAEDIC SURGERY
Payer: COMMERCIAL

## 2018-11-09 VITALS
DIASTOLIC BLOOD PRESSURE: 58 MMHG | SYSTOLIC BLOOD PRESSURE: 150 MMHG | WEIGHT: 191 LBS | HEART RATE: 107 BPM | BODY MASS INDEX: 33.83 KG/M2 | TEMPERATURE: 98.4 F

## 2018-11-09 DIAGNOSIS — Z96.652 STATUS POST TOTAL LEFT KNEE REPLACEMENT: Primary | ICD-10-CM

## 2018-11-09 DIAGNOSIS — M17.12 PRIMARY OSTEOARTHRITIS OF LEFT KNEE: ICD-10-CM

## 2018-11-09 PROCEDURE — 99024 POSTOP FOLLOW-UP VISIT: CPT | Performed by: ORTHOPAEDIC SURGERY

## 2018-11-09 PROCEDURE — 73562 X-RAY EXAM OF KNEE 3: CPT | Mod: LT

## 2018-11-09 RX ORDER — OXYCODONE AND ACETAMINOPHEN 5; 325 MG/1; MG/1
1 TABLET ORAL EVERY 6 HOURS PRN
Qty: 30 TABLET | Refills: 0 | Status: SHIPPED | OUTPATIENT
Start: 2018-11-09 | End: 2019-08-23

## 2018-11-09 ASSESSMENT — PAIN SCALES - GENERAL: PAINLEVEL: EXTREME PAIN (8)

## 2018-11-09 NOTE — LETTER
"    11/9/2018         RE: Leah Guerrero  [address]  [place] [address]        Dear Colleague,    Thank you for referring your patient, Leah Guerrero, to the [facility]. Please see a copy of my visit note below.    Chief Complaint   Patient presents with     Left Knee - Surgical Followup     Surgical Followup     Total Left Knee  DOS 10/23/2018         SURGERY: Hybrid tricompartmental total knee arthroplasty (press-fit tibial and femoral components for the medial, lateral compartments, and cemented patellofemoral component), left knee. ( [facility])  DATE OF SURGERY: 10/23/2018    HISTORY OF PRESENT ILLNESS: Leah \"Fina\" ALEK Guerrero is a 72 year old female seen for postoperative evaluation of left total knee arthroplasty. It has been 2.5 weeks since surgery. Returns today stating doing fine. Pain is rated an 8/10. Presets with a walker today. Denies any wound problems. Denies numbness, tingling, or weakness in the affected extremity. Denies fevers chills night sweats. Continues to take 325 mg aspirin for anti-coagulation for deep vein thrombosis prophylaxis. Takes percocet for pain, 1 tablet every 6 hours. Has been doing Physical Therapy at [facility] here in [place]. Will be going home today.  Concerns today include: none.    Present symptoms: pain, + swelling.    Pain severity: 8/10  Pain quality: aching  Frequency of symptoms: frequently  Exacerbating Factors: weightbearing, end range of motion   Relieving Factors: rest, oxycodone   Night Pain: Yes  Pain while at rest: No   Associated numbness or tingling: No     Past medical history:   Past Medical History:   Diagnosis Date     Abnormal CT of the chest 10/12    Nodular consolidation right lobe 1.6 cm. Needs repeat CT Jan 2013     Bleeding disorder (H)     in bowels x2      Chronic pancreatitis (H)      CVA (cerebral infarction)      Diabetes      Female stress incontinence      Hyperlipidemia LDL goal " <100      Hypertension      Intermittent asthma 2011     Lateral epicondylitis (tennis elbow) - left 3/30/2011     OA (osteoarthritis) of knee 3/22/2013     Pulmonary nodule, left 10/12    0.5 cm; needs f/u chest CT scan 2013     Surgical complications      Unspecified asthma(493.90)      Patient Active Problem List    Diagnosis Date Noted     Mild intermittent asthma without complication 2018     Priority: Medium     Malignant neoplasm of lung, unspecified laterality, unspecified part of lung (H) 2017     Priority: Medium     Chronic pancreatitis, unspecified pancreatitis type (H) 2016     Priority: Medium     Type 2 diabetes mellitus without complication, with long-term current use of insulin (H) 2016     Priority: Medium     Anxiety 2016     Priority: Medium     Insomnia, unspecified type 2016     Priority: Medium     Idiopathic chronic pancreatitis (H) 2015     Priority: Medium     Primary osteoarthritis of left knee 10/28/2015     Priority: Medium     Hypertension goal BP (blood pressure) < 140/90 2015     Priority: Medium     Obesity 2015     Priority: Medium     Health Care Home 10/22/2014     Priority: Medium     [facility]   Guerline Zhao RN  [phone]    [facility] CARE PLAN SUMMARY    Client Name:  Leah Guerrero  Address:  [address] [place] [address] Phone: [phone]   :  1945 Date of Assessment: 10/8/18   Health Plan:  [employer] [facility]  Health Plan Number: [id number] Medical Assistance Number: [id number]  Financial Worker:  Savanna Collins  [phone]  Case #:  [id number]   [facility] :  Guerline Zhao RN CM Phone:  [phone]   Fax:  [phone]   [facility] Enrollment Date: 18 Case Mix:  L  Rate Cell:  B  Waiver Type: EW    Emergency Contact:   Primary Emergency Contact: Paul Guerrero  [place] Phone: [phone]  Relation: Son  Secondary Emergency  "Contact: Hiram Guerrero   Mobile Phone: [phone]  Relation: Son Language:  English  :  No   Health Care Agent/POA:   Advanced Directives/Living Will:  On file   Primary Care Clinic/Phone/Fax:  [facility]/(p) [phone], (f) [phone] Primary Dx:  Diabetes:E11.9  Secondary Dx: Osteoarthritis: M19.91    Primary Physician:  Kevin Esquivel   Height:  5' 3\"  Weight:  186 lbs   Specialty Physician:    Audiologist:     Eye Care Provider:   Dental Care Provider:    [facility]: [facility] [phone]   Other:        [employer]  [phone]  Fax: [phone] 11/1/18-10/31/19 weekly 4 hrs/week  (16 units)  $4.61/unit      Transportation[employer]  [employer] 11/1/18-10/31/19 Monthly as needed Rush:10/m  Non Rush: 10/m Rush $4/ticket  ($40/m)  Non rush  $3/ticket ($30/m)     [employer]Mental Health   Aditi Rich:[phone]  Fax: [phone] Ongoing Monthly visits       [facility] worker       Intermittent asthma without complication 07/11/2014     Priority: Medium     IT band syndrome 01/13/2014     Priority: Medium     Contusion of knee 11/20/2013     Priority: Medium     Prepatellar bursitis of left knee 11/20/2013     Priority: Medium     Insomnia 05/30/2013     Priority: Medium     Mechanical low back pain 03/22/2013     Priority: Medium     Radicular pain of left lower extremity 03/22/2013     Priority: Medium     GERD (gastroesophageal reflux disease) 02/06/2013     Priority: Medium     Pulmonary nodule, left      Priority: Medium     0.5 cm; needs f/u chest CT scan Jan 2013       Abnormal CT of the chest      Priority: Medium     Nodular consolidation right lobe 1.6 cm. Needs repeat CT Jan 2013       Anxiety state 06/21/2012     Priority: Medium     Problem list name updated by automated process. Provider to review       Intermittent asthma 12/30/2011     Priority: Medium     Advanced directives, counseling/discussion 05/06/2011     " Priority: Medium     04/29/2013  Advance Care Planning:   Receipt of ACP document:  Received: Health Care Directive which was witnessed or notarized on 07/06/2011.  Document previously scanned on 07/07/2011.  Validation form completed and sent to be scanned.  Code Status reflects choices in most recent ACP document.  Confirmed/documented designated decision maker(s). See permanent comments section of demographics in clinical tab. View document(s) and details by clicking on code status.   Added by Lacie Canchola on 4/29/2013.          Advance Care Planning:   ACP Review and Resources Provided:  Reviewed chart for advance care plan.  Leah Guerrero has no plan or code status on file however states presence of ACP document. Copy requested. Confirmed code status reflects current choices pending receipt of document/advance care plan review. Confirmed/documented designated decision maker(s). See permanent comments section of demographics in clinical tab.   Added by Emma Medina on 4/24/2013          Planning  Advance Directive Initial Visit  Leah Guerrero presents in person for initial session regarding completion of advanced directive form. She was referred to the facilitator by self.  She currently has an advance directive from 1980 & wants to fill out a new updated one.  Plan:  Advanced directive form, healthcare agent information card, advanced care planning information booklet provided to patient.   Follow up meeting: to be arranged when patient calls back to make an appointment after reviewing documents in one week or so. Patient instructed to bring healthcare agent to this meeting.  Flores Gerard RN  Letter sent to patient for Honoring Choices follow up.  6/22/2011  Flores Gerard RN  Advance Directive Follow-up Visit  Leah Guerrero presents for advanced care planning session accompanied by no one.  Reviewed definitions of advanced care planning and advance directive form, and informational  handouts given to patient previously.  Patient has questions and /or concerns about acp process or form .  Reviewed advance directive form with patient & answered all of her questions. Designated healthcare agent is identified. Healthcare agent s name is Paul Guerrero. My Hopes and Wishes reviewed.  Finalized wishes are clear to patient Yes  Patient and designated healthcare agent are aware that document may be changed at any time in the future.  Advanced directive form: completed at this visit.  Advanced directive form: verified with notary signature. Original and two copies of advanced directive form given to patient copy sent to  for scanning into EMR and original given to patient and instructed to give copy to designated [facility] and non-[facility] physician where applicable.  Flores Gerard RN  7/6/2011  Advance Directive Problem List Overview:   Name Relationship Phone    Primary Health Care Agent Paul Guerrero Son [phone]         Alternative Health Care Agent Hiram Guerrero Son c [phone]  [phone]                      Hyperlipidemia LDL goal <100 12/16/2010     Priority: Medium     Fatigue 12/15/2010     Priority: Medium     Type 2 diabetes, HbA1C goal < 8% (H) 11/24/2010     Priority: Medium     Female stress incontinence 10/28/2010     Priority: Medium       Past Surgical History:   Past Surgical History:   Procedure Laterality Date     APPENDECTOMY  2012     C HAND/FINGER SURGERY UNLISTED       C SHOULDER SURG PROC UNLISTED       C STOMACH SURGERY PROCEDURE UNLISTED       CHOLECYSTECTOMY  1969     COLONOSCOPY  6-2-08    Neg. At [facility]     COLONOSCOPY  9-3-13     EYE SURGERY       HYSTERECTOMY TOTAL ABD, HEIDI SALPINGO-OOPHORECTOMY, NODE DISSECTION, TUMOR DEBULKING, COMBINED  1988    fibroids?     KNEE SURGERY  x5     KNEE SURGERY      kneecap procedure     LUNG SURGERY  10-7-16    removal lung cancer     SHOULDER SURGERY  2005    right shoulder, bone spurs     WRIST SURGERY      right  wrist       Medications:   Current Outpatient Prescriptions:      ACE/ARB NOT PRESCRIBED, INTENTIONAL,, ACE & ARB not prescribed due to Allergy, Disp: , Rfl:      albuterol (2.5 MG/3ML) 0.083% nebulizer solution, Take 3 mLs (2.5 mg) by nebulization 4 times daily as needed, Disp: 1 Box, Rfl: 5     albuterol (PROAIR HFA/PROVENTIL HFA/VENTOLIN HFA) 108 (90 Base) MCG/ACT Inhaler, Inhale 2 puffs into the lungs every 6 hours as needed for shortness of breath / dyspnea or wheezing, Disp: 1 Inhaler, Rfl: 11     amLODIPine (NORVASC) 5 MG tablet, TAKE 1 TABLET(5 MG) BY MOUTH DAILY, Disp: 30 tablet, Rfl: 1     aspirin 81 MG tablet, Take 1 tablet by mouth daily. 1 daily, Disp: , Rfl:      atenolol (TENORMIN) 50 MG tablet, TAKE 1 TABLET(50 MG) BY MOUTH TWICE DAILY, Disp: 180 tablet, Rfl: 1     BASAGLAR 100 UNIT/ML injection, 75 units at bedtime or as directed, Disp: 75 mL, Rfl: 3     blood glucose monitoring (ONE TOUCH ULTRASOFT) lancets, TEST 6 TIMES PER DAY, Disp: 600 each, Rfl: 0     blood glucose monitoring (ONETOUCH ULTRA) test strip, TEST 6 TIMES PER DAY, Disp: 600 strip, Rfl: 11     Calcium Carbonate-Vitamin D (CALCIUM-D PO), Take  by mouth. 1200mg calcium/600mg D3, Disp: , Rfl:      calcium polycarbophil (FIBERCON) 625 MG tablet, Take 2 tablets (1,250 mg) by mouth daily, Disp: 60 tablet, Rfl: 11     fish oil-omega-3 fatty acids (FISH OIL) 1000 MG capsule, Take 1 capsule by mouth daily., Disp: , Rfl:      Garlic CAPS, Take  by mouth. One daily, Disp: , Rfl:      hydrOXYzine (VISTARIL) 50 MG capsule, Take 1 capsule (50 mg) by mouth nightly as needed for anxiety or other, Disp: 30 capsule, Rfl: 0     ibuprofen (ADVIL,MOTRIN) 800 MG tablet, Take 1 tablet (800 mg) by mouth every 8 hours as needed for pain, Disp: 100 tablet, Rfl: 0     insulin aspart (NOVOLOG PEN) 100 UNIT/ML injection, Patient uses 100 units daily split up between multiple doses. Take 2 units per 15 grams of carbohydrate eaten and 2 units per 30 mg/dL above  "170 mg/dL., Disp: 30 mL, Rfl: 1     Insulin Pen Needle (PEN NEEDLES 5/16\") 31G X 8 MM MISC, Patient does 4 doses novolog and 2 lantus shots  insulin daily.  Needs 180 needles per month. Okay refills for one year., Disp: 180 each, Rfl: 11     meperidine (DEMEROL) 50 MG tablet, Take 1 tablet (50 mg) by mouth every 4 hours as needed, Disp: 20 tablet, Rfl: 0     MULTIVITAMIN TABS   OR, 1 TABLET DAILY, Disp: , Rfl:      oxyCODONE-acetaminophen (PERCOCET) 5-325 MG per tablet, Take by mouth every 4 hours as needed for moderate to severe pain, Disp: , Rfl:      promethazine (PHENERGAN) 12.5 MG tablet, Take 12.5 mg by mouth as needed, Disp: , Rfl: 5     rosuvastatin (CRESTOR) 20 MG tablet, TAKE 1 TABLET(20 MG) BY MOUTH DAILY, Disp: 90 tablet, Rfl: 2     SALINE, to clean port every other month, Disp: , Rfl:      senna-docusate (SENOKOT-S;PERICOLACE) 8.6-50 MG per tablet, 1-2 po bid prn constipation, Disp: 100 tablet, Rfl: 1     tiZANidine (ZANAFLEX) 2 MG tablet, Take 1 tablet (2 mg) by mouth 3 times daily as needed for muscle spasms, Disp: 30 tablet, Rfl: 0     zolpidem (AMBIEN) 5 MG tablet, Take 1 tablet (5 mg) by mouth nightly as needed for sleep, Disp: 30 tablet, Rfl: 2    Allergies:   Allergies   Allergen Reactions     Hydralazine Shortness Of Breath     Famotidine Nausea and Vomiting and Unknown     Benadryl Itch Stopping      Gives her more energy, can't sleep     Lisinopril      Tongue swelling       Metoclopramide Nausea and Vomiting     Ondansetron Nausea and Vomiting     Other reaction(s): Headache     Penicillins Hives     Tape [Adhesive Tape]      blisters     Tylenol      Anxiety  Tablets only.     REVIEW OF SYSTEMS:  CONSTITUTIONAL:NEGATIVE for fever, chills, night sweats, change in weight  INTEGUMENTARY/SKIN: NEGATIVE for worrisome rashes, moles or lesions  MUSCULOSKELETAL:See HPI above  NEURO: NEGATIVE for weakness, dizziness or paresthesias  CARDIOVASCULAR: negative for chest pain, shortness of breath    This " document serves as a record of the services and decisions personally performed and made by José Vogel MD. It was created on his behalf by Nely Pratt, a trained medical scribe. The creation of this document is based the provider's statements to the medical scribe.    Tamiko Pratt 11:29 AM 11/9/2018     PHYSICAL EXAM:  /58  Pulse 107  Temp 98.4  F (36.9  C) (Oral)  Wt 191 lb (86.6 kg)  BMI 33.83 kg/m2   GENERAL APPEARANCE: healthy, alert, no distress  SKIN: no suspicious lesions or rashes  NEURO: Normal strength and tone, mentation intact and speech normal  PSYCH:  mentation appears normal and affect normal  RESPIRATORY: No increased work of breathing.    BILATERAL LOWER EXTREMITIES:  Gait: quadriceps avoidance, favoring left knee, using a walker   Intact sensation deep peroneal nerve, superficial peroneal nerve, med/lat tibial nerve, sural nerve, saphenous nerve  Intact EHL, EDL, TA, FHL, GS, quadriceps hamstrings and hip flexors  Toes warm and well perfused, brisk capillary refill. Palpable 2+ dp pulses.  Bilateral calf soft and nttp or squeeze.  Edema: 1+ left lower extremity.    LEFT KNEE EXAM:    Incision: healing well, skin well approximated. Skin glue.  Skin: intact, no ecchymosis or erythema  ROM: 5 extension to 90 flexion  Effusion: moderate  Tender: diffuse mild tenderness.    X-RAY:  3 views left knee from 11/9/2018 were reviewed in clinic today. No obvious fractures or dislocations. Total knee arthroplasty components in place without evidence of failure or loosening.    Impression: Leah Guerrero is a 72 year old female status post Total knee arthroplasty, left knee, doing well, 2.5 weeks out from surgery.    Plan:   * reviewed xrays today, showing new total knee arthroplasty.  * continue DVT prophylaxis for a total of 4 weeks postoperative  * continue with knee range of motion exercises, focusing on extension  * wean off all pain medications as tolerated  * small prescription for  percocet given today. 1 tab q6h as needed, #30, no refills.  * ok to shower, no soaking in tub/pool. No bandage needed.  * xrays next visit: 2 views of the knee  * return to clinic 4 weeks   * elevation for swelling.  * ice as needed.  * Physical Therapy, home exercise program.  * all questions addressed and answered prior to discharge from clinic today to patient's satisfaction.  * patient will need longterm prophylactic antibiotics use with dental procedures or other invasive procedures (2 g amoxicilin or 450mg ([id number]) clindamycin) 1 hour prior to dental procedures.    * Oxford Knee score: NOT DONE today. TO BE DONE at 3 month visit.     Patient to follow up with Primary Care provider regarding elevated blood pressure.      The information in this document, created by a scribe for me, accurately reflects the services I personally performed and the decisions made by me. I have reviewed and approved this document for accuracy.      José Vogel M.D., M.S.  Dept. of Orthopaedic Surgery  [facility]            Again, thank you for allowing me to participate in the care of your patient.        Sincerely,        José Vogel MD

## 2018-11-09 NOTE — TELEPHONE ENCOUNTER
Faxed one of forms for the neb, pt needs face to face, informed [facility] medical  Amaliaher Patiño, TC

## 2018-11-09 NOTE — MR AVS SNAPSHOT
After Visit Summary   11/9/2018    Leah Guerrero    MRN: [id number]           Patient Information     Date Of Birth          1945        Visit Information        Provider Department      11/9/2018 10:45 AM José Vogel MD [facility]        Today's Diagnoses     Status post total left knee replacement    -  1    Primary osteoarthritis of left knee           Follow-ups after your visit        Follow-up notes from your care team     Return in about 4 weeks (around 12/7/2018) for clinical recheck.      Your next 10 appointments already scheduled     Dec 12, 2018 11:00 AM CST   Return Visit with José Vogel MD   [facility] ([facility])    [address] [address] [phone]              Who to contact     If you have questions or need follow up information about today's clinic visit or your schedule please contact [facility] directly at [phone].  Normal or non-critical lab and imaging results will be communicated to you by [employer], letter or phone within 4 business days after the clinic has received the results. If you do not hear from us within 7 days, please contact the clinic through [employer] or phone. If you have a critical or abnormal lab result, we will notify you by phone as soon as possible.  Submit refill requests through [email or web address] or call your pharmacy and they will forward the refill request to us. Please allow 3 business days for your refill to be completed.          Additional Information About Your Visit        MyChart Information     [email or web address] gives you secure access to your electronic health record. If you see a primary care provider, you can also send messages to your care team and make appointments. If you have questions, please call your primary care clinic.  If you do not have a primary care provider, please call [phone] and they will assist you.        Care EveryWhere ID     This  is your Care EveryWhere ID. This could be used by other organizations to access your [facility] medical records  [id number]        Your Vitals Were     Pulse Temperature BMI (Body Mass Index)             107 98.4  F (36.9  C) (Oral) 33.83 kg/m2          Blood Pressure from Last 3 Encounters:   11/09/18 150/58   10/10/18 139/67   09/24/18 146/64    Weight from Last 3 Encounters:   11/09/18 191 lb (86.6 kg)   10/10/18 190 lb (86.2 kg)   09/24/18 187 lb (84.8 kg)                 Today's Medication Changes          These changes are accurate as of 11/9/18 11:52 AM.  If you have any questions, ask your nurse or doctor.               These medicines have changed or have updated prescriptions.        Dose/Directions    * oxyCODONE-acetaminophen 5-325 MG per tablet   Commonly known as:  PERCOCET   This may have changed:  Another medication with the same name was added. Make sure you understand how and when to take each.   Changed by:  José Vogel MD        Take by mouth every 4 hours as needed for moderate to severe pain   Refills:  0       * oxyCODONE-acetaminophen 5-325 MG per tablet   Commonly known as:  PERCOCET   This may have changed:  You were already taking a medication with the same name, and this prescription was added. Make sure you understand how and when to take each.   Used for:  Status post total left knee replacement   Changed by:  oJsé Vogel MD        Dose:  1 tablet   Take 1 tablet by mouth every 6 hours as needed for pain   Quantity:  30 tablet   Refills:  0       * Notice:  This list has 2 medication(s) that are the same as other medications prescribed for you. Read the directions carefully, and ask your doctor or other care provider to review them with you.         Where to get your medicines      Some of these will need a paper prescription and others can be bought over the counter.  Ask your nurse if you have questions.     Bring a paper prescription for each of these medications      oxyCODONE-acetaminophen 5-325 MG per tablet               Information about OPIOIDS     PRESCRIPTION OPIOIDS: WHAT YOU NEED TO KNOW   We gave you an opioid (narcotic) pain medicine. It is important to manage your pain, but opioids are not always the best choice. You should first try all the other options your care team gave you. Take this medicine for as short a time (and as few doses) as possible.    Some activities can increase your pain, such as bandage changes or therapy sessions. It may help to take your pain medicine 30 to 60 minutes before these activities. Reduce your stress by getting enough sleep, working on hobbies you enjoy and practicing relaxation or meditation. Talk to your care team about ways to manage your pain beyond prescription opioids.    These medicines have risks:    DO NOT drive when on new or higher doses of pain medicine. These medicines can affect your alertness and reaction times, and you could be arrested for driving under the influence (DUI). If you need to use opioids long-term, talk to your care team about driving.    DO NOT operate heavy machinery    DO NOT do any other dangerous activities while taking these medicines.    DO NOT drink any alcohol while taking these medicines.     If the opioid prescribed includes acetaminophen, DO NOT take with any other medicines that contain acetaminophen. Read all labels carefully. Look for the word  acetaminophen  or  Tylenol.  Ask your pharmacist if you have questions or are unsure.    You can get addicted to pain medicines, especially if you have a history of addiction (chemical, alcohol or substance dependence). Talk to your care team about ways to reduce this risk.    All opioids tend to cause constipation. Drink plenty of water and eat foods that have a lot of fiber, such as fruits, vegetables, prune juice, apple juice and high-fiber cereal. Take a laxative (Miralax, milk of magnesia, Colace, Senna) if you don t move your bowels at least  every other day. Other side effects include upset stomach, sleepiness, dizziness, throwing up, tolerance (needing more of the medicine to have the same effect), physical dependence and slowed breathing.    Store your pills in a secure place, locked if possible. We will not replace any lost or stolen medicine. If you don t finish your medicine, please throw away (dispose) as directed by your pharmacist. The [place] [employer] has more information about safe disposal: https://www.[email or web address]/living-green/managing-unwanted-medications         Primary Care Provider Office Phone # Fax #    Genaro Dawson [phone] [address] [facility] [address]        Goals        General    I will continue the exercises provided by physical therapy for my knee (pt-stated)     Notes - Note created  7/9/2015 10:30 AM by Ray Holland RN    As of today's date 7/9/2015 goal is met at 0 - 25%.   Goal Status:  Active        I will remember to take my insulin before meals especially lunch when I am  away from home. (pt-stated)     Notes - Note created  3/25/2015  3:51 PM by Ray Holland RN      As of today's date 3/25/2015 goal is met at 0 - 25%.   Goal Status:  Active.          I will work on testing my blood sugar and taking my insulin when I am away from home at lunch time. (pt-stated)     Notes - Note created  5/20/2016  2:43 PM by Ray Holland RN    As of today's date 5/20/2016 goal is met at 0 - 25%.   Goal Status:  Active          Equal Access to Services     [facility]: Hadii candido santos hadasho Soomaali, waaxda luqadaha, qaybta kaalmamaxine parry, jude livingston . So [facility] [phone].    ATENCIÓN: Si habla español, tiene a verduzco disposición servicios gratuitos de asistencia lingüística. Llame al [phone].    We comply with applicable federal civil rights laws and [place] laws. We do not discriminate on the basis of race, color, national  origin, age, disability, sex, sexual orientation, or gender identity.            Thank you!     Thank you for choosing [facility]  for your care. Our goal is always to provide you with excellent care. Hearing back from our patients is one way we can continue to improve our services. Please take a few minutes to complete the written survey that you may receive in the mail after your visit with us. Thank you!             Your Updated Medication List - Protect others around you: Learn how to safely use, store and throw away your medicines at www.disposemymeds.org.          This list is accurate as of 11/9/18 11:52 AM.  Always use your most recent med list.                   Brand Name Dispense Instructions for use Diagnosis    ACE/ARB/ARNI NOT PRESCRIBED (INTENTIONAL)      ACE & ARB not prescribed due to Allergy    Type 2 diabetes mellitus without complication, with long-term current use of insulin (H)       * albuterol (2.5 MG/3ML) 0.083% neb solution     1 Box    Take 3 mLs (2.5 mg) by nebulization 4 times daily as needed    Intermittent asthma       * albuterol 108 (90 Base) MCG/ACT inhaler    PROAIR HFA/PROVENTIL HFA/VENTOLIN HFA    1 Inhaler    Inhale 2 puffs into the lungs every 6 hours as needed for shortness of breath / dyspnea or wheezing    Mild intermittent asthma without complication       amLODIPine 5 MG tablet    NORVASC    30 tablet    TAKE 1 TABLET(5 MG) BY MOUTH DAILY    Hypertension goal BP (blood pressure) < 140/90       aspirin 81 MG tablet      Take 1 tablet by mouth daily. 1 daily        atenolol 50 MG tablet    TENORMIN    180 tablet    TAKE 1 TABLET(50 MG) BY MOUTH TWICE DAILY    Hypertension goal BP (blood pressure) < 140/90       BASAGLAR 100 UNIT/ML injection     75 mL    75 units at bedtime or as directed    Type 2 diabetes mellitus without complication, with long-term current use of insulin (H)       blood glucose monitoring lancets     600 each    TEST 6 TIMES PER DAY    Type  "2 diabetes mellitus without complication, with long-term current use of insulin (H)       blood glucose monitoring test strip    ONETOUCH ULTRA    600 strip    TEST 6 TIMES PER DAY    Type 2 diabetes mellitus without complication, with long-term current use of insulin (H)       calcium polycarbophil 625 MG tablet    FIBERCON    60 tablet    Take 2 tablets (1,250 mg) by mouth daily    Health care maintenance       CALCIUM-D PO      Take  by mouth. 1200mg calcium/600mg D3        fish oil-omega-3 fatty acids 1000 MG capsule      Take 1 capsule by mouth daily.        Garlic Caps      Take  by mouth. One daily        hydrOXYzine 50 MG capsule    VISTARIL    30 capsule    Take 1 capsule (50 mg) by mouth nightly as needed for anxiety or other    Anxiety attack       ibuprofen 800 MG tablet    ADVIL/MOTRIN    100 tablet    Take 1 tablet (800 mg) by mouth every 8 hours as needed for pain    Arthritis       insulin aspart 100 UNIT/ML injection    NovoLOG PEN    30 mL    Patient uses 100 units daily split up between multiple doses. Take 2 units per 15 grams of carbohydrate eaten and 2 units per 30 mg/dL above 170 mg/dL.    Type 2 diabetes mellitus without complication, with long-term current use of insulin (H)       meperidine 50 MG tablet    DEMEROL    20 tablet    Take 1 tablet (50 mg) by mouth every 4 hours as needed    Idiopathic chronic pancreatitis (H)       MULTIVITAMIN TABS   OR      1 TABLET DAILY        * oxyCODONE-acetaminophen 5-325 MG per tablet    PERCOCET     Take by mouth every 4 hours as needed for moderate to severe pain        * oxyCODONE-acetaminophen 5-325 MG per tablet    PERCOCET    30 tablet    Take 1 tablet by mouth every 6 hours as needed for pain    Status post total left knee replacement       pen needles 5/16\" 31G X 8 MM Misc     180 each    Patient does 4 doses novolog and 2 lantus shots  insulin daily.  Needs 180 needles per month. Okay refills for one year.    Type 2 diabetes mellitus without " complication, with long-term current use of insulin (H)       promethazine 12.5 MG tablet    PHENERGAN     Take 12.5 mg by mouth as needed        rosuvastatin 20 MG tablet    CRESTOR    90 tablet    TAKE 1 TABLET(20 MG) BY MOUTH DAILY    Hyperlipidemia LDL goal <100       SALINE      to clean port every other month    Bronchitis       senna-docusate 8.6-50 MG per tablet    SENOKOT-S;PERICOLACE    100 tablet    1-2 po bid prn constipation    Constipation, unspecified constipation type       tiZANidine 2 MG tablet    ZANAFLEX    30 tablet    Take 1 tablet (2 mg) by mouth 3 times daily as needed for muscle spasms    Muscle pain       zolpidem 5 MG tablet    AMBIEN    30 tablet    Take 1 tablet (5 mg) by mouth nightly as needed for sleep    Insomnia, unspecified type       * Notice:  This list has 4 medication(s) that are the same as other medications prescribed for you. Read the directions carefully, and ask your doctor or other care provider to review them with you.

## 2018-11-09 NOTE — PROGRESS NOTES
"Chief Complaint   Patient presents with     Left Knee - Surgical Followup     Surgical Followup     Total Left Knee  DOS 10/23/2018         SURGERY: Hybrid tricompartmental total knee arthroplasty (press-fit tibial and femoral components for the medial, lateral compartments, and cemented patellofemoral component), left knee. ( [facility])  DATE OF SURGERY: 10/23/2018    HISTORY OF PRESENT ILLNESS: Leah \"Bee\" ALEK Guerrero is a 72 year old female seen for postoperative evaluation of left total knee arthroplasty. It has been 2.5 weeks since surgery. Returns today stating doing fine. Pain is rated an 8/10. Presets with a walker today. Denies any wound problems. Denies numbness, tingling, or weakness in the affected extremity. Denies fevers chills night sweats. Continues to take 325 mg aspirin for anti-coagulation for deep vein thrombosis prophylaxis. Takes percocet for pain, 1 tablet every 6 hours. Has been doing Physical Therapy at [facility] here in [place]. Will be going home today.  Concerns today include: none.    Present symptoms: pain, + swelling.    Pain severity: 8/10  Pain quality: aching  Frequency of symptoms: frequently  Exacerbating Factors: weightbearing, end range of motion   Relieving Factors: rest, oxycodone   Night Pain: Yes  Pain while at rest: No   Associated numbness or tingling: No     Past medical history:   Past Medical History:   Diagnosis Date     Abnormal CT of the chest 10/12    Nodular consolidation right lobe 1.6 cm. Needs repeat CT Jan 2013     Bleeding disorder (H)     in bowels x2      Chronic pancreatitis (H)      CVA (cerebral infarction)      Diabetes      Female stress incontinence      Hyperlipidemia LDL goal <100      Hypertension      Intermittent asthma 12/30/2011     Lateral epicondylitis (tennis elbow) - left 3/30/2011     OA (osteoarthritis) of knee 3/22/2013     Pulmonary nodule, left 10/12    0.5 cm; needs f/u chest CT scan Jan 2013     Surgical complications  "     Unspecified asthma(493.90)      Patient Active Problem List    Diagnosis Date Noted     Mild intermittent asthma without complication 2018     Priority: Medium     Malignant neoplasm of lung, unspecified laterality, unspecified part of lung (H) 2017     Priority: Medium     Chronic pancreatitis, unspecified pancreatitis type (H) 2016     Priority: Medium     Type 2 diabetes mellitus without complication, with long-term current use of insulin (H) 2016     Priority: Medium     Anxiety 2016     Priority: Medium     Insomnia, unspecified type 2016     Priority: Medium     Idiopathic chronic pancreatitis (H) 2015     Priority: Medium     Primary osteoarthritis of left knee 10/28/2015     Priority: Medium     Hypertension goal BP (blood pressure) < 140/90 2015     Priority: Medium     Obesity 2015     Priority: Medium     Health Care Home 10/22/2014     Priority: Medium     [facility]   Guerline Zhao RN  [phone]    [facility] CARE PLAN SUMMARY    Client Name:  Leah Guerrero  Address:  [address] [place] [id number] Phone: [phone]   :  1945 Date of Assessment: 10/8/18   Health Plan:  [employer]  Health Plan Number: [id number] Medical Assistance Number: [id number]  Financial Worker:  Savanna Collins  [phone]  Case #:  [id number]   [facility] :  Guerline Zhao RN CM Phone:  [phone]   Fax:  [phone]   [facility] Enrollment Date: 18 Case Mix:  L  Rate Cell:  B  Waiver Type: EW    Emergency Contact:   Primary Emergency Contact: Paul Guerrero  [place] Phone: [phone]  Relation: Son  Secondary Emergency Contact: Hiram Guerrero   Mobile Phone: [phone]  Relation: Son Language:  English  :  No   Health Care Agent/POA:   Advanced Directives/Living Will:  On file   Primary Care Clinic/Phone/Fax:  [facility]/(p) [phone], (f)  "[phone] Primary Dx:  Diabetes:E11.9  Secondary Dx: Osteoarthritis: M19.91    Primary Physician:  Kevin Esquivel   Height:  5' 3\"  Weight:  186 lbs   Specialty Physician:    Audiologist:     Eye Care Provider:   Dental Care Provider:    [employer]: [facility] [phone]   Other:        [employer]  [phone]  Fax: [phone] 11/1/18-10/31/19 weekly 4 hrs/week  (16 units)  $4.61/unit      Transportation[employer] 11/1/18-10/31/19 Monthly as needed Rush:10/m  Non Rush: 10/m Rush $4/ticket  ($40/m)  Non rush  $3/ticket ($30/m)     [employer]Mental Health   Aditi Hudson:[phone]  Fax: [phone] Ongoing Monthly visits       [facility] worker       Intermittent asthma without complication 07/11/2014     Priority: Medium     IT band syndrome 01/13/2014     Priority: Medium     Contusion of knee 11/20/2013     Priority: Medium     Prepatellar bursitis of left knee 11/20/2013     Priority: Medium     Insomnia 05/30/2013     Priority: Medium     Mechanical low back pain 03/22/2013     Priority: Medium     Radicular pain of left lower extremity 03/22/2013     Priority: Medium     GERD (gastroesophageal reflux disease) 02/06/2013     Priority: Medium     Pulmonary nodule, left      Priority: Medium     0.5 cm; needs f/u chest CT scan Jan 2013       Abnormal CT of the chest      Priority: Medium     Nodular consolidation right lobe 1.6 cm. Needs repeat CT Jan 2013       Anxiety state 06/21/2012     Priority: Medium     Problem list name updated by automated process. Provider to review       Intermittent asthma 12/30/2011     Priority: Medium     Advanced directives, counseling/discussion 05/06/2011     Priority: Medium     04/29/2013  Advance Care Planning:   Receipt of ACP document:  Received: Health Care Directive which was witnessed or notarized on 07/06/2011.  Document previously scanned on 07/07/2011.  Validation form completed and sent to be scanned.  Code Status " reflects choices in most recent ACP document.  Confirmed/documented designated decision maker(s). See permanent comments section of demographics in clinical tab. View document(s) and details by clicking on code status.   Added by Lacie Canchola on 4/29/2013.          Advance Care Planning:   ACP Review and Resources Provided:  Reviewed chart for advance care plan.  Leah Guerrero has no plan or code status on file however states presence of ACP document. Copy requested. Confirmed code status reflects current choices pending receipt of document/advance care plan review. Confirmed/documented designated decision maker(s). See permanent comments section of demographics in clinical tab.   Added by Emma Medina on 4/24/2013          Planning  Advance Directive Initial Visit  Leah Guerrero presents in person for initial session regarding completion of advanced directive form. She was referred to the facilitator by self.  She currently has an advance directive from 1980 & wants to fill out a new updated one.  Plan:  Advanced directive form, healthcare agent information card, advanced care planning information booklet provided to patient.   Follow up meeting: to be arranged when patient calls back to make an appointment after reviewing documents in one week or so. Patient instructed to bring healthcare agent to this meeting.  Flores Gerard RN  Letter sent to patient for [employer] follow up.  6/22/2011  Flores Gerard RN  Advance Directive Follow-up Visit  Leah Guerrero presents for advanced care planning session accompanied by no one.  Reviewed definitions of advanced care planning and advance directive form, and informational handouts given to patient previously.  Patient has questions and /or concerns about acp process or form .  Reviewed advance directive form with patient & answered all of her questions. Designated healthcare agent is identified. Healthcare agent s name is Paul Guerrero. My  Hopes and Wishes reviewed.  Finalized wishes are clear to patient Yes  Patient and designated healthcare agent are aware that document may be changed at any time in the future.  Advanced directive form: completed at this visit.  Advanced directive form: verified with notary signature. Original and two copies of advanced directive form given to patient copy sent to  for scanning into EMR and original given to patient and instructed to give copy to designated [facility] and non-[facility] physician where applicable.  Flores Gerard RN  7/6/2011  Advance Directive Problem List Overview:   Name Relationship Phone    Primary Health Care Agent Palu Bro [phone]         Alternative Health Care Agent Hiram Guerrero Son c [phone]  [phone]                      Hyperlipidemia LDL goal <100 12/16/2010     Priority: Medium     Fatigue 12/15/2010     Priority: Medium     Type 2 diabetes, HbA1C goal < 8% (H) 11/24/2010     Priority: Medium     Female stress incontinence 10/28/2010     Priority: Medium       Past Surgical History:   Past Surgical History:   Procedure Laterality Date     APPENDECTOMY  2012     C HAND/FINGER SURGERY UNLISTED       C SHOULDER SURG PROC UNLISTED       C STOMACH SURGERY PROCEDURE UNLISTED       CHOLECYSTECTOMY  1969     COLONOSCOPY  6-2-08    Neg. At [facility]     COLONOSCOPY  9-3-13     EYE SURGERY       HYSTERECTOMY TOTAL ABD, HEIDI SALPINGO-OOPHORECTOMY, NODE DISSECTION, TUMOR DEBULKING, COMBINED  1988    fibroids?     KNEE SURGERY  x5     KNEE SURGERY      kneecap procedure     LUNG SURGERY  10-7-16    removal lung cancer     SHOULDER SURGERY  2005    right shoulder, bone spurs     WRIST SURGERY      right wrist       Medications:   Current Outpatient Prescriptions:      ACE/ARB NOT PRESCRIBED, INTENTIONAL,, ACE & ARB not prescribed due to Allergy, Disp: , Rfl:      albuterol (2.5 MG/3ML) 0.083% nebulizer solution, Take 3 mLs (2.5 mg) by nebulization 4 times daily as needed,  "Disp: 1 Box, Rfl: 5     albuterol (PROAIR HFA/PROVENTIL HFA/VENTOLIN HFA) 108 (90 Base) MCG/ACT Inhaler, Inhale 2 puffs into the lungs every 6 hours as needed for shortness of breath / dyspnea or wheezing, Disp: 1 Inhaler, Rfl: 11     amLODIPine (NORVASC) 5 MG tablet, TAKE 1 TABLET(5 MG) BY MOUTH DAILY, Disp: 30 tablet, Rfl: 1     aspirin 81 MG tablet, Take 1 tablet by mouth daily. 1 daily, Disp: , Rfl:      atenolol (TENORMIN) 50 MG tablet, TAKE 1 TABLET(50 MG) BY MOUTH TWICE DAILY, Disp: 180 tablet, Rfl: 1     BASAGLAR 100 UNIT/ML injection, 75 units at bedtime or as directed, Disp: 75 mL, Rfl: 3     blood glucose monitoring (ONE TOUCH ULTRASOFT) lancets, TEST 6 TIMES PER DAY, Disp: 600 each, Rfl: 0     blood glucose monitoring (ONETOUCH ULTRA) test strip, TEST 6 TIMES PER DAY, Disp: 600 strip, Rfl: 11     Calcium Carbonate-Vitamin D (CALCIUM-D PO), Take  by mouth. 1200mg calcium/600mg D3, Disp: , Rfl:      calcium polycarbophil (FIBERCON) 625 MG tablet, Take 2 tablets (1,250 mg) by mouth daily, Disp: 60 tablet, Rfl: 11     fish oil-omega-3 fatty acids (FISH OIL) 1000 MG capsule, Take 1 capsule by mouth daily., Disp: , Rfl:      Garlic CAPS, Take  by mouth. One daily, Disp: , Rfl:      hydrOXYzine (VISTARIL) 50 MG capsule, Take 1 capsule (50 mg) by mouth nightly as needed for anxiety or other, Disp: 30 capsule, Rfl: 0     ibuprofen (ADVIL,MOTRIN) 800 MG tablet, Take 1 tablet (800 mg) by mouth every 8 hours as needed for pain, Disp: 100 tablet, Rfl: 0     insulin aspart (NOVOLOG PEN) 100 UNIT/ML injection, Patient uses 100 units daily split up between multiple doses. Take 2 units per 15 grams of carbohydrate eaten and 2 units per 30 mg/dL above 170 mg/dL., Disp: 30 mL, Rfl: 1     Insulin Pen Needle (PEN NEEDLES 5/16\") 31G X 8 MM MISC, Patient does 4 doses novolog and 2 lantus shots  insulin daily.  Needs 180 needles per month. Okay refills for one year., Disp: 180 each, Rfl: 11     meperidine (DEMEROL) 50 MG " tablet, Take 1 tablet (50 mg) by mouth every 4 hours as needed, Disp: 20 tablet, Rfl: 0     MULTIVITAMIN TABS   OR, 1 TABLET DAILY, Disp: , Rfl:      oxyCODONE-acetaminophen (PERCOCET) 5-325 MG per tablet, Take by mouth every 4 hours as needed for moderate to severe pain, Disp: , Rfl:      promethazine (PHENERGAN) 12.5 MG tablet, Take 12.5 mg by mouth as needed, Disp: , Rfl: 5     rosuvastatin (CRESTOR) 20 MG tablet, TAKE 1 TABLET(20 MG) BY MOUTH DAILY, Disp: 90 tablet, Rfl: 2     SALINE, to clean port every other month, Disp: , Rfl:      senna-docusate (SENOKOT-S;PERICOLACE) 8.6-50 MG per tablet, 1-2 po bid prn constipation, Disp: 100 tablet, Rfl: 1     tiZANidine (ZANAFLEX) 2 MG tablet, Take 1 tablet (2 mg) by mouth 3 times daily as needed for muscle spasms, Disp: 30 tablet, Rfl: 0     zolpidem (AMBIEN) 5 MG tablet, Take 1 tablet (5 mg) by mouth nightly as needed for sleep, Disp: 30 tablet, Rfl: 2    Allergies:   Allergies   Allergen Reactions     Hydralazine Shortness Of Breath     Famotidine Nausea and Vomiting and Unknown     Benadryl Itch Stopping      Gives her more energy, can't sleep     Lisinopril      Tongue swelling       Metoclopramide Nausea and Vomiting     Ondansetron Nausea and Vomiting     Other reaction(s): Headache     Penicillins Hives     Tape [Adhesive Tape]      blisters     Tylenol      Anxiety  Tablets only.     REVIEW OF SYSTEMS:  CONSTITUTIONAL:NEGATIVE for fever, chills, night sweats, change in weight  INTEGUMENTARY/SKIN: NEGATIVE for worrisome rashes, moles or lesions  MUSCULOSKELETAL:See HPI above  NEURO: NEGATIVE for weakness, dizziness or paresthesias  CARDIOVASCULAR: negative for chest pain, shortness of breath    This document serves as a record of the services and decisions personally performed and made by José Vogel MD. It was created on his behalf by Nely Pratt, a trained medical scribe. The creation of this document is based the provider's statements to the medical  phillReno    Kirillfelicitas Pratt 11:29 AM 11/9/2018     PHYSICAL EXAM:  /58  Pulse 107  Temp 98.4  F (36.9  C) (Oral)  Wt 191 lb (86.6 kg)  BMI 33.83 kg/m2   GENERAL APPEARANCE: healthy, alert, no distress  SKIN: no suspicious lesions or rashes  NEURO: Normal strength and tone, mentation intact and speech normal  PSYCH:  mentation appears normal and affect normal  RESPIRATORY: No increased work of breathing.    BILATERAL LOWER EXTREMITIES:  Gait: quadriceps avoidance, favoring left knee, using a walker   Intact sensation deep peroneal nerve, superficial peroneal nerve, med/lat tibial nerve, sural nerve, saphenous nerve  Intact EHL, EDL, TA, FHL, GS, quadriceps hamstrings and hip flexors  Toes warm and well perfused, brisk capillary refill. Palpable 2+ dp pulses.  Bilateral calf soft and nttp or squeeze.  Edema: 1+ left lower extremity.    LEFT KNEE EXAM:    Incision: healing well, skin well approximated. Skin glue.  Skin: intact, no ecchymosis or erythema  ROM: 5 extension to 90 flexion  Effusion: moderate  Tender: diffuse mild tenderness.    X-RAY:  3 views left knee from 11/9/2018 were reviewed in clinic today. No obvious fractures or dislocations. Total knee arthroplasty components in place without evidence of failure or loosening.    Impression: Leah Guerrero is a 72 year old female status post Total knee arthroplasty, left knee, doing well, 2.5 weeks out from surgery.    Plan:   * reviewed xrays today, showing new total knee arthroplasty.  * continue DVT prophylaxis for a total of 4 weeks postoperative  * continue with knee range of motion exercises, focusing on extension  * wean off all pain medications as tolerated  * small prescription for percocet given today. 1 tab q6h as needed, #30, no refills.  * ok to shower, no soaking in tub/pool. No bandage needed.  * xrays next visit: 2 views of the knee  * return to clinic 4 weeks   * elevation for swelling.  * ice as needed.  * Physical Therapy, home  exercise program.  * all questions addressed and answered prior to discharge from clinic today to patient's satisfaction.  * patient will need longterm prophylactic antibiotics use with dental procedures or other invasive procedures (2 g amoxicilin or 450mg ([id number]) clindamycin) 1 hour prior to dental procedures.    * Oxford Knee score: NOT DONE today. TO BE DONE at 3 month visit.     Patient to follow up with Primary Care provider regarding elevated blood pressure.      The information in this document, created by a scribe for me, accurately reflects the services I personally performed and the decisions made by me. I have reviewed and approved this document for accuracy.      José Vogel M.D., M.S.  Dept. of Orthopaedic Surgery  [facility]

## 2018-11-10 ENCOUNTER — MEDICAL CORRESPONDENCE (OUTPATIENT)
Dept: HEALTH INFORMATION MANAGEMENT | Facility: CLINIC | Age: 73
End: 2018-11-10

## 2018-11-10 ENCOUNTER — DOCUMENTATION ONLY (OUTPATIENT)
Dept: CARE COORDINATION | Facility: CLINIC | Age: 73
End: 2018-11-10

## 2018-11-10 NOTE — PROGRESS NOTES
[facility] now requests orders and shares plan of care/discharge summaries for some patients through [employer].  Please REPLY TO THIS MESSAGE OR ROUTE BACK TO THE AUTHOR in order to give authorization for orders when needed.  This is considered a verbal order, you will still receive a faxed copy of orders for signature.  Thank you for your assistance in improving collaboration for our patients.    ORDER    Skilled nursing 1 week 1, 2 week 2, 1 week 2, 3 PRN  For disease and symptom management, wound assess, pain management, UTI and diabetic education.  PT eval and treat for strengthening, ROM, HEP.  OT eval and treat for HSE, adls, DME needs  HHA 2 week 2, 1 week 2 for bathing and hygiene cares.      Reema Varma, RN Admission Clinician  [facility]  [phone]

## 2018-11-12 ENCOUNTER — PATIENT OUTREACH (OUTPATIENT)
Dept: GERIATRIC MEDICINE | Facility: CLINIC | Age: 73
End: 2018-11-12

## 2018-11-12 ENCOUNTER — TELEPHONE (OUTPATIENT)
Dept: ORTHOPEDICS | Facility: CLINIC | Age: 73
End: 2018-11-12

## 2018-11-12 NOTE — PROGRESS NOTES
11/12/18 ARPAN received a VM from  Home Care nurse Carol that member was admitted to home care 11/10/18 for SNV, HHA, PT/OT eval.  ARPAN called leah for SNF follow up no answer, LMTRC.  ARPAN also wants to discuss her need of a nebulizer. ARPAN had received a message from [facility] stating her clinic wants her to come in for an evaluation of the needing of a nebulizer before her doctor will sign for one from [facility]. Leah needs to see her doctor for this first.   LMTRC.  Keyanna Zhao RN BA PHN  [facility] Case Management  [phone]

## 2018-11-12 NOTE — PROGRESS NOTES
11/12/18 CM called member again for follow up from TCU discharge. [facility].  Keyanna Zhao RN BA PHN  [facility] Case Management  [phone]

## 2018-11-12 NOTE — TELEPHONE ENCOUNTER
Reason for Call: Request for an order or referral:    Order or referral being requested: Grzegorz calling and asking for and order for PT twice a week for 3 weeks for home therapy    Date needed: as soon as possible    Has the patient been seen by the PCP for this problem? Not Applicable    Additional comments: verbal ok    Phone number Patient can be reached at:  Other phone number: [phone]     Best Time:  Any time    Can we leave a detailed message on this number?  YES    Call taken on 11/12/2018 at 2:00 PM by Bita Handy

## 2018-11-12 NOTE — TELEPHONE ENCOUNTER
I left a VM for Grzegorz letting him know it was OK to go ahead with home Physical Therapy as proposed.    Grant Dillon PA-C, CAQ (Ortho)  Supervising Physician: José Vogel M.D., M.S.  Dept. of Orthopaedic Surgery  [facility]

## 2018-11-19 ENCOUNTER — OFFICE VISIT (OUTPATIENT)
Dept: FAMILY MEDICINE | Facility: CLINIC | Age: 73
End: 2018-11-19
Payer: COMMERCIAL

## 2018-11-19 VITALS
TEMPERATURE: 98.3 F | SYSTOLIC BLOOD PRESSURE: 165 MMHG | BODY MASS INDEX: 31.95 KG/M2 | WEIGHT: 180.38 LBS | DIASTOLIC BLOOD PRESSURE: 69 MMHG | HEART RATE: 71 BPM | OXYGEN SATURATION: 98 %

## 2018-11-19 DIAGNOSIS — E11.9 TYPE 2 DIABETES MELLITUS WITHOUT COMPLICATION, WITH LONG-TERM CURRENT USE OF INSULIN (H): ICD-10-CM

## 2018-11-19 DIAGNOSIS — Z79.4 TYPE 2 DIABETES MELLITUS WITHOUT COMPLICATION, WITH LONG-TERM CURRENT USE OF INSULIN (H): ICD-10-CM

## 2018-11-19 DIAGNOSIS — J45.20 MILD INTERMITTENT ASTHMA WITHOUT COMPLICATION: Primary | ICD-10-CM

## 2018-11-19 DIAGNOSIS — K86.1 IDIOPATHIC CHRONIC PANCREATITIS (H): ICD-10-CM

## 2018-11-19 DIAGNOSIS — D64.9 ANEMIA, UNSPECIFIED TYPE: ICD-10-CM

## 2018-11-19 DIAGNOSIS — Z96.652 STATUS POST TOTAL LEFT KNEE REPLACEMENT: ICD-10-CM

## 2018-11-19 PROCEDURE — 99214 OFFICE O/P EST MOD 30 MIN: CPT | Performed by: FAMILY MEDICINE

## 2018-11-19 RX ORDER — MEPERIDINE HYDROCHLORIDE 50 MG/1
50 TABLET ORAL EVERY 4 HOURS PRN
Qty: 20 TABLET | Refills: 0 | Status: SHIPPED | OUTPATIENT
Start: 2018-11-19 | End: 2019-08-29

## 2018-11-19 ASSESSMENT — PAIN SCALES - GENERAL: PAINLEVEL: NO PAIN (0)

## 2018-11-19 NOTE — LETTER
[address] [address]  [phone]               2018    Order for Leah Guerrero  49    Draw blood through her port to check these two labs:      Hemoglobin a1c, dx type 2 diabetes #11.9    Hemoglobin, dx anemia D64.9        Fax results to Dr. Esquivel [phone]                    Kevin Esquivel MD

## 2018-11-19 NOTE — MR AVS SNAPSHOT
After Visit Summary   11/19/2018    Leah Guerrero    MRN: [id number]           Patient Information     Date Of Birth          1945        Visit Information        Provider Department      11/19/2018 1:40 PM Kevin Esquivel MD [facility]        Today's Diagnoses     Mild intermittent asthma without complication    -  1    Anemia, unspecified type        Type 2 diabetes mellitus without complication, with long-term current use of insulin (H)          Care Instructions    Start QVAR steroid inhaler on regular 2x daily basis    Hopefully you can cut way back on frequency of albuterol usage    Increase walking as able    We will send you lab results              Follow-ups after your visit        Your next 10 appointments already scheduled     Dec 12, 2018 11:00 AM CST   [facility]    [address] [address] [phone]              Who to contact     If you have questions or need follow up information about today's clinic visit or your schedule please contact [facility] directly at [phone].  Normal or non-critical lab and imaging results will be communicated to you by Shoot Extremehart, letter or phone within 4 business days after the clinic has received the results. If you do not hear from us within 7 days, please contact the clinic through Shoot Extremehart or phone. If you have a critical or abnormal lab result, we will notify you by phone as soon as possible.  Submit refill requests through Conductor or call your pharmacy and they will forward the refill request to us. Please allow 3 business days for your refill to be completed.          Additional Information About Your Visit        MyChart Information     Conductor gives you secure access to your electronic health record. If you see a primary care provider, you can also send messages to your care team and  make appointments. If you have questions, please call your primary care clinic.  If you do not have a primary care provider, please call [phone] and they will assist you.        Care EveryWhere ID     This is your Care EveryWhere ID. This could be used by other organizations to access your [place] medical records  [id number]        Your Vitals Were     Pulse Temperature Pulse Oximetry Breastfeeding? BMI (Body Mass Index)       71 98.3  F (36.8  C) (Oral) 98% No 31.95 kg/m2        Blood Pressure from Last 3 Encounters:   11/19/18 165/69   11/09/18 150/58   10/10/18 139/67    Weight from Last 3 Encounters:   11/19/18 180 lb 6 oz (81.8 kg)   11/09/18 191 lb (86.6 kg)   10/10/18 190 lb (86.2 kg)              We Performed the Following     Hemoglobin A1c     Hemoglobin          Today's Medication Changes          These changes are accurate as of 11/19/18  2:10 PM.  If you have any questions, ask your nurse or doctor.               Start taking these medicines.        Dose/Directions    beclomethasone HFA 80 MCG/ACT inhaler   Commonly known as:  QVAR REDIHALER   Used for:  Mild intermittent asthma without complication   Started by:  Kevin Esquivel MD        Dose:  1 puff   Inhale 1 puff into the lungs 2 times daily   Quantity:  10.6 g   Refills:  6            Where to get your medicines      These medications were sent to [facility] [address] [address]     Phone:  [phone]     beclomethasone HFA 80 MCG/ACT inhaler                Primary Care Provider Office Phone # Fax #    Genaro Dawson [phone]       [address] [facility] [address]        Goals        General    I will continue the exercises provided by physical therapy for my knee (pt-stated)     Notes - Note created  7/9/2015 10:30 AM by Ray Holland RN    As of today's date 7/9/2015 goal is met at 0 - 25%.   Goal Status:  Active        I will  remember to take my insulin before meals especially lunch when I am  away from home. (pt-stated)     Notes - Note created  3/25/2015  3:51 PM by Ray Holland RN      As of today's date 3/25/2015 goal is met at 0 - 25%.   Goal Status:  Active.          I will work on testing my blood sugar and taking my insulin when I am away from home at lunch time. (pt-stated)     Notes - Note created  5/20/2016  2:43 PM by Ray Holland RN    As of today's date 5/20/2016 goal is met at 0 - 25%.   Goal Status:  Active          Equal Access to Services     [facility]: Hadii aad ku hadasho Soomaali, waaxda luqadaha, qaybta kaalmada adeegyada, jude livingston . So [facility] [phone].    ATENCIÓN: Si habla español, tiene a verduzco disposición servicios gratuitos de asistencia lingüística. Llame al [phone].    We comply with applicable federal civil rights laws and [place] laws. We do not discriminate on the basis of race, color, national origin, age, disability, sex, sexual orientation, or gender identity.            Thank you!     Thank you for choosing [facility]  for your care. Our goal is always to provide you with excellent care. Hearing back from our patients is one way we can continue to improve our services. Please take a few minutes to complete the written survey that you may receive in the mail after your visit with us. Thank you!             Your Updated Medication List - Protect others around you: Learn how to safely use, store and throw away your medicines at www.disposemymeds.org.          This list is accurate as of 11/19/18  2:10 PM.  Always use your most recent med list.                   Brand Name Dispense Instructions for use Diagnosis    ACE/ARB/ARNI NOT PRESCRIBED (INTENTIONAL)      ACE & ARB not prescribed due to Allergy    Type 2 diabetes mellitus without complication, with long-term current use of insulin (H)       * albuterol (2.5 MG/3ML) 0.083% neb  solution     1 Box    Take 3 mLs (2.5 mg) by nebulization 4 times daily as needed    Intermittent asthma       * albuterol 108 (90 Base) MCG/ACT inhaler    PROAIR HFA/PROVENTIL HFA/VENTOLIN HFA    1 Inhaler    Inhale 2 puffs into the lungs every 6 hours as needed for shortness of breath / dyspnea or wheezing    Mild intermittent asthma without complication       amLODIPine 5 MG tablet    NORVASC    30 tablet    TAKE 1 TABLET(5 MG) BY MOUTH DAILY    Hypertension goal BP (blood pressure) < 140/90       aspirin 81 MG tablet      Take 1 tablet by mouth daily. 1 daily        atenolol 50 MG tablet    TENORMIN    180 tablet    TAKE 1 TABLET(50 MG) BY MOUTH TWICE DAILY    Hypertension goal BP (blood pressure) < 140/90       beclomethasone HFA 80 MCG/ACT inhaler    QVAR REDIHALER    10.6 g    Inhale 1 puff into the lungs 2 times daily    Mild intermittent asthma without complication       blood glucose monitoring lancets     600 each    TEST 6 TIMES PER DAY    Type 2 diabetes mellitus without complication, with long-term current use of insulin (H)       blood glucose monitoring test strip    ONETOUCH ULTRA    600 strip    TEST 6 TIMES PER DAY    Type 2 diabetes mellitus without complication, with long-term current use of insulin (H)       calcium polycarbophil 625 MG tablet    FIBERCON    60 tablet    Take 2 tablets (1,250 mg) by mouth daily    Health care maintenance       CALCIUM-D PO      Take  by mouth. 1200mg calcium/600mg D3        fish oil-omega-3 fatty acids 1000 MG capsule      Take 1 capsule by mouth daily.        Garlic Caps      Take  by mouth. One daily        hydrOXYzine 50 MG capsule    VISTARIL    30 capsule    Take 1 capsule (50 mg) by mouth nightly as needed for anxiety or other    Anxiety attack       ibuprofen 800 MG tablet    ADVIL/MOTRIN    100 tablet    Take 1 tablet (800 mg) by mouth every 8 hours as needed for pain    Arthritis       insulin aspart 100 UNIT/ML injection    NovoLOG PEN    30 mL     "Patient uses 100 units daily split up between multiple doses. Take 2 units per 15 grams of carbohydrate eaten and 2 units per 30 mg/dL above 170 mg/dL.    Type 2 diabetes mellitus without complication, with long-term current use of insulin (H)       insulin glargine 100 UNIT/ML pen     75 mL    75 units at bedtime or as directed    Type 2 diabetes mellitus without complication, with long-term current use of insulin (H)       meperidine 50 MG tablet    DEMEROL    20 tablet    Take 1 tablet (50 mg) by mouth every 4 hours as needed    Idiopathic chronic pancreatitis (H)       MULTIVITAMIN TABS   OR      1 TABLET DAILY        * oxyCODONE-acetaminophen 5-325 MG per tablet    PERCOCET     Take by mouth every 4 hours as needed for moderate to severe pain        * oxyCODONE-acetaminophen 5-325 MG per tablet    PERCOCET    30 tablet    Take 1 tablet by mouth every 6 hours as needed for pain    Status post total left knee replacement       pen needles 5/16\" 31G X 8 MM Misc     180 each    Patient does 4 doses novolog and 2 lantus shots  insulin daily.  Needs 180 needles per month. Okay refills for one year.    Type 2 diabetes mellitus without complication, with long-term current use of insulin (H)       promethazine 12.5 MG tablet    PHENERGAN     Take 12.5 mg by mouth as needed        rosuvastatin 20 MG tablet    CRESTOR    90 tablet    TAKE 1 TABLET(20 MG) BY MOUTH DAILY    Hyperlipidemia LDL goal <100       SALINE      to clean port every other month    Bronchitis       senna-docusate 8.6-50 MG per tablet    SENOKOT-S;PERICOLACE    100 tablet    1-2 po bid prn constipation    Constipation, unspecified constipation type       tiZANidine 2 MG tablet    ZANAFLEX    30 tablet    Take 1 tablet (2 mg) by mouth 3 times daily as needed for muscle spasms    Muscle pain       zolpidem 5 MG tablet    AMBIEN    30 tablet    Take 1 tablet (5 mg) by mouth nightly as needed for sleep    Insomnia, unspecified type       * Notice:  This " list has 4 medication(s) that are the same as other medications prescribed for you. Read the directions carefully, and ask your doctor or other care provider to review them with you.

## 2018-11-19 NOTE — PROGRESS NOTES
SUBJECTIVE:   Leah Guerrero is a 72 year old female who presents to clinic today for the following health issues:       Surgery follow up         Problem list and histories reviewed & adjusted, as indicated.  Additional history: as documented         Reviewed and updated as needed this visit by clinical staff  Tobacco  Allergies  Meds  Problems  Med Hx  Surg Hx  Fam Hx  Soc Hx        Reviewed and updated as needed this visit by Provider          saw orthopedics a few days ago, knee doing okay    Some issues breathing    Problems come and go    Inhaler helps some      Using albuterol 3-4 x daily    Neb is 2x daily or once    No chest pain with walking        Physical Exam   Constitutional: She is oriented to person, place, and time and well-developed, well-nourished, and in no distress.   HENT:   Head: Normocephalic.   Right Ear: External ear normal.   Left Ear: External ear normal.   Nose: Nose normal.   Mouth/Throat: Oropharynx is clear and moist.   No sinus/ submandib tenderness     Eyes: Conjunctivae are normal.   Neck: Neck supple.   Cardiovascular: Normal rate, regular rhythm and normal heart sounds.    Pulmonary/Chest: Effort normal and breath sounds normal. No respiratory distress.   Neurological: She is alert and oriented to person, place, and time.     Patient with walker here  Not able to fully extend the left knee  Can flex to about 90      Hemoglobin had dropped to 8.6 after knee surgery    ASSESSMENT / PLAN:  (J45.20) Mild intermittent asthma without complication  (primary encounter diagnosis)  Comment: needs maintenance inhaler.  Goal is to reduce albuterol usage.  Plan: beclomethasone HFA (QVAR REDIHALER) 80 MCG/ACT         inhaler             (D64.9) Anemia, unspecified type  Comment: needs to get this rechecked but with port can't do here   Plan: CANCELED: Hemoglobin             (E11.9,  Z79.4) Type 2 diabetes mellitus without complication, with long-term current use of insulin  (H)  Comment: needs it rechecked   Plan: CANCELED: Hemoglobin A1c            (K86.1) Idiopathic chronic pancreatitis (H)  Comment: only rarely uses this.  It sometimes allow her to avoid hospitalization. She has been hospitalized many times for this in past.  Plan: meperidine (DEMEROL) 50 MG tablet             S/p tka: overall stable.  Plan per orthopedics .       I reviewed the patient's medications, allergies, medical history, family history, and social history.    Kevin Esquivel MD

## 2018-11-19 NOTE — PATIENT INSTRUCTIONS
Start QVAR steroid inhaler on regular 2x daily basis    Hopefully you can cut way back on frequency of albuterol usage    Increase walking as able    We will send you lab results

## 2018-11-28 DIAGNOSIS — Z53.9 DIAGNOSIS NOT YET DEFINED: Primary | ICD-10-CM

## 2018-11-28 PROCEDURE — G0180 MD CERTIFICATION HHA PATIENT: HCPCS | Performed by: FAMILY MEDICINE

## 2018-11-29 ENCOUNTER — TELEPHONE (OUTPATIENT)
Dept: FAMILY MEDICINE | Facility: CLINIC | Age: 73
End: 2018-11-29

## 2018-11-29 DIAGNOSIS — R05.9 COUGH: Primary | ICD-10-CM

## 2018-11-29 RX ORDER — BENZONATATE 100 MG/1
100 CAPSULE ORAL 3 TIMES DAILY PRN
Qty: 42 CAPSULE | Refills: 1 | Status: SHIPPED | OUTPATIENT
Start: 2018-11-29 | End: 2019-10-10

## 2018-11-29 NOTE — TELEPHONE ENCOUNTER
Called Alejandra at [phone]. Left a detailed message relaying the information below and adivsed to return call to nurse triage line at [phone] with any questions.    Sherron Barkley RN

## 2018-11-29 NOTE — TELEPHONE ENCOUNTER
Reason for Call:  Other     Detailed comments: Needs orders once a week for three weeks for assessment until patient sees surgeon 12/12/18. Stated that domenica verma was on discharge summary and she did not receive. She takes 100 mg three times daily [facility] pharmacy [phone]    Phone Number Patient can be reached at: Other phone number:    [facility] ([facility]) [phone]         Best Time:     Can we leave a detailed message on this number? YES    Call taken on 11/29/2018 at 11:36 AM by Fina Ferro

## 2018-11-29 NOTE — TELEPHONE ENCOUNTER
I sent in prescription for tessalon    Okay to home care orders    Please call    Kevin Esquivel MD

## 2018-11-29 NOTE — TELEPHONE ENCOUNTER
Routing to PCP to review and advise.    Pharmacy cued.  Jemima August not on active medication list.    Treva Tyson RN  [facility]

## 2018-11-30 ENCOUNTER — PATIENT OUTREACH (OUTPATIENT)
Dept: GERIATRIC MEDICINE | Facility: CLINIC | Age: 73
End: 2018-11-30

## 2018-11-30 ENCOUNTER — TELEPHONE (OUTPATIENT)
Dept: ORTHOPEDICS | Facility: CLINIC | Age: 73
End: 2018-11-30

## 2018-11-30 NOTE — TELEPHONE ENCOUNTER
Reason for Call: Request for an order or referral:    Order or referral being requested: Continue 1x a week for 3x weeks        Date needed: as soon as possible    Has the patient been seen by the PCP for this problem? YES    Additional comments: none     Phone number Patient can be reached at:  Other phone number:  [phone]    Best Time:  Any     Can we leave a detailed message on this number?  YES    Call taken on 11/30/2018 at 3:58 PM by Reynaldo Preston

## 2018-11-30 NOTE — PROGRESS NOTES
11/30/18 CM called for homemaking services [facility] and [facility]. CM called [facility] and they might have a homemaker for members area. They will call CM back on Monday.  Keyanna WERNER N  [facility] Case Management  [phone]

## 2018-11-30 NOTE — PROGRESS NOTES
11/30/18 ARPAN received a VM from agatha Foster asking ARPAN to call Leah that she does not know who her medica CM is.  ARPAN called and spoke with Leah and let her know CM is her medica . [facility].  Leah did call ARPAN back.  Leah states that she needs another homemaking agency that [facility] is not going to do homemaking for her any longer. ARPAN asked why and Leah states that the homemaker that they sent out last week will not make her bed and so she will not come back.   ARPAN told Leah that ARPAN will call [employer] her current agency to see what they can do. It is very difficult to find homemaking agencys that have staff for the area she lives and [facility] may be our only option. ARPAN asked if leah has family or friends who want to do this for her and she said no.  ARPAN called [employer] and they states after searching for a long time they finally did find a homemaker for leah. YOLIE states that the area Leah lives is very difficult to staff. She states the homemaker was 15 minutes late because she got lost but Leah was extremely angry that she was late. They did not get along very well, personality clashes per [facility]. The homemaker did not want to tuck the sheets into the bed all the way due to her own issues with cleanliness and the bed being pushed against the wall. Apparently this homemaker does not do this for her own mother. This also made Leah angry and they decided to part ways.  [facility] is not sure if they can even find any staff for where Leah lives but are willing to keep trying. They can send over staff who have open shifts to cover for a while.    ARPAN called [facility] and [facility] home care and neither have any homemaking staff at this time for the [place] area.  Keyanna Zhao RN BA N  [facility]  [phone]

## 2018-12-03 NOTE — TELEPHONE ENCOUNTER
I left a VM for Grzegorz letting him know I agreed with having home Physical Therapy 1x/week for the next 3 weeks. I asked that he call if there were furter questions or concerns.     Grant Dillon PA-C, CAANANDA (Ortho)  Supervising Physician: José Vogel M.D., M.S.  Dept. of Orthopaedic Surgery  [facility]

## 2018-12-04 ENCOUNTER — TELEPHONE (OUTPATIENT)
Dept: FAMILY MEDICINE | Facility: CLINIC | Age: 73
End: 2018-12-04

## 2018-12-04 NOTE — TELEPHONE ENCOUNTER
Forms received from:  Home Care   Phone number listed: [phone]   Fax listed: [phone]  Date received: 12/04/18  Form description: SN 1 wk 3, 2 as needed  Once forms are completed, please return to  Home Care via Fax.  Is patient requesting to be contacted when forms are completed: NA    Form placed: in providers fabienne Patiño

## 2018-12-06 ENCOUNTER — PATIENT OUTREACH (OUTPATIENT)
Dept: GERIATRIC MEDICINE | Facility: CLINIC | Age: 73
End: 2018-12-06

## 2018-12-06 NOTE — PROGRESS NOTES
12.5 18  called [facility] to see if they have found a homemaker for client. They were supposed to call CM back Monday and have not. They sent CM to Nicolás. ARPAN  [facility].    12/6/18 ARPAN called Leah to let her know ARPAN is trying to find another homemaking agency but has not yet found one [facility] and [facility], [facility] do not have staff in her area. The agency's are telling ARPAN this is a very difficult area to staff. ARPAN is still waiting to hear back from [facility] to see if they have possible staff. ARPAN has left them 2 messages now.  [facility] did tell ARPAN they would continue to look for staff for Leah since the last person did not work out. They cannot gaurantee they will be able to find anther person because it took several weeks to find the last one. They could possibly staff her with people who have open shifts in there schedule. ARPAN told Leah to call [facility] for any possible future staff they may have available with open slots.      Leah states she has an agency she contacted who said they do have staff in her area.  [facility] [phone]. ARPAN asked if they can do homemaking under the EW and Leah did not know. She got the phone number from someone who lives in her building.  ARPAN called [facility] and they do not have homemakers only [facility].  ARPAN told Leah this and that we cannot use this agency.  Keyanna Zhao RN BA N  [facility] Case Management  [phone]

## 2018-12-11 ENCOUNTER — PATIENT OUTREACH (OUTPATIENT)
Dept: GERIATRIC MEDICINE | Facility: CLINIC | Age: 73
End: 2018-12-11

## 2018-12-12 ENCOUNTER — OFFICE VISIT (OUTPATIENT)
Dept: ORTHOPEDICS | Facility: CLINIC | Age: 73
End: 2018-12-12
Payer: COMMERCIAL

## 2018-12-12 ENCOUNTER — ANCILLARY PROCEDURE (OUTPATIENT)
Dept: GENERAL RADIOLOGY | Facility: CLINIC | Age: 73
End: 2018-12-12
Attending: ORTHOPAEDIC SURGERY
Payer: COMMERCIAL

## 2018-12-12 ENCOUNTER — TRANSFERRED RECORDS (OUTPATIENT)
Dept: HEALTH INFORMATION MANAGEMENT | Facility: CLINIC | Age: 73
End: 2018-12-12

## 2018-12-12 VITALS
BODY MASS INDEX: 33.83 KG/M2 | OXYGEN SATURATION: 94 % | SYSTOLIC BLOOD PRESSURE: 146 MMHG | WEIGHT: 191 LBS | TEMPERATURE: 98.5 F | HEART RATE: 66 BPM | DIASTOLIC BLOOD PRESSURE: 72 MMHG

## 2018-12-12 DIAGNOSIS — M17.12 PRIMARY OSTEOARTHRITIS OF LEFT KNEE: ICD-10-CM

## 2018-12-12 DIAGNOSIS — M17.12 PRIMARY OSTEOARTHRITIS OF LEFT KNEE: Primary | ICD-10-CM

## 2018-12-12 PROCEDURE — 73562 X-RAY EXAM OF KNEE 3: CPT | Mod: LT

## 2018-12-12 PROCEDURE — 99024 POSTOP FOLLOW-UP VISIT: CPT | Performed by: ORTHOPAEDIC SURGERY

## 2018-12-12 ASSESSMENT — PAIN SCALES - GENERAL: PAINLEVEL: NO PAIN (0)

## 2018-12-12 NOTE — LETTER
"    12/12/2018         RE: Leah Guerrero  [address]  [facility] [address]        Dear Colleague,    Thank you for referring your patient, Leah Guerrero, to the [facility]. Please see a copy of my visit note below.        Chief Complaint   Patient presents with     Left Knee - Surgical Followup, Pain     Pt DOS 11/30/18.  Pt states she is doing good her muscles need to be rubbed out as well as some pain with walking.  Pt wondering if she should be walking?  Pt also states on the left side of her incision she notices some warmth.    Bina Smith CM       SURGERY: Hybrid tricompartmental total knee arthroplasty (press-fit tibial and femoral components for the medial, lateral compartments, and cemented patellofemoral component), left knee. ( [facility])  DATE OF SURGERY: 10/23/2018    HISTORY OF PRESENT ILLNESS: Leah \"Fina\" ALEK Guerrero is a 73 year old female seen for postoperative evaluation of left total knee arthroplasty. It has been 6.5 weeks since surgery. Improving. She is walking unassisted. Some pain with walking and at night. Feels her muscles need to be rubbed out. Some warmth of the lateral incision.     Present symptoms: no pain, + swelling.    Pain severity: 0/10  Pain quality: aching  Frequency of symptoms: frequently  Exacerbating Factors: weightbearing, end range of motion   Relieving Factors: rest, oxycodone   Night Pain: Yes  Pain while at rest: No   Associated numbness or tingling: No     Past medical history:   Past Medical History:   Diagnosis Date     Abnormal CT of the chest 10/12    Nodular consolidation right lobe 1.6 cm. Needs repeat CT Jan 2013     Bleeding disorder (H)     in bowels x2      Chronic pancreatitis (H)      CVA (cerebral infarction)      Diabetes      Female stress incontinence      Hyperlipidemia LDL goal <100      Hypertension      Intermittent asthma 12/30/2011     Lateral epicondylitis (tennis elbow) - left " 3/30/2011     OA (osteoarthritis) of knee 3/22/2013     Pulmonary nodule, left 10/12    0.5 cm; needs f/u chest CT scan 2013     Surgical complications      Unspecified asthma(493.90)      Patient Active Problem List    Diagnosis Date Noted     Status post total left knee replacement 2018     Priority: Medium     10/23/2018 at [facility]       Mild intermittent asthma without complication 2018     Priority: Medium     Malignant neoplasm of lung, unspecified laterality, unspecified part of lung (H) 2017     Priority: Medium     Chronic pancreatitis, unspecified pancreatitis type (H) 2016     Priority: Medium     Type 2 diabetes mellitus without complication, with long-term current use of insulin (H) 2016     Priority: Medium     Anxiety 2016     Priority: Medium     Insomnia, unspecified type 2016     Priority: Medium     Idiopathic chronic pancreatitis (H) 2015     Priority: Medium     Primary osteoarthritis of left knee 10/28/2015     Priority: Medium     Hypertension goal BP (blood pressure) < 140/90 2015     Priority: Medium     Obesity 2015     Priority: Medium     Health Care Home 10/22/2014     Priority: Medium     [facility]   Guerline Zhao RN  [phone]    [facility] CARE PLAN SUMMARY    Client Name:  Leah Guerrero  Address:  [address] [place] [id number] Phone: [phone]   :  1945 Date of Assessment: 10/8/18   Health Plan:  [facility]  Health Plan Number: [id number] Medical Assistance Number: [id number]  Financial Worker:  Savanna Collins  [phone]  Case #:  [id number]   [facility] :  Guerline Zhao RN CM Phone:  [phone]   Fax:  [phone]   [facility] Enrollment Date: 18 Case Mix:  L  Rate Cell:  B  Waiver Type: EW    Emergency Contact:   Primary Emergency Contact: Paul Guerrero  [place] Phone: [phone]  Relation: Son  Secondary Emergency  "Contact: Hiram Guerrero   Mobile Phone: [phone]  Relation: Son Language:  English  :  No   Health Care Agent/POA:   Advanced Directives/Living Will:  On file   Primary Care Clinic/Phone/Fax:  [facility]/(p) [phone], (f) [phone] Primary Dx:  Diabetes:E11.9  Secondary Dx: Osteoarthritis: M19.91    Primary Physician:  Kevin Esquivel   Height:  5' 3\"  Weight:  186 lbs   Specialty Physician:    Audiologist:     Eye Care Provider:   Dental Care Provider:    [facility] [phone]   Other:        Homemaking/[employer]  [phone]  Fax: [phone] 11/1/18-10/31/19 weekly 4 hrs/week  (16 units)  $4.61/unit      Transportation/[employer]  [facility] 11/1/18-10/31/19 Monthly as needed Rush:10/m  Non Rush: 10/m Rush $4/ticket  ($40/m)  Non rush  $3/ticket ($30/m)     [employer]Mental Health   dAiti Rich:[phone]  Fax: [phone] Ongoing Monthly visits       [facility] worker       Intermittent asthma without complication 07/11/2014     Priority: Medium     IT band syndrome 01/13/2014     Priority: Medium     Contusion of knee 11/20/2013     Priority: Medium     Prepatellar bursitis of left knee 11/20/2013     Priority: Medium     Insomnia 05/30/2013     Priority: Medium     Mechanical low back pain 03/22/2013     Priority: Medium     Radicular pain of left lower extremity 03/22/2013     Priority: Medium     GERD (gastroesophageal reflux disease) 02/06/2013     Priority: Medium     Pulmonary nodule, left      Priority: Medium     0.5 cm; needs f/u chest CT scan Jan 2013       Abnormal CT of the chest      Priority: Medium     Nodular consolidation right lobe 1.6 cm. Needs repeat CT Jan 2013       Anxiety state 06/21/2012     Priority: Medium     Problem list name updated by automated process. Provider to review       Intermittent asthma 12/30/2011     Priority: Medium     Advanced directives, counseling/discussion 05/06/2011     " Priority: Medium     04/29/2013  Advance Care Planning:   Receipt of ACP document:  Received: Health Care Directive which was witnessed or notarized on 07/06/2011.  Document previously scanned on 07/07/2011.  Validation form completed and sent to be scanned.  Code Status reflects choices in most recent ACP document.  Confirmed/documented designated decision maker(s). See permanent comments section of demographics in clinical tab. View document(s) and details by clicking on code status.   Added by Lacie Canchola on 4/29/2013.          Advance Care Planning:   ACP Review and Resources Provided:  Reviewed chart for advance care plan.  Leah Guerrero has no plan or code status on file however states presence of ACP document. Copy requested. Confirmed code status reflects current choices pending receipt of document/advance care plan review. Confirmed/documented designated decision maker(s). See permanent comments section of demographics in clinical tab.   Added by Emma Medina on 4/24/2013          Planning  Advance Directive Initial Visit  Leah Guerrero presents in person for initial session regarding completion of advanced directive form. She was referred to the facilitator by self.  She currently has an advance directive from 1980 & wants to fill out a new updated one.  Plan:  Advanced directive form, healthcare agent information card, advanced care planning information booklet provided to patient.   Follow up meeting: to be arranged when patient calls back to make an appointment after reviewing documents in one week or so. Patient instructed to bring healthcare agent to this meeting.  Flores Gerard RN  Letter sent to patient for [employer] Cho[employer] follow up.  6/22/2011  Flores Gerard RN  Advance Directive Follow-up Visit  Leah Guerrero presents for advanced care planning session accompanied by no one.  Reviewed definitions of advanced care planning and advance directive form, and informational  handouts given to patient previously.  Patient has questions and /or concerns about acp process or form .  Reviewed advance directive form with patient & answered all of her questions. Designated healthcare agent is identified. Healthcare agent s name is Paul Guerrero. My Hopes and Wishes reviewed.  Finalized wishes are clear to patient Yes  Patient and designated healthcare agent are aware that document may be changed at any time in the future.  Advanced directive form: completed at this visit.  Advanced directive form: verified with notary signature. Original and two copies of advanced directive form given to patient copy sent to  for scanning into EMR and original given to patient and instructed to give copy to designated [facility] and non-[place] physician where applicable.  Flores Gerard RN  7/6/2011  Advance Directive Problem List Overview:   Name Relationship Phone    Primary Health Care Agent Paul Guerrero Son [phone]         Alternative Health Care Agent Hiram Guerrero Son c [phone]  [phone]                      Hyperlipidemia LDL goal <100 12/16/2010     Priority: Medium     Fatigue 12/15/2010     Priority: Medium     Type 2 diabetes, HbA1C goal < 8% (H) 11/24/2010     Priority: Medium     Female stress incontinence 10/28/2010     Priority: Medium       Past Surgical History:   Past Surgical History:   Procedure Laterality Date     APPENDECTOMY  2012     C HAND/FINGER SURGERY UNLISTED       C SHOULDER SURG PROC UNLISTED       C STOMACH SURGERY PROCEDURE UNLISTED       CHOLECYSTECTOMY  1969     COLONOSCOPY  6-2-08    Neg. At [facility]     COLONOSCOPY  9-3-13     EYE SURGERY       HYSTERECTOMY TOTAL ABD, HEIDI SALPINGO-OOPHORECTOMY, NODE DISSECTION, TUMOR DEBULKING, COMBINED  1988    fibroids?     KNEE SURGERY  x5     KNEE SURGERY      kneecap procedure     LUNG SURGERY  10-7-16    removal lung cancer     SHOULDER SURGERY  2005    right shoulder, bone spurs     WRIST SURGERY      right  wrist       Medications:   Current Outpatient Medications:      ACE/ARB NOT PRESCRIBED, INTENTIONAL,, ACE & ARB not prescribed due to Allergy, Disp: , Rfl:      albuterol (2.5 MG/3ML) 0.083% nebulizer solution, Take 3 mLs (2.5 mg) by nebulization 4 times daily as needed, Disp: 1 Box, Rfl: 5     albuterol (PROAIR HFA/PROVENTIL HFA/VENTOLIN HFA) 108 (90 Base) MCG/ACT Inhaler, Inhale 2 puffs into the lungs every 6 hours as needed for shortness of breath / dyspnea or wheezing, Disp: 1 Inhaler, Rfl: 11     amLODIPine (NORVASC) 5 MG tablet, TAKE 1 TABLET(5 MG) BY MOUTH DAILY, Disp: 30 tablet, Rfl: 1     aspirin 81 MG tablet, Take 1 tablet by mouth daily. 1 daily, Disp: , Rfl:      atenolol (TENORMIN) 50 MG tablet, TAKE 1 TABLET(50 MG) BY MOUTH TWICE DAILY, Disp: 180 tablet, Rfl: 1     BASAGLAR 100 UNIT/ML injection, 75 units at bedtime or as directed, Disp: 75 mL, Rfl: 3     beclomethasone HFA (QVAR REDIHALER) 80 MCG/ACT inhaler, Inhale 1 puff into the lungs 2 times daily, Disp: 10.6 g, Rfl: 6     benzonatate (TESSALON) 100 MG capsule, Take 1 capsule (100 mg) by mouth 3 times daily as needed for cough, Disp: 42 capsule, Rfl: 1     blood glucose monitoring (ONE TOUCH ULTRASOFT) lancets, TEST 6 TIMES PER DAY, Disp: 600 each, Rfl: 0     blood glucose monitoring (ONETOUCH ULTRA) test strip, TEST 6 TIMES PER DAY, Disp: 600 strip, Rfl: 11     Calcium Carbonate-Vitamin D (CALCIUM-D PO), Take  by mouth. 1200mg calcium/600mg D3, Disp: , Rfl:      calcium polycarbophil (FIBERCON) 625 MG tablet, Take 2 tablets (1,250 mg) by mouth daily, Disp: 60 tablet, Rfl: 11     fish oil-omega-3 fatty acids (FISH OIL) 1000 MG capsule, Take 1 capsule by mouth daily., Disp: , Rfl:      Garlic CAPS, Take  by mouth. One daily, Disp: , Rfl:      hydrOXYzine (VISTARIL) 50 MG capsule, Take 1 capsule (50 mg) by mouth nightly as needed for anxiety or other, Disp: 30 capsule, Rfl: 0     ibuprofen (ADVIL,MOTRIN) 800 MG tablet, Take 1 tablet (800 mg) by  "mouth every 8 hours as needed for pain, Disp: 100 tablet, Rfl: 0     insulin aspart (NOVOLOG PEN) 100 UNIT/ML injection, Patient uses 100 units daily split up between multiple doses. Take 2 units per 15 grams of carbohydrate eaten and 2 units per 30 mg/dL above 170 mg/dL., Disp: 30 mL, Rfl: 1     Insulin Pen Needle (PEN NEEDLES 5/16\") 31G X 8 MM MISC, Patient does 4 doses novolog and 2 lantus shots  insulin daily.  Needs 180 needles per month. Okay refills for one year., Disp: 180 each, Rfl: 11     meperidine (DEMEROL) 50 MG tablet, Take 1 tablet (50 mg) by mouth every 4 hours as needed, Disp: 20 tablet, Rfl: 0     MULTIVITAMIN TABS   OR, 1 TABLET DAILY, Disp: , Rfl:      oxyCODONE-acetaminophen (PERCOCET) 5-325 MG per tablet, Take 1 tablet by mouth every 6 hours as needed for pain, Disp: 30 tablet, Rfl: 0     promethazine (PHENERGAN) 12.5 MG tablet, Take 12.5 mg by mouth as needed, Disp: , Rfl: 5     rosuvastatin (CRESTOR) 20 MG tablet, TAKE 1 TABLET(20 MG) BY MOUTH DAILY, Disp: 90 tablet, Rfl: 2     SALINE, to clean port every other month, Disp: , Rfl:      senna-docusate (SENOKOT-S;PERICOLACE) 8.6-50 MG per tablet, 1-2 po bid prn constipation, Disp: 100 tablet, Rfl: 1     tiZANidine (ZANAFLEX) 2 MG tablet, Take 1 tablet (2 mg) by mouth 3 times daily as needed for muscle spasms, Disp: 30 tablet, Rfl: 0     zolpidem (AMBIEN) 5 MG tablet, Take 1 tablet (5 mg) by mouth nightly as needed for sleep, Disp: 30 tablet, Rfl: 2    Allergies:   Allergies   Allergen Reactions     Hydralazine Shortness Of Breath     Famotidine Nausea and Vomiting and Unknown     Benadryl Itch Stopping      Gives her more energy, can't sleep     Lisinopril      Tongue swelling       Metoclopramide Nausea and Vomiting     Ondansetron Nausea and Vomiting     Other reaction(s): Headache     Penicillins Hives     Tape [Adhesive Tape]      blisters     Tylenol      Anxiety  Tablets only.     REVIEW OF SYSTEMS:  CONSTITUTIONAL:NEGATIVE for fever, " chills, night sweats, change in weight  INTEGUMENTARY/SKIN: NEGATIVE for worrisome rashes, moles or lesions  MUSCULOSKELETAL:See HPI above  NEURO: NEGATIVE for weakness, dizziness or paresthesias  CARDIOVASCULAR: negative for chest pain, shortness of breath    This document serves as a record of the services and decisions personally performed and made by José Vogle MD. It was created on his behalf by Nely Pratt, a trained medical scribe. The creation of this document is based the provider's statements to the medical scribe.    Scribe Nely Pratt 11:27 AM 12/12/2018      PHYSICAL EXAM:  /65 (BP Location: Left arm, Patient Position: Sitting, Cuff Size: Adult Regular)   Pulse 66   Temp 98.5  F (36.9  C) (Oral)   Wt 86.6 kg (191 lb)   SpO2 94%   BMI 33.83 kg/m      GENERAL APPEARANCE: healthy, alert, no distress  SKIN: no suspicious lesions or rashes  NEURO: Normal strength and tone, mentation intact and speech normal  PSYCH:  mentation appears normal and affect normal  RESPIRATORY: No increased work of breathing.    BILATERAL LOWER EXTREMITIES:  Gait: slight quadriceps avoidance, favoring left knee. Walking unassisted.   Intact sensation deep peroneal nerve, superficial peroneal nerve, med/lat tibial nerve, sural nerve, saphenous nerve  Intact EHL, EDL, TA, FHL, GS, quadriceps hamstrings and hip flexors  Toes warm and well perfused, brisk capillary refill. Palpable 2+ dp pulses.  Bilateral calf soft and nttp or squeeze.  Edema: 1+ left lower extremity.    LEFT KNEE EXAM:    Incision: healing well, skin well approximated.  Skin: intact, no ecchymosis or erythema  ROM: 5 extension to 105 flexion  Effusion: mild  Tender: diffuse mild tenderness.    X-RAY:  3 views left knee from 11/9/2018 were reviewed in clinic today. No obvious fractures or dislocations. Total knee arthroplasty components in place without evidence of failure or loosening.    Impression: Leah Guerrero is a 73 year old female status  post Total knee arthroplasty, left knee, doing well, 6.5 weeks out from surgery.    Plan:   * reviewed xrays today, showing new total knee arthroplasty.  * continue with knee range of motion exercises, focusing on flexion now  * Tylenol for pain.  * no swimming yet.  * ok to shower, no soaking in tub/pool.  * xrays next visit: 2 views of the knee  * return to clinic 6 weeks   * elevation for swelling.  * ice as needed.  * Physical Therapy, home exercise program.  * all questions addressed and answered prior to discharge from clinic today to patient's satisfaction.  * patient will need longterm prophylactic antibiotics use with dental procedures or other invasive procedures (2 g amoxicilin or 450mg ([id number]) clindamycin) 1 hour prior to dental procedures.    * Oxford Knee score: NOT DONE today. TO BE DONE at 3 month visit.     * Patient to follow up with Primary Care provider regarding elevated blood pressure     The information in this document, created by a scribe for me, accurately reflects the services I personally performed and the decisions made by me. I have reviewed and approved this document for accuracy.      José Vogel M.D., M.S.  Dept. of Orthopaedic Surgery  [facility]            Patient to follow up with Primary Care provider regarding elevated blood pressure.    Bina Smith, RODDY  12/12/2018  11:32 AM    Again, thank you for allowing me to participate in the care of your patient.        Sincerely,        José Vogel MD

## 2018-12-12 NOTE — PROGRESS NOTES
Patient to follow up with Primary Care provider regarding elevated blood pressure.    Bina Smith CMA  12/12/2018  11:32 AM

## 2018-12-12 NOTE — PROGRESS NOTES
"Chief Complaint   Patient presents with     Left Knee - Surgical Followup, Pain     Pt DOS 11/30/18.  Pt states she is doing good her muscles need to be rubbed out as well as some pain with walking.  Pt wondering if she should be walking?  Pt also states on the left side of her incision she notices some warmth.    Bina Smith CM       SURGERY: Hybrid tricompartmental total knee arthroplasty (press-fit tibial and femoral components for the medial, lateral compartments, and cemented patellofemoral component), left knee. ( [facility])  DATE OF SURGERY: 10/23/2018    HISTORY OF PRESENT ILLNESS: Leah \"Bee\" ALEK Guerrero is a 73 year old female seen for postoperative evaluation of left total knee arthroplasty. It has been 6.5 weeks since surgery. Improving. She is walking unassisted. Some pain with walking and at night. Feels her muscles need to be rubbed out. Some warmth of the lateral incision.     Present symptoms: no pain, + swelling.    Pain severity: 0/10  Pain quality: aching  Frequency of symptoms: frequently  Exacerbating Factors: weightbearing, end range of motion   Relieving Factors: rest, oxycodone   Night Pain: Yes  Pain while at rest: No   Associated numbness or tingling: No     Past medical history:   Past Medical History:   Diagnosis Date     Abnormal CT of the chest 10/12    Nodular consolidation right lobe 1.6 cm. Needs repeat CT Jan 2013     Bleeding disorder (H)     in bowels x2      Chronic pancreatitis (H)      CVA (cerebral infarction)      Diabetes      Female stress incontinence      Hyperlipidemia LDL goal <100      Hypertension      Intermittent asthma 12/30/2011     Lateral epicondylitis (tennis elbow) - left 3/30/2011     OA (osteoarthritis) of knee 3/22/2013     Pulmonary nodule, left 10/12    0.5 cm; needs f/u chest CT scan Jan 2013     Surgical complications      Unspecified asthma(493.90)      Patient Active Problem List    Diagnosis Date Noted     Status post total left " knee replacement 2018     Priority: Medium     10/23/2018 at [place]       Mild intermittent asthma without complication 2018     Priority: Medium     Malignant neoplasm of lung, unspecified laterality, unspecified part of lung (H) 2017     Priority: Medium     Chronic pancreatitis, unspecified pancreatitis type (H) 2016     Priority: Medium     Type 2 diabetes mellitus without complication, with long-term current use of insulin (H) 2016     Priority: Medium     Anxiety 2016     Priority: Medium     Insomnia, unspecified type 2016     Priority: Medium     Idiopathic chronic pancreatitis (H) 2015     Priority: Medium     Primary osteoarthritis of left knee 10/28/2015     Priority: Medium     Hypertension goal BP (blood pressure) < 140/90 2015     Priority: Medium     Obesity 2015     Priority: Medium     Health Care Home 10/22/2014     Priority: Medium     [facility]   Guerline Zhao RN  [phone]    [facility] CARE PLAN SUMMARY    Client Name:  Leah Guerrero  Address:  [address] [place] [address] Phone: [phone]   :  1945 Date of Assessment: 10/8/18   Health Plan:  [facility]  Health Plan Number: [id number] Medical Assistance Number: [id number]  Financial Worker:  Savanna Collins  [phone]  Case #:  [id number]   [facility] :  Guerline Zhao RN CM Phone:  [phone]   Fax:  [phone]   [facility] Enrollment Date: 18 Case Mix:  L  Rate Cell:  B  Waiver Type: EW    Emergency Contact:   Primary Emergency Contact: Paul Guerrero  [place] Phone: [phone]  Relation: Son  Secondary Emergency Contact: Hiram Guerrero   Mobile Phone: [phone]  Relation: Son Language:  English  :  No   Health Care Agent/POA:   Advanced Directives/Living Will:  On file   Primary Care Clinic/Phone/Fax:  [facility]/(p) [phone], (f) [phone]  "Primary Dx:  Diabetes:E11.9  Secondary Dx: Osteoarthritis: M19.91    Primary Physician:  Kevin Esquivel   Height:  5' 3\"  Weight:  186 lbs   Specialty Physician:    Audiologist:     Eye Care Provider:   Dental Care Provider:    [facility] [phone]   Other:        [employer]  [phone]  Fax: [phone] 11/1/18-10/31/19 weekly 4 hrs/week  (16 units)  $4.61/unit      Transportation/[employer]  [facility] 11/1/18-10/31/19 Monthly as needed Rush:10/m  Non Rush: 10/m Rush $4/ticket  ($40/m)  Non rush  $3/ticket ($30/m)     [employer]Mental Health   [place]:[phone]  Fax: [phone] Ongoing Monthly visits       [facility]/[place] worker       Intermittent asthma without complication 07/11/2014     Priority: Medium     IT band syndrome 01/13/2014     Priority: Medium     Contusion of knee 11/20/2013     Priority: Medium     Prepatellar bursitis of left knee 11/20/2013     Priority: Medium     Insomnia 05/30/2013     Priority: Medium     Mechanical low back pain 03/22/2013     Priority: Medium     Radicular pain of left lower extremity 03/22/2013     Priority: Medium     GERD (gastroesophageal reflux disease) 02/06/2013     Priority: Medium     Pulmonary nodule, left      Priority: Medium     0.5 cm; needs f/u chest CT scan Jan 2013       Abnormal CT of the chest      Priority: Medium     Nodular consolidation right lobe 1.6 cm. Needs repeat CT Jan 2013       Anxiety state 06/21/2012     Priority: Medium     Problem list name updated by automated process. Provider to review       Intermittent asthma 12/30/2011     Priority: Medium     Advanced directives, counseling/discussion 05/06/2011     Priority: Medium     04/29/2013  Advance Care Planning:   Receipt of ACP document:  Received: Health Care Directive which was witnessed or notarized on 07/06/2011.  Document previously scanned on 07/07/2011.  Validation form completed and sent to be scanned.  Code Status reflects " choices in most recent ACP document.  Confirmed/documented designated decision maker(s). See permanent comments section of demographics in clinical tab. View document(s) and details by clicking on code status.   Added by Lacie Canchola on 4/29/2013.          Advance Care Planning:   ACP Review and Resources Provided:  Reviewed chart for advance care plan.  Leah Guerrero has no plan or code status on file however states presence of ACP document. Copy requested. Confirmed code status reflects current choices pending receipt of document/advance care plan review. Confirmed/documented designated decision maker(s). See permanent comments section of demographics in clinical tab.   Added by Emma Medina on 4/24/2013          Planning  Advance Directive Initial Visit  Leah Guerrero presents in person for initial session regarding completion of advanced directive form. She was referred to the facilitator by self.  She currently has an advance directive from 1980 & wants to fill out a new updated one.  Plan:  Advanced directive form, healthcare agent information card, advanced care planning information booklet provided to patient.   Follow up meeting: to be arranged when patient calls back to make an appointment after reviewing documents in one week or so. Patient instructed to bring healthcare agent to this meeting.  Flores Gerard RN  Letter sent to patient for [employer] Cho[employer] follow up.  6/22/2011  Flores Gerard RN  Advance Directive Follow-up Visit  Leah Guerrero presents for advanced care planning session accompanied by no one.  Reviewed definitions of advanced care planning and advance directive form, and informational handouts given to patient previously.  Patient has questions and /or concerns about acp process or form .  Reviewed advance directive form with patient & answered all of her questions. Designated healthcare agent is identified. Healthcare agent s name is Paul Guerrero. My Hopes and  Wishes reviewed.  Finalized wishes are clear to patient Yes  Patient and designated healthcare agent are aware that document may be changed at any time in the future.  Advanced directive form: completed at this visit.  Advanced directive form: verified with notary signature. Original and two copies of advanced directive form given to patient copy sent to  for scanning into EMR and original given to patient and instructed to give copy to designated [facility] and non-[place] physician where applicable.  Flores Gerard RN  7/6/2011  Advance Directive Problem List Overview:   Name Relationship Phone    Primary Health Care Agent Paul Bro [phone]         Alternative Health Care Agent Hiram Guerrero Son c [phone]  [phone]                      Hyperlipidemia LDL goal <100 12/16/2010     Priority: Medium     Fatigue 12/15/2010     Priority: Medium     Type 2 diabetes, HbA1C goal < 8% (H) 11/24/2010     Priority: Medium     Female stress incontinence 10/28/2010     Priority: Medium       Past Surgical History:   Past Surgical History:   Procedure Laterality Date     APPENDECTOMY  2012     C HAND/FINGER SURGERY UNLISTED       C SHOULDER SURG PROC UNLISTED       C STOMACH SURGERY PROCEDURE UNLISTED       CHOLECYSTECTOMY  1969     COLONOSCOPY  6-2-08    Neg. At [facility]     COLONOSCOPY  9-3-13     EYE SURGERY       HYSTERECTOMY TOTAL ABD, HEIDI SALPINGO-OOPHORECTOMY, NODE DISSECTION, TUMOR DEBULKING, COMBINED  1988    fibroids?     KNEE SURGERY  x5     KNEE SURGERY      kneecap procedure     LUNG SURGERY  10-7-16    removal lung cancer     SHOULDER SURGERY  2005    right shoulder, bone spurs     WRIST SURGERY      right wrist       Medications:   Current Outpatient Medications:      ACE/ARB NOT PRESCRIBED, INTENTIONAL,, ACE & ARB not prescribed due to Allergy, Disp: , Rfl:      albuterol (2.5 MG/3ML) 0.083% nebulizer solution, Take 3 mLs (2.5 mg) by nebulization 4 times daily as needed, Disp: 1 Box,  Rfl: 5     albuterol (PROAIR HFA/PROVENTIL HFA/VENTOLIN HFA) 108 (90 Base) MCG/ACT Inhaler, Inhale 2 puffs into the lungs every 6 hours as needed for shortness of breath / dyspnea or wheezing, Disp: 1 Inhaler, Rfl: 11     amLODIPine (NORVASC) 5 MG tablet, TAKE 1 TABLET(5 MG) BY MOUTH DAILY, Disp: 30 tablet, Rfl: 1     aspirin 81 MG tablet, Take 1 tablet by mouth daily. 1 daily, Disp: , Rfl:      atenolol (TENORMIN) 50 MG tablet, TAKE 1 TABLET(50 MG) BY MOUTH TWICE DAILY, Disp: 180 tablet, Rfl: 1     BASAGLAR 100 UNIT/ML injection, 75 units at bedtime or as directed, Disp: 75 mL, Rfl: 3     beclomethasone HFA (QVAR REDIHALER) 80 MCG/ACT inhaler, Inhale 1 puff into the lungs 2 times daily, Disp: 10.6 g, Rfl: 6     benzonatate (TESSALON) 100 MG capsule, Take 1 capsule (100 mg) by mouth 3 times daily as needed for cough, Disp: 42 capsule, Rfl: 1     blood glucose monitoring (ONE TOUCH ULTRASOFT) lancets, TEST 6 TIMES PER DAY, Disp: 600 each, Rfl: 0     blood glucose monitoring (ONETOUCH ULTRA) test strip, TEST 6 TIMES PER DAY, Disp: 600 strip, Rfl: 11     Calcium Carbonate-Vitamin D (CALCIUM-D PO), Take  by mouth. 1200mg calcium/600mg D3, Disp: , Rfl:      calcium polycarbophil (FIBERCON) 625 MG tablet, Take 2 tablets (1,250 mg) by mouth daily, Disp: 60 tablet, Rfl: 11     fish oil-omega-3 fatty acids (FISH OIL) 1000 MG capsule, Take 1 capsule by mouth daily., Disp: , Rfl:      Garlic CAPS, Take  by mouth. One daily, Disp: , Rfl:      hydrOXYzine (VISTARIL) 50 MG capsule, Take 1 capsule (50 mg) by mouth nightly as needed for anxiety or other, Disp: 30 capsule, Rfl: 0     ibuprofen (ADVIL,MOTRIN) 800 MG tablet, Take 1 tablet (800 mg) by mouth every 8 hours as needed for pain, Disp: 100 tablet, Rfl: 0     insulin aspart (NOVOLOG PEN) 100 UNIT/ML injection, Patient uses 100 units daily split up between multiple doses. Take 2 units per 15 grams of carbohydrate eaten and 2 units per 30 mg/dL above 170 mg/dL., Disp: 30 mL,  "Rfl: 1     Insulin Pen Needle (PEN NEEDLES 5/16\") 31G X 8 MM MISC, Patient does 4 doses novolog and 2 lantus shots  insulin daily.  Needs 180 needles per month. Okay refills for one year., Disp: 180 each, Rfl: 11     meperidine (DEMEROL) 50 MG tablet, Take 1 tablet (50 mg) by mouth every 4 hours as needed, Disp: 20 tablet, Rfl: 0     MULTIVITAMIN TABS   OR, 1 TABLET DAILY, Disp: , Rfl:      oxyCODONE-acetaminophen (PERCOCET) 5-325 MG per tablet, Take 1 tablet by mouth every 6 hours as needed for pain, Disp: 30 tablet, Rfl: 0     promethazine (PHENERGAN) 12.5 MG tablet, Take 12.5 mg by mouth as needed, Disp: , Rfl: 5     rosuvastatin (CRESTOR) 20 MG tablet, TAKE 1 TABLET(20 MG) BY MOUTH DAILY, Disp: 90 tablet, Rfl: 2     SALINE, to clean port every other month, Disp: , Rfl:      senna-docusate (SENOKOT-S;PERICOLACE) 8.6-50 MG per tablet, 1-2 po bid prn constipation, Disp: 100 tablet, Rfl: 1     tiZANidine (ZANAFLEX) 2 MG tablet, Take 1 tablet (2 mg) by mouth 3 times daily as needed for muscle spasms, Disp: 30 tablet, Rfl: 0     zolpidem (AMBIEN) 5 MG tablet, Take 1 tablet (5 mg) by mouth nightly as needed for sleep, Disp: 30 tablet, Rfl: 2    Allergies:   Allergies   Allergen Reactions     Hydralazine Shortness Of Breath     Famotidine Nausea and Vomiting and Unknown     Benadryl Itch Stopping      Gives her more energy, can't sleep     Lisinopril      Tongue swelling       Metoclopramide Nausea and Vomiting     Ondansetron Nausea and Vomiting     Other reaction(s): Headache     Penicillins Hives     Tape [Adhesive Tape]      blisters     Tylenol      Anxiety  Tablets only.     REVIEW OF SYSTEMS:  CONSTITUTIONAL:NEGATIVE for fever, chills, night sweats, change in weight  INTEGUMENTARY/SKIN: NEGATIVE for worrisome rashes, moles or lesions  MUSCULOSKELETAL:See HPI above  NEURO: NEGATIVE for weakness, dizziness or paresthesias  CARDIOVASCULAR: negative for chest pain, shortness of breath    This document serves as a " record of the services and decisions personally performed and made by José Vogel MD. It was created on his behalf by Nely Pratt, a trained medical scribe. The creation of this document is based the provider's statements to the medical scribe.    Tamiko Pratt 11:27 AM 12/12/2018      PHYSICAL EXAM:  /65 (BP Location: Left arm, Patient Position: Sitting, Cuff Size: Adult Regular)   Pulse 66   Temp 98.5  F (36.9  C) (Oral)   Wt 86.6 kg (191 lb)   SpO2 94%   BMI 33.83 kg/m     GENERAL APPEARANCE: healthy, alert, no distress  SKIN: no suspicious lesions or rashes  NEURO: Normal strength and tone, mentation intact and speech normal  PSYCH:  mentation appears normal and affect normal  RESPIRATORY: No increased work of breathing.    BILATERAL LOWER EXTREMITIES:  Gait: slight quadriceps avoidance, favoring left knee. Walking unassisted.   Intact sensation deep peroneal nerve, superficial peroneal nerve, med/lat tibial nerve, sural nerve, saphenous nerve  Intact EHL, EDL, TA, FHL, GS, quadriceps hamstrings and hip flexors  Toes warm and well perfused, brisk capillary refill. Palpable 2+ dp pulses.  Bilateral calf soft and nttp or squeeze.  Edema: 1+ left lower extremity.    LEFT KNEE EXAM:    Incision: healing well, skin well approximated.  Skin: intact, no ecchymosis or erythema  ROM: 5 extension to 105 flexion  Effusion: mild  Tender: diffuse mild tenderness.    X-RAY:  3 views left knee from 11/9/2018 were reviewed in clinic today. No obvious fractures or dislocations. Total knee arthroplasty components in place without evidence of failure or loosening.    Impression: Leah Guerrero is a 73 year old female status post Total knee arthroplasty, left knee, doing well, 6.5 weeks out from surgery.    Plan:   * reviewed xrays today, showing new total knee arthroplasty.  * continue with knee range of motion exercises, focusing on flexion now  * Tylenol for pain.  * no swimming yet.  * ok to shower, no  soaking in tub/pool.  * xrays next visit: 2 views of the knee  * return to clinic 6 weeks   * elevation for swelling.  * ice as needed.  * Physical Therapy, home exercise program.  * all questions addressed and answered prior to discharge from clinic today to patient's satisfaction.  * patient will need longterm prophylactic antibiotics use with dental procedures or other invasive procedures (2 g amoxicilin or 450mg ([id number]) clindamycin) 1 hour prior to dental procedures.    * Oxford Knee score: NOT DONE today. TO BE DONE at 3 month visit.     * Patient to follow up with Primary Care provider regarding elevated blood pressure     The information in this document, created by a scribe for me, accurately reflects the services I personally performed and the decisions made by me. I have reviewed and approved this document for accuracy.      José Vogel M.D., M.S.  Dept. of Orthopaedic Surgery  [facility]

## 2018-12-13 ENCOUNTER — TELEPHONE (OUTPATIENT)
Dept: ORTHOPEDICS | Facility: CLINIC | Age: 73
End: 2018-12-13

## 2018-12-13 NOTE — TELEPHONE ENCOUNTER
I spoke to Fina and we discussed her work duties around her place of residence. She basically just checks in on everyone and makes sure they are doing Ok. Will call 911 when there is a disturbance occasionally. I let her know I would write the note and fax it in as directed.    Grant Dillon PA-C, CAANANDA (Ortho)  Supervising Physician: José Vogel M.D., M.S.  Dept. of Orthopaedic Surgery  [facility]

## 2018-12-19 ENCOUNTER — TRANSFERRED RECORDS (OUTPATIENT)
Dept: HEALTH INFORMATION MANAGEMENT | Facility: CLINIC | Age: 73
End: 2018-12-19

## 2018-12-31 DIAGNOSIS — I10 HYPERTENSION GOAL BP (BLOOD PRESSURE) < 140/90: ICD-10-CM

## 2018-12-31 NOTE — TELEPHONE ENCOUNTER
"Requested Prescriptions   Pending Prescriptions Disp Refills     hydrochlorothiazide (HYDRODIURIL) 25 MG tablet [Pharmacy Med Name: HYDROCHLOROTHIAZIDE 25MG TABLETS] 90 tablet 0          Last Written Prescription Date:  na  Last Fill Quantity: na,   # refills: na  Last Office Visit: na  Future Office visit:    Next 5 appointments (look out 90 days)    Jan 23, 2019 11:00 AM CST  Return Visit with José Vogel MD  [facility] ([address]  [place] [address] [phone]           Routing refill request to provider for review/approval because:  Drug not active on patient's medication list   Sig: TAKE 1 TABLET(25 MG) BY MOUTH DAILY    Diuretics (Including Combos) Protocol Failed - 12/31/2018  3:47 AM       Failed - Blood pressure under 140/90 in past 12 months    BP Readings from Last 3 Encounters:   12/12/18 146/72   11/19/18 165/69   11/09/18 150/58                Passed - Recent (12 mo) or future (30 days) visit within the authorizing provider's specialty    Patient had office visit in the last 12 months or has a visit in the next 30 days with authorizing provider or within the authorizing provider's specialty.  See \"Patient Info\" tab in inbasket, or \"Choose Columns\" in Meds & Orders section of the refill encounter.             Passed - Patient is age 18 or older       Passed - No active pregancy on record       Passed - Normal serum creatinine on file in past 12 months    Recent Labs   Lab Test 09/10/18  1205   CR 0.62             Passed - Normal serum potassium on file in past 12 months    Recent Labs   Lab Test 09/10/18  1205   POTASSIUM 3.8                   Passed - Normal serum sodium on file in past 12 months    Recent Labs   Lab Test 09/10/18  1205                Passed - No positive pregnancy test in past 12 months          "

## 2019-01-02 RX ORDER — HYDROCHLOROTHIAZIDE 25 MG/1
TABLET ORAL
Qty: 90 TABLET | Refills: 0 | OUTPATIENT
Start: 2019-01-02

## 2019-01-02 NOTE — TELEPHONE ENCOUNTER
Called patient and she stated she did not request this medication.. The chart shows it has been discontinued. Refill denied.     Sherron Barkley RN

## 2019-01-16 ENCOUNTER — TRANSFERRED RECORDS (OUTPATIENT)
Dept: HEALTH INFORMATION MANAGEMENT | Facility: CLINIC | Age: 74
End: 2019-01-16

## 2019-01-23 ENCOUNTER — ANCILLARY PROCEDURE (OUTPATIENT)
Dept: GENERAL RADIOLOGY | Facility: CLINIC | Age: 74
End: 2019-01-23
Payer: COMMERCIAL

## 2019-01-23 ENCOUNTER — OFFICE VISIT (OUTPATIENT)
Dept: ORTHOPEDICS | Facility: CLINIC | Age: 74
End: 2019-01-23
Payer: COMMERCIAL

## 2019-01-23 VITALS
OXYGEN SATURATION: 94 % | WEIGHT: 173 LBS | HEART RATE: 54 BPM | SYSTOLIC BLOOD PRESSURE: 124 MMHG | BODY MASS INDEX: 30.65 KG/M2 | RESPIRATION RATE: 14 BRPM | DIASTOLIC BLOOD PRESSURE: 73 MMHG

## 2019-01-23 DIAGNOSIS — Z96.652 STATUS POST TOTAL LEFT KNEE REPLACEMENT: Primary | ICD-10-CM

## 2019-01-23 PROCEDURE — 73560 X-RAY EXAM OF KNEE 1 OR 2: CPT | Mod: LT

## 2019-01-23 PROCEDURE — 99024 POSTOP FOLLOW-UP VISIT: CPT | Performed by: ORTHOPAEDIC SURGERY

## 2019-01-23 ASSESSMENT — PAIN SCALES - GENERAL: PAINLEVEL: NO PAIN (0)

## 2019-01-23 NOTE — PROGRESS NOTES
"Chief Complaint   Patient presents with     RECHECK      f/u left total knee arthroplasty DOS: 10/23/2018 Patient states  doing great         SURGERY: Hybrid tricompartmental total knee arthroplasty (press-fit tibial and femoral components for the medial, lateral compartments, and cemented patellofemoral component), left knee. ( [facility])  DATE OF SURGERY: 10/23/2018    HISTORY OF PRESENT ILLNESS: Leah \"Bee\" ALEK Guerrero is a 73 year old female seen for postoperative evaluation of left total knee arthroplasty. It has been 13 weeks since surgery. Doing well. She continues to walk unassisted. Her knee has been a little warm lately and her scars are more pronounced as a side effect of chemotherapy. Her lung cancer came back.    *: ~2 years she had right lung lobectomy. Nodules on right and left lung have been found more recently according to patient account.     Present symptoms: no pain, + swelling.    Pain severity: 0/10  Pain quality: aching  Frequency of symptoms: frequently  Exacerbating Factors: weightbearing, end range of motion   Relieving Factors: rest, oxycodone   Night Pain: Yes  Pain while at rest: No   Associated numbness or tingling: No     Past medical history:   Past Medical History:   Diagnosis Date     Abnormal CT of the chest 10/12    Nodular consolidation right lobe 1.6 cm. Needs repeat CT Jan 2013     Bleeding disorder (H)     in bowels x2      Chronic pancreatitis (H)      CVA (cerebral infarction)      Diabetes      Female stress incontinence      Hyperlipidemia LDL goal <100      Hypertension      Intermittent asthma 12/30/2011     Lateral epicondylitis (tennis elbow) - left 3/30/2011     OA (osteoarthritis) of knee 3/22/2013     Pulmonary nodule, left 10/12    0.5 cm; needs f/u chest CT scan Jan 2013     Surgical complications      Unspecified asthma(493.90)      Patient Active Problem List    Diagnosis Date Noted     Status post total left knee replacement 11/09/2018     " Priority: Medium     10/23/2018 at [facility]       Mild intermittent asthma without complication 2018     Priority: Medium     Malignant neoplasm of lung, unspecified laterality, unspecified part of lung (H) 2017     Priority: Medium     Chronic pancreatitis, unspecified pancreatitis type (H) 2016     Priority: Medium     Type 2 diabetes mellitus without complication, with long-term current use of insulin (H) 2016     Priority: Medium     Anxiety 2016     Priority: Medium     Insomnia, unspecified type 2016     Priority: Medium     Idiopathic chronic pancreatitis (H) 2015     Priority: Medium     Primary osteoarthritis of left knee 10/28/2015     Priority: Medium     Hypertension goal BP (blood pressure) < 140/90 2015     Priority: Medium     Obesity 2015     Priority: Medium     Health Care Home 10/22/2014     Priority: Medium     [facility]   Guerline Zhao RN  [phone]    [facility] CARE PLAN SUMMARY    Client Name:  Leah Guerrero  Address:  [address] [place] [id number] Phone: [phone]   :  1945 Date of Assessment: 10/8/18   Health Plan:  [employer] [facility] [place]  Health Plan Number: [id number] Medical Assistance Number: [id number]  Financial Worker:  Savanna Collins  [phone]  Case #:  [id number]   [facility] :  Guerline Zhao RN  Phone:  [phone]   Fax:  [phone]   [facility] Enrollment Date: 18 Case Mix:  L  Rate Cell:  B  Waiver Type: EW    Emergency Contact:   Primary Emergency Contact: Paul Guerrero  [place] Phone: [phone]  Relation: Son  Secondary Emergency Contact: Hiram Guerrero   Mobile Phone: [phone]  Relation: Son Language:  English  :  No   Health Care Agent/POA:   Advanced Directives/Living Will:  On file   Primary Care Clinic/Phone/Fax:  [facility]/(p) [phone], (f) [phone] Primary Dx:   "Diabetes:E11.9  Secondary Dx: Osteoarthritis: M19.91    Primary Physician:  Kevin Esquivel   Height:  5' 3\"  Weight:  186 lbs   Specialty Physician:    Audiologist:     Eye Care Provider:   Dental Care Provider:    [facility]: [facility] [phone]   Other:        Homemaking/[employer]  [phone]  Fax: [phone] 11/1/18-10/31/19 weekly 4 hrs/week  (16 units)  $4.61/unit      Transportation/[employer] 11/1/18-10/31/19 Monthly as needed Rush:10/m  Non Rush: 10/m Rush $4/ticket  ($40/m)  Non rush  $3/ticket ($30/m)     [employer]Mental Health   Aditi Rich:[phone]  Fax: [phone] Ongoing Monthly visits       [facility] worker       Intermittent asthma without complication 07/11/2014     Priority: Medium     IT band syndrome 01/13/2014     Priority: Medium     Contusion of knee 11/20/2013     Priority: Medium     Prepatellar bursitis of left knee 11/20/2013     Priority: Medium     Insomnia 05/30/2013     Priority: Medium     Mechanical low back pain 03/22/2013     Priority: Medium     Radicular pain of left lower extremity 03/22/2013     Priority: Medium     GERD (gastroesophageal reflux disease) 02/06/2013     Priority: Medium     Pulmonary nodule, left      Priority: Medium     0.5 cm; needs f/u chest CT scan Jan 2013       Abnormal CT of the chest      Priority: Medium     Nodular consolidation right lobe 1.6 cm. Needs repeat CT Jan 2013       Anxiety state 06/21/2012     Priority: Medium     Problem list name updated by automated process. Provider to review       Intermittent asthma 12/30/2011     Priority: Medium     Advanced directives, counseling/discussion 05/06/2011     Priority: Medium     04/29/2013  Advance Care Planning:   Receipt of ACP document:  Received: Health Care Directive which was witnessed or notarized on 07/06/2011.  Document previously scanned on 07/07/2011.  Validation form completed and sent to be scanned.  Code Status reflects choices in most " recent ACP document.  Confirmed/documented designated decision maker(s). See permanent comments section of demographics in clinical tab. View document(s) and details by clicking on code status.   Added by Lacie Canchola on 4/29/2013.          Advance Care Planning:   ACP Review and Resources Provided:  Reviewed chart for advance care plan.  Leah Guerrero has no plan or code status on file however states presence of ACP document. Copy requested. Confirmed code status reflects current choices pending receipt of document/advance care plan review. Confirmed/documented designated decision maker(s). See permanent comments section of demographics in clinical tab.   Added by Emma Medina on 4/24/2013          Planning  Advance Directive Initial Visit  Leah Guerrero presents in person for initial session regarding completion of advanced directive form. She was referred to the facilitator by self.  She currently has an advance directive from 1980 & wants to fill out a new updated one.  Plan:  Advanced directive form, healthcare agent information card, advanced care planning information booklet provided to patient.   Follow up meeting: to be arranged when patient calls back to make an appointment after reviewing documents in one week or so. Patient instructed to bring healthcare agent to this meeting.  Flores Gerard RN  Letter sent to patient for [employer] follow up.  6/22/2011  Flores Gerard RN  Advance Directive Follow-up Visit  Leah Guerrero presents for advanced care planning session accompanied by no one.  Reviewed definitions of advanced care planning and advance directive form, and informational handouts given to patient previously.  Patient has questions and /or concerns about acp process or form .  Reviewed advance directive form with patient & answered all of her questions. Designated healthcare agent is identified. Healthcare agent s name is Paul Guerrero. My Hopes and Wishes  reviewed.  Finalized wishes are clear to patient Yes  Patient and designated healthcare agent are aware that document may be changed at any time in the future.  Advanced directive form: completed at this visit.  Advanced directive form: verified with notary signature. Original and two copies of advanced directive form given to patient copy sent to  for scanning into EMR and original given to patient and instructed to give copy to designated [facility] and non-[place] physician where applicable.  Flores Gerard RN  7/6/2011  Advance Directive Problem List Overview:   Name Relationship Phone    Primary Health Care Agent Paul Bro [phone]         Alternative Health Care Agent Hiram Bro c [phone]  [phone]                      Hyperlipidemia LDL goal <100 12/16/2010     Priority: Medium     Fatigue 12/15/2010     Priority: Medium     Type 2 diabetes, HbA1C goal < 8% (H) 11/24/2010     Priority: Medium     Female stress incontinence 10/28/2010     Priority: Medium       Past Surgical History:   Past Surgical History:   Procedure Laterality Date     APPENDECTOMY  2012     C HAND/FINGER SURGERY UNLISTED       C SHOULDER SURG PROC UNLISTED       C STOMACH SURGERY PROCEDURE UNLISTED       CHOLECYSTECTOMY  1969     COLONOSCOPY  6-2-08    Neg. At [facility]     COLONOSCOPY  9-3-13     EYE SURGERY       HYSTERECTOMY TOTAL ABD, HEIDI SALPINGO-OOPHORECTOMY, NODE DISSECTION, TUMOR DEBULKING, COMBINED  1988    fibroids?     KNEE SURGERY  x5     KNEE SURGERY      kneecap procedure     LUNG SURGERY  10-7-16    removal lung cancer     SHOULDER SURGERY  2005    right shoulder, bone spurs     WRIST SURGERY      right wrist       Medications:   Current Outpatient Medications:      ACE/ARB NOT PRESCRIBED, INTENTIONAL,, ACE & ARB not prescribed due to Allergy, Disp: , Rfl:      albuterol (2.5 MG/3ML) 0.083% nebulizer solution, Take 3 mLs (2.5 mg) by nebulization 4 times daily as needed, Disp: 1 Box, Rfl: 5      albuterol (PROAIR HFA/PROVENTIL HFA/VENTOLIN HFA) 108 (90 Base) MCG/ACT Inhaler, Inhale 2 puffs into the lungs every 6 hours as needed for shortness of breath / dyspnea or wheezing, Disp: 1 Inhaler, Rfl: 11     amLODIPine (NORVASC) 5 MG tablet, TAKE 1 TABLET(5 MG) BY MOUTH DAILY, Disp: 30 tablet, Rfl: 1     aspirin 81 MG tablet, Take 1 tablet by mouth daily. 1 daily, Disp: , Rfl:      atenolol (TENORMIN) 50 MG tablet, TAKE 1 TABLET(50 MG) BY MOUTH TWICE DAILY, Disp: 180 tablet, Rfl: 1     BASAGLAR 100 UNIT/ML injection, 75 units at bedtime or as directed, Disp: 75 mL, Rfl: 3     beclomethasone HFA (QVAR REDIHALER) 80 MCG/ACT inhaler, Inhale 1 puff into the lungs 2 times daily, Disp: 10.6 g, Rfl: 6     benzonatate (TESSALON) 100 MG capsule, Take 1 capsule (100 mg) by mouth 3 times daily as needed for cough, Disp: 42 capsule, Rfl: 1     blood glucose monitoring (ONE TOUCH ULTRASOFT) lancets, TEST 6 TIMES PER DAY, Disp: 600 each, Rfl: 0     blood glucose monitoring (ONETOUCH ULTRA) test strip, TEST 6 TIMES PER DAY, Disp: 600 strip, Rfl: 11     Calcium Carbonate-Vitamin D (CALCIUM-D PO), Take  by mouth. 1200mg calcium/600mg D3, Disp: , Rfl:      calcium polycarbophil (FIBERCON) 625 MG tablet, Take 2 tablets (1,250 mg) by mouth daily, Disp: 60 tablet, Rfl: 11     fish oil-omega-3 fatty acids (FISH OIL) 1000 MG capsule, Take 1 capsule by mouth daily., Disp: , Rfl:      Garlic CAPS, Take  by mouth. One daily, Disp: , Rfl:      hydrOXYzine (VISTARIL) 50 MG capsule, Take 1 capsule (50 mg) by mouth nightly as needed for anxiety or other, Disp: 30 capsule, Rfl: 0     ibuprofen (ADVIL,MOTRIN) 800 MG tablet, Take 1 tablet (800 mg) by mouth every 8 hours as needed for pain, Disp: 100 tablet, Rfl: 0     insulin aspart (NOVOLOG FLEXPEN) 100 UNIT/ML pen, PATIENT USES 100 UNITS DAILY. SPLIT UP BETWEEN MULTIPLE DOSES, Disp: 90 mL, Rfl: 0     insulin aspart (NOVOLOG PEN) 100 UNIT/ML injection, Patient uses 100 units daily split up  between multiple doses. Take 2 units per 15 grams of carbohydrate eaten and 2 units per 30 mg/dL above 170 mg/dL., Disp: 30 mL, Rfl: 1     insulin pen needle (B-D U/F) 31G X 8 MM miscellaneous, USE TO ADMINISTER INSULIN 6 TIMES DAILY, Disp: 200 each, Rfl: 0     meperidine (DEMEROL) 50 MG tablet, Take 1 tablet (50 mg) by mouth every 4 hours as needed, Disp: 20 tablet, Rfl: 0     MULTIVITAMIN TABS   OR, 1 TABLET DAILY, Disp: , Rfl:      oxyCODONE-acetaminophen (PERCOCET) 5-325 MG per tablet, Take 1 tablet by mouth every 6 hours as needed for pain, Disp: 30 tablet, Rfl: 0     promethazine (PHENERGAN) 12.5 MG tablet, Take 12.5 mg by mouth as needed, Disp: , Rfl: 5     rosuvastatin (CRESTOR) 20 MG tablet, TAKE 1 TABLET(20 MG) BY MOUTH DAILY, Disp: 90 tablet, Rfl: 2     SALINE, to clean port every other month, Disp: , Rfl:      senna-docusate (SENOKOT-S;PERICOLACE) 8.6-50 MG per tablet, 1-2 po bid prn constipation, Disp: 100 tablet, Rfl: 1     tiZANidine (ZANAFLEX) 2 MG tablet, Take 1 tablet (2 mg) by mouth 3 times daily as needed for muscle spasms, Disp: 30 tablet, Rfl: 0     zolpidem (AMBIEN) 5 MG tablet, Take 1 tablet (5 mg) by mouth nightly as needed for sleep, Disp: 30 tablet, Rfl: 2    Allergies:   Allergies   Allergen Reactions     Hydralazine Shortness Of Breath     Famotidine Nausea and Vomiting and Unknown     Benadryl Itch Stopping      Gives her more energy, can't sleep     Lisinopril      Tongue swelling       Metoclopramide Nausea and Vomiting     Ondansetron Nausea and Vomiting     Other reaction(s): Headache     Penicillins Hives     Tape [Adhesive Tape]      blisters     Tylenol      Anxiety  Tablets only.     REVIEW OF SYSTEMS:  CONSTITUTIONAL:NEGATIVE for fever, chills, night sweats, change in weight  INTEGUMENTARY/SKIN: NEGATIVE for worrisome rashes, moles or lesions. Scars more hyperemic as a result of chemotherapy  MUSCULOSKELETAL:See HPI above  NEURO: NEGATIVE for weakness, dizziness or  paresthesias  CARDIOVASCULAR: negative for chest pain, shortness of breath      PHYSICAL EXAM:  /73   Pulse 54   Resp 14   Wt 78.5 kg (173 lb)   SpO2 94%   BMI 30.65 kg/m     GENERAL APPEARANCE: healthy, alert, no distress  SKIN: no suspicious lesions or rashes  NEURO: Normal strength and tone, mentation intact and speech normal  PSYCH:  mentation appears normal and affect normal  RESPIRATORY: No increased work of breathing.    BILATERAL LOWER EXTREMITIES:  Gait: slight quadriceps avoidance, favoring left knee. Walking unassisted.   Intact sensation deep peroneal nerve, superficial peroneal nerve, med/lat tibial nerve, sural nerve, saphenous nerve  Intact EHL, EDL, TA, FHL, GS, quadriceps hamstrings and hip flexors  Toes warm and well perfused, brisk capillary refill. Palpable 2+ dp pulses.  Bilateral calf soft and nttp or squeeze.  Edema: 1+ left lower extremity.    LEFT KNEE EXAM:    Incision: healed  Skin: intact, no ecchymosis or erythema  ROM: 2 extension to 100 flexion  Effusion: trace  Tender: diffuse mild tenderness.    X-RAY:  2 views left knee from 1/23/2019 were reviewed in clinic today. No obvious fractures or dislocations. Total knee arthroplasty components in place without evidence of failure or loosening.    Impression: Leah Guerrero is a 73 year old female status post Total knee arthroplasty, left knee, doing well, 13 weeks out from surgery.    Plan:   * sorry to hear about her relapse of lung cancer. Our thoughts are with her on her treatments.  * reviewed xrays today, showing total knee arthroplasty, unchanged from prior films.  * continue with knee range of motion exercises, focusing on flexion now  * Tylenol for pain.  * xrays next visit: 2 views of the knee  * return to clinic 9 months  * elevation for swelling.  * ice as needed.  * Physical Therapy, home exercise program.  * all questions addressed and answered prior to discharge from clinic today to patient's satisfaction.  *  patient will need longterm prophylactic antibiotics use with dental procedures or other invasive procedures (2 g amoxicilin or 450mg ([id number]) clindamycin) 1 hour prior to dental procedures.    * Oxford Knee score: done today. To be done at 1 year visit.        Grant Dillon PA-C, [facility] (Ortho)  Supervising Physician: José Vogel M.D., M.S.  Dept. of Orthopaedic Surgery  [facility]

## 2019-01-23 NOTE — LETTER
"    1/23/2019         RE: Leah Guerrero  [address]  [facility] [address]        Dear Colleague,    Thank you for referring your patient, Leah Guerrero, to the [facility]. Please see a copy of my visit note below.        Chief Complaint   Patient presents with     RECHECK      f/u left total knee arthroplasty DOS: 10/23/2018 Patient states  doing great         SURGERY: Hybrid tricompartmental total knee arthroplasty (press-fit tibial and femoral components for the medial, lateral compartments, and cemented patellofemoral component), left knee. ( [facility])  DATE OF SURGERY: 10/23/2018    HISTORY OF PRESENT ILLNESS: Leah \"Fina\" ALEK Guerrero is a 73 year old female seen for postoperative evaluation of left total knee arthroplasty. It has been 13 weeks since surgery. Doing well. She continues to walk unassisted. Her knee has been a little warm lately and her scars are more pronounced as a side effect of chemotherapy. Her lung cancer came back.    *: ~2 years she had right lung lobectomy. Nodules on right and left lung have been found more recently according to patient account.     Present symptoms: no pain, + swelling.    Pain severity: 0/10  Pain quality: aching  Frequency of symptoms: frequently  Exacerbating Factors: weightbearing, end range of motion   Relieving Factors: rest, oxycodone   Night Pain: Yes  Pain while at rest: No   Associated numbness or tingling: No     Past medical history:   Past Medical History:   Diagnosis Date     Abnormal CT of the chest 10/12    Nodular consolidation right lobe 1.6 cm. Needs repeat CT Jan 2013     Bleeding disorder (H)     in bowels x2      Chronic pancreatitis (H)      CVA (cerebral infarction)      Diabetes      Female stress incontinence      Hyperlipidemia LDL goal <100      Hypertension      Intermittent asthma 12/30/2011     Lateral epicondylitis (tennis elbow) - left 3/30/2011     OA (osteoarthritis) of knee " 3/22/2013     Pulmonary nodule, left 10/12    0.5 cm; needs f/u chest CT scan 2013     Surgical complications      Unspecified asthma(493.90)      Patient Active Problem List    Diagnosis Date Noted     Status post total left knee replacement 2018     Priority: Medium     10/23/2018 at [place]       Mild intermittent asthma without complication 2018     Priority: Medium     Malignant neoplasm of lung, unspecified laterality, unspecified part of lung (H) 2017     Priority: Medium     Chronic pancreatitis, unspecified pancreatitis type (H) 2016     Priority: Medium     Type 2 diabetes mellitus without complication, with long-term current use of insulin (H) 2016     Priority: Medium     Anxiety 2016     Priority: Medium     Insomnia, unspecified type 2016     Priority: Medium     Idiopathic chronic pancreatitis (H) 2015     Priority: Medium     Primary osteoarthritis of left knee 10/28/2015     Priority: Medium     Hypertension goal BP (blood pressure) < 140/90 2015     Priority: Medium     Obesity 2015     Priority: Medium     Health Care Home 10/22/2014     Priority: Medium     [facility]   Guerline Zhao RN  [phone]    [facility] CARE PLAN SUMMARY    Client Name:  Leah GASTON Cesar  Address:  [address] [place] [address] Phone: [phone]   :  1945 Date of Assessment: 10/8/18   Health Plan:  [facility]  [place] Number: [id number] Medical Assistance Number: [id number]  Financial Worker:  Savanna Collins  [phone]  Case #:  [id number]   [facility] :  Guerline Zhao RN  Phone:  [phone]   Fax:  [phone]   [facility] Enrollment Date: 18 Case Mix:  L  Rate Cell:  B  Waiver Type: EW    Emergency Contact:   Primary Emergency Contact: YuvalALEK Guerrero  [place] Phone: [phone]  Relation: Son  Secondary Emergency Contact: Hiram Guerrero   Mobile Phone:  "[phone]  Relation: Son Language:  English  :  No   Health Care Agent/POA:   Advanced Directives/Living Will:  On file   Primary Care Clinic/Phone/Fax:  [facility]/(p) [phone], (f) [phone] Primary Dx:  Diabetes:E11.9  Secondary Dx: Osteoarthritis: M19.91    Primary Physician:  Kevin Esquivel   Height:  5' 3\"  Weight:  186 lbs   Specialty Physician:    Audiologist:     Eye Care Provider:   Dental Care Provider:    [facility]: [facility] [phone]   Other:        Homemaking/[employer]  [phone]  Fax: [phone] 11/1/18-10/31/19 weekly 4 hrs/week  (16 units)  $4.61/unit      Transportation/[employer]  [facility] 11/1/18-10/31/19 Monthly as needed Rush:10/m  Non Rush: 10/m Rush $4/ticket  ($40/m)  Non rush  $3/ticket ($30/m)     [employer]Mental Health   Aditi Rich:[phone]  Fax: [phone] Ongoing Monthly visits       [facility] worker       Intermittent asthma without complication 07/11/2014     Priority: Medium     IT band syndrome 01/13/2014     Priority: Medium     Contusion of knee 11/20/2013     Priority: Medium     Prepatellar bursitis of left knee 11/20/2013     Priority: Medium     Insomnia 05/30/2013     Priority: Medium     Mechanical low back pain 03/22/2013     Priority: Medium     Radicular pain of left lower extremity 03/22/2013     Priority: Medium     GERD (gastroesophageal reflux disease) 02/06/2013     Priority: Medium     Pulmonary nodule, left      Priority: Medium     0.5 cm; needs f/u chest CT scan Jan 2013       Abnormal CT of the chest      Priority: Medium     Nodular consolidation right lobe 1.6 cm. Needs repeat CT Jan 2013       Anxiety state 06/21/2012     Priority: Medium     Problem list name updated by automated process. Provider to review       Intermittent asthma 12/30/2011     Priority: Medium     Advanced directives, counseling/discussion 05/06/2011     Priority: Medium     04/29/2013  Advance Care " Planning:   Receipt of ACP document:  Received: Health Care Directive which was witnessed or notarized on 07/06/2011.  Document previously scanned on 07/07/2011.  Validation form completed and sent to be scanned.  Code Status reflects choices in most recent ACP document.  Confirmed/documented designated decision maker(s). See permanent comments section of demographics in clinical tab. View document(s) and details by clicking on code status.   Added by Lacie Canchola on 4/29/2013.          Advance Care Planning:   ACP Review and Resources Provided:  Reviewed chart for advance care plan.  Leah Guerrero has no plan or code status on file however states presence of ACP document. Copy requested. Confirmed code status reflects current choices pending receipt of document/advance care plan review. Confirmed/documented designated decision maker(s). See permanent comments section of demographics in clinical tab.   Added by Emma Medina on 4/24/2013          Planning  Advance Directive Initial Visit  Leah Guerrero presents in person for initial session regarding completion of advanced directive form. She was referred to the facilitator by self.  She currently has an advance directive from 1980 & wants to fill out a new updated one.  Plan:  Advanced directive form, healthcare agent information card, advanced care planning information booklet provided to patient.   Follow up meeting: to be arranged when patient calls back to make an appointment after reviewing documents in one week or so. Patient instructed to bring healthcare agent to this meeting.  Flores Gerard RN  Letter sent to patient for [employer] Cho[employer] follow up.  6/22/2011  Flores Gerard RN  Advance Directive Follow-up Visit  Leah Guerrero presents for advanced care planning session accompanied by no one.  Reviewed definitions of advanced care planning and advance directive form, and informational handouts given to patient previously.  Patient  has questions and /or concerns about acp process or form .  Reviewed advance directive form with patient & answered all of her questions. Designated healthcare agent is identified. Healthcare agent s name is Paul Guerrero. My Hopes and Wishes reviewed.  Finalized wishes are clear to patient Yes  Patient and designated healthcare agent are aware that document may be changed at any time in the future.  Advanced directive form: completed at this visit.  Advanced directive form: verified with notary signature. Original and two copies of advanced directive form given to patient copy sent to  for scanning into EMR and original given to patient and instructed to give copy to designated [facility] and non-[facility] physician where applicable.  Flores Gerard RN  7/6/2011  Advance Directive Problem List Overview:   Name Relationship Phone    Primary Health Care Agent Paul Guerrero Son [phone]         Alternative Health Care Agent Hiram Guerrero Son c [phone]  [phone]                      Hyperlipidemia LDL goal <100 12/16/2010     Priority: Medium     Fatigue 12/15/2010     Priority: Medium     Type 2 diabetes, HbA1C goal < 8% (H) 11/24/2010     Priority: Medium     Female stress incontinence 10/28/2010     Priority: Medium       Past Surgical History:   Past Surgical History:   Procedure Laterality Date     APPENDECTOMY  2012     C HAND/FINGER SURGERY UNLISTED       C SHOULDER SURG PROC UNLISTED       C STOMACH SURGERY PROCEDURE UNLISTED       CHOLECYSTECTOMY  1969     COLONOSCOPY  6-2-08    Neg. At [facility]     COLONOSCOPY  9-3-13     EYE SURGERY       HYSTERECTOMY TOTAL ABD, HEIDI SALPINGO-OOPHORECTOMY, NODE DISSECTION, TUMOR DEBULKING, COMBINED  1988    fibroids?     KNEE SURGERY  x5     KNEE SURGERY      kneecap procedure     LUNG SURGERY  10-7-16    removal lung cancer     SHOULDER SURGERY  2005    right shoulder, bone spurs     WRIST SURGERY      right wrist       Medications:   Current Outpatient  Medications:      ACE/ARB NOT PRESCRIBED, INTENTIONAL,, ACE & ARB not prescribed due to Allergy, Disp: , Rfl:      albuterol (2.5 MG/3ML) 0.083% nebulizer solution, Take 3 mLs (2.5 mg) by nebulization 4 times daily as needed, Disp: 1 Box, Rfl: 5     albuterol (PROAIR HFA/PROVENTIL HFA/VENTOLIN HFA) 108 (90 Base) MCG/ACT Inhaler, Inhale 2 puffs into the lungs every 6 hours as needed for shortness of breath / dyspnea or wheezing, Disp: 1 Inhaler, Rfl: 11     amLODIPine (NORVASC) 5 MG tablet, TAKE 1 TABLET(5 MG) BY MOUTH DAILY, Disp: 30 tablet, Rfl: 1     aspirin 81 MG tablet, Take 1 tablet by mouth daily. 1 daily, Disp: , Rfl:      atenolol (TENORMIN) 50 MG tablet, TAKE 1 TABLET(50 MG) BY MOUTH TWICE DAILY, Disp: 180 tablet, Rfl: 1     BASAGLAR 100 UNIT/ML injection, 75 units at bedtime or as directed, Disp: 75 mL, Rfl: 3     beclomethasone HFA (QVAR REDIHALER) 80 MCG/ACT inhaler, Inhale 1 puff into the lungs 2 times daily, Disp: 10.6 g, Rfl: 6     benzonatate (TESSALON) 100 MG capsule, Take 1 capsule (100 mg) by mouth 3 times daily as needed for cough, Disp: 42 capsule, Rfl: 1     blood glucose monitoring (ONE TOUCH ULTRASOFT) lancets, TEST 6 TIMES PER DAY, Disp: 600 each, Rfl: 0     blood glucose monitoring (ONETOUCH ULTRA) test strip, TEST 6 TIMES PER DAY, Disp: 600 strip, Rfl: 11     Calcium Carbonate-Vitamin D (CALCIUM-D PO), Take  by mouth. 1200mg calcium/600mg D3, Disp: , Rfl:      calcium polycarbophil (FIBERCON) 625 MG tablet, Take 2 tablets (1,250 mg) by mouth daily, Disp: 60 tablet, Rfl: 11     fish oil-omega-3 fatty acids (FISH OIL) 1000 MG capsule, Take 1 capsule by mouth daily., Disp: , Rfl:      Garlic CAPS, Take  by mouth. One daily, Disp: , Rfl:      hydrOXYzine (VISTARIL) 50 MG capsule, Take 1 capsule (50 mg) by mouth nightly as needed for anxiety or other, Disp: 30 capsule, Rfl: 0     ibuprofen (ADVIL,MOTRIN) 800 MG tablet, Take 1 tablet (800 mg) by mouth every 8 hours as needed for pain, Disp: 100  tablet, Rfl: 0     insulin aspart (NOVOLOG FLEXPEN) 100 UNIT/ML pen, PATIENT USES 100 UNITS DAILY. SPLIT UP BETWEEN MULTIPLE DOSES, Disp: 90 mL, Rfl: 0     insulin aspart (NOVOLOG PEN) 100 UNIT/ML injection, Patient uses 100 units daily split up between multiple doses. Take 2 units per 15 grams of carbohydrate eaten and 2 units per 30 mg/dL above 170 mg/dL., Disp: 30 mL, Rfl: 1     insulin pen needle (B-D U/F) 31G X 8 MM miscellaneous, USE TO ADMINISTER INSULIN 6 TIMES DAILY, Disp: 200 each, Rfl: 0     meperidine (DEMEROL) 50 MG tablet, Take 1 tablet (50 mg) by mouth every 4 hours as needed, Disp: 20 tablet, Rfl: 0     MULTIVITAMIN TABS   OR, 1 TABLET DAILY, Disp: , Rfl:      oxyCODONE-acetaminophen (PERCOCET) 5-325 MG per tablet, Take 1 tablet by mouth every 6 hours as needed for pain, Disp: 30 tablet, Rfl: 0     promethazine (PHENERGAN) 12.5 MG tablet, Take 12.5 mg by mouth as needed, Disp: , Rfl: 5     rosuvastatin (CRESTOR) 20 MG tablet, TAKE 1 TABLET(20 MG) BY MOUTH DAILY, Disp: 90 tablet, Rfl: 2     SALINE, to clean port every other month, Disp: , Rfl:      senna-docusate (SENOKOT-S;PERICOLACE) 8.6-50 MG per tablet, 1-2 po bid prn constipation, Disp: 100 tablet, Rfl: 1     tiZANidine (ZANAFLEX) 2 MG tablet, Take 1 tablet (2 mg) by mouth 3 times daily as needed for muscle spasms, Disp: 30 tablet, Rfl: 0     zolpidem (AMBIEN) 5 MG tablet, Take 1 tablet (5 mg) by mouth nightly as needed for sleep, Disp: 30 tablet, Rfl: 2    Allergies:   Allergies   Allergen Reactions     Hydralazine Shortness Of Breath     Famotidine Nausea and Vomiting and Unknown     Benadryl Itch Stopping      Gives her more energy, can't sleep     Lisinopril      Tongue swelling       Metoclopramide Nausea and Vomiting     Ondansetron Nausea and Vomiting     Other reaction(s): Headache     Penicillins Hives     Tape [Adhesive Tape]      blisters     Tylenol      Anxiety  Tablets only.     REVIEW OF SYSTEMS:  CONSTITUTIONAL:NEGATIVE for fever,  chills, night sweats, change in weight  INTEGUMENTARY/SKIN: NEGATIVE for worrisome rashes, moles or lesions. Scars more hyperemic as a result of chemotherapy  MUSCULOSKELETAL:See HPI above  NEURO: NEGATIVE for weakness, dizziness or paresthesias  CARDIOVASCULAR: negative for chest pain, shortness of breath      PHYSICAL EXAM:  /73   Pulse 54   Resp 14   Wt 78.5 kg (173 lb)   SpO2 94%   BMI 30.65 kg/m      GENERAL APPEARANCE: healthy, alert, no distress  SKIN: no suspicious lesions or rashes  NEURO: Normal strength and tone, mentation intact and speech normal  PSYCH:  mentation appears normal and affect normal  RESPIRATORY: No increased work of breathing.    BILATERAL LOWER EXTREMITIES:  Gait: slight quadriceps avoidance, favoring left knee. Walking unassisted.   Intact sensation deep peroneal nerve, superficial peroneal nerve, med/lat tibial nerve, sural nerve, saphenous nerve  Intact EHL, EDL, TA, FHL, GS, quadriceps hamstrings and hip flexors  Toes warm and well perfused, brisk capillary refill. Palpable 2+ dp pulses.  Bilateral calf soft and nttp or squeeze.  Edema: 1+ left lower extremity.    LEFT KNEE EXAM:    Incision: healed  Skin: intact, no ecchymosis or erythema  ROM: 2 extension to 100 flexion  Effusion: trace  Tender: diffuse mild tenderness.    X-RAY:  2 views left knee from 1/23/2019 were reviewed in clinic today. No obvious fractures or dislocations. Total knee arthroplasty components in place without evidence of failure or loosening.    Impression: Leah Guerrero is a 73 year old female status post Total knee arthroplasty, left knee, doing well, 13 weeks out from surgery.    Plan:   * sorry to hear about her relapse of lung cancer. Our thoughts are with her on her treatments.  * reviewed xrays today, showing total knee arthroplasty, unchanged from prior films.  * continue with knee range of motion exercises, focusing on flexion now  * Tylenol for pain.  * xrays next visit: 2 views of  the knee  * return to clinic 9 months  * elevation for swelling.  * ice as needed.  * Physical Therapy, home exercise program.  * all questions addressed and answered prior to discharge from clinic today to patient's satisfaction.  * patient will need longterm prophylactic antibiotics use with dental procedures or other invasive procedures (2 g amoxicilin or 450mg ([id number]) clindamycin) 1 hour prior to dental procedures.    * Oxford Knee score: done today. To be done at 1 year visit.        Grant Dillon PA-C, CAQ (Ortho)  Supervising Physician: José Vogel M.D., M.S.  Dept. of Orthopaedic Surgery  [facility]      Again, thank you for allowing me to participate in the care of your patient.        Sincerely,        José Vogel MD

## 2019-01-30 ENCOUNTER — PATIENT OUTREACH (OUTPATIENT)
Dept: GERIATRIC MEDICINE | Facility: CLINIC | Age: 74
End: 2019-01-30

## 2019-01-30 NOTE — PROGRESS NOTES
1/30/19 CM received a fax from [employer] asking for an auth request for 5 HHA visits done by [facility] back in December. ARPAN sent information to [employer] to write up the service auth request.  Keyanna Zhao RN BA PHN  [facility] Case Management  [phone]

## 2019-02-11 ENCOUNTER — TELEPHONE (OUTPATIENT)
Dept: FAMILY MEDICINE | Facility: CLINIC | Age: 74
End: 2019-02-11

## 2019-02-11 DIAGNOSIS — E11.9 TYPE 2 DIABETES MELLITUS WITHOUT COMPLICATION, WITH LONG-TERM CURRENT USE OF INSULIN (H): ICD-10-CM

## 2019-02-11 DIAGNOSIS — Z79.4 TYPE 2 DIABETES MELLITUS WITHOUT COMPLICATION, WITH LONG-TERM CURRENT USE OF INSULIN (H): ICD-10-CM

## 2019-02-11 NOTE — TELEPHONE ENCOUNTER
Patient out of [place] and needs medication today. Forgot her supply at home.     Sherron Barkley RN

## 2019-02-20 ENCOUNTER — TRANSFERRED RECORDS (OUTPATIENT)
Dept: HEALTH INFORMATION MANAGEMENT | Facility: CLINIC | Age: 74
End: 2019-02-20

## 2019-02-27 ENCOUNTER — PATIENT OUTREACH (OUTPATIENT)
Dept: GERIATRIC MEDICINE | Facility: CLINIC | Age: 74
End: 2019-02-27

## 2019-02-27 NOTE — PROGRESS NOTES
19 ARPAN received a call from Emani  at [employer] She is asking if member can be opened to the EW. Leah has no one to take her shopping for groceries. She is asking for an ILS worker She does use [employer] but with her cancer spreading and undergoing chemotherapy she needs assistance. She also received a bill from [facility] for a private room at her last hospitalization. Emani is asking if  has any resources to pay this bill which is $570.    ARPAN notified Emani that EW does not pay for ILS workers. ARPAN can check into Store to Door services for groceries or MOW. Leah has not been interested MOW in the past. Emani states she will probably not want MOW because she likes to cook. ARPAN will check with Leah. Or we could send a referral for Sr  to take her shopping.  EW does not pay medical  Bills. Did Leah agree to a private room. Emani believes she may have agreed with this but was confused that insurance did not cover the cost.  Emani will call [facility] to inquire on this bill. She will also check with the [facility] to see if they have any emergency funding for such things.  Emani is a temporary Mental Health CM for Leah until they can assign a new oe. Her previous CM .    ARPAN spoke with Leah and she is not interested in store to door services for grocery shopping. She would like a Senior  to take her shopping weekly though.   ARPAN sent an online referral to [facility]  for a senior  3 hrs per week.  ARPAN sent info to [employer] for an auth for Sr  services. Also the Care plan service page and care plan change letter to  mail to client.    ARPAN spoke with Maude NIX CM again and let her know of the referral to sr  services through [employer].  Keyanna Zhao RN BA N  [facility] Case Management  [phone]

## 2019-02-28 ENCOUNTER — PATIENT OUTREACH (OUTPATIENT)
Dept: GERIATRIC MEDICINE | Facility: CLINIC | Age: 74
End: 2019-02-28

## 2019-02-28 NOTE — PROGRESS NOTES
[facility] Care Coordination Contact    Member Signature - POC Change:  Per CC, member has made a change to their POC.  Care Plan Change Letter mailed to member for signature with a self-addressed return envelope.       Lucero Hernandez  Case Management Specialist   [facility]   [phone]

## 2019-03-18 ENCOUNTER — TRANSFERRED RECORDS (OUTPATIENT)
Dept: HEALTH INFORMATION MANAGEMENT | Facility: CLINIC | Age: 74
End: 2019-03-18

## 2019-03-26 ENCOUNTER — TRANSFERRED RECORDS (OUTPATIENT)
Dept: HEALTH INFORMATION MANAGEMENT | Facility: CLINIC | Age: 74
End: 2019-03-26

## 2019-03-29 ENCOUNTER — PATIENT OUTREACH (OUTPATIENT)
Dept: GERIATRIC MEDICINE | Facility: CLINIC | Age: 74
End: 2019-03-29

## 2019-03-29 NOTE — PROGRESS NOTES
TRANSITIONS OF CARE (JED) LOG   JED tasks should be completed by the CC within one (1) business day of notification of each transition. Follow up contact with member is required after return to their usual care setting.  Note:  If CC finds out about the transitions fifteen (15) days or more after the member has returned to their usual care setting, no JED log is needed. However, the CC should check in with the member to discuss the transition process, any changes needed to the care plan and document it in a case note.    Member Name:  Leah Guerrero O Name:  [employer] Plan Member ID#: [id number]   Product: [facility] Care Coordinator Contact:  Guerline Zhao RN Agency/[facility]/Care System: [facility]   Transition Communication Actions from Care Management Contact   Transition #1   Notification Date: 3/29/19 Transition Date:   3/27/19 Transition From: Home     Is this the member s usual care setting?               yes Transition To: Hospital, [place]   Transition Type:  Unplanned  Reason for Admission/Comments:  Vomiting     Shared CC contact info, care plan/services with receiving setting--Date completed: 3/29/19    Notified PCP of transition--Date completed:  3/29/19     via  EMR   Transition #2   Transition #3  (if applicable)   Notification Date: 4/2/19         Transition To:  Home  Transition Date: 4/1/19     Transition Type:    Planned  Notified PCP -- Date completed: 4/2/19              Shared CC contact info, care plan/services with receiving setting or, if applicable, home care agency--Date completed:  4/1/19  *Complete additional tasks below, if this transition is a return to usual care setting.      Comments:  4/2/19 CM called member at home for hospital discharge follow up. No answer,TRC. Guerline Zhao RN    Notification Date:          Transition To:    Transition Date:              Transition Type:      Notified PCP--Date completed:          Shared CC contact info, care  plan/services with receiving setting or, if applicable, home care agency--Date completed:       *Complete additional tasks below, if this transition is a return to usual care setting.      Comments:       *Complete tasks below when the member is discharging TO their usual care setting within one (1) business day of notification.  For situations where the Care Coordinator is notified of the discharge prior to the date of discharge, the Care Coordinator must follow up with the member or designated representative to confirm that discharge actually occurred and discuss required JED tasks as outlined in the JED Instructions.  (This includes situations where it may be a  new  usual care setting for the member. (i.e., a community member who decides upon permanent nursing home placement following hospitalization and rehab).    Date completed:   Communicated with member or their designated representative about the following:  care transition process; about changes to the member s health status; plan of care updates; education about transitions and how to prevent unplanned transitions/readmissions  Four Pillars for Optimal Transition:    Check  Yes  - if the member, family member and/or SNF/facility staff manages the following:    If  No  provide explanation in the comments section.          [x]  Yes     []  No     Does the member have a follow-up appointment scheduled with primary care or specialist? (Mental health hospitalizations--the appt. should be w/in 7 days)   [x]  Yes     []  No     Can the member manage their medications or is there a system in place to manage medications (e.g. home care set-up)?         [x]  Yes     []  No     Can the member verbalize warning signs and symptoms to watch for and how to respond?         [x]  Yes     []  No     Does the member use a Personal Health Care Record?  Check  Yes  if visit summary, discharge summary, and/or healthcare summary are being used as a PHR.                                                                                                                                                                                     [] Yes      [x] No      Have you updated the member s care plan?  If  No  provide explanation in comments.   Comments:  No changes to care plan.     3/29/19 ARPAN called Leah and she is still in the hospital. She thinks the vomiting started after a new chemo. She thinks she may go home tomorrow.  ARPAN spoke with Jing BARROS at Health system. Jing states she will go home with [facility] services. Discharge has not yet been set.  Keyanna WERNER [facility] Case Management  [phone]  4/1/19 ARPAN called leah and she is still in the hospital. She thinks she may be dscharged home tomorrow. ARPAN will follow up after she discharges to home.  Keyanna WERNER [facility] Case Management  [phone]  4/2/19 CM received notice member discharged to home 4/1/19. ARPAN called member at home 4/2/19 but no answer,LMTRC.   ARPAN reviewed the discharge notes and her the hospital RN notes member left AMA 4/1. It states she has a folow up appt with her specialist 4/8. ARPAN notified members PCP.  Keyanna WERNER [facility] Case Management  [phone]

## 2019-04-03 ENCOUNTER — PATIENT OUTREACH (OUTPATIENT)
Dept: GERIATRIC MEDICINE | Facility: CLINIC | Age: 74
End: 2019-04-03

## 2019-04-03 NOTE — PROGRESS NOTES
4/3/19 CM called member for hospital follow up. No answer,LM if she needs anything to call CM.  Keyanna Zhao RN BA N  [facility] Case Management  [phone]

## 2019-04-08 ENCOUNTER — TRANSFERRED RECORDS (OUTPATIENT)
Dept: HEALTH INFORMATION MANAGEMENT | Facility: CLINIC | Age: 74
End: 2019-04-08

## 2019-04-18 ENCOUNTER — PATIENT OUTREACH (OUTPATIENT)
Dept: GERIATRIC MEDICINE | Facility: CLINIC | Age: 74
End: 2019-04-18

## 2019-04-18 NOTE — PROGRESS NOTES
[facility] Care Coordination Contact      [facility] Six-Month Telephone Assessment    6 month telephone assessment completed on 4/18/19.    ER visits: Yes -  [facility]  Hospitalizations: Yes -  [facility]  TCU stays: No  Significant health status changes: no  Falls/Injuries: No  ADL/IADL changes: No  Changes in services: No    Caregiver Assessment follow up:  NA    Goals: See POC in chart for goal progress documentation.      Will see member in 6 months for an annual health risk assessment.   Encouraged member to call CC with any questions or concerns in the meantime.   Keyanna Zhao RN BA PHN  [facility] Case Management  [phone]

## 2019-04-29 ENCOUNTER — TRANSFERRED RECORDS (OUTPATIENT)
Dept: HEALTH INFORMATION MANAGEMENT | Facility: CLINIC | Age: 74
End: 2019-04-29

## 2019-05-20 ENCOUNTER — TRANSFERRED RECORDS (OUTPATIENT)
Dept: HEALTH INFORMATION MANAGEMENT | Facility: CLINIC | Age: 74
End: 2019-05-20

## 2019-05-20 ENCOUNTER — DOCUMENTATION ONLY (OUTPATIENT)
Dept: OTHER | Facility: CLINIC | Age: 74
End: 2019-05-20

## 2019-05-26 ENCOUNTER — TRANSFERRED RECORDS (OUTPATIENT)
Dept: HEALTH INFORMATION MANAGEMENT | Facility: CLINIC | Age: 74
End: 2019-05-26

## 2019-05-31 ENCOUNTER — PATIENT OUTREACH (OUTPATIENT)
Dept: GERIATRIC MEDICINE | Facility: CLINIC | Age: 74
End: 2019-05-31

## 2019-05-31 NOTE — PROGRESS NOTES
TRANSITIONS OF CARE (JED) LOG   JED tasks should be completed by the CC within one (1) business day of notification of each transition. Follow up contact with member is required after return to their usual care setting.  Note:  If CC finds out about the transitions fifteen (15) days or more after the member has returned to their usual care setting, no JED log is needed. However, the CC should check in with the member to discuss the transition process, any changes needed to the care plan and document it in a case note.    Member Name:  Leah Guerrero MCO Name:  [employer] Member ID#: [id number]   Product: [facility] Care Coordinator Contact:  Guerline Zhao RN Agency/[facility]/Care System: [facility]   Transition Communication Actions from Care Management Contact   Transition #1   Notification Date: 5/31/19 Transition Date:   5/26/19 Transition From: Home     Is this the member s usual care setting?               yes Transition To: Hospital, [place]   Transition Type:  Unplanned  Reason for Admission/Comments:  Weakness/fall     Shared CC contact info, care plan/services with receiving setting--Date completed: 5/31/19    Notified PCP of transition--Date completed:  5/31/19     via  EMR   Transition #2   Transition #3  (if applicable)   Notification Date: 6/3/19         Transition To:  Home  Transition Date: 6/2/19     Transition Type:    Planned  Notified PCP -- Date completed: 6/3/19              Shared CC contact info, care plan/services with receiving setting or, if applicable, home care agency--Date completed:  NA  *Complete additional tasks below, if this transition is a return to usual care setting.      Comments:  CM learned through Epic that member had been dishcraged to home 6/2/19.    Notification Date:          Transition To:    Transition Date:              Transition Type:      Notified PCP--Date completed:          Shared CC contact info, care plan/services with receiving setting  or, if applicable, home care agency--Date completed:       *Complete additional tasks below, if this transition is a return to usual care setting.      Comments:       *Complete tasks below when the member is discharging TO their usual care setting within one (1) business day of notification.  For situations where the Care Coordinator is notified of the discharge prior to the date of discharge, the Care Coordinator must follow up with the member or designated representative to confirm that discharge actually occurred and discuss required JED tasks as outlined in the JED Instructions.  (This includes situations where it may be a  new  usual care setting for the member. (i.e., a community member who decides upon permanent nursing home placement following hospitalization and rehab).    Date completed: 6/3./19  Communicated with member or their designated representative about the following:  care transition process; about changes to the member s health status; plan of care updates; education about transitions and how to prevent unplanned transitions/readmissions  Four Pillars for Optimal Transition:    Check  Yes  - if the member, family member and/or SNF/facility staff manages the following:    If  No  provide explanation in the comments section.          [x]  Yes     []  No     Does the member have a follow-up appointment scheduled with primary care or specialist? (Mental health hospitalizations--the appt. should be w/in 7 days)   [x]  Yes     []  No     Can the member manage their medications or is there a system in place to manage medications (e.g. home care set-up)?         [x]  Yes     []  No     Can the member verbalize warning signs and symptoms to watch for and how to respond?         [x]  Yes     []  No     Does the member use a Personal Health Care Record?  Check  Yes  if visit summary, discharge summary, and/or healthcare summary are being used as a PHR.                                                                                                                                                                                     [] Yes      [x] No      Have you updated the member s care plan?  If  No  provide explanation in comments.   No changes to care plan  Comments:     5/31/19 ARPAN spoke with Jing BARROS at [facility]. Discharge date not yet known. Guerline Zhao RN PHN  6/3/19 CM called member at home.LM asking her to call CM if she needs anything or has any concerns from her recent hospitalization.   Guerline Zhao RN PHN

## 2019-06-03 ENCOUNTER — PATIENT OUTREACH (OUTPATIENT)
Dept: GERIATRIC MEDICINE | Facility: CLINIC | Age: 74
End: 2019-06-03

## 2019-06-03 NOTE — PROGRESS NOTES
6/3/19 CM received a call from Camila at [facility] regarding the [employer] readmisiion prevention program. They will contact Leah to see if she would like to participate for the 30 day program. CM tried calling member for post hospital call and member has not yet returned the call. ARPAN did see she has an infusion appt today with her physician.  Keyanna Zhao RN BA N  [facility] Case Management  [phone]

## 2019-06-04 ENCOUNTER — TELEPHONE (OUTPATIENT)
Dept: FAMILY MEDICINE | Facility: CLINIC | Age: 74
End: 2019-06-04

## 2019-06-04 NOTE — PROGRESS NOTES
[facility] Care Coordination Contact    No Letter Received: 60 day tracking of letter complete, no letter received from member. Tracking discontinued.     [facility]  Care Management Specialist  [facility]  [phone]

## 2019-06-04 NOTE — TELEPHONE ENCOUNTER
Connie with [facility] called back to RN line and left message for call back to her.  I called and spoke to Connie, advised her home care orders approved by Dr. Esquivel as requested.    Keyanna Carrasquillo RN  [facility]

## 2019-06-04 NOTE — TELEPHONE ENCOUNTER
Reason for Call: Request for an order or referral:    Order or referral being requested: Multiple     Date needed: as soon as possible    Has the patient been seen by the PCP for this problem? YES    Additional comments: Connie from [facility] is calling to receive verbal orders for Leah. The orders are...    Skilled nursing evaluation  accupational therapy evaluation  Home Health Aide: 2x3 weeks   Physical Therapy: 1x1 week, 2x3 weeks, and 1x1 week     Please call Connie to give verbal orders.     Phone number Patient can be reached at:  Other phone number:  [phone]    Best Time:  Anytime    Can we leave a detailed message on this number?  YES    Call taken on 6/4/2019 at 10:44 AM by Jyoti Bernal

## 2019-06-04 NOTE — TELEPHONE ENCOUNTER
Called Melissa at [phone] No answer, left message to call nurse line at [phone]       Treva Tyson RN  [facility]

## 2019-06-07 ENCOUNTER — PATIENT OUTREACH (OUTPATIENT)
Dept: GERIATRIC MEDICINE | Facility: CLINIC | Age: 74
End: 2019-06-07

## 2019-06-07 NOTE — PROGRESS NOTES
6/7/19 CC received a call from Divine Doll at [facility]. Divine states SNV/PT/OT were ordered through [facility] after her hospital discharge this week. Divine wants to add HHA 2 visits per week. She is not sure for how long at this point but asks for an auth to be sent to [facility] for the HHA visits. They started with visits 6/6/19 and would like the auth to go through 8/2/19. She does not know if she will need them for that long yet.   ARPAN sent an auth request to [employer] to send to [facility].    CM also received an email form [facility] that member has agreed to the hospital prevention program through [facility]. [facility]SS will continue to send emails with visit dates and updates for auth that will also need to be sent to [facility] for this program.  Keyanna Zhao RN BA PHN  [facility] Case Management  [phone]

## 2019-06-10 ENCOUNTER — DOCUMENTATION ONLY (OUTPATIENT)
Dept: CARE COORDINATION | Facility: CLINIC | Age: 74
End: 2019-06-10

## 2019-06-10 ENCOUNTER — TRANSFERRED RECORDS (OUTPATIENT)
Dept: HEALTH INFORMATION MANAGEMENT | Facility: CLINIC | Age: 74
End: 2019-06-10

## 2019-06-10 ENCOUNTER — PATIENT OUTREACH (OUTPATIENT)
Dept: GERIATRIC MEDICINE | Facility: CLINIC | Age: 74
End: 2019-06-10

## 2019-06-10 NOTE — PROGRESS NOTES
6/10/19 ARPAN received a call from Maria Elena JARQUIN at [facility] stating Maria Elena needs a standard shower chair. She has a built in but she feels like she is going to slide off it.  ARPAN sent an email to [facility] ordering a shower chair for member. Maria Elena was notified.  Keyanna Zhao RN BA PHN  [facility] Case Management  [phone]

## 2019-06-10 NOTE — PROGRESS NOTES
[facility] now requests orders and shares plan of care/discharge summaries for some patients through [employer]  Please REPLY TO THIS MESSAGE OR ROUTE BACK TO THE AUTHOR in order to give authorization for orders when needed.  This is considered a verbal order, you will still receive a faxed copy of orders for signature.  Thank you for your assistance in improving collaboration for our patients.    ORDER ok for OT to cont 1w2 for HEP for endurance, DME and EC ed.    Maria Elena JACQUES/L  [phone]  [email or web address]

## 2019-06-11 ENCOUNTER — TELEPHONE (OUTPATIENT)
Dept: FAMILY MEDICINE | Facility: CLINIC | Age: 74
End: 2019-06-11

## 2019-06-11 ENCOUNTER — PATIENT OUTREACH (OUTPATIENT)
Dept: GERIATRIC MEDICINE | Facility: CLINIC | Age: 74
End: 2019-06-11

## 2019-06-11 NOTE — PROGRESS NOTES
6/11/19 CM received an email from [facility] stating they cannot release the shower chair because she [place]. [place] and [place]. CC called Leah and she states she has the paperwork for MA renewal in her back pack but [facility]. She will do that tomorrow. SHAWANDA told her the shower chair will be held until her MA is reopened.  SHAWANDA also called the OT Maria Elena at [facility] and also let her know what is happening with the shower chair and the order will be delayed until she gets back on MA.  Keyanna Zhao RN BA PHN  [facility] Case Management  [phone]

## 2019-06-11 NOTE — TELEPHONE ENCOUNTER
Forms received from:  [facility] [employer]   Phone number listed: [phone]   Fax listed: [phone]  Date received: 06/11/19  Form description: Bath Bench  Once forms are completed, please return to  [facility] [employer] via Fax.  Is patient requesting to be contacted when forms are completed: NA   Form placed: in providers fabienne Patiño

## 2019-06-12 ENCOUNTER — TELEPHONE (OUTPATIENT)
Dept: FAMILY MEDICINE | Facility: CLINIC | Age: 74
End: 2019-06-12

## 2019-06-12 NOTE — TELEPHONE ENCOUNTER
Forms received from:  [facility]   Phone number listed: [phone]   Fax listed: [phone]  Date received: 06/12/19  Form description: SN  Once forms are completed, please return to  Home Care via Fax.  Is patient requesting to be contacted when forms are completed: NA   Form placed: in nicolas Patiño

## 2019-06-13 NOTE — TELEPHONE ENCOUNTER
Requested information faxed to number provided.  [facility]  Team 3 Coordinator

## 2019-06-14 ENCOUNTER — TELEPHONE (OUTPATIENT)
Dept: FAMILY MEDICINE | Facility: CLINIC | Age: 74
End: 2019-06-14

## 2019-06-14 NOTE — TELEPHONE ENCOUNTER
Forms received from:  Home Care   Phone number listed: [phone]   Fax listed: [phone]  Date received: 06/14/19  Form description: OT  Once forms are completed, please return to  Home Care via Fax.  Is patient requesting to be contacted when forms are completed:  Home Care    Form placed: in providers fabienne Patiño

## 2019-06-24 ENCOUNTER — TELEPHONE (OUTPATIENT)
Dept: FAMILY MEDICINE | Facility: CLINIC | Age: 74
End: 2019-06-24

## 2019-06-24 NOTE — TELEPHONE ENCOUNTER
Reason for Call: Request for an order or referral:    Order or referral being requested: Home Care    Date needed: as soon as possible    Has the patient been seen by the PCP for this problem?     Additional comments: Orders for home health aid to provide assistance with bathing and personal cares twice a week for 2 weeks    Phone number Patient can be reached at:  Other phone number:  [phone]    Best Time:  any    Can we leave a detailed message on this number?  YES    Call taken on 6/24/2019 at 9:10 AM by Cassie Patterson

## 2019-06-24 NOTE — TELEPHONE ENCOUNTER
Irais Stevens at  [phone] gave okay for orders as requested per Dr Esquivel.    Rodney verbalized understanding.    Treva Tyson RN  [facility]

## 2019-06-24 NOTE — TELEPHONE ENCOUNTER
Routing to PCP to review and advise.    Home health aide orders    Treva Tyson RN  [facility]

## 2019-06-25 ENCOUNTER — TELEPHONE (OUTPATIENT)
Dept: FAMILY MEDICINE | Facility: CLINIC | Age: 74
End: 2019-06-25

## 2019-06-26 ENCOUNTER — PATIENT OUTREACH (OUTPATIENT)
Dept: GERIATRIC MEDICINE | Facility: CLINIC | Age: 74
End: 2019-06-26

## 2019-06-26 NOTE — PROGRESS NOTES
6/25/19 CC received a call from Maria Elena [facility] [phone] asking about the shower chair.  CC called Leah and she states she did turn in her paperwork to the [facility] last week. CC called her financial worker to see where they are at.   [place] 6[place]26[place]and she is still not open to MA/EW.  CC left another  for her [facility] financial worker Lacie. CC notified Maria Elena of the delay. [facility] will not release the shower chair until she is back on MA.  Keyanna Zhao RN BA N  [facility] Case Management  [phone]

## 2019-07-01 ENCOUNTER — PATIENT OUTREACH (OUTPATIENT)
Dept: GERIATRIC MEDICINE | Facility: CLINIC | Age: 74
End: 2019-07-01

## 2019-07-01 ENCOUNTER — TELEPHONE (OUTPATIENT)
Dept: FAMILY MEDICINE | Facility: CLINIC | Age: 74
End: 2019-07-01

## 2019-07-01 ENCOUNTER — TRANSFERRED RECORDS (OUTPATIENT)
Dept: HEALTH INFORMATION MANAGEMENT | Facility: CLINIC | Age: 74
End: 2019-07-01

## 2019-07-01 ENCOUNTER — DOCUMENTATION ONLY (OUTPATIENT)
Dept: FAMILY MEDICINE | Facility: CLINIC | Age: 74
End: 2019-07-01

## 2019-07-01 NOTE — PROGRESS NOTES
7/1/19 CC received a VM from Matthias at [facility] stating the post hospital visits have concluded. She states member would like to continue with her home delivered meals through [facility] [phone]. She still has 1 more delivery 7/10.   CC spoke with Leah and she would like to continue with home delivered meals through [facility] [employer] 7 meals per week. She would like deliveries only once per week not every other week because she has no room to store more than 7 meals at a time.  CC let Leah know we will need to wait until she goes back on her MA/EW before CC can order the meals for her. CC suggested maybe Leah can call her financial worker at the [facility] to see if they have received her updated bank statement as the worker is not calling CC back.  Keyanna Zhao RN BA N  [facility] Case Management  [phone]

## 2019-07-01 NOTE — PROGRESS NOTES
[facility] now requests orders and shares plan of care/discharge summaries for some patients through [employer].  Please REPLY TO THIS MESSAGE OR ROUTE BACK TO THE AUTHOR in order to give authorization for orders when needed.  This is considered a verbal order, you will still receive a faxed copy of orders for signature.  Thank you for your assistance in improving collaboration for our patients.    ORDER ok for OT to return 1 more visit to assist with DME s/u once pt receives shower chair.    Maria Elena JACQUES/L  [phone]  [email or web address]

## 2019-07-01 NOTE — TELEPHONE ENCOUNTER
Forms received from:  Home Care   Phone number listed: [phone]   Fax listed: [phone]  Date received: 07/01/19  Form description: HA  Once forms are completed, please return to  Home Care via Fax.  Is patient requesting to be contacted when forms are completed: NA    Form placed: in nicolas Patiño

## 2019-07-01 NOTE — PROGRESS NOTES
7[place]1/19 [place] and [place].  called Leah and she states the [facility] wanted an updated bank statement and they did fax that over to the [facility].  let Leah know that is why she has not received the shower chair. The [employer] is still waiting for her MA to open.  will still keep checking her status. Her financial worker has not called SHAWANDA back from [facility] last week on this.  Keyanna Zhao RN BA N  [facility] Case Management  [phone]

## 2019-07-02 NOTE — TELEPHONE ENCOUNTER
Requested information faxed to number provided.  [facility]  Team 3 Coordinator

## 2019-07-05 DIAGNOSIS — Z53.9 DIAGNOSIS NOT YET DEFINED: Primary | ICD-10-CM

## 2019-07-05 PROCEDURE — G0180 MD CERTIFICATION HHA PATIENT: HCPCS | Performed by: FAMILY MEDICINE

## 2019-07-08 ENCOUNTER — PATIENT OUTREACH (OUTPATIENT)
Dept: GERIATRIC MEDICINE | Facility: CLINIC | Age: 74
End: 2019-07-08

## 2019-07-08 NOTE — PROGRESS NOTES
7/8/19 CC [place] and [place] still not open to [place]/EW. CC called members financial worker Savanna GIBBONS and left another message asking if CC can assist in getting her back on MA/EW because she needs a shower chair and other services that are not being started because she does not have MA. Leah states she went to the [facility] about 2 weeks ago and got all her paperwork and an updated bank statement to the [facility].  [facility].  CC also called Jessenia and CHACORTA with this information and suggested she also call her [facility] financial worker to see where they are at with this. CC cannot order her home delivered meals or her shower chair until she opens to MA/EW again.  Keyanna Zhao RN BA N  [facility] Case Management  [phone]

## 2019-07-10 ENCOUNTER — TELEPHONE (OUTPATIENT)
Dept: FAMILY MEDICINE | Facility: CLINIC | Age: 74
End: 2019-07-10

## 2019-07-10 NOTE — TELEPHONE ENCOUNTER
Anny called from  Home Care and is looking for Verbal orders for Home Health Aid   2 times a wk for 4wks    Please call back at [phone] ok to leave message  Amalia Patiño  Team 3 Coordinator

## 2019-07-10 NOTE — TELEPHONE ENCOUNTER
Called Anny - relayed okay for orders as requested. Anny verbalized understanding.    Treva Tyson RN  [facility]

## 2019-07-10 NOTE — TELEPHONE ENCOUNTER
Forms received from:  Home Care   Phone number listed: [phone]   Fax listed: [phone]  Date received: 07/10/19  Form description: OT  Once forms are completed, please return to  Home Care via Fax.  Is patient requesting to be contacted when forms are completed: NA    Form placed: in nicolas Patiño

## 2019-07-12 ENCOUNTER — NURSE TRIAGE (OUTPATIENT)
Dept: FAMILY MEDICINE | Facility: CLINIC | Age: 74
End: 2019-07-12

## 2019-07-12 ENCOUNTER — TELEPHONE (OUTPATIENT)
Dept: FAMILY MEDICINE | Facility: CLINIC | Age: 74
End: 2019-07-12

## 2019-07-12 ENCOUNTER — TRANSFERRED RECORDS (OUTPATIENT)
Dept: HEALTH INFORMATION MANAGEMENT | Facility: CLINIC | Age: 74
End: 2019-07-12

## 2019-07-12 NOTE — TELEPHONE ENCOUNTER
"Spoke to JEANIE Whyte [facility]. She is with patient now. Patient has been weak and dizzy all week per neighbor. Today she is confused and unable to stand. RN states blood sugar machine reads high which on her machine means over 400-500. Due to weakness and confusion will have RN call 911 and stay with patient until ambulance arrives.    Reason for Disposition    Acting confused (e.g., disoriented, slurred speech)    Sounds like a life-threatening emergency to the triager    Very weak (can't stand)    Additional Information    Blood glucose > 500 mg/dl (27.5 mmol/l)    Answer Assessment - Initial Assessment Questions  1. BLOOD GLUCOSE: \"What is your blood glucose level?\"       Over 400-500  2. ONSET: \"When did you check the blood glucose?\"      [facility] nurse is with patient. She just checked BS  3. USUAL RANGE: \"What is your glucose level usually?\" (e.g., usual fasting morning value, usual evening value)      unknown  4. KETONES: \"Do you check for ketones (urine or blood test strips)?\" If yes, ask: \"What does the test show now?\"       unknown  5. TYPE 1 or 2:  \"Do you know what type of diabetes you have?\"  (e.g., Type 1, Type 2, Gestational; doesn't know)       Type 2  6. INSULIN: \"Do you take insulin?\" If yes, ask: \"Have you missed any shots recently?\"      Yes, no  7. DIABETES PILLS: \"Do you take any pills for your diabetes?\" If yes, ask: \"Have you missed taking any pills recently?\"      Yes, unknown  8. OTHER SYMPTOMS: \"Do you have any symptoms?\" (e.g., fever, frequent urination, difficulty breathing, dizziness, weakness, vomiting)      Yes:Dizziness, weakness, confusion, short of breath  9. PREGNANCY: \"Is there any chance you are pregnant?\" \"When was your last menstrual period?\"      no    Protocols used: DIABETES - HIGH BLOOD SUGAR-A-OH      "

## 2019-07-12 NOTE — TELEPHONE ENCOUNTER
Forms received from: [facility]   Phone number listed: [phone]   Fax listed: [phone]  Date received: 07/12/2019  Form description: Prescription-diabetic supplies  Once forms are completed, please return to [facility] via fax.  Is patient requesting to be contacted when forms are completed: NA    Form placed: In provider's basket  Latisha Finney

## 2019-07-12 NOTE — TELEPHONE ENCOUNTER
Reason for call:  Patient reporting a symptom    Symptom or request:  Home Care nurse is with patient right now.  Blood sugar did not register on meter-- meter just said high( nurse said usually means it is over 400). Patient is confused, weak, difficulty swallowing.    Duration (how long have symptoms been present):  For the past weak patient has been declining.    Have you been treated for this before? Yes    Additional comments:     Phone Number patient can be reached at:  Other phone number:  Isabell/ Home Care nurse/ [phone]    Best Time:  ASAP    Can we leave a detailed message on this number:  YES    Call taken on 7/12/2019 at 11:44 AM by Desi Hodges

## 2019-07-12 NOTE — TELEPHONE ENCOUNTER
Message left for patient to return call to TC line to clarify if she requested these supplies. Form looks suspicious for a phiing pharmacy. This pharmacy is located in [place], [place].

## 2019-07-15 ENCOUNTER — PATIENT OUTREACH (OUTPATIENT)
Dept: GERIATRIC MEDICINE | Facility: CLINIC | Age: 74
End: 2019-07-15

## 2019-07-15 DIAGNOSIS — Z79.4 TYPE 2 DIABETES MELLITUS WITHOUT COMPLICATION, WITH LONG-TERM CURRENT USE OF INSULIN (H): ICD-10-CM

## 2019-07-15 DIAGNOSIS — E11.9 TYPE 2 DIABETES MELLITUS WITHOUT COMPLICATION, WITH LONG-TERM CURRENT USE OF INSULIN (H): ICD-10-CM

## 2019-07-15 NOTE — PROGRESS NOTES
TRANSITIONS OF CARE (JED) LOG   JED tasks should be completed by the CC within one (1) business day of notification of each transition. Follow up contact with member is required after return to their usual care setting.  Note:  If CC finds out about the transitions fifteen (15) days or more after the member has returned to their usual care setting, no JED log is needed. However, the CC should check in with the member to discuss the transition process, any changes needed to the care plan and document it in a case note.    Member Name:  Leah Guerrero MCO Name:  [employer]/Health Plan Member ID#:: [id number]    Product: [facility] Coordinator Contact:  Guerline Zhao RN Agency/[facility]/Care System: [facility]   Transition Communication Actions from Care Management Contact   Transition #1   Notification Date: 7/15/19 Transition Date:   7/12/19 Transition From: Home     Is this the member s usual care setting?               yes Transition To: [facility]   Transition Type:  Unplanned  Reason for Admission/Comments:  Weakness     Shared CC contact info, care plan/services with receiving setting--Date completed: 7/15/19    Notified PCP of transition--Date completed:  7/15/19     via  EMR   Transition #2   Transition #3  (if applicable)   Notification Date: 7/15/19         Transition To:  Home  Transition Date: 7/15/19     Transition Type:    Planned  Notified PCP -- Date completed: 7/15/19              Shared CC contact info, care plan/services with receiving setting or, if applicable, home care agency--Date completed:  7/15/19  *Complete additional tasks below, if this transition is a return to usual care setting.      Comments:  7/15/19 CC was notified by the  at [facility] that member had been admitted for weakness after her chemotherapy on Friday. She is doing well today and will be discharging to home. They will have [facility] restart services with member. Guerline Zhao RN PHN     Notification Date:          Transition To:    Transition Date:              Transition Type:      Notified PCP--Date completed:          Shared CC contact info, care plan/services with receiving setting or, if applicable, home care agency--Date completed:       *Complete additional tasks below, if this transition is a return to usual care setting.      Comments:       *Complete tasks below when the member is discharging TO their usual care setting within one (1) business day of notification.  For situations where the Care Coordinator is notified of the discharge prior to the date of discharge, the Care Coordinator must follow up with the member or designated representative to confirm that discharge actually occurred and discuss required JED tasks as outlined in the JED Instructions.  (This includes situations where it may be a  new  usual care setting for the member. (i.e., a community member who decides upon permanent nursing home placement following hospitalization and rehab).    Date completed: 7/16/19  Communicated with member or their designated representative about the following:  care transition process; about changes to the member s health status; plan of care updates; education about transitions and how to prevent unplanned transitions/readmissions  Four Pillars for Optimal Transition:    Check  Yes  - if the member, family member and/or SNF/facility staff manages the following:    If  No  provide explanation in the comments section.          [x]  Yes     []  No     Does the member have a follow-up appointment scheduled with primary care or specialist? (Mental health hospitalizations--the appt. should be w/in 7 days)   [x]  Yes     []  No     Can the member manage their medications or is there a system in place to manage medications (e.g. home care set-up)?         [x]  Yes     []  No     Can the member verbalize warning signs and symptoms to watch for and how to respond?         [x]  Yes     []  No      Does the member use a Personal Health Care Record?  Check  Yes  if visit summary, discharge summary, and/or healthcare summary are being used as a PHR.                                                                                                                                                                                    [] Yes      [x] No      Have you updated the member s care plan?  If  No  provide explanation in comments.   Comments: NO changes     7/16/19 CC called member at home to see if she needs anything after her discharge. CC also asked her to call to let CC know what she wants to do about home delivered meals. [facility] will only deliver every 2 weeks. We also need to wait until the [facility] [place] back on to [place]. CC checked today and it still has not been changed. CC did leave a message yesterday for her financial worker at the [facility] to change that. Guerline Zhao RN PHN

## 2019-07-15 NOTE — TELEPHONE ENCOUNTER
"Requested Prescriptions   Pending Prescriptions Disp Refills     insulin pen needle (B-D U/F) 31G X 8 MM miscellaneous [Pharmacy Med Name: B-D PEN NDL SHRT [id number](5/16) MILLER] 200 each 0     Sig: USE TO ADMINISTER INSULIN 6 TIMES A DAY   Last Written Prescription Date:  1/17/19  Last Fill Quantity: 200,  # refills: 0   Last office visit: 11/19/2018 with prescribing provider:     Future Office Visit:   Next 5 appointments (look out 90 days)    Jul 18, 2019 10:40 AM CDT  [facility] [address] [place] [id number]  [phone]             Diabetic Supplies Protocol Passed - 7/15/2019  3:33 PM        Passed - Medication is active on med list        Passed - Patient is 18 years of age or older        Passed - Recent (6 mo) or future (30 days) visit within the authorizing provider's specialty     Patient had office visit in the last 6 months or has a visit in the next 30 days with authorizing provider.  See \"Patient Info\" tab in inbasket, or \"Choose Columns\" in Meds & Orders section of the refill encounter.              "

## 2019-07-15 NOTE — PROGRESS NOTES
7/15/19 [place] and [place] however they put her waivers as none. CC called her [facility] financial worker Savanna GIBBONS and left a VM letting her know they need to have [place] the elderly [place].  CC called [facility] [phone] to inquire if we could order her home delivered meals without [place] in open to EW and they said no. We need to wait for the [facility] to change that. They also state that they cannot deliver meals weekly as Leah had requested due to low storage space for 14 meals at a time. They will only deliver 14 every other week.  CC called Leah and left a VM with the above information.  asked leah to call CC back to see if she still wants meals through [facility] or if she wants CC to try other agencys that might be able to deliver weekly.  SHAWANDA did send an email to [facility] letting them know she is back on MA so we can go ahead with her shower chair.  Keyanna Zhao RN BA N  [facility] Case Management  [phone]

## 2019-07-17 NOTE — TELEPHONE ENCOUNTER
Pt has appt on 07/18 with Deal will discuss then  Amalia [facility]  Team 3 Coordinator

## 2019-07-18 ENCOUNTER — PATIENT OUTREACH (OUTPATIENT)
Dept: GERIATRIC MEDICINE | Facility: CLINIC | Age: 74
End: 2019-07-18

## 2019-07-18 NOTE — TELEPHONE ENCOUNTER
Prescription approved per [facility] Refill Protocol.  Marcia Lopez,Clinic Rn  [place]

## 2019-07-18 NOTE — PROGRESS NOTES
7/18/19 SHAWANDA received a VM from Savanna at [facility] saying she is not members financial worker but Manuela is and gave Manuela's phone number. She states she did forward my VM to [place].  Keyanna Zhao RN BA N  [facility] Case Management  [phone]

## 2019-07-18 NOTE — PROGRESS NOTES
7/18/19 [place] and [place] still [place] not been [place] to indicate she has a waiver. CC called [facility] financial worker Savanna EDWIGE and left another message asking her yo update this or let CC know who to contact to [place] a [place] so she can continue to receive waiver services.  Keyanna Zhao RN BA N  [facility] Case Management  [phone]

## 2019-07-19 ENCOUNTER — PATIENT OUTREACH (OUTPATIENT)
Dept: GERIATRIC MEDICINE | Facility: CLINIC | Age: 74
End: 2019-07-19

## 2019-07-19 NOTE — PROGRESS NOTES
7/19/19 CC called Manuela at [facility] the financial worker CC was given name and number for. [facility] and asked if someone can update [place] the EW.  Keyanna Zhao RN BA N  [facility] Case Management  [phone]

## 2019-07-19 NOTE — PROGRESS NOTES
7/19/19 CC received a VM from Manuela members financial worker at [facility] asking CC to fax her a release so she can talk to CC.  CC called Manuela and CHACORTA letting her know CC is members CC for the [facility] program and they sign a release at the [facility] to talk with their care coordinators. CC does not need to send another one the [facility] already has one. CC does not olivier to talk with Manuela she just needs to change the [place] to [place] the Elderly [place]aiver. Now it states none. If she cannot do this then let CC know who she needs to talk to to be able to do this so she can continue to get her waivered services.  Keyanna Zhao RN BA N  [facility] Case Management  [phone]

## 2019-07-23 ENCOUNTER — DOCUMENTATION ONLY (OUTPATIENT)
Dept: FAMILY MEDICINE | Facility: CLINIC | Age: 74
End: 2019-07-23

## 2019-07-23 ENCOUNTER — PATIENT OUTREACH (OUTPATIENT)
Dept: GERIATRIC MEDICINE | Facility: CLINIC | Age: 74
End: 2019-07-23

## 2019-07-23 DIAGNOSIS — J45.20 MILD INTERMITTENT ASTHMA WITHOUT COMPLICATION: ICD-10-CM

## 2019-07-23 NOTE — PROGRESS NOTES
7/23/19 SHAWANDA spoke with Leah and asked her to call her [facility] financial worker Manuela and gave her Manuela's number.  is asking her to call Manuela to have someone at the [facility] to put her open to EW in [facility].  cannot order her MOW without this. Manuela will not speak with  without an authorization from leah and no one [place] changing [place] which [place] her EW services. Leah will call Manuela.   also asked her if its ok to have home delivered meals q other week because [facility] nutrition will not deliver weekly and she states that is ok she does not want to use another agency because these meals are really good.  Keyanna Zhao RN BA N  [facility] Case Management  [phone]

## 2019-07-23 NOTE — PROGRESS NOTES
[facility] now requests orders and shares plan of care/discharge summaries for some patients through [employer].  Please REPLY TO THIS MESSAGE OR ROUTE BACK TO THE AUTHOR in order to give authorization for orders when needed.  This is considered a verbal order, you will still receive a faxed copy of orders for signature.  Thank you for your assistance in improving collaboration for our patients.    ORDER     Skilled nursing 2 times for 2 weeks, PT eval and tx, OT eval and tx, HHA 1 time for 1 week and 2 times for 1 week.   SN for diabetes education and management, medication education, falls prevention, nutrition, and disease management.  OT for adaptive equip and upper body strength and endurance.  PT for lower body strengthening and home exercises.  HHA for bathing and grooming  Isabell Garcia

## 2019-07-24 ENCOUNTER — PATIENT OUTREACH (OUTPATIENT)
Dept: GERIATRIC MEDICINE | Facility: CLINIC | Age: 74
End: 2019-07-24

## 2019-07-24 ENCOUNTER — TELEPHONE (OUTPATIENT)
Dept: FAMILY MEDICINE | Facility: CLINIC | Age: 74
End: 2019-07-24

## 2019-07-24 DIAGNOSIS — K02.9 DENTAL CARIES: Primary | ICD-10-CM

## 2019-07-24 RX ORDER — CLINDAMYCIN HCL 150 MG
150 CAPSULE ORAL 3 TIMES DAILY
Qty: 21 CAPSULE | Refills: 0 | Status: SHIPPED | OUTPATIENT
Start: 2019-07-24 | End: 2019-08-29

## 2019-07-24 NOTE — TELEPHONE ENCOUNTER
Attempt # 1  Called patient at home [phone]   Was call answered?  Yes, informed of script sent.        Treva Tyson RN  [facility]

## 2019-07-24 NOTE — PROGRESS NOTES
7/24/19 CC received a phone call from the [facility] nurse asking CC to renew the HHA 2 times a week beyond the 8/2/19 expiration of her last auth. CC sent new auth info to [employer] [place].  Keyanna Zhao RN BA PHN  [facility] Case Management  [phone]

## 2019-07-24 NOTE — TELEPHONE ENCOUNTER
Attempt # 1  Called patient at home [phone]   Was call answered?  Yes, broke a tooth off and then now got swelling, no dental appointment, front tooth on bottom, no fever, no foul taste or smell, woke up this morning with the swelling. went over allergy list all correct and current, no new known allergies.     Routing to Dr. Esquivel     Call patient if/when script sent.    Treva Tyson RN  [facility]

## 2019-07-24 NOTE — TELEPHONE ENCOUNTER
.Reason for Call:  Other     Detailed comments: Patient would like an antibiotic for swollen gums sent to [facility]     Phone Number Patient can be reached at: Home number on file [phone] (home)    Best Time:     Can we leave a detailed message on this number? YES    Call taken on 7/24/2019 at 11:35 AM by Fina Ferro

## 2019-07-24 NOTE — PROGRESS NOTES
7/24/19  received a VM on 7/23 from a Molly at [facility] [phone]. She states that she knows  has left several messages but they cannot give out any information without a release of information from the client. She cannot even tell CC what the name of the client is.But they did send her a release of information so they can talk to CC. Molly states she will be out of the office until next Monday.  CC called [facility] and asked a message be sent to a supervisor for the financial workers.  Asking them to add she is onEW to [facility] so  can provide services for her as she is requesting.  Keyanna Zhao RN BA N  [facility] Case Management  [phone]

## 2019-07-25 ENCOUNTER — TRANSFERRED RECORDS (OUTPATIENT)
Dept: HEALTH INFORMATION MANAGEMENT | Facility: CLINIC | Age: 74
End: 2019-07-25

## 2019-07-25 ENCOUNTER — PATIENT OUTREACH (OUTPATIENT)
Dept: GERIATRIC MEDICINE | Facility: CLINIC | Age: 74
End: 2019-07-25

## 2019-07-25 NOTE — TELEPHONE ENCOUNTER
Left patient VM to return call. Patient needs OV to get medication fill. Med check  Kain Ken CMA on 7/25/2019 at 8:08 AM

## 2019-07-25 NOTE — PROGRESS NOTES
TRANSITIONS OF CARE (JED) LOG   JED tasks should be completed by the CC within one (1) business day of notification of each transition. Follow up contact with member is required after return to their usual care setting.  Note:  If CC finds out about the transitions fifteen (15) days or more after the member has returned to their usual care setting, no JED log is needed. However, the CC should check in with the member to discuss the transition process, any changes needed to the care plan and document it in a case note.    Member Name:  Leah Guerrero MCO Name:  [employer]/Health Plan Member ID#: [id number]   Product: [facility] Care Coordinator Contact:  Guerline Zhao RN Agency/[facility]/Care System: [facility]   Transition Communication Actions from Care Management Contact   Transition #1   Notification Date: 7/25/19 Transition Date:   7/24/19 Transition From: Home     Is this the member s usual care setting?               yes Transition To: [facility] [place]   Transition Type:  Unplanned  Reason for Admission/Comments:  Dizziness     Shared CC contact info, care plan/services with receiving setting--Date completed: 7/25/19    Notified PCP of transition--Date completed:  7/25/19     via  EMR   Transition #2   Transition #3  (if applicable)   Notification Date: 7/29/19        Transition To:  Home  Transition Date: 7/28/19     Transition Type:    Planned  Notified PCP -- Date completed: 7/28/19              Shared CC contact info, care plan/services with receiving setting or, if applicable, home care agency--Date completed:  7/29/19  *Complete additional tasks below, if this transition is a return to usual care setting.      Comments:     Notification Date:          Transition To:    Transition Date:             Transition Type:      Notified PCP--Date completed:          Shared CC contact info, care plan/services with receiving setting or, if applicable, home care agency--Date completed:   *Complete  additional tasks below, if this transition is a return to usual care setting.      Comments:       *Complete tasks below when the member is discharging TO their usual care setting within one (1) business day of notification.  For situations where the Care Coordinator is notified of the discharge prior to the date of discharge, the Care Coordinator must follow up with the member or designated representative to confirm that discharge actually occurred and discuss required JED tasks as outlined in the JED Instructions.  (This includes situations where it may be a  new  usual care setting for the member. (i.e., a community member who decides upon permanent nursing home placement following hospitalization and rehab).    Date completed: 7/30/19  Communicated with member or their designated representative about the following:  care transition process; about changes to the member s health status; plan of care updates; education about transitions and how to prevent unplanned transitions/readmissions  Four Pillars for Optimal Transition:    Check  Yes  - if the member, family member and/or SNF/facility staff manages the following:    If  No  provide explanation in the comments section.          [x]  Yes     []  No     Does the member have a follow-up appointment scheduled with primary care or specialist? (Mental health hospitalizations--the appt. should be w/in 7 days)   [x]  Yes     []  No     Can the member manage their medications or is there a system in place to manage medications (e.g. home care set-up)?         [x]  Yes     []  No     Can the member verbalize warning signs and symptoms to watch for and how to respond?         [x]  Yes     []  No     Does the member use a Personal Health Care Record?  Check  Yes  if visit summary, discharge summary, and/or healthcare summary are being used as a PHR.                                                                                                                                                                                     [x] Yes      [] No      Have you updated the member s care plan?  If  No  provide explanation in comments.   Comments:  CC spoke with member for hospital f/u.     7/26/19 CC spoke with Radha BARROS at [facility] and gave her the information on what services she has in [place] at home. Guerline Zaho RN PHN

## 2019-07-25 NOTE — PROGRESS NOTES
7/25/19 CC received a call from Maribel Lozano supervisor at [facility]. She [place] and apologized for the service issues with the financial workers.  CC ordered home delivered meals through [facility] [employer] and notified Leah who also states she is in the hospital right now. She went in yesterday because of dizziness.  Keyanna Zhao RN BA N  [facility] Case Management  [phone]

## 2019-07-29 DIAGNOSIS — G47.00 INSOMNIA, UNSPECIFIED TYPE: ICD-10-CM

## 2019-07-29 RX ORDER — ZOLPIDEM TARTRATE 5 MG/1
5 TABLET ORAL
Qty: 30 TABLET | Refills: 2 | Status: CANCELLED | OUTPATIENT
Start: 2019-07-29

## 2019-07-29 NOTE — TELEPHONE ENCOUNTER
Requested Prescriptions   Pending Prescriptions Disp Refills     zolpidem (AMBIEN) 5 MG tablet 30 tablet 2     Sig: Take 1 tablet (5 mg) by mouth nightly as needed for sleep       There is no refill protocol information for this order         Last Written Prescription Date:  5-24-18  Last Fill Quantity: 30,   # refills: 2  Last Office Visit: 11-19-18  Future Office visit:    Next 5 appointments (look out 90 days)    Jul 31, 2019 12:20 PM CDT  [facility] Genaro Dawson [facility] [address] [place] [id number]  [phone]   Oct 25, 2019 11:00 AM CDT  Return Visit with José Vogel MD  [facility] ([address] [address]  [phone]           Routing refill request to provider for review/approval because:  Drug not on the [facility], [facility] or [facility] refill protocol or controlled substance

## 2019-07-29 NOTE — PROGRESS NOTES
Covering CC left a  requesting a call back to f/u post hospitalization.    Vanessa Fox RN  [facility]  [phone]

## 2019-07-30 RX ORDER — ALBUTEROL SULFATE 90 UG/1
AEROSOL, METERED RESPIRATORY (INHALATION)
Qty: 18 G | Refills: 0 | Status: SHIPPED | OUTPATIENT
Start: 2019-07-30

## 2019-07-30 NOTE — PROGRESS NOTES
[facility] Care Coordination Contact    CC received notification of discharge to home. Discharge occurred on 7/28/19 from [facility].   CC contacted member and reviewed discharge summary.  Member has a follow-up appointment with PCP in 7 days: Yes: scheduled on 7/31/19 . Member reports she doesn't know if she can make it to this appt yet. Explained the importance of rescheduling if she cannot make it.  Member has had a change in condition: No  Home visit needed: No  Care plan reviewed and updated.  The following home based services  [employer] RN EW transportation were resumed.  New referrals placed: No  Transition log completed.   PCP notified of transition back to home via EMR.    Vanessa Fox RN  [facility]  [phone]

## 2019-07-30 NOTE — TELEPHONE ENCOUNTER
We have not prescribed this in over a year.    Patient has appointment at [facility] tomorrow.    Defer to that provider.    Kevin Esquivel MD

## 2019-07-31 ENCOUNTER — PATIENT OUTREACH (OUTPATIENT)
Dept: GERIATRIC MEDICINE | Facility: CLINIC | Age: 74
End: 2019-07-31

## 2019-07-31 ENCOUNTER — TELEPHONE (OUTPATIENT)
Dept: FAMILY MEDICINE | Facility: CLINIC | Age: 74
End: 2019-07-31

## 2019-07-31 NOTE — TELEPHONE ENCOUNTER
Forms received from:[facility]  Phone number listed:  [phone]    Fax listed: [phone]  Date received: 07/31/2019  Form description: diabetic testing  Once forms are completed, please return to [facility] pharmacy via fax.  Form placed: in providers folder  Fina Ferro

## 2019-07-31 NOTE — TELEPHONE ENCOUNTER
Called [phone] Anny - relayed okay for orders as requested. Patient told Anny has not taken any medication since discharged from hospital, Anny HC nurse will be seeing patient weekly and did set up her medications, has a full bottle of Clindamycin (looks like was prescribed 7/24/2019 Dr. Esquivel for dental swelling. Also 2 other bottles of ABX much older not taken either.   Also HC nurse questioning amount of insulin [facility] HC nurse was told 23 units - patient states is taking 20 units.     Treva Tyson RN  [facility]

## 2019-07-31 NOTE — PROGRESS NOTES
7/31/19 CC received a call from Anny WARE from [facility]. She is with Leah now and is requesting a shower chair with back, half bed rail. CC ordered these through [facility]. Pers was ordered through [employer]. Mobile button so she can take it outside her apartment. She is interested in looking into assisted living. CC will mail her information on AL s in the area she is requesting.  Lift chair. CC told Anny she will need to have her PCP send a referral to the [facility] clinic for evaluation for a lift chair before it can be ordered. She will help her with that.  Keyanna Zhao RN BA PHN  [employer] [facility]  [phone]

## 2019-07-31 NOTE — TELEPHONE ENCOUNTER
Reason for Call: Request for an order or referral:    Order or referral being requested: resumption of care 1 PRN to  19 skilled nursing 1 time per week with 3 PRN for 60 days and home health aid 2 times per week for 60 days. Patient needs a referral for a lift chair to the seating clinic to be evaluated for the chair     Date needed: as soon as possible    Has the patient been seen by the PCP for this problem? YES    Additional comments:     Phone number Patient can be reached at:  Other phone number:  [phone]    Best Time:  any    Can we leave a detailed message on this number?  YES    Call taken on 2019 at 2:17 PM by Elvira Lomeli

## 2019-08-01 ENCOUNTER — TELEPHONE (OUTPATIENT)
Dept: FAMILY MEDICINE | Facility: CLINIC | Age: 74
End: 2019-08-01

## 2019-08-01 NOTE — TELEPHONE ENCOUNTER
Forms received from: [facility]   Phone number listed: [phone]   Fax listed: [phone]  Date received: 08/01/2019  Form description: SN 2w2, 2 as needed, PT 1 every 10 days 1, OT 1 every 10 days 1, HA 1w1, 2w1  Once forms are completed, please return to [facility] via fax.  Is patient requesting to be contacted when forms are completed: NA    Form placed: In provider's basket  Latisha Finney

## 2019-08-01 NOTE — TELEPHONE ENCOUNTER
Forms received from: [employer]   Phone number listed: [phone]   Fax listed: [phone]  Date received: 08/01/2019  Form description: prescription request  Once forms are completed, please return to [employer] via fax.  Form placed: in providers folder  Fina Ferro

## 2019-08-02 ENCOUNTER — PATIENT OUTREACH (OUTPATIENT)
Dept: GERIATRIC MEDICINE | Facility: CLINIC | Age: 74
End: 2019-08-02

## 2019-08-02 NOTE — PROGRESS NOTES
8/2/19 CC received a VM from Aleksandra the  at [facility] stating she is at [facility] to install the mobile help button but Leah told her she will be getting a pacemaker. Aleksandra states their equipment will interfere with a pacemaker. She suggests CC try to find a company that will not interfere with a pacemaker.  CC called [employer]- They states they are not medical professionals and cannot say if it will interfere. So far they have had no problems with their clients who have pacemakers but suggest CC contact the doctor.  [facility]- If you can use a cell phone with your pacemaker you can use their equipment  Connect Janell/[facility]-no answer.  Keyanna Zhao RN BA PHN  [facility]  [phone]

## 2019-08-04 ENCOUNTER — TRANSFERRED RECORDS (OUTPATIENT)
Dept: HEALTH INFORMATION MANAGEMENT | Facility: CLINIC | Age: 74
End: 2019-08-04

## 2019-08-04 LAB
CREAT SERPL-MCNC: 1.12 MG/DL (ref 0.57–1.11)
GFR SERPL CREATININE-BSD FRML MDRD: 48 ML/MIN/1.73M2
GLUCOSE SERPL-MCNC: 204 MG/DL (ref 65–100)
POTASSIUM SERPL-SCNC: 4.5 MMOL/L (ref 3.5–5)

## 2019-08-05 ENCOUNTER — PATIENT OUTREACH (OUTPATIENT)
Dept: GERIATRIC MEDICINE | Facility: CLINIC | Age: 74
End: 2019-08-05

## 2019-08-05 NOTE — PROGRESS NOTES
8/5/19 Leah was seen at the ER 8/4 with SOB. She is feeling better today.  CC asked her about the pacemaker and she states she is going to be getting one soon. CC has called other PERS agemcys and cannot find one that gaurantees no pacemaker will be affected byt their mobile units. She can get a unit for inside the home. When she is out she does admit she is with someone and also has her cell phone in case of an emergency. She really needs something inside her home when she is alone. She will accept the PERS that works inside the home. This will not affect her pacemaker when she gets one.  CC called [employer] and ordered the PERS for inside the home and she also wants the wrist button not the pendant. CC sent a new auth to [facility] as this jackson costs a little more monthly.  Keyanna Zhao RN BA PHN  [facility]  [phone]

## 2019-08-06 ENCOUNTER — PATIENT OUTREACH (OUTPATIENT)
Dept: GERIATRIC MEDICINE | Facility: CLINIC | Age: 74
End: 2019-08-06

## 2019-08-06 NOTE — PROGRESS NOTES
8/6/19 CC mailed information from 6 AL's in member area that accept EW without private pay first.   Keyanna Zhao RN BA N  [facility] Case Management  [phone]

## 2019-08-07 ENCOUNTER — PATIENT OUTREACH (OUTPATIENT)
Dept: GERIATRIC MEDICINE | Facility: CLINIC | Age: 74
End: 2019-08-07

## 2019-08-07 ENCOUNTER — TELEPHONE (OUTPATIENT)
Dept: FAMILY MEDICINE | Facility: CLINIC | Age: 74
End: 2019-08-07

## 2019-08-07 NOTE — PROGRESS NOTES
"8/7/19  received a VM from Anny members home care nurse asking when her home delivered meals were going to be delivered.  8/7/19 CC received a call from Amber the lifeline  concerned about Leah. She installed her lifeline today and she looks pale and seems confused. Leah says she is very tired today and cannot stand long enough to cook anything because she can't stand long enough to cook anything. But she says she has food and she orders take out. She has plenty of water near her.  CC suggested maybe she needs to see her doctor this afternoon or tomorrow am. She said \"what for I see him Friday\" CC asked if she has a ride set up for the appt Friday and she said yes. CC did confirm she has an appt with Dr Esquivel Friday Aug 9.. CC asked if she would allow  to call one of her sons to come and check on her and she said no. One son lives too far and the other works 15 hrs per day.  She was coherent when talking to  and knew who CC was. She sounded tired. CC reminded her she has a lifeline now and if she is finding she cannot get up and needs help to push her button.  CC called [facility] Nurse Anny and [facility] with above information. CC is asking what Anny saw the last visit if it was today or yesterday. Also let Anny know that CC called [facility] about her home delivered meals and they will call CC back. CC also let Leah know about the meals.  CC did call son Paul and spoke with him letting him know of concerns we have for his mom and to check on her. Paul will do that. He calls her every evening anyway.  Keyanna Zhao RN BA PHN  [facility]  [phone]      "

## 2019-08-07 NOTE — PROGRESS NOTES
8/7/19 CC received a call VM from home care  Nurse Anny [phone] asking when her meals were going to start.  CC called [facility] and they will check and get back to CM possibly not until the am.  Keyanna Zhao RN BA N  [facility] Case [facility]  [phone]

## 2019-08-07 NOTE — TELEPHONE ENCOUNTER
Routing to PCP to review and advise.    May need to make Friday appointment a face to face?      Treva Tyson RN  [facility]

## 2019-08-07 NOTE — PROGRESS NOTES
8/7/19 SHWAANDA spoke with Anny nurse from [facility]. She saw Leah today and she is not doing as well this week as last week. She is tired. Anny also called members son Paul and asked him to come and check on his mom tonight and he was going to do that.  Keyanna Zhao RN BA PHN  [facility]  [phone]

## 2019-08-07 NOTE — TELEPHONE ENCOUNTER
Reason for Call:  Other     Detailed comments:     Anny from [facility] is calling to give an update to Dr Esquivel:  Patients Oxygen stat is 90 when walking, vitals are normal. Lung sounds diminished at bases. Patient Feeling exhausted.  There has been talk about moving to assisted living.  Anny was calling to let Dr Esquivel know since he is seeing patient this Friday.    Phone Number Patient can be reached at: Other phone number:  Anny/ [facility]/ [phone]    Best Time: any    Can we leave a detailed message on this number? YES    Call taken on 8/7/2019 at 12:28 PM by Desi Hodges

## 2019-08-08 ENCOUNTER — PATIENT OUTREACH (OUTPATIENT)
Dept: GERIATRIC MEDICINE | Facility: CLINIC | Age: 74
End: 2019-08-08

## 2019-08-08 NOTE — PROGRESS NOTES
8/8/19 CC received a call back from Leah and let her know about the [facility] meals being shipped today. She sounds much more alert today than yesterday when CC talked with her. She said she is getting ready to go to the hospital for her hydration appt. She has a cab coming to get her.  Keyanna WERNER N  [facility]  [phone]

## 2019-08-08 NOTE — PROGRESS NOTES
8/8/19 CC received a VM from Lisbet at [facility] stating her 14 frozen meals will be shipped today.  CC called Leah and [employer] with this information.  Keyanna Zhao RN BA N  [facility] Case [facility]  [phone]

## 2019-08-14 ENCOUNTER — TRANSFERRED RECORDS (OUTPATIENT)
Dept: HEALTH INFORMATION MANAGEMENT | Facility: CLINIC | Age: 74
End: 2019-08-14

## 2019-08-15 ENCOUNTER — PATIENT OUTREACH (OUTPATIENT)
Dept: GERIATRIC MEDICINE | Facility: CLINIC | Age: 74
End: 2019-08-15

## 2019-08-15 NOTE — PROGRESS NOTES
8/14/19 CC received a call from members [facility] home care nurse Anny stating she came to see Leah today and she wasvery confused almost non responsive. What she did respond to she did not respnd appropriately to questions. Her blood sugar reading was so high it didn't even read. She had not been answering her phone for a few days to Pedro Luis calls or her sons calls. She is not taking her medications. Anny called 911 and they took her to [facility]. Anny does not feel she is safe at home.  CC will follow up.  Keyanna Zhao RN BA N  [facility] Case [facility]  [phone]

## 2019-08-15 NOTE — PROGRESS NOTES
TRANSITIONS OF CARE (JED) LOG   JED tasks should be completed by the CC within one (1) business day of notification of each transition. Follow up contact with member is required after return to their usual care setting.  Note:  If CC finds out about the transitions fifteen (15) days or more after the member has returned to their usual care setting, no JED log is needed. However, the CC should check in with the member to discuss the transition process, any changes needed to the care plan and document it in a case note.    Member Name:  Leah Guerrero MCO Name:  [employer] Member ID#: [id number]   Product: [facility] Care Coordinator Contact:  Guerline Zhao RN Agency/[facility]/Care System: [facility]   Transition Communication Actions from Care Management Contact   Transition #1   Notification Date: 8/14/19 Transition Date:   8/14/19 Transition From: Home     Is this the member s usual care setting?               yes Transition To: Hospital, [place]   Transition Type:  Unplanned  Reason for Admission/Comments:  Sepsis     Shared CC contact info, care plan/services with receiving setting--Date completed: 8/15/19    Notified PCP of transition--Date completed:  8/15/19     via  EM[facility]   Transition #2   Transition #3  (if applicable)   Notification Date: 8/19/19         Transition To:  Home  Transition Date: 8/18/19     Transition Type:    Planned  Notified PCP -- Date completed: 8/19/19              Shared CC contact info, care plan/services with receiving setting or, if applicable, home care agency--Date completed:  NA  *Complete additional tasks below, if this transition is a return to usual care setting.      Comments:  8/19/19 CC spoke with client about her discharge    Notification Date:          Transition To:    Transition Date:              Transition Type:      Notified PCP--Date completed:          Shared CC contact info, care plan/services with receiving setting or, if applicable, home  care agency--Date completed:       *Complete additional tasks below, if this transition is a return to usual care setting.      Comments:       *Complete tasks below when the member is discharging TO their usual care setting within one (1) business day of notification.  For situations where the Care Coordinator is notified of the discharge prior to the date of discharge, the Care Coordinator must follow up with the member or designated representative to confirm that discharge actually occurred and discuss required JED tasks as outlined in the JED Instructions.  (This includes situations where it may be a  new  usual care setting for the member. (i.e., a community member who decides upon permanent nursing home placement following hospitalization and rehab).    Date completed: 8/19/19  Communicated with member or their designated representative about the following:  care transition process; about changes to the member s health status; plan of care updates; education about transitions and how to prevent unplanned transitions/readmissions  Four Pillars for Optimal Transition:    Check  Yes  - if the member, family member and/or SNF/facility staff manages the following:    If  No  provide explanation in the comments section.          [x]  Yes     []  No     Does the member have a follow-up appointment scheduled with primary care or specialist? (Mental health hospitalizations--the appt. should be w/in 7 days)   [x]  Yes     []  No     Can the member manage their medications or is there a system in place to manage medications (e.g. home care set-up)?         [x]  Yes     []  No     Can the member verbalize warning signs and symptoms to watch for and how to respond?         [x]  Yes     []  No     Does the member use a Personal Health Care Record?  Check  Yes  if visit summary, discharge summary, and/or healthcare summary are being used as a PHR.                                                                                                                                                                                     [] Yes      [x] No      Have you updated the member s care plan?  If  No  provide explanation in comments.   Comments: No changes at this time   8/14/19 SHAWANDA received a call from [facility]ny [facility] nurse about her calling 911 for Anne-Marie because she very confused almost unresponsive.  8/15/19 CC called [facility] and she was admitted with Sepsis. CC spoke with Dara BARROS at [place] and updated her with the services at home. CC also let her know the condition her homecare nurse found her in and that the home care nurse does not feel she is safe to be living at home alone. SHAWANDA let Dara know CC is looking into an AL and has provided Anne-Marie with information but SHAWANDA is not sure if she can actually go out and tour any of them. She has been very sick and in the hospital several times over the last few months. Dara states she can look into Palliative care consult for her. In the past Anne-Marie has always refused a TCU. Guerline Zhao RN PHN  8/19/19 CC spoke with Leah and she states she is doing much better. She was very coherent. She does not remember the home care nurse or her calling 911 last week. SHAWANDA will be doing a home visit with her in September. Guerline Zhao RN PHN

## 2019-08-16 ENCOUNTER — TELEPHONE (OUTPATIENT)
Dept: FAMILY MEDICINE | Facility: CLINIC | Age: 74
End: 2019-08-16
Payer: COMMERCIAL

## 2019-08-16 DIAGNOSIS — I10 HYPERTENSION GOAL BP (BLOOD PRESSURE) < 140/90: ICD-10-CM

## 2019-08-16 DIAGNOSIS — Z96.652 STATUS POST TOTAL LEFT KNEE REPLACEMENT: ICD-10-CM

## 2019-08-16 DIAGNOSIS — E11.65 UNCONTROLLED TYPE 2 DIABETES MELLITUS WITH HYPERGLYCEMIA (H): Primary | ICD-10-CM

## 2019-08-16 DIAGNOSIS — R11.0 NAUSEA: ICD-10-CM

## 2019-08-16 NOTE — TELEPHONE ENCOUNTER
Forms received from:  Home Care   Phone number listed: [phone]   Fax listed: [phone]  Date received: 08/16/19  Form description: Home Health Cert(08/03/19-10/01/19)  Once forms are completed, please return to  Home Care via Fax.  Is patient requesting to be contacted when forms are completed: NA    Form placed: in providers JEANIE Patiño

## 2019-08-20 ENCOUNTER — DOCUMENTATION ONLY (OUTPATIENT)
Dept: FAMILY MEDICINE | Facility: CLINIC | Age: 74
End: 2019-08-20

## 2019-08-20 NOTE — PROGRESS NOTES
[facility] now requests orders and shares plan of care/discharge summaries for some patients through [employer].  Please REPLY TO THIS MESSAGE OR ROUTE BACK TO THE AUTHOR in order to give authorization for orders when needed.  This is considered a verbal order, you will still receive a faxed copy of orders for signature.  Thank you for your assistance in improving collaboration for our patients.    ORDER  Skilled nursing 1 time week for 1 week, then 2 times a week for 5 weeks.   PT eval and tx  OT eval and tx  HHA 1 time a week for 1 week, then 2 times a week for 5 weeks  MSW eval and tx    PLAN OF CARE   Skilled nursing for med mgmt and education, home safety, diabetes mgmt.  PT for lower body strengthening  OT for upper body strengthening and ADL tools  HHA for grooming and showers  MSW for community resources    Isabell Hunter RN  [facility]

## 2019-08-21 ENCOUNTER — PATIENT OUTREACH (OUTPATIENT)
Dept: GERIATRIC MEDICINE | Facility: CLINIC | Age: 74
End: 2019-08-21

## 2019-08-21 NOTE — PROGRESS NOTES
[facility]21/19 [facility] another nurse Isabell went to visit Leah this week. Leah had been out to lunch when she got there and then had to walk her dog before Isabell could do the visit. Isabell waited a long time. Leah has canceled many home care visits in the past. They will see if this continues they may stop providing services. When Isabell asked her if she has taken her blood sugars she said no and then when Isabell asked if she had taken her insulin she said no because her blood sugar was in the 90's that morning. She was being very defiant with Isabell. Anny will go see her the next visit.  CC told Anny that when CC called member for post hospital follow up 8/19 CC had asked Leah about moving to an assisted living and Leah denied she ever stated she wanted to move to an assisted living and she does not want to move. CC asked if she received any of the information CC sent for AL's in the area and she said she hasn't checked her mail because she hasn't been home.  Anny states when she was at Leah's home last week and she was so confused and  seemed ill with a very high heart rate Anny called 911. Leah got up and got dressed and hid a bad of medicine bottles before 911 got there.  Anny states they are going to file a [facility] report  Keyanna Zhao RN BA N  [facility] Case Management  [phone]

## 2019-08-23 ENCOUNTER — TELEPHONE (OUTPATIENT)
Dept: FAMILY MEDICINE | Facility: CLINIC | Age: 74
End: 2019-08-23

## 2019-08-23 NOTE — TELEPHONE ENCOUNTER
Called Lisa home care nurse.  She stated that patients BS is not coming down after her insulin.  Patients BS is 536. Advised Lisa per protocol that she needs to go to ER.    She stated that she is with patient and let her know.    Naida Amaral RN C[facility] Triage.

## 2019-08-23 NOTE — TELEPHONE ENCOUNTER
Reason for Call:  Other prescription    Detailed comments: Lisa with  Homecare requesting for nurse to call regarding dosage on insulin and also need to confirm medications that patient is currently taking on on med list. Please advise.     Phone Number Patient can be reached at: Other phone number:  [phone]    Best Time: Anytime    Can we leave a detailed message on this number? YES    Call taken on 8/23/2019 at 4:57 PM by Maryuri Handy

## 2019-08-26 ENCOUNTER — TELEPHONE (OUTPATIENT)
Dept: FAMILY MEDICINE | Facility: CLINIC | Age: 74
End: 2019-08-26

## 2019-08-26 NOTE — TELEPHONE ENCOUNTER
[facility] needs to reconcile meds    In Epic not on form: Atenolol (Tenormin) 50 mg tablet take 1 tablet (50mg) PO BID 9/24/2018?    On Form not in Epic (discontinued)  Folic acid, metronidazole, labetalol        Treva Tyson RN  [facility]    .

## 2019-08-26 NOTE — TELEPHONE ENCOUNTER
Reason for Call: Request for an order or referral:    Order or referral being requested: verbal orders    Date needed: as soon as possible    Has the patient been seen by the PCP for this problem? Not Applicable    Additional comments: Emani with  Homecare requesting for verbal orders  - OT extention order: 1x a week for 3 weeks for ADL equipment needs    Phone number Patient can be reached at:  Other phone number:  [phone]    Best Time:  Anytime    Can we leave a detailed message on this number?  YES    Call taken on 8/26/2019 at 3:44 PM by Maryuri Handy

## 2019-08-27 NOTE — TELEPHONE ENCOUNTER
Called Emani Alexander with [facility] at  [phone],  No answer, left message to call nurse line at [phone]      Treva Tyson RN  [facility]

## 2019-08-27 NOTE — TELEPHONE ENCOUNTER
Emani PRIETO home care OT returned call - nurse relayed okay for orders as below.     Emani will have the nurse fax updated medication list to [phone]        Treva Tyson RN  [employer]

## 2019-08-27 NOTE — TELEPHONE ENCOUNTER
Emani PRIETO home care OT returned call - nurse relayed okay for orders as below.   Emani will have the nurse fax updated medication list to [phone]      Treva Tyson RN  [facility]

## 2019-08-27 NOTE — TELEPHONE ENCOUNTER
Emani with [facility] care called back.    Please call her back at [phone].    Naida Amaral RN C[facility] Triage.

## 2019-08-28 ENCOUNTER — PATIENT OUTREACH (OUTPATIENT)
Dept: GERIATRIC MEDICINE | Facility: CLINIC | Age: 74
End: 2019-08-28

## 2019-08-28 NOTE — PROGRESS NOTES
8/27/19 SHAWANDA received a VM from Rebecca BARROS stating she visited Leah today and she is asking about her lift chair and also wants to change her lifeline wrist band to a pendant because it is irritating her wrist.  SHAWANDA called Rebecca back to let her know SHAWANDA was not involved in her lift chair and does not see any orders in [facility] for the lift chair.    8/28/19 SHAWANDA called [facility] and they cannot give her a pendant because Leah told them she was having a pacemaker placed within a week or 2.  CC looked in MR and cannot find anything indicating a pacemaker placement unless she is going outside [place] for this. CC also noticed member was a no show for an appt with Dr Esquivel for the lift chair.  CC also noticed Leah has been a no show or canceled her last 7 appts with doctors at [place].    CC called Leah and left a message asking her to call . CC left message telling her [facility] cannot replace the wrist band with a pendant because Leah said she was getting a pacemaker. Maybe she can put a soft cloth under the wrist band so it doesn't irritate her skin.Where is Leah getting her lift chair from did she have an evaluation somewhere for the lift chair. SHAWANDA needs to do a home visit in September can she call SHAWANDA back so we can schedule the visit.  CC also left a message for Rebecca BARROS letting her know about the lifeline pendant vs wrist band.  Keyanna Zhao RN BA PHN  [facility]  [phone]

## 2019-08-29 ENCOUNTER — PATIENT OUTREACH (OUTPATIENT)
Dept: GERIATRIC MEDICINE | Facility: CLINIC | Age: 74
End: 2019-08-29

## 2019-08-29 DIAGNOSIS — Z53.9 DIAGNOSIS NOT YET DEFINED: Primary | ICD-10-CM

## 2019-08-29 PROCEDURE — G0179 MD RECERTIFICATION HHA PT: HCPCS | Performed by: FAMILY MEDICINE

## 2019-08-29 RX ORDER — PROMETHAZINE HYDROCHLORIDE 12.5 MG/1
12.5 TABLET ORAL PRN
Refills: 5 | COMMUNITY
Start: 2019-08-23

## 2019-08-29 RX ORDER — OXYCODONE AND ACETAMINOPHEN 5; 325 MG/1; MG/1
1 TABLET ORAL EVERY 6 HOURS PRN
Qty: 30 TABLET | Refills: 0 | COMMUNITY
Start: 2019-08-23

## 2019-08-29 RX ORDER — AMLODIPINE BESYLATE 5 MG/1
TABLET ORAL
Qty: 30 TABLET | Refills: 1 | COMMUNITY
Start: 2019-08-23 | End: 2019-09-05

## 2019-08-29 NOTE — TELEPHONE ENCOUNTER
Medications reconciled on [facility] form, changes noted on form.  Form to PCP for signature.    Treva Tyson RN  [facility]

## 2019-08-29 NOTE — TELEPHONE ENCOUNTER
Requested information faxed to number provided.  [facility]  Team 3 Coordinator

## 2019-08-29 NOTE — PROGRESS NOTES
8/29/19 SHAWANDA received a VM from Aditi Joseph her MH worker from [facility] [phone].  Aditi states she was with Leah today and Leah told her that a nurse hasn't been out and she is out of medications. Aditi is asking if we can have someone go out today.  SHAWANDA called [facility] and CHACORTA Malik the RN asking her when they last saw Leah and that the MH worker suggested someone see Leah today.  SHAWANDA received a VM from Anny WARE [facility] stating a nurse was out Monday 8/26. She also states that Leah will sometimes not even let them see her medications telling them she already set them up herself. Isabell RN was out there today to see Leah and Leah told Isabell the same thing she was out of meds. Isabell found her pill box and all the medications were still in there. She has not been taking her medications. Isabell went through all of her medications today with Leah.  SHAWANDA called Leah to schedule a home visit. Leah states she needs to talk to her oncologist first to see what schedule they have her on before she can make a home visit appt.  She said SHAWANDA can call her back next week.  Leah also states she doesn't want the wrist band PERS. She wants the pendant. CC told her that Leah told SHAWANDA and the [facility]  that she was going to have a pacemaker put in and they cannot use a pendant with a pacemaker. Leah argued that she was told she could have either the wrist or pendant with this service. SHAWANDA explained again that [facility] which is the service she has cannot use a pendant if she has a pacemaker. SHAWANDA asked her again if she was having a pacemaker put in and she said no.  SHAWANDA wrote a message to Dr Esquivel to see if he knows anything about a pacemaker or even if she is seeing a cardiologist. Also letting him know about the confusion and at times incoherence she seems to be having. She has days she seems ok and other days she doesn't remember or seem to  understand what is going on. CC does not feel it is safe for her to live alone. CC has brought up AL and has even sent her information but Leah states she is not moving to an AL.  Keyanna WERNER N  [facility] Case Management  [phone]

## 2019-08-30 ENCOUNTER — PATIENT OUTREACH (OUTPATIENT)
Dept: GERIATRIC MEDICINE | Facility: CLINIC | Age: 74
End: 2019-08-30

## 2019-08-30 ENCOUNTER — TELEPHONE (OUTPATIENT)
Dept: FAMILY MEDICINE | Facility: CLINIC | Age: 74
End: 2019-08-30

## 2019-08-30 NOTE — PROGRESS NOTES
8/30/19 CC received a message from Dr Esquivel stating he does not know anything about a pacemaker. Leah gets most of her care through [facility]. Leah states she is not getting a pacemaker. CC called [facility] and asked them to switch her lifeline from the wrist to a pendant.  Keyanna Zhao RN BA PHN  [facility] Case Management  [phone]

## 2019-08-30 NOTE — TELEPHONE ENCOUNTER
Forms received from:  Home Care   Phone number listed: [phone]   Fax listed: [phone]  Date received: 08/30/19  Form description: Multiple Ordes  Once forms are completed, please return to [facility] Care via Fax.  Is patient requesting to be contacted when forms are completed: NA    Form placed: in providers fabienne Patiño

## 2019-09-03 ENCOUNTER — TRANSFERRED RECORDS (OUTPATIENT)
Dept: HEALTH INFORMATION MANAGEMENT | Facility: CLINIC | Age: 74
End: 2019-09-03

## 2019-09-03 NOTE — PROGRESS NOTES
[facility] Care Coordination Contact    2nd Attempt: Signed Letter not received from Member, resent per process.     [facility]  Care Management Specialist  [facility]  [phone]

## 2019-09-03 NOTE — TELEPHONE ENCOUNTER
Requested information faxed to number provided.  [facility]  Team 3 Coordinator

## 2019-09-05 ENCOUNTER — TELEPHONE (OUTPATIENT)
Dept: FAMILY MEDICINE | Facility: CLINIC | Age: 74
End: 2019-09-05

## 2019-09-05 DIAGNOSIS — I10 HYPERTENSION GOAL BP (BLOOD PRESSURE) < 140/90: Primary | ICD-10-CM

## 2019-09-05 RX ORDER — AMLODIPINE BESYLATE 10 MG/1
10 TABLET ORAL DAILY
Qty: 90 TABLET | Refills: 0 | Status: SHIPPED | OUTPATIENT
Start: 2019-09-05

## 2019-09-05 NOTE — TELEPHONE ENCOUNTER
Forms received from:  Home Care   Phone number listed: [phone]   Fax listed: [phone]  Date received: 09/05/19  Form description: OT  Once forms are completed, please return to  Home Care via Fax.  Is patient requesting to be contacted when forms are completed: NA    Form placed: in nicolas Patiño

## 2019-09-05 NOTE — TELEPHONE ENCOUNTER
See longer note below.    Anny says Leah used [facility].   I called [facility] pharmacy, they say the last Rx they have was amlodipine 10 mg daily per a Dr. Crawford out of a [place] clinic.  Our amlodipine 5 mg daily is historical.     I called and spoke to Anne-Marie, she says she ran out of amlodipine when she last filled her pill box.   She confirms it is 10 mg daily.     I advised her she needs to be seen to review meds and follow up.   She declined to schedule with me, states she needs to get her schedule, is not with her now.     I advised her to call to schedule with Dr. Esquivel as soon as she knows when she can come in.  She says Dr. Esquivel is still her PCP.     I cued up the amlodipine, routed to Dr. Esquivel to send the new dose.     Keyanna Carrasquillo RN  [facility]

## 2019-09-05 NOTE — TELEPHONE ENCOUNTER
"Anny, nurse with [facility] cold called RN line and left detailed message, states she is calling to follow up on a note she got from OT, med reconcile attempted by OT and \"is a bit of a mess\".   Leah not allowing nurse to look at meds to verify.      Leah did say she is needing refill amlodipine, apparently pharmacy sent a request as well, per Anny.    I see amlodipine on Epic is 5 mg but the note says \"per [facility] form 19 is taking 10 mg daily\".    I don't see a request from a pharmacy.    Anny says Leah's BP was \"great\" today, 120/58.    The need new order for home PT eval, Leah is indicating interest in that.   Old order is now .    I called Anny back at number provided, advised her home care orders for PT evaluation approved as requested per [facility] standing orders for Home Care, Assisted Living or Nursing Home Evaluations and Treatments.     She says Leah is \"hot and cold\" regarding services and sometimes cooperates and sometimes won't let nurse in.  She seems confused on meds, will say she is out of a med one day then the next nurse will be told she is not out.   She is still in her apartment, she turned down plan to go to assisted living.  Anny says a vulnerable adult report was filed a few weeks ago.      Anny says Leah used [facility].   I called [facility], they say the last Rx they have was amlodipine 10 mg daily per a Dr. Crawford out of a [place] clinic.  Our amlodipine 5 mg daily is historical.    I called and spoke to Anne-Marie, she says she ran out of amlodipine when she last filled her pill box.   She confirms it is 10 mg daily.    I advised her she needs to be seen to review meds and follow up.   She declined to schedule with me, states she needs to get her schedule, is not with her now.    I advised her to call to schedule with Dr. Esquivel as soon as she knows when she can come in.    I cued up the amlodipine, routed to Dr. Esquivel to " send the new dose.    Keyanna Carrasquillo RN  [facility]            Keyanna Carrasquillo RN  [facility]

## 2019-09-05 NOTE — TELEPHONE ENCOUNTER
Requested information faxed to number provided.  [facility]  Team 3 Coordinator

## 2019-09-06 ENCOUNTER — PATIENT OUTREACH (OUTPATIENT)
Dept: GERIATRIC MEDICINE | Facility: CLINIC | Age: 74
End: 2019-09-06

## 2019-09-06 NOTE — PROGRESS NOTES
9/6/19 CC spoke with [facility] nurse Anny. She saw Leah yesterday and she seemed in a good mood and was alert and oriented. Anny spoke with her about their conversation for an AL and Leah denied having that conversation with her and said she was asking about AL's for a friend.  Leah has not seen her primary MD Dr Esquivel since last November and has many no shows and cancellations these past few months. Leah keeps her bedroom door closed and refuses to let anyone in there. She again said she was out of her blood pressure medication and when the nurse asked if she could go look for it she said no and would not let the nurse set up her medications because she said she was out of her blood pressure medications. Anny is not convinced she is taking her medications because her blood sugar was in the 300's and Leah had told her she had not eaten anything yet that day. The last visit she had all her medications when she also said she was out but the nurse found them.  Anny did let her clinic know of her situation with her medications. Leah was a no show to her post hospital visit at the clinic and a cancel for previous visit post hospital. Anny has to use the discharge notes from [facility] to set up her medications. The nurse at the clinic told Anny she will call Leah and have her schedule an appt with Dr Esquivel. Even though she has not seen Dr Esquivel and mostly sees her oncologist at [facility] Leah still insists Dr Esquivel is her primary according to Anny.   has a home visit scheduled with Leah 9/16 at 1PM.  Keyanna Zhao RN BA N  [facility] Case Management  [phone]

## 2019-09-09 ENCOUNTER — PATIENT OUTREACH (OUTPATIENT)
Dept: GERIATRIC MEDICINE | Facility: CLINIC | Age: 74
End: 2019-09-09

## 2019-09-09 NOTE — PROGRESS NOTES
9/9/19 CC spoke with [facility] to verify they are still providing homemaking services for Leah 4 hrs per week and they are.  Keyanna Zhao RN BA N  [facility] Case Management  [phone]

## 2019-09-12 ENCOUNTER — PATIENT OUTREACH (OUTPATIENT)
Dept: GERIATRIC MEDICINE | Facility: CLINIC | Age: 74
End: 2019-09-12

## 2019-09-12 NOTE — PROGRESS NOTES
9/12/19 CC called member and left VM reminding her CC will visit Monday 9/16 at 1PM for her annual assessment to keep her open to EW.  If she has questions to call CC back.  Keyanna Zhao RN BA N  [facility] Case Management  [phone]

## 2019-09-13 ENCOUNTER — TELEPHONE (OUTPATIENT)
Dept: FAMILY MEDICINE | Facility: CLINIC | Age: 74
End: 2019-09-13

## 2019-09-13 NOTE — TELEPHONE ENCOUNTER
Forms received from:  Home Care   Phone number listed: [phone]   Fax listed: [phone]  Date received: 09/13/19  Form description: PT  Once forms are completed, please return to  Home Care via Fax.  Is patient requesting to be contacted when forms are completed: NA    Form placed: in nicolas Patiño

## 2019-09-16 ENCOUNTER — PATIENT OUTREACH (OUTPATIENT)
Dept: GERIATRIC MEDICINE | Facility: CLINIC | Age: 74
End: 2019-09-16

## 2019-09-16 RX ORDER — FOLIC ACID 1 MG/1
1 TABLET ORAL DAILY
COMMUNITY
End: 2020-02-04

## 2019-09-16 RX ORDER — LABETALOL 100 MG/1
100 TABLET, FILM COATED ORAL 3 TIMES DAILY
COMMUNITY

## 2019-09-16 NOTE — TELEPHONE ENCOUNTER
Requested information faxed to number provided.  [facility]  Team 3 Coordinator

## 2019-09-16 NOTE — PROGRESS NOTES
[facility] Care Coordination Contact    [facility] Home Visit Assessment     Home visit for Health Risk Assessment with Leah Guerrero completed on September 16, 2019    Type of residence:: Apartment - handicap accessible        Assessment completed with:: Patient    Current Care Plan  Member currently receiving the following home care services: Skilled Nursing, Home Health Aid   Member currently receiving the following community resources: County Worker, Home Care, Housekeeping/Chore Agency, Lifeline, Meals on Wheels  She also has a MH  from [place] who sees her weekly.    Medication Review  Medication reconciliation completed in Epic: Yes  Medication set-up & administration: RN set up weekly.  Self-administers medications.  Medication Risk Assessment Medication (1 or more, place referral to MTM): Lacks understanding of medication regimen  MTM Referral Placed: No: Member believes she is taking her medications as prescribed by her oncologist and sees no need to se her primary physician Dr Esquivel except when she is ill. Medication issues have been ongoing and she does accept a SNV weekly for medication set ups but on occasion will refuse having the nurse set them up saying she can do it herself. PCP is aware of this. CC strongly encouraged member to see Dr Esquivel because her medication list does not have many of the same medications on it as her list in her MR. CC told member this and she said the list she provided me at the home visit is all she needs to take and these are prescribed by her oncologist Dr Borrego/Romain and not Dr Esquivel. She still considers Dr Esquivel her PCP but only feels she needs to see him when she is ill. She states she goes over to [facility] weekly for fluids.They chack her weight and draw lab work. CC told Leah she still needs to make an appt to see Dr Esquivel and not just when she is ill. Meds she is taking Amlodipine 10 mg every day,Crestor 20 mg every day, Folic Acid  1 mg every day, Labetalol 100 mg tid per her list and she states she takes her Insulin but it was not on her list. CC told Leah she needs her A1C and her Cholesterol checked and she states she has all her blood work she needs done by her oncologist every month. Dr Esquivel notified of her medication discrepancy.    Mental/Behavioral Health   Depression Screening: See PHQ assessment flowsheet.   Mental health DX:: Yes   Mental health DX how managed:: Mental Health Targeted Care Manager  Mental Health Diagnosis: Yes: Anxiety  Mental Health Services: Yes: Current mental health services:  Mental Health Targeted     Falls Assessment:            ADL/IADL Dependencies:   Dependent ADLs:: Bathing  Dependent IADLs:: Cleaning, Transportation, Cooking, Shopping, Meal Preparation, Medication Management    [facility] Health Plan sponsored benefits: Shared information re: Silver Sneakers/gym memberships, ASA, Calcium +D.    PCA Assessment completed at visit: Not applicable     Elderly Waiver Eligibility: Yes-will continue on EW    Care Plan & Recommendations: see care plan    See LTCC for detailed assessment information.    Follow-Up Plan: Member informed of future contact, plan to f/u with member with a 6 month telephone assessment.  Contact information shared with member and family, encouraged member to call with any questions or concerns at any time.    [place] care continuum providers: Please refer to Health Care Home on the Epic Problem List to view this patient's [facility] Care Plan Summary.    CC called [facility] to ask if they have founf her a Se  yet. LMTRC for Lisa at [facility].  Keyanna Zhao RN BA PHN  [facility] Case Management  [phone]

## 2019-09-17 ENCOUNTER — PATIENT OUTREACH (OUTPATIENT)
Dept: GERIATRIC MEDICINE | Facility: CLINIC | Age: 74
End: 2019-09-17

## 2019-09-17 NOTE — PROGRESS NOTES
9/17/19 CC received a VM from Christen at [facility] stating the pendant auto alert was given to member 9/16/19.  Keyanna Zhao RN BA N  [facility] Case Management  [phone]

## 2019-09-19 ENCOUNTER — PATIENT OUTREACH (OUTPATIENT)
Dept: GERIATRIC MEDICINE | Facility: CLINIC | Age: 74
End: 2019-09-19

## 2019-09-19 NOTE — PROGRESS NOTES
9/19/19 CC received a VM from Lisa Stearns at [facility] [phone]. She states they tried to reach Leah several times in May without success. They can look for a match in Sr.  if she is still interested.  CC called Lisa back and LM that she is still interested and can be difficult to reach at times. CC did send a new auth in for Sr  and if she has further questions to let CC know.  Keyanna Zhao RN BA PHN  [facility] Case Management  [phone]

## 2019-09-25 ENCOUNTER — TELEPHONE (OUTPATIENT)
Dept: FAMILY MEDICINE | Facility: CLINIC | Age: 74
End: 2019-09-25

## 2019-09-25 NOTE — TELEPHONE ENCOUNTER
Reason for Call: Request for an order or referral:    Order or referral being requested: skilled nursing ok for late re certification 1 time a week with 3 PRN for 60 days and home health aid for 60 days.     Date needed: as soon as possible    Has the patient been seen by the PCP for this problem? YES    Additional comments:     Phone number Patient can be reached at:  Other phone number:  [phone]    Best Time:  any    Can we leave a detailed message on this number?  YES    Call taken on 9/25/2019 at 1:12 PM by Elvira Lomeli

## 2019-09-25 NOTE — TELEPHONE ENCOUNTER
Called Anny - relayed okay for orders, Anny reports patient has some mouth/jaw movements.   BS high in 400 today but patient states did not eat today.     Anny asks that provider be made aware of the unusual mouth/jaw movements - smacking lips, moving jaw around.      Treva Tyson RN  [facility]

## 2019-09-27 ENCOUNTER — PATIENT OUTREACH (OUTPATIENT)
Dept: GERIATRIC MEDICINE | Facility: CLINIC | Age: 74
End: 2019-09-27

## 2019-09-27 NOTE — LETTER
September 27, 2019    Important [employer] Information    GERALDINE ALBERT  [address]   [facility] [address]  Your Care Plan  Dear Geraldine,  When we spoke recently, I promised to send you a Care Plan. The plan enclosed is a summary of our discussion. It includes the steps we agreed would help you meet your health goals. In addition, I can help you with:  [id number]  This program is available to members who need a ride to medical and dental visits. To schedule a ride, call [phone] or [phone] (toll free). TTY/TTD: 711. You can call 8 a.m. to 8 p.m. Seven days a week. Access to a representative may be limited at times.    [employer]   The [employer] program empowers you to improve your health through education and exercise. To learn more, visit [employer], or call [employer] Service at [phone] (toll free) (TTY:711) from 7 a.m. - 7 p.m. Central Time, Monday-Friday.  Health Care Directive   This form helps you outline your health care wishes. You can request a form from me and I will answer any questions you have before you discuss it with your doctor.   Annual Physical  Take a key step on your path to good health and set up an annual physical at your clinic.  Questions?  Call me at [phone] Monday-Friday between 8am and 5pm.  TTY/TTD: 711. As we discussed, I plan to be in touch with you again in 6 months to follow up via phone.  Sincerely,    Guerline Zhao RN    E-mail: [email or web address]  Phone: [phone]    Care Manager  [facility]          cc: member records                    Civil Rights Notice  Discrimination is against the law. [employer] does not discriminate on the basis of any of the following:    Race    Color    National Origin    Creed    Roman Catholic    Age    Public Assistance Status    Receipt of Health Care Services    Disability (including physical or mental impairment)    Sex (including sex stereotypes and gender  identity)    Marital Status    Political Beliefs    Medical Condition    Genetic Information    Sexual Orientation    Claims Experience    Medical History    Health Status    Auxiliary Aids and Services:  [employer] provides auxiliary aids and services, like qualified interpreters or information in accessible formats, free of charge and in a timely manner, to ensure an equal opportunity to participate in our health care programs. Contact [employer] Customer Service at [employer]/contact medicaid or call [phone] (toll free); TTY:719 or at [employer]/contactiChartscaid.    Language Assistance Services:  [employer] provides translated documents and spoken language interpreting, free of charge and in a timely manner, when language assistance services are necessary to ensure limited English speakers have meaningful access to our information and services. Contact [employer] at -[phone] (toll free); TTY: 757 or [employer]/contactmedicaid.     Civil Rights Complaints  You have the right to file a discrimination complaint if you believe you were treated in a discriminatory way by [employer]. You may contact any of the following four agencies directly to file a discrimination complaint.    U.S. Department of Health and Human Services  Office for Civil Rights (OCR)  You have the right to file a complaint with the OCR, a federal agency, if you believe you have been discriminated against because of any of the following:    Race    Disability    Color    Sex    National Origin    Age      Contact the OCR directly to file a complaint:         Director         U.S. Department of Health and Human Services  Office for Civil Rights         [address] [place] [address], [place] [address]         [phone] (Voice)         [phone] (TDD)         Complaint Portal - https://ocrportal.hhs.gov/ocr/portal/lobby.jsf     [place] Department of Human Rights ([facility])  In [place], you have the right to  file a complaint with the MDHR if you believe you have been discriminated against because of any of the following:      Race    Color    National Origin    Quaker    Creed    Sex    Sexual Orientation    Marital Status    Public Assistance Status    Disability    Contact the MDHR directly to file a complaint:         [place] Department of Human Rights         [facility], [address] [place] [address]         [phone] (voice)          [phone] (toll free)         711 or [phone] ([place] Relay)         [phone] (Fax)         [email or web address] (Email)     [place] Department of Human Services (DHS)  You have the right to file a complaint with [facility] if you believe you have been discriminated against in our health care programs because of any of the following:    Race    Color    National Origin    Creed    Quaker    Age    Public Assistance Status    Receipt of Health Care Services    Disability (including physical or mental impairment)    Sex (including sex stereotypes and gender identity)    Marital Status    Political Beliefs    Medical Condition    Genetic Information    Sexual Orientation    Claims Experience    Medical History    Health Status    Complaints must be in writing and filed within 180 days of the date you discovered the alleged discrimination. The complaint must contain your name and address and describe the discrimination you are complaining about. After we get your complaint, we will review it and notify you in writing about whether we have authority to investigate. If we do, we will investigate the complaint.      [facility] will notify you in writing of the investigation s outcome. You have a right to appeal the outcome if you disagree with the decision. To appeal, you must send a written request to have [facility] review the investigation outcome period. Be brief and state why you disagree with the decision. Include additional information you think is important.       If you file a complaint in this way, the people who work for the agency named in the complaint cannot retaliate against you. This means they cannot punish you in any way for filing a complaint. Filing a complaint in this way does not stop you from seeking out other legal or administration actions.     Contact DHS directly to file a discrimination complaint:        Civil Rights Coordinator        [facility]        Equal Opportunity and Access Division        [address] [address]        [place] [address] [phone] (voice) or use your preferred relay service     [employer] Complaint Notice   You have the right to file a complaint with [employer] if you believe you have been discriminated against because of any of the following:       Medical condition    Health status    Receipt of health care services    Claims experience    Medical history    Genetic information    Disability (including mental or physical impairment)    Marital status    Age    Sex (including sex stereotypes and gender identity)    Sexual orientation    National origin    Race    Color    Temple    Creed    Public assistance status    Political beliefs    You can file a complaint and ask for help in filing a complaint in person or by mail, phone, fax, or email at:     [employer] Civil Rights Coordinator  [facility]  [address], [place] [address] [phone] (voice and fax) or TTY:909  Email: [email or web address]    American Indians can continue to use [place] and  Health Services (IHS) clinics. We will not require prior approval or impose any conditions for you to get services at these clinics. For elders age 65 years and older this includes Elderly Waiver (EW) services accessed through the [place]. If a doctor or other provider in a [place] or IHS clinic refers you to a provider in our network, we will not require you to see your primary care provider prior to the  referral.    For accessible formats of this publication or assistance with equal or access to our services, visit [email or web address], or call [phone] (toll free) or use your preferred relay service.

## 2019-09-27 NOTE — PROGRESS NOTES
[facility] Care Coordination Contact    Received after visit chart from care coordinator.  Completed following tasks: Mailed copy of care plan to client, Updated services in access and Submitted referrals/auths for , dixie, HHA, hmkg  Chart was returned to CC.    and Provider Signature - No POC Shared:  Member indicates that they do not want their POC shared with any EW providers.   Tricia Burkett  Case Management Specialist  [facility]  [phone]

## 2019-10-08 ENCOUNTER — TRANSFERRED RECORDS (OUTPATIENT)
Dept: HEALTH INFORMATION MANAGEMENT | Facility: CLINIC | Age: 74
End: 2019-10-08

## 2019-10-08 LAB — HBA1C MFR BLD: 10.6 % (ref 0–5.7)

## 2019-10-10 ENCOUNTER — PATIENT OUTREACH (OUTPATIENT)
Dept: GERIATRIC MEDICINE | Facility: CLINIC | Age: 74
End: 2019-10-10

## 2019-10-10 NOTE — PROGRESS NOTES
TRANSITIONS OF CARE (JED) LOG   JED tasks should be completed by the CC within one (1) business day of notification of each transition. Follow up contact with member is required after return to their usual care setting.  Note:  If CC finds out about the transitions fifteen (15) days or more after the member has returned to their usual care setting, no JED log is needed. However, the CC should check in with the member to discuss the transition process, any changes needed to the care plan and document it in a case note.    Member Name:  Leah Guerrero O Name:  [employer] O/Health Plan Member ID#: [id number]   Product: [facility] Care Coordinator Contact:  Guerline Zhao RN Agency/County/Care System: [place] [email or web address]   Transition Communication Actions from Care Management Contact   Transition #1   Notification Date: 10/10/19 Transition Date:   10/8/19 Transition From: Home     Is this the member s usual care setting?               yes Transition To: Hospital, [place]   Transition Type:  Unplanned  Reason for Admission/Comments:  Bloody diarrhea,facial swelling     Shared CC contact info, care plan/services with receiving setting--Date completed: 10/10/19    Notified PCP of transition--Date completed:  10/10/19     via  EMR   Keyanna Zhao RN BA N  [facility] Case Management  [phone]     Transition #2   Transition #3  (if applicable)   Notification Date: 10/14/19         Transition To:  Home  Transition Date: 10/12/19     Transition Type:    Planned  Notified PCP -- Date completed: 10/14/19              Shared CC contact info, care plan/services with receiving setting or, if applicable, home care agency--Date completed:  10/14/19  *Complete additional tasks below, if this transition is a return to usual care setting.      Comments:      Notification Date:          Transition To:    Transition Date:              Transition Type:      Notified PCP--Date completed:          Shared CC contact info,  care plan/services with receiving setting or, if applicable, home care agency--Date completed:       *Complete additional tasks below, if this transition is a return to usual care setting.      Comments:       *Complete tasks below when the member is discharging TO their usual care setting within one (1) business day of notification.  For situations where the Care Coordinator is notified of the discharge prior to the date of discharge, the Care Coordinator must follow up with the member or designated representative to confirm that discharge actually occurred and discuss required JED tasks as outlined in the JED Instructions.  (This includes situations where it may be a  new  usual care setting for the member. (i.e., a community member who decides upon permanent nursing home placement following hospitalization and rehab).    Date completed: 10/14/19  Communicated with member or their designated representative about the following:  care transition process; about changes to the member s health status; plan of care updates; education about transitions and how to prevent unplanned transitions/readmissions  Four Pillars for Optimal Transition:    Check  Yes  - if the member, family member and/or SNF/facility staff manages the following:    If  No  provide explanation in the comments section.          []  Yes     [x]  No     Does the member have a follow-up appointment scheduled with primary care or specialist? (Mental health hospitalizations--the appt. should be w/in 7 days)   [x]  Yes     []  No     Can the member manage their medications or is there a system in place to manage medications (e.g. home care set-up)?         [x]  Yes     []  No     Can the member verbalize warning signs and symptoms to watch for and how to respond?         [x]  Yes     []  No     Does the member use a Personal Health Care Record?  Check  Yes  if visit summary, discharge summary, and/or healthcare summary are being used as a PHR.                                                                                                                                                                                     [] Yes      [x] No      Have you updated the member s care plan?  If  No  provide explanation in comments.   Comments:  No changes

## 2019-10-11 DIAGNOSIS — Z53.9 DIAGNOSIS NOT YET DEFINED: Primary | ICD-10-CM

## 2019-10-11 PROCEDURE — G0179 MD RECERTIFICATION HHA PT: HCPCS | Performed by: FAMILY MEDICINE

## 2019-10-13 ENCOUNTER — TELEPHONE (OUTPATIENT)
Dept: NURSING | Facility: CLINIC | Age: 74
End: 2019-10-13

## 2019-10-13 ENCOUNTER — NURSE TRIAGE (OUTPATIENT)
Dept: NURSING | Facility: CLINIC | Age: 74
End: 2019-10-13

## 2019-10-13 NOTE — TELEPHONE ENCOUNTER
Reason for Call:  Home Health Care orders     Alycia with [facility] called regarding :      Orders are needed for this patient.   Skilled Nursing      Skilled Nursing: Assess & Treat    Message sent to Dr. BECCA Esquivel P [id number]

## 2019-10-13 NOTE — TELEPHONE ENCOUNTER
Reason for Call:  Home Health Care orders     Alycia with [facility] called regarding :      Orders are needed for this patient.   Skilled Nursing      Skilled Nursing: Assess & Treat    Message sent to Dr. BECCA Esquivel P [id number]    Emani Francisco RN, [facility]    Additional Information    Nursing judgment or information in reference    Protocols used: NO GUIDELINE [id number]

## 2019-10-14 ENCOUNTER — TRANSFERRED RECORDS (OUTPATIENT)
Dept: HEALTH INFORMATION MANAGEMENT | Facility: CLINIC | Age: 74
End: 2019-10-14

## 2019-10-14 NOTE — TELEPHONE ENCOUNTER
Called the general   home care line and was transferred to nurse on call. Relayed message below.     Sherron Barkley RN

## 2019-10-15 ENCOUNTER — DOCUMENTATION ONLY (OUTPATIENT)
Dept: FAMILY MEDICINE | Facility: CLINIC | Age: 74
End: 2019-10-15

## 2019-10-15 NOTE — PROGRESS NOTES
[facility] Care Coordination Contact    No Letter Received: 60 day tracking of letter complete, no letter received from member. Tracking discontinued.     [facility]  Care Management Specialist  [facility]  [phone]

## 2019-10-16 NOTE — PROGRESS NOTES
[facility] now requests orders and shares plan of care/discharge summaries for some patients through [facility].  Please REPLY TO THIS MESSAGE OR ROUTE BACK TO THE AUTHOR in order to give authorization for orders when needed.  This is considered a verbal order, you will still receive a faxed copy of orders for signature.  Thank you for your assistance in improving collaboration for our patients.    ORDER  Skilled nursing to see patient 1 time a week for 6 week.  For chronic disease mgmt, med assess and set up, diabetes education and mgmt, pain mgmt, nutrition education, home safety.  Also need ok to continue cipro because of interaction with tizanidine and zofran. Cipro ends on 10/16/19.  Isabell Hunter RN  [facility]

## 2019-10-17 ENCOUNTER — TELEPHONE (OUTPATIENT)
Dept: FAMILY MEDICINE | Facility: CLINIC | Age: 74
End: 2019-10-17

## 2019-10-17 NOTE — TELEPHONE ENCOUNTER
Forms received from:  Home Care   Phone number listed: [phone]   Fax listed: [phone]  Date received: 10.17.19  Form description: SN  Once forms are completed, please return to  Home Care via Fax.  Is patient requesting to be contacted when forms are completed: NA    Form placed: in nicolas Patiño

## 2019-10-21 NOTE — TELEPHONE ENCOUNTER
Requested information faxed to number provided.  [facility]  Team 3 Coordinator

## 2019-10-23 ENCOUNTER — PATIENT OUTREACH (OUTPATIENT)
Dept: GERIATRIC MEDICINE | Facility: CLINIC | Age: 74
End: 2019-10-23

## 2019-10-23 ENCOUNTER — TRANSFERRED RECORDS (OUTPATIENT)
Dept: HEALTH INFORMATION MANAGEMENT | Facility: CLINIC | Age: 74
End: 2019-10-23

## 2019-10-23 NOTE — PROGRESS NOTES
10/23/19 CC received a call from members [facility] financial worker Irina asking CC assistance.  first needs to send Irina a [phone] so she can speak with CC. CC faxed [phone] to [facility].  CC spoke with Irina and she states she has been unable to get member to sign and return the new AVS form. If she does not get it by 10/31/19 to [place] will close 11/1/19.  CC called enzo and went to her home and had her sign the AVS form. CC faxed the AVS form to [facility] and CC called irina and left her a VM that the form has been faxed and if she does not receive it to let CC know.  Keyanna Zhao RN BA N  [facility] Case Management  [phone]

## 2019-10-24 ENCOUNTER — PATIENT OUTREACH (OUTPATIENT)
Dept: GERIATRIC MEDICINE | Facility: CLINIC | Age: 74
End: 2019-10-24

## 2019-10-24 NOTE — PROGRESS NOTES
TRANSITIONS OF CARE (JED) LOG   JED tasks should be completed by the CC within one (1) business day of notification of each transition. Follow up contact with member is required after return to their usual care setting.  Note:  If CC finds out about the transitions fifteen (15) days or more after the member has returned to their usual care setting, no JED log is needed. However, the CC should check in with the member to discuss the transition process, any changes needed to the care plan and document it in a case note.    Member Name:  Leah Guerrero O Name:  [employer] M[employer]/Health Plan Member ID#: [id number]   Product: [facility] Care Coordinator Contact:  Guerline Zhao RN Agency/[facility]/Care System: [employer]   Transition Communication Actions from Care Management Contact   Transition #1   Notification Date: 10/24/19 Transition Date:   10/23/19 Transition From: Home     Is this the member s usual care setting?               yes Transition To: Hospital, [place]   Transition Type:  Unplanned  Reason for Admission/Comments:  Elevated BG's     Shared CC contact info, care plan/services with receiving setting--Date completed: 10/24/19    Notified PCP of transition--Date completed:  10/23/19     via  EMR   Keyanna WERNER N  [employer] Case Management  [phone]     Transition #2   Transition #3  (if applicable)   Notification Date: 10/28/19         Transition To:  Home  Transition Date: 10/26/19     Transition Type:    Planned  Notified PCP -- Date completed: 10/28/19              Shared CC contact info, care plan/services with receiving setting or, if applicable, home care agency--Date completed:  10/28/19  *Complete additional tasks below, if this transition is a return to usual care setting.      Comments:    Keyanna WERNER N  [employer] Case Management  [phone]      Notification Date:          Transition To:    Transition Date:              Transition Type:       Notified PCP--Date completed:          Shared CC contact info, care plan/services with receiving setting or, if applicable, home care agency--Date completed:       *Complete additional tasks below, if this transition is a return to usual care setting.      Comments:       *Complete tasks below when the member is discharging TO their usual care setting within one (1) business day of notification.  For situations where the Care Coordinator is notified of the discharge prior to the date of discharge, the Care Coordinator must follow up with the member or designated representative to confirm that discharge actually occurred and discuss required JED tasks as outlined in the JED Instructions.  (This includes situations where it may be a  new  usual care setting for the member. (i.e., a community member who decides upon permanent nursing home placement following hospitalization and rehab).    Date completed: 10/28/19  Communicated with member or their designated representative about the following:  care transition process; about changes to the member s health status; plan of care updates; education about transitions and how to prevent unplanned transitions/readmissions  Four Pillars for Optimal Transition:    Check  Yes  - if the member, family member and/or SNF/facility staff manages the following:    If  No  provide explanation in the comments section.          []  Yes     [x]  No     Does the member have a follow-up appointment scheduled with primary care or specialist? (Mental health hospitalizations--the appt. should be w/in 7 days)   [x]  Yes     []  No     Can the member manage their medications or is there a system in place to manage medications (e.g. home care set-up)?         [x]  Yes     []  No     Can the member verbalize warning signs and symptoms to watch for and how to respond?         [x]  Yes     []  No     Does the member use a Personal Health Care Record?  Check  Yes  if visit summary, discharge  summary, and/or healthcare summary are being used as a PHR.                                                                                                                                                                                    [] Yes      [x] No      Have you updated the member s care plan?  If  No  provide explanation in comments.   Comments:  No changes     10/24/19 CC called [facility] and LMTRC to the  who is assigned to member.  10/28/19 CC called  for hospital follow up to see how she is doing and to have her make an appt with PCP. No answer,[facility].   also left a message for her [facility] nurse Anny asking her when she visits Leah to assist her in scheduling a follow up hospital visit with a doctor. Leah has expressed that she wants to change her PCP to the clinic across the street from her [facility] but has not yet done so. Maybe this would be a good time to follow up with a doctor of her choice since she has declined to see her [facility] physician the last few hospitalizations. She needs to reestablish care with someone.  Keyanna Zhao RN BA PHN  [facility] Case Management  [phone]

## 2019-10-30 ENCOUNTER — TELEPHONE (OUTPATIENT)
Dept: FAMILY MEDICINE | Facility: CLINIC | Age: 74
End: 2019-10-30

## 2019-10-30 ENCOUNTER — PATIENT OUTREACH (OUTPATIENT)
Dept: GERIATRIC MEDICINE | Facility: CLINIC | Age: 74
End: 2019-10-30

## 2019-10-30 NOTE — TELEPHONE ENCOUNTER
Reason for Call: Request for an order or referral:    Order or referral being requested: Skilled Nursing    Date needed: as soon as possible    Has the patient been seen by the PCP for this problem? YES    Additional comments: Skilled nursing every other week, with 3 PRN, until November 30th.     Phone number Patient can be reached at:  Other phone number:  [phone]     Best Time:  anytime    Can we leave a detailed message on this number?  YES - secure line     Call taken on 10/30/2019 at 2:42 PM by Allyssa Parish

## 2019-10-30 NOTE — TELEPHONE ENCOUNTER
RN called Anny, relayed provider's orders.     Gill Sharma, RN, BSN, PHN  [facility]

## 2019-10-30 NOTE — PROGRESS NOTES
10/30/19 CC received a call from Anny [facility] nurse and she saw Leah today and she states she wants to keep seeing Dr Esquivel and not an [place] primary. She has received a letter from the clinic that she cannot continue to no show appts.or she will not be allowed to be seen there. She did make a follow up appt with Dr Miller office for next Monday and Anny warned her she needs to show up for the appt or the clinic might cancel her.  Keyanna Zhao RN BA PHN  [facility] Case Management  [phone]

## 2019-11-07 ENCOUNTER — TELEPHONE (OUTPATIENT)
Dept: FAMILY MEDICINE | Facility: CLINIC | Age: 74
End: 2019-11-07

## 2019-11-07 NOTE — TELEPHONE ENCOUNTER
Forms received from:  Home Care   Phone number listed: [phone]   Fax listed: [phone]  Date received: 11.07.19  Form description: SN  Once forms are completed, please return to  Home Care via Fax.  Is patient requesting to be contacted when forms are completed: NA    Form placed: in nicolas Patiño

## 2019-11-13 ENCOUNTER — TRANSFERRED RECORDS (OUTPATIENT)
Dept: HEALTH INFORMATION MANAGEMENT | Facility: CLINIC | Age: 74
End: 2019-11-13

## 2019-11-13 ENCOUNTER — PATIENT OUTREACH (OUTPATIENT)
Dept: GERIATRIC MEDICINE | Facility: CLINIC | Age: 74
End: 2019-11-13

## 2019-11-13 NOTE — PROGRESS NOTES
11/13/19 SHAWANDA received a call from [facility]  Aditi Joseph from [employer] Aditi has contacted Leah to schedule a visit and Leah keeps giving her reasons why she can't schedule a visit and today told Aditi she hasn't eaten in 3 days. She told Aditi she has no food because no one can take her shopping.CC told Aditi that she has MOW daily, HHA 2 times a week, weekly nurse visits. Homemaker, PERS, and a Sr  weekly. There should be no reason she has nothing to eat in the home. Aditi is going to call Leah again tomorrow to see if she can get her to agree to an appt with her. SHAWANDA let her know Leah does have a follow up appt with her primary doctor 11/15.   Aditi is asking if CC can get her to go into another type of living with more services in the apartment complex. SHAWANDA said she spoke as well as members home RN has spoken to Leah about moving to an AL. 1 time Leah agreed it might be good and SHAWANDA sent her information on AL's that accept EW. The next time SHAWANDA asked her about it she denied having ever said she would move to an AL. We cannot force her to move if she refuses.   CC called Leah to follow up on her not eating for 3 days. No answer,[facility].    SHAWANDA called Carmelina dietz [facility] nurse and member was sent to [facility]  by the home care nurse who saw her today with confusion and very high blood sugars.      Keyanna Zhao RN BA N  [facility] Case Management  [phone]

## 2019-11-14 ENCOUNTER — PATIENT OUTREACH (OUTPATIENT)
Dept: GERIATRIC MEDICINE | Facility: CLINIC | Age: 74
End: 2019-11-14

## 2019-11-14 NOTE — PROGRESS NOTES
11/14/19 CC called member   Aditi Joseph to let her know about the hospital admission. Aditi states when she called Leah yesterday she was in the hospital.  Aditi states that Leah needs to continue to follow up with her psychiatrist or she will not be able to continue to follow with her much longer. CC asked who her psychiatrist is and Aditi did not know off hand but will call CC back with the name.  Aditi also suggests if Leah will not see her psychiatrist that CC can call [facility] and see if they will start seeing her again. They used to come to her home.  CC called [facility] and they are still seeing her weekly.  CC called [facility] to see if she still has a senior  and LMTRC.  Keyanna Zhao RN BA N  [facility] Case Management  [phone]

## 2019-11-14 NOTE — PROGRESS NOTES
TRANSITIONS OF CARE (JED) LOG   JED tasks should be completed by the CC within one (1) business day of notification of each transition. Follow up contact with member is required after return to their usual care setting.  Note:  If CC finds out about the transitions fifteen (15) days or more after the member has returned to their usual care setting, no JED log is needed. However, the CC should check in with the member to discuss the transition process, any changes needed to the care plan and document it in a case note.    Member Name:  Leah Guerrero O Name:  [employer] Plan Member ID#:[id number]    Product: [facility] Care Coordinator Contact:  uGerline Zhao RN Agency[place]Care [place]   Transition Communication Actions from Care Management Contact   Transition #1   Notification Date: 11/14/19 Transition Date:   11/13/19 Transition From: Home     Is this the member s usual care setting?               yes Transition To: Hospital, [place]   Transition Type:  Unplanned  Reason for Admission/Comments:  Weakness,high blood sugars  Keyanna Zhao RN BA PHN  [facility] Case Management  [phone]       Shared CC contact info, care plan/services with receiving setting--Date completed: 11/14/19    Notified PCP of transition--Date completed:  11/14/19     via  EMR   Transition #2   Transition #3  (if applicable)   Notification Date: 11/18/19         Transition To:  Home  Transition Date: 11/16/19     Transition Type:    Planned  Notified PCP -- Date completed: 11/16/19              Shared CC contact info, care plan/services with receiving setting or, if applicable, home care agency--Date completed:  11/18/19  *Complete additional tasks below, if this transition is a return to usual care setting.      Comments: 11/14/19 CC called Dara Severino BARROS at [facility] regarding this admission. It is members 9th admission this year. Dara saw client last night and is aware of her  services in the home. CC asked Dara BARROS is any drug testing has been done on client as she has varying behavior patterns at home. Member has not been doing well in the home and has refused a TCU in the past but it may be needed, TR. Guerline Zhao RN PHN   Notification Date:          Transition To:    Transition Date:              Transition Type:      Notified PCP--Date completed:          Shared CC contact info, care plan/services with receiving setting or, if applicable, home care agency--Date completed:       *Complete additional tasks below, if this transition is a return to usual care setting.      Comments:       *Complete tasks below when the member is discharging TO their usual care setting within one (1) business day of notification.  For situations where the Care Coordinator is notified of the discharge prior to the date of discharge, the Care Coordinator must follow up with the member or designated representative to confirm that discharge actually occurred and discuss required JED tasks as outlined in the JED Instructions.  (This includes situations where it may be a  new  usual care setting for the member. (i.e., a community member who decides upon permanent nursing home placement following hospitalization and rehab).    Date completed: 11/18/19  Communicated with member or their designated representative about the following:  care transition process; about changes to the member s health status; plan of care updates; education about transitions and how to prevent unplanned transitions/readmissions  Four Pillars for Optimal Transition:    Check  Yes  - if the member, family member and/or SNF/facility staff manages the following:    If  No  provide explanation in the comments section.          []  Yes     [x]  No     Does the member have a follow-up appointment scheduled with primary care or specialist? (Mental health hospitalizations--the appt. should be w/in 7 days)   [x]  Yes     []  No     Can  the member manage their medications or is there a system in place to manage medications (e.g. home care set-up)?         [x]  Yes     []  No     Can the member verbalize warning signs and symptoms to watch for and how to respond?         [x]  Yes     []  No     Does the member use a Personal Health Care Record?  Check  Yes  if visit summary, discharge summary, and/or healthcare summary are being used as a PHR.                                                                                                                                                                                    [] Yes      [x] No      Have you updated the member s care plan?  If  No  provide explanation in comments.   Comments:  No changes  11/18/19  spoke with Leah and asked her to be sure she makes a follow up appt with her PCP for this week. She will need to explain to the clinic she missed her last appt because she was in the hospital. Leah states she is fine and she has had 3 calls this morning already from people wanting to know how she is doing.  also asked her if she sees a psychiatrist. Her   Aditi from [facility] suggested to  if Leah was not seeing a psychiatrist that she may need to start seing one or the   will not be able to continue. Leah is not sure who Aditi is and says she hasn't seen her for 3 months. According to Aditi ,Leah has declined home visits but Aditi hopes to see her in December. Aditi suggested member should maybe go back to [facility] because they can come to the home. Leah declines to see anyone stating she does not need to.  Keyanna Zhao RN BA N  [facility] Case Management  [phone]

## 2019-11-17 ENCOUNTER — DOCUMENTATION ONLY (OUTPATIENT)
Dept: CARE COORDINATION | Facility: CLINIC | Age: 74
End: 2019-11-17

## 2019-11-17 NOTE — PROGRESS NOTES
Dear  XXXXX  Medicare Home Health regulations requires [facility] to provide an initial assessment visit either within 48 hours of the patient's return home, or on the physician ordered Start of Care date.    There will be a delay in the Initial Assessment for Leah Guerrero; MRN [id number]  We anticipate the Start of care will be 11/18/19 date.      Sincerely [facility]  Jaye Murray RN  [phone]

## 2019-11-18 ENCOUNTER — DOCUMENTATION ONLY (OUTPATIENT)
Dept: FAMILY MEDICINE | Facility: CLINIC | Age: 74
End: 2019-11-18

## 2019-11-18 ENCOUNTER — PATIENT OUTREACH (OUTPATIENT)
Dept: GERIATRIC MEDICINE | Facility: CLINIC | Age: 74
End: 2019-11-18

## 2019-11-18 NOTE — PROGRESS NOTES
Dear Dr. Kevin Esquivel,  Medicare Home Health regulations requires [facility] to provide an initial assessment visit either within 48 hours of the patient's return home, or on the physician ordered Start of Care date.    There will be a delay in the Initial Assessment for Leah Guerrero; MRN [id number]  We anticipate the Resumption of care will be 11/19/19.  Patient has an appointment in clinic today for IV hydration.       Sincerely [facility]  Alycia No RN  [phone]

## 2019-11-18 NOTE — PROGRESS NOTES
11/18/19  received a VM from Elvie Burkett at [facility] stating Leah at 1 time did have a sr  but no longer. He did not indicate why.  Keyanna Zhao RN BA N  [facility] Case Management  [phone]

## 2019-11-20 ENCOUNTER — PATIENT OUTREACH (OUTPATIENT)
Dept: GERIATRIC MEDICINE | Facility: CLINIC | Age: 74
End: 2019-11-20

## 2019-11-20 ENCOUNTER — TELEPHONE (OUTPATIENT)
Dept: FAMILY MEDICINE | Facility: CLINIC | Age: 74
End: 2019-11-20

## 2019-11-20 NOTE — TELEPHONE ENCOUNTER
Irais Mccormick at [phone] relayed orders from Dr. Alvin Mccormick verbalized understanding.      Treva Tyson RN  [facility]

## 2019-11-20 NOTE — PROGRESS NOTES
11/20/19 CC called and gave a verbal VA report.  Report # [id number]     also received a VM from Jaiden Singletary [facility] stating they have put Leah back on the waiting list for a sr . In the past they have tried contacting Leah about a sr  but she had never responded to calls or called them back. He said if  wants to check on the waiting list time CC can call Jimmie Burkett at [facility] [phone].  Keyanna Zhao RN BA N  [facility] Case Management  [phone]

## 2019-11-20 NOTE — PROGRESS NOTES
11/20/19 CC in reviewing members services and the discrepencies to what Leah is stating she is getting and what CC put auths in for back on 10/1/19 CC called her MOW services through [facility] and per [facility] the meals were put on hold back in August 2019 and were not restarted even when CC called them in September to start her home delivered meals.  It is unclear who put the meals on hold. CC restarted the meals with [facility]. CC called Leah and she states she put them on hold in August because she was getting to many and they were stock piling. She didn't have room for them. CC told her CC restarted the meals and she was hesitant stating she doesn't have the room for so many and is not sure she needs them. She states she doesn't eat and does not feel hungry.CC suggested she at least get a few deliveries so she has food in the house when she needs it and she agreed.    CC also let Leah know that she spoke with S about her Sr  and they said she had a sr  but no longer does. CC asked Leah why the Sr  stopped. Leah states she never had a sr . (Yet in past conversations Leah has told CC she did have a sr ). Leah states she would like one. CC called [facility] and  for Jaiden Hayder.    Leah does have her PERS, homemaking 4 hrs per week through [facility]. CC verified this with [facility]. She has [facility] SNV and HHA.     CC called Wilda   Aditi Joseph  [employer]. CC let Aditi know of her services as above. CC also let Aditi know that on 11/18 when CC spoke with Leah about needing to see her psychiatrist or CC could start her with [facility] again and maybe they could see her in her home Wilda response was she didn't need this. CC had told Leah that [facility] will not be able to work with her anymore if she does not continue with therapy and she had no response to that. Aditi will call Leah and let her know it is imperative she continue with  therapy or [facility] will not be able to provide a  for her.  Aditi states that she had put Leah in touch with the [employer] and gave her phone numbers to call and they could also help her with this diagnosis but eLah never followed through with that.  Aditi states the diagnosis they are seeing her for is Major Depressive Disorder, Anxiety,PTSD. There are no medications on file in MR for any of these MH diagnosis. The only diagnosis on file is Anxiety.     Keyanna Zhao RN BA N  [facility] Case Management  [phone]

## 2019-11-20 NOTE — PROGRESS NOTES
11/20/19  attempted to send a VA reprot to [facility] but could not get the online reprot to file. CC will try again later.  Keyanna Zhao RN BA N  [facility] Case Management  [phone]

## 2019-11-20 NOTE — TELEPHONE ENCOUNTER
Reason for Call: Request for an order or referral:    Order or referral being requested: Anny with [facility] calling for verbal order for one SN visit next week and 1 PRN. She seen patient for a resumption of care after a hospitalization. She states that patient is difficult to care for, almost seems pathological when talking with patient. She states that patient has had multiple hospitalizations recently. The only provider that patient has seen is Dr Paul Borrego, her oncologist. She's not taking her medications. She's not eating, blood sugars are high. She asked the oncology nurse if the chemo would be affecting blood sugars and the chemo nurse said no.     Date needed: as soon as possible    Has the patient been seen by the PCP for this problem? Not Applicable    Additional comments:     Phone number Patient can be reached at:  Other phone number:  [phone]    Best Time:  any    Can we leave a detailed message on this number?  YES    Call taken on 11/20/2019 at 11:37 AM by Latisha Finney

## 2019-12-10 ENCOUNTER — PATIENT OUTREACH (OUTPATIENT)
Dept: GERIATRIC MEDICINE | Facility: CLINIC | Age: 74
End: 2019-12-10

## 2019-12-11 NOTE — PROGRESS NOTES
12/10/19 SHAWANDA received a phone call from Aditi MAYBERRY for MH from [place]. She states she has tried reaching Leah 3 times and she is not calling her back. She wanted to clarify why Leah did not want services from her anymore. Laura supervisor would like her to see Leah 1 more time.  CC told Aditi it did not sound like Leah did not want to see her in as much as her response when nCC told her she needed to see a Psychiatrist/therapist or [facility] to continue to receive [facility] services. Her response was she did not need this type of service. CC understood she really did not want to see a Psychiatrist or [facility] services.  Aditi will try her a few more time. SHAWANDA was not given any information that member was in the hospital. CC been out of the office this past week.  Keyanna Zhao RN BA N  [facility] Case Management  [phone]

## 2019-12-15 ENCOUNTER — TRANSFERRED RECORDS (OUTPATIENT)
Dept: HEALTH INFORMATION MANAGEMENT | Facility: CLINIC | Age: 74
End: 2019-12-15

## 2019-12-15 LAB
CREAT SERPL-MCNC: 1.04 MG/DL (ref 0.57–1.11)
GFR SERPL CREATININE-BSD FRML MDRD: 52 ML/MIN/1.73M2
GLUCOSE SERPL-MCNC: 399 MG/DL (ref 65–100)
POTASSIUM SERPL-SCNC: 4.2 MMOL/L (ref 3.5–5)

## 2019-12-16 ENCOUNTER — PATIENT OUTREACH (OUTPATIENT)
Dept: GERIATRIC MEDICINE | Facility: CLINIC | Age: 74
End: 2019-12-16

## 2019-12-16 NOTE — PROGRESS NOTES
12/16/19 CC received information member was seen in the ER at [facility] 12/15/19 for dizziness and was discharged to home. She has a follow up appt scheduled for 12/18 with Dr Esquivel.  SHAWANDA called Leah and no answer. CC LMTRC and reminded her of her appt with Dr Esquivel on 12/18.  Keyanna Zhao RN BA N  [facility] Case Management  [phone]

## 2019-12-18 ENCOUNTER — PATIENT OUTREACH (OUTPATIENT)
Dept: GERIATRIC MEDICINE | Facility: CLINIC | Age: 74
End: 2019-12-18

## 2019-12-18 NOTE — PROGRESS NOTES
12/18/19 SHAWANDA received a VM from members   from [employer] Aditi Joseph. She states Leah has not been answering or returning her calls. She asks if SHAWANDA would call Leah to see if she still wanted services from [facility]. They can continue to see her without her seeing a psychiatrist if she would agree to a home diagnostic assessment from [facility].     SHAWANDA called Leah and there was no answer and the voice mail box was full and SHAWANDA was unable to leave a message.  SHAWANDA did note she is to see Dr Esquivel this afternoon.    SHAWANDA called Anny WARE from [facility] and  asking if the next nurse visit can they discuss with Leah about continuing with [employer]. SHAWANDA gave her the above information from Aditi Joseph/[facility].  CC called and spoke with Aditi letting her know CC asked the home care nurse to bring this up with Leah as her voice mail box is full and she did not answer SHAWANDA's call.  Keyanna Zhao RN BA PHN  [facility] Case Management  [phone]

## 2019-12-26 ENCOUNTER — PATIENT OUTREACH (OUTPATIENT)
Dept: GERIATRIC MEDICINE | Facility: CLINIC | Age: 74
End: 2019-12-26

## 2019-12-26 NOTE — PROGRESS NOTES
12/26/19 SHAWANDA received a call from Anny WARE [facility] nurse. She spoke with Leah at her last home visit regarding having her Mental Health CC Aditi Joseph from [place] come and see her with a home evaluation. Leah did say she would like that.Leah stated she was feeling a little depressed. SHAWANDA called Aditi Joseph and left her a VM with this information.    Anny also states Leah had asked about transportation. CC emailed Anny the phone number for [employer] as well as Aditi Joseph  SHAWANDA from [facility] phone number for Leah. Anny states Leah often will not call her back. She reached her only if Leah answers the phone when she calls.    Anny did speak with members Oncologists nurse at [facility]. The nurse let Anny know that Leah is in end stage of her cancer. Her oncologist will treat until the end if a patient requests it. Anny spoke with Leah at the visit on seeing her primary physician. She apparently no showed another visit with Dr Esquivel. Leah did state again she was interested in finding a new doctor across the street from where she lives that is not [facility]. Anny will continue to work with Leah on getting her to not only make these appts. And failing. Leah stated she no showed this last appt due to being in the hospital which she was not.   Keyanna Zhao RN BA PHN  [facility] Case Management  [phone]

## 2020-01-03 ENCOUNTER — TRANSFERRED RECORDS (OUTPATIENT)
Dept: HEALTH INFORMATION MANAGEMENT | Facility: CLINIC | Age: 75
End: 2020-01-03

## 2020-01-08 ENCOUNTER — TELEPHONE (OUTPATIENT)
Dept: FAMILY MEDICINE | Facility: CLINIC | Age: 75
End: 2020-01-08

## 2020-01-11 ENCOUNTER — TRANSFERRED RECORDS (OUTPATIENT)
Dept: HEALTH INFORMATION MANAGEMENT | Facility: CLINIC | Age: 75
End: 2020-01-11

## 2020-01-13 ENCOUNTER — PATIENT OUTREACH (OUTPATIENT)
Dept: GERIATRIC MEDICINE | Facility: CLINIC | Age: 75
End: 2020-01-13

## 2020-01-13 NOTE — PROGRESS NOTES
1/13/20 CC spoke with son Paul Guerrero and discussed CC's concerns about Fina living alone.   Paul states he is aware of her frequent hospitalizations and why it happens he believes is because she keeps getting chemotherapy and it makes her sick. She gets dehydrated and incoherent. He feels she is losing her silver with cancer. CC asked if they have discussed stopping her chemo with her cancer doctor and have they discussed hospice care. Paul states Fina absolutely refuses hospice care. They have discussed this and she will just shut down if you talk to her about this.    CC told Paul that CC has spoken to [facility] nurse several times about her living at home alone and that she is not caring for herself as she should. Paul states he has thought about like an assisted living. CC told him that CC and his home care nurse have brought this up several times but Fina refuses to move to an AL. Maybe family can talk with her about this. SHAWANDA is very concerned about her living alone and not being able to take care of herself or not even being able to call for help/911 if she needed it.Often she has been found in a state of altered mental status when they see her and call 911.   Paul said she will not leave her dog. CC let him know there are some AL facilities that accept dogs. They may have waiting lists but at least that would be a step in the right direction if family could talk her into moving.    Reg said he wants to do what is right by his mom also and he cannot force her to move either. He sees her weekly and does her shopping for her. She is a stubborn woman and has been her whole life per Paul. Neither he or his brothers can talk ot into doing what she doesn't want to do and she will often not return their calls or answer when they call either.    CC gave Paul her name and number to call if there is anything CC can do for his mom. She does have homemaking, MOW,HHA,SNV and lifeline in place.  Keyanna  Paevl WERNER PHN  [facility] Case Management  [phone]

## 2020-01-13 NOTE — PROGRESS NOTES
TRANSITIONS OF CARE (JED) LOG   JED tasks should be completed by the CC within one (1) business day of notification of each transition. Follow up contact with member is required after return to their usual care setting.  Note:  If CC finds out about the transitions fifteen (15) days or more after the member has returned to their usual care setting, no JED log is needed. However, the CC should check in with the member to discuss the transition process, any changes needed to the care plan and document it in a case note.    Member Name:  Leah Guerrero [facility]    Product: [facility] Care Coordinator Contact:  Guerline Zhao RN Agency/County/Care System: [facility]   Transition Communication Actions from Care Management Contact   Transition #1   Notification Date: 11/13/20 Transition Date:   11/11/20 Transition From: Home     Is this the member s usual care setting?               yes Transition To: Hospital, [facility]   Transition Type:  Unplanned  Reason for Admission/Comments:  Fever,hyperglycemia,non responsive  Keyanna WERNER N  [employer] [facility] Case Management  [phone]       Shared CC contact info, care plan/services with receiving setting--Date completed: 1/13/20   CC called [facility] ICU and LMTRC for her SW. CC left a message letting the SW know that member has had many hospitalizations this past year for similar diagnosis of hyperglycemia. Neither CC or her home care nurse feel she is safe at home. She is not taking her medications correctly, she should not be living alone. However when we try to talk about an assisted living facility she refuses to even consider this and often will not take out calls or call back. She needs more help and hopefully they can get her to tranfer to a TCU for rehab at least. In the past she has refused a TCU.  Keyanna WERNER N  [facility] Case Management  [phone]     Notified PCP of  transition--Date completed:  1/13/20     via  [facility]   Transition #2   Transition #3  (if applicable)   Notification Date: 1/17/20         Transition To:  Skilled Nursing Facility, [facility]  Transition Date: 1/16/20     Transition Type:    Planned  Notified PCP -- Date completed: 1/17/20              Shared CC contact info, care plan/services with receiving setting or, if applicable, home care agency--Date completed:  1/17/20  *Complete additional tasks below, if this transition is a return to usual care setting.      Comments:  1/17/20 CC spoke with Larissa PAPO at [facility] and gave her the comminity services information. CC let her know that CC also spoke with her son Paul and we discussed her moving to an assisted living facility. Larissa said she new of 2 AL's in or near [place] that takes small dogs. [place] and [place]. CC will give this information to Paul son if he wants to tour.  Keyanna WERNER N  [facility] Case Management  [phone]      Notification Date:  2/6/20        Transition To:  Assisted Living, [facility]  Transition Date:   2/5/20           Transition Type:    Planned  Notified PCP--Date completed: 2/6/20         Shared CC contact info, care plan/services with receiving setting or, if applicable, home care agency--Date completed: 2/6/20      *Complete additional tasks below, if this transition is a return to usual care setting.      Comments:  Keyanna WERNER N  [facility] Case Management  [phone]       *Complete tasks below when the member is discharging TO their usual care setting within one (1) business day of notification.  For situations where the Care Coordinator is notified of the discharge prior to the date of discharge, the Care Coordinator must follow up with the member or designated representative to confirm that discharge actually occurred and discuss required JED tasks as outlined in the JED  Instructions.  (This includes situations where it may be a  new  usual care setting for the member. (i.e., a community member who decides upon permanent nursing home placement following hospitalization and rehab).    Date completed: 2/6/20  Communicated with member or their designated representative about the following:  care transition process; about changes to the member s health status; plan of care updates; education about transitions and how to prevent unplanned transitions/readmissions  Four Pillars for Optimal Transition:    Check  Yes  - if the member, family member and/or SNF/facility staff manages the following:    If  No  provide explanation in the comments section.          [x]  Yes     []  No     Does the member have a follow-up appointment scheduled with primary care or specialist? (Mental health hospitalizations--the appt. should be w/in 7 days)   [x]  Yes     []  No     Can the member manage their medications or is there a system in place to manage medications (e.g. home care set-up)?         [x]  Yes     []  No     Can the member verbalize warning signs and symptoms to watch for and how to respond?         [x]  Yes     []  No     Does the member use a Personal Health Care Record?  Check  Yes  if visit summary, discharge summary, and/or healthcare summary are being used as a PHR.                                                                                                                                                                                    [] Yes      [x] No      Have you updated the member s care plan?  If  No  provide explanation in comments.   Comments: New care plan from recent visit for transfer to AL. No changes.  Keyanna Zhao RN BA N  [facility] Case Management  [phone]       1/15/20 CC received a VM from Darlene ABRROS at [facility]. She states they are working on sending Leah to  [facility] not home. She will let CC know where she will be going once that is  known.  1/22/20 CC received a VM from Larissa BARROS at [facility] stating that Fina has agreed to discharge to an assisted living facility. [facility] in [place] has an EW opening and accepts dogs.  CC LM asking if they have a potential discharge date yet as SHAWANDA will need to come and do another visit with her before she discharges to the AL.  1/24/20 CC received a call from Larissa BARROS and they are planning on discharge to the AL the end of next week. CC will come 1/29/20 to do her assessment. Larissa will let Leah know and will be available for the visit.  1/30/20 CC called Larissa BARROS to see how Wilda visit to the AL went and if she has decided to move.LMTR.  2/4/20 CC received a VM from Larissa BARROS at [facility]. She states Leah will be discharging tomorrow 2/5/20 to [facility]. She is scheduled to leave around 11am.  Keyanna Zhao RN BA N  [facility] Case Management  [phone]

## 2020-01-15 ENCOUNTER — PATIENT OUTREACH (OUTPATIENT)
Dept: GERIATRIC MEDICINE | Facility: CLINIC | Age: 75
End: 2020-01-15

## 2020-01-15 NOTE — PROGRESS NOTES
1/15/20 CC received a call from son Paul. He is asking about putting her name on a waiting list for an assisted living facility who will accept dogs. Preferrably in the [place] area where he lives because these are the 2 issues he spoke with his mom about last night to get her to agree to consider this.  CC let Paul know that CC can look for AL's that accept dogs. CC also let him know the SW at [facility] let CC know they plan to send her to a TCU after discharge from the hospital first and Paul said he was aware of this.    CC called Anny WARE at [facility] and updated her on members hospitalization and probable TCU at discharge.  Keyanna Zhao RN BA PHN  [facility] Case Management  [phone]

## 2020-01-17 ENCOUNTER — PATIENT OUTREACH (OUTPATIENT)
Dept: GERIATRIC MEDICINE | Facility: CLINIC | Age: 75
End: 2020-01-17

## 2020-01-17 NOTE — PROGRESS NOTES
1/17/20 CC called son Paul and gave him 2 optons to german at for AL's that accept dogs that were given to CC by the SW at [facility]3] near [place].  [place] [phone],[place] [phone].  He can set up a tour and see if he thinks his mom would like to move to either of these AL's. If so he can fill out paperwork to put her on a waiting list.  Keyanna Zhao RN BA N  [facility] Case Management  [phone]

## 2020-01-20 NOTE — TELEPHONE ENCOUNTER
Forms received from:  Home Care   Phone number listed: [phone]   Fax listed: [phone]  Date received: 01.08.20  Form description: SN  Once forms are completed, please return to  Home Care via Fax.  Is patient requesting to be contacted when forms are completed: NA   Form placed: in nicolas Patiño    
Nova called back and said that she didn't get the fax asked to please re send it   
Requested information faxed to number provided.  
Requested information faxed to number provided.  [facility]  Team 3 Coordinator       
No complaints

## 2020-01-21 ENCOUNTER — TELEPHONE (OUTPATIENT)
Dept: FAMILY MEDICINE | Facility: CLINIC | Age: 75
End: 2020-01-21

## 2020-01-21 NOTE — TELEPHONE ENCOUNTER
Forms received from:  Home Care   Phone number listed: [phone]   Fax listed: [phone]  Date received: 01.21.20  Form description: SN  Once forms are completed, please return to  Home Care via Fax.  Is patient requesting to be contacted when forms are completed: NA    Form placed: in nicolas Patiño

## 2020-01-24 ENCOUNTER — PATIENT OUTREACH (OUTPATIENT)
Dept: GERIATRIC MEDICINE | Facility: CLINIC | Age: 75
End: 2020-01-24

## 2020-01-27 ENCOUNTER — PATIENT OUTREACH (OUTPATIENT)
Dept: GERIATRIC MEDICINE | Facility: CLINIC | Age: 75
End: 2020-01-27

## 2020-01-27 NOTE — PROGRESS NOTES
1/27/20 CC left a VM for Wanda at [facility] asking what their plans are for accepting Leah to the AL and that CC needs their NPI number for the CL tool. CC will be doing an assessment on Wednesday 2/29.   Keyanna Zhao RN BA N  [facility] Case Management  [phone]

## 2020-01-29 ENCOUNTER — PATIENT OUTREACH (OUTPATIENT)
Dept: GERIATRIC MEDICINE | Facility: CLINIC | Age: 75
End: 2020-01-29

## 2020-01-29 NOTE — PROGRESS NOTES
1/28/20 CC received a VM from Wanda white [place] giving me the contatc regarding the CL tool and NPI. Eugenia BARROS  CC also received a VM from Eugenia BARROS giving me her contact phone # [phone] and [place] NPI# [id number].  Keyanna WERNER N  [facility] Case Management  [phone]

## 2020-01-30 ENCOUNTER — PATIENT OUTREACH (OUTPATIENT)
Dept: GERIATRIC MEDICINE | Facility: CLINIC | Age: 75
End: 2020-01-30

## 2020-01-30 NOTE — PROGRESS NOTES
1/30/20 CC received a call back from Larissa BARROS at [facility] and she said Leah's tour went great, she loved [facility] and signed the lease. She will be moving 2/5/20.  CC called to cancel services for Religious  Sr -LM,MILS for homemaking,Anny WARE [facility] HHA/SNV-LM,[facility] LM,Religious  MOW the phone rang but never was picked up, no VM available, call would not got through twice.  CC sent DTR for above services to Leida Wilson  CC called Aditi NIX  and LM that member will be moving to [facility] in [place].  Keyanna Zhao RN BA PHN  [facility] Case Management  [phone]

## 2020-01-30 NOTE — PROGRESS NOTES
[facility] Care Coordination Contact    [facility] Change in Condition Assessment    Home visit for Change in Condition Health Risk Assessment with Leah Guerrero completed on January 29, 2020    Reason for Early reassessment: Member had her 7th hospitalization in the past year and was in ICU and then transferred to [facility] for STR. It is planned that she will move to [facility] in [place] along with her dog. [facility] has assessed and accepted her. However Leah is now reconsidering and wanting to go home. CC discussed this with her and pointed out the benefits of moving to the AL with nursing support and being able to call on someone to help her whenever she needed it, regular meals, medications and blood sugar monitoring will all asisst her to decrease her hospitalizations. She will have someone there to monitor her when she gets her chemotherapy. Safety for her is drastically compromised living in her apartment alone. This was supported by the [facility] and the speech therapist. We all discussed this with her. Leah and her son will be touring [facility] later today. Her discharge plans from [facility] are scheduled for Friday.    Type of residence:: Apartment now but hopefully will discharge to an AL        Assessment completed with:: Care Team Member, Patient    Current Care Plan  Member currently receiving the following home care services:     Member currently receiving the following community resources:    MOW,Homemaking,HHA.SNA[place]ONETTE,Lifeline, care coordinator that she is now not wanting to see any longer. She again feels she does not need this.    Medication Review  Medication reconciliation completed in Epic: No, member has not seen her PCP in many months and accuracy cannot be determined at this time until she does.  Medication set-up & administration: RN set up daily.  Nursing Home staff administers medications.  Medication Risk Assessment Medication  (1 or more, place referral to MTM): Difficulty adhering to medication regimen  MTM Referral Placed: No: Not at this time, Hopefully moving to AL    Mental/Behavioral Health   Depression Screening: See PHQ assessment flowsheet.          Mental Health Diagnosis: Yes: anxiety  Mental Health Services: Yes: Current mental health services:  Mental Health Targeted     Falls Assessment:            ADL/IADL Dependencies:           [employer] Health Plan sponsored benefits: Shared information re: Silver Sneakers/gym memberships, ASA, Calcium +D.    PCA Assessment completed at visit: No     Elderly Waiver Eligibility: Yes-will continue on EW    Care Plan & Recommendations: see careplan    See LTCC for detailed assessment information.    Follow-Up Plan: Member informed of future contact, plan to f/u with member with a 6 month telephone assessment.  Contact information shared with member and family, encouraged member to call with any questions or concerns at any time.    [facility] care continuum providers: Please refer to Health Care Home on the Epic Problem List to view this patient's [facility] Care Plan Summary.  Keyanna Zhao RN BA PHN  [facility] Case Management  [phone]

## 2020-01-31 ENCOUNTER — PATIENT OUTREACH (OUTPATIENT)
Dept: GERIATRIC MEDICINE | Facility: CLINIC | Age: 75
End: 2020-01-31

## 2020-01-31 NOTE — PROGRESS NOTES
1/31/20 CC received a call from [employer] [place] delivered meals Abilio asking about [employer] meals [phone]. CC called her back and let her know we are canceling her meals because she is moving to an AL.  Keyanna Zhao RN BA N  [facility] Case Management  [phone]

## 2020-01-31 NOTE — PROGRESS NOTES
1/31/20 SHAWANDA sent a note to Dr Esquivel letting him know Leah will be transferring to [facility] on 2/5/20.   also received a VM from Eugenia BARROS at [place] giving her phone number and fax number for the CL tool.  CC completed the CL tool and faxed it to Eugenia for review.  Keyanna Zhao RN BA N  [facility] Case Management  [phone]

## 2020-02-03 ENCOUNTER — PATIENT OUTREACH (OUTPATIENT)
Dept: GERIATRIC MEDICINE | Facility: CLINIC | Age: 75
End: 2020-02-03

## 2020-02-03 DIAGNOSIS — Z53.9 DIAGNOSIS NOT YET DEFINED: Primary | ICD-10-CM

## 2020-02-03 PROCEDURE — G0179 MD RECERTIFICATION HHA PT: HCPCS | Performed by: FAMILY MEDICINE

## 2020-02-03 NOTE — PROGRESS NOTES
[facility] Care Coordination Contact    Received a request to submit a DTR for the terminated of Meals, lifeline, homemaking, sr , hha and snv. Documentation completed and faxed to the health plan. Care Coordinator aware.    Leida Wilson RN  Utilization   [facility]  [phone]

## 2020-02-04 ENCOUNTER — PATIENT OUTREACH (OUTPATIENT)
Dept: GERIATRIC MEDICINE | Facility: CLINIC | Age: 75
End: 2020-02-04

## 2020-02-04 NOTE — PROGRESS NOTES
2/4/20 CC received a VM from Eugenia BARROS at [place] [phone]. She has reviewed the CL tool and is asking for adjustments in the medication areas.  Leah has med administration QID and they would like 15 minutes for each. Total 60 min /day.Blood glucose QID, they would like 20 minutes a day for these. Insulin draws TID and they would like 15  Minutes a day for these. CC has already given 20 minutes a day for insulin draws and insulin administration.  Member is still within her budget with these changes.  CC called Eugenia and CHACORTA that these changes have been made and is there anything else. [facility].  Keyanna Zhao RN BA N  [facility] Case Management  [phone]

## 2020-02-06 ENCOUNTER — PATIENT OUTREACH (OUTPATIENT)
Dept: GERIATRIC MEDICINE | Facility: CLINIC | Age: 75
End: 2020-02-06

## 2020-02-06 NOTE — PROGRESS NOTES
2/6/20 CC called Eugenia BARROS at [facility] to see who Leah will be following with at the AL. In house or do we need to find a new PCP outside the AL.  Eugenia had LM for CC letting CC know the updated AL tool looks good.    CC faxed [phone] to [facility] letting them know of the new address.  CC sent an email to [facility] letting her know of the new address.  Keyanna Zhao RN BA N  [facility] Case Management  [phone]    
WDL

## 2020-02-07 ENCOUNTER — DOCUMENTATION ONLY (OUTPATIENT)
Dept: CARE COORDINATION | Facility: CLINIC | Age: 75
End: 2020-02-07

## 2020-02-07 ENCOUNTER — PATIENT OUTREACH (OUTPATIENT)
Dept: GERIATRIC MEDICINE | Facility: CLINIC | Age: 75
End: 2020-02-07

## 2020-02-07 ENCOUNTER — MEDICAL CORRESPONDENCE (OUTPATIENT)
Dept: HEALTH INFORMATION MANAGEMENT | Facility: CLINIC | Age: 75
End: 2020-02-07

## 2020-02-07 ENCOUNTER — TELEPHONE (OUTPATIENT)
Dept: FAMILY MEDICINE | Facility: CLINIC | Age: 75
End: 2020-02-07

## 2020-02-07 NOTE — TELEPHONE ENCOUNTER
Orders are needed for this patient. PT & OT     PT: Two times a week for one week and One time a week for one week.     OT: To eval and treat     Ziyad also stated patient is now home from TCU placement due to pneumonia.  She is also in a new assisted living apartment.    Routed to PCP to review and advise.     Gill Sharma, RN, BSN, PHN  [facility] [place]

## 2020-02-07 NOTE — TELEPHONE ENCOUNTER
I called number provided, spoke to Ziyad, advised Dr. Esquivel approved orders as requested.    Keyanna Carrasquillo RN  [facility]

## 2020-02-07 NOTE — PROGRESS NOTES
2/6/20 CC received a VM from Eugenia BARROS at [place]. She is not sure yet who will be [facility] PCP but it may be inhouse.  Keyanna Zhao RN BA N  [facility] Case Management  [phone]

## 2020-02-07 NOTE — TELEPHONE ENCOUNTER
Reason for Call:  Home Health Care    Ziyad with [facility] called regarding (reason for call): Verbal orders request    Orders are needed for this patient. PT & OT    PT: Two times a week for one week and One time a week for one week.    OT: To eval and treat    Ziyad also stated patient is now home from TCU placement due to pneumonia.  She is also in a new assisted living apartment.    Skilled Nursing: No    Pt Provider: Ziyad w/[facility]    Phone Number Homecare Nurse can be reached at: [phone]    Can we leave a detailed message on this number? YES    Phone number patient can be reached at: Home number on file [phone] (home)    Best Time: ASAP    Call taken on 2/7/2020 at 2:22 PM by Maggie Pimentel

## 2020-02-08 ENCOUNTER — PATIENT OUTREACH (OUTPATIENT)
Dept: GERIATRIC MEDICINE | Facility: CLINIC | Age: 75
End: 2020-02-08

## 2020-02-08 NOTE — LETTER
February 10, 2020    Important [employer] Information    Leah Guerrero   [place]  [address]  [place] [address]  Your Care Plan  Dear Leah,  When we spoke recently, I promised to send you a Care Plan. The plan enclosed is a summary of our discussion. It includes the steps we agreed would help you meet your health goals. In addition, I can help you with:  Pemdzkr-L-NxbsGY  This program is available to members who need a ride to medical and dental visits. To schedule a ride, call [phone] or [phone] (toll free). TTY/TTD: 711. You can call 8 a.m. to 8 p.m. Seven days a week. Access to a representative may be limited at times.    Just Between Friends   The Just Between Friends program empowers you to improve your health through education and exercise. To learn more, visit [place], or call [employer] Member Serviceser Service at [phone] (toll free) (TTY:711) from 7 a.m. - 7 p.m. Central Time, Monday-Friday.  Health Care Directive   This form helps you outline your health care wishes. You can request a form from me and I will answer any questions you have before you discuss it with your doctor.   Annual Physical  Take a key step on your path to good health and set up an annual physical at your clinic.  Questions?  Call me at [phone] Monday-Friday between 8am and 5pm.  TTY/TTD: 711. As we discussed, I plan to be in touch with you again in 6 months to follow up via phone.  Sincerely,    Guerline Zhao RN    E-mail: [email or web address]  Phone: [phone]    Care Manager  [facility]          cc: member records                    Civil Rights Notice  Discrimination is against the law. [employer] does not discriminate on the basis of any of the following:    Race    Color    National Origin    Creed    Druze    Age    Public Assistance Status    Receipt of Health Care Services    Disability (including physical or mental impairment)    Sex (including sex stereotypes and  gender identity)    Marital Status    Political Beliefs    Medical Condition    Genetic Information    Sexual Orientation    Claims Experience    Medical History    Health Status    Auxiliary Aids and Services:  [employer] provides auxiliary aids and services, like qualified interpreters or information in accessible formats, free of charge and in a timely manner, to ensure an equal opportunity to participate in our health care programs. Contact [employer] Customer Service at [employer]/contact medicaid or call [phone] (toll free); TTY:712 or at [employer]/contactBiomeasurecaid.    Language Assistance Services:  [employer] provides translated documents and spoken language interpreting, free of charge and in a timely manner, when language assistance services are necessary to ensure limited English speakers have meaningful access to our information and services. Contact [employer] at [phone] (toll free); TTY: 321 or [employer]/contactmedicaid.     Civil Rights Complaints  You have the right to file a discrimination complaint if you believe you were treated in a discriminatory way by [employer]. You may contact any of the following four agencies directly to file a discrimination complaint.    U.S. Department of Health and Human Services  Office for Civil Rights (OCR)  You have the right to file a complaint with the OCR, a federal agency, if you believe you have been discriminated against because of any of the following:    Race    Disability    Color    Sex    National Origin    Age      Contact the OCR directly to file a complaint:         Director         U.S. Department of Health and Human Services  Office for Civil Rights         [address] [place] [address], [place] [address]         [phone] (Voice)         [phone] (TDD)         Complaint Portal - https://ocrportal.hhs.gov/ocr/portal/lobby.jsf     [place] Department of Human Rights (Formerly [facility])  In [place], you have the  right to file a complaint with the MDHR if you believe you have been discriminated against because of any of the following:      Race    Color    National Origin    Muslim    Creed    Sex    Sexual Orientation    Marital Status    Public Assistance Status    Disability    Contact the MDHR directly to file a complaint:         [place] Department of Human Rights         [facility], [address] [place] [address]         [phone] (voice)          [phone] (toll free)         [phone] or [phone] ([place] Relay)         [phone] (Fax)         [email or web address] (Email)     [place] Department of Human Services (DHS)  You have the right to file a complaint with [facility] if you believe you have been discriminated against in our health care programs because of any of the following:    Race    Color    National Origin    Creed    Muslim    Age    Public Assistance Status    Receipt of Health Care Services    Disability (including physical or mental impairment)    Sex (including sex stereotypes and gender identity)    Marital Status    Political Beliefs    Medical Condition    Genetic Information    Sexual Orientation    Claims Experience    Medical History    Health Status    Complaints must be in writing and filed within 180 days of the date you discovered the alleged discrimination. The complaint must contain your name and address and describe the discrimination you are complaining about. After we get your complaint, we will review it and notify you in writing about whether we have authority to investigate. If we do, we will investigate the complaint.      [facility] will notify you in writing of the investigation s outcome. You have a right to appeal the outcome if you disagree with the decision. To appeal, you must send a written request to have [facility] review the investigation outcome period. Be brief and state why you disagree with the decision. Include additional information you think is  important.      If you file a complaint in this way, the people who work for the agency named in the complaint cannot retaliate against you. This means they cannot punish you in any way for filing a complaint. Filing a complaint in this way does not stop you from seeking out other legal or administration actions.     Contact DHS directly to file a discrimination complaint:        Civil Rights Coordinator        [facility]        Equal Opportunity and Access Division        [address] [address]        [place] [address] [phone] (voice) or use your preferred relay service     [employer] Complaint Notice   You have the right to file a complaint with [employer] if you believe you have been discriminated against because of any of the following:       Medical condition    Health status    Receipt of health care services    Claims experience    Medical history    Genetic information    Disability (including mental or physical impairment)    Marital status    Age    Sex (including sex stereotypes and gender identity)    Sexual orientation    National origin    Race    Color    Confucianism    Creed    Public assistance status    Political beliefs    You can file a complaint and ask for help in filing a complaint in person or by mail, phone, fax, or email at:     [employer] Civil Rights Coordinator  [facility] [employer] [facility]  [address], [place] [address] [phone] (voice and fax) or TTH:997  Email: [email or web address]    American Indians can continue to use [place] and  Health Services (IHS) clinics. We will not require prior approval or impose any conditions for you to get services at these clinics. For elders age 65 years and older this includes Elderly Waiver (EW) services accessed through the [place]. If a doctor or other provider in a [place] or IHS clinic refers you to a provider in our network, we will not require you to see your primary care provider  prior to the referral.    For accessible formats of this publication or assistance with equal or access to our services, visit [employer]/contactmedicaid, or call [phone] (toll free) or use your preferred relay service.

## 2020-02-08 NOTE — PROGRESS NOTES
Please call home care and have them ask patient if she gets any headache after taking ondansetron( zofran) .  That is the reaction listed in chart. If patient tolerating it fine, okay to leave on list.    Kevin Esquivel MD

## 2020-02-08 NOTE — PROGRESS NOTES
Med Interaction noted/PT completed med rec as part of home care admission visit.  Noted that pt currently has Ondansetron on med list for prn nausea and also listed as a current allergy.  Please advise if this is ok to leave as currently ordered or if action required.  Thank you for addressing this.  Ziyad Rivers PT  [phone]

## 2020-02-09 NOTE — PROGRESS NOTES
[facility] Care Coordination Contact    Received after visit chart from care coordinator.  Completed following tasks: Mailed copy of care plan to client, Updated services in access and Submitted referrals/auths for Charles BELLA.  Chart was returned to CC.   , Mailed copy of CL tool to member, faxed copy to AL facility, uploaded into [employer] and submitted authorization to health plan.   and Provider Signature - No POC Shared:  Member indicates that they do not want their POC shared with any EW providers.     Mailed FVP survey to member    Iliana Mckee  Care Management Specialist  [facility]  [phone]

## 2020-02-10 ENCOUNTER — PATIENT OUTREACH (OUTPATIENT)
Dept: GERIATRIC MEDICINE | Facility: CLINIC | Age: 75
End: 2020-02-10

## 2020-02-10 NOTE — PROGRESS NOTES
2/7/20 CC received a call from Ziyad Physical Therapist who asks if member is still seeing Dr Esquivel and would he provide orders for PT. CC believes he would order PT if needed. It is unclear if Leah will continu to follow with Dr Esquivel or start seeing the [facility] at [facility]. Ziyad will check with the nurses at [facility] to see who she will have goiong forward. She is doing well in therapy at this time but will need orders going forward. He will contact Dr Esquivel.    2/10/20 CC received a call from Amalia SINHA for the post hospital nurse visit through [employer]. SHAWANDA let Amalia know that Anne-Marie has transferred to an assisted living facility [facility] in [place]. She has no cognitive impairment and can see Amalia SINHA for hospital follow up if Anne-Marie wishes. She would not qualify for home delivered meals or lifeline however since these are part of her AL services.  Keyanna Zhao RN BA N  [facility] Case Management  [phone]

## 2020-02-10 NOTE — PROGRESS NOTES
Called Ziyad PT who states did see the message from PCP and will certainly ask patient and inform clinic of answer.      Treva Tyson RN  [facility]

## 2020-02-13 NOTE — PROGRESS NOTES
Ziyad returned call and stated he has spoke with the patient regarding headaches and she does not report having any headaches and that she is tolerating this medication.     He stated no reason to call him back unless we need something else from him.    Sherron Barkley RN

## 2020-02-13 NOTE — PROGRESS NOTES
Attempt # 1  Called Ziyad PT, No answer, left message to call nurse line at [phone]              Treva Tyson RN  [facility]

## 2020-02-18 ENCOUNTER — PATIENT OUTREACH (OUTPATIENT)
Dept: GERIATRIC MEDICINE | Facility: CLINIC | Age: 75
End: 2020-02-18

## 2020-02-18 NOTE — PROGRESS NOTES
2/18/20 CC called Leah to see how she is doing since moving to the AL. No answer,[facility].  CC also called the nurse at [facility] and left message asking for her to let CC know if Leah is staying with her primary at [facility] or changing to in house and if changing who will her new doctor be.  Keyanna Zhao RN BA PHN  [facility] Case Management  [phone]

## 2020-02-19 ENCOUNTER — PATIENT OUTREACH (OUTPATIENT)
Dept: GERIATRIC MEDICINE | Facility: CLINIC | Age: 75
End: 2020-02-19

## 2020-02-19 NOTE — PROGRESS NOTES
2/19/20 SHAWANDA received a phone call from Wilda Lackey Physical Therapist . She is done with therapy and doing well. She gets out a lot with her son and will often eat dinner but forgets to stop and get her insulin shot before she goes and then her BG's are high. Ziyad spoke with her letting her know how important it is to get her insulin before going out for dinner. They are slowly moving in items from her previous residence. She does not need a lot as she has a shared kitchen and her own bedroom area. She seems to be happy at the AL.   Leah has told Ziyad that she will not see Dr Esquivel anymore and will continue to see her oncologist. Ziyad has spoken to her about starting with a doctor who comes to the AL and she is fine with that. Ziyad did speak with the nurses about this at the AL and they will talk with their director about this.  SHAWANDA has not yet heard back from the nurses as to her PCP.  Keyanna Zhao RN BA N  [facility] Case Management  [phone]

## 2020-02-20 ENCOUNTER — TRANSFERRED RECORDS (OUTPATIENT)
Dept: HEALTH INFORMATION MANAGEMENT | Facility: CLINIC | Age: 75
End: 2020-02-20

## 2020-02-21 ENCOUNTER — PATIENT OUTREACH (OUTPATIENT)
Dept: GERIATRIC MEDICINE | Facility: CLINIC | Age: 75
End: 2020-02-21

## 2020-02-21 NOTE — PROGRESS NOTES
2/21/20 CC called [facility] Eugenia  and [facility] asking her who Leah will be seeing for her PCP. The PT Ziyad had spoken with Leah about her PCP and Leah told him she was not going to see Dr Esquivel anymore and she would not mind seeing the doctor who comes to [facility].  CC needs to know if that change has been made or who she will be following with if not Dr Esquivel.  Keyanna Zhao RN BA N  [facility] Case Management  [phone]

## 2020-02-24 ENCOUNTER — PATIENT OUTREACH (OUTPATIENT)
Dept: GERIATRIC MEDICINE | Facility: CLINIC | Age: 75
End: 2020-02-24

## 2020-02-24 NOTE — PROGRESS NOTES
2/24/20 CC received a VM from Eugenia BARROS at [facility]. Eugenia apologized that the nursing staff have not gotten back to CC about who members primary will be going forward. They have not started any paperwork on her seeing their [facility] physician who is Dr Watters. She will check with staff and talk with Bee to see if that is who she would like to see and asks CC to check back with her . Eugenia states she does have a lot of meetings this week.  Keyanna Zhao RN BA N  [facility] Case Management  [phone]

## 2020-02-29 DIAGNOSIS — M79.10 MUSCLE PAIN: ICD-10-CM

## 2020-02-29 RX ORDER — TIZANIDINE 2 MG/1
TABLET ORAL
Qty: 270 TABLET | Refills: 0 | OUTPATIENT
Start: 2020-02-29

## 2020-03-02 ENCOUNTER — PATIENT OUTREACH (OUTPATIENT)
Dept: GERIATRIC MEDICINE | Facility: CLINIC | Age: 75
End: 2020-03-02

## 2020-03-02 NOTE — PROGRESS NOTES
3/2/20 CC received a VM from Ad[facility] CC at [facility].  Aditi states that [facility] will be closing member to services. Their director spoke with member and since moving to the AL they feel her services are being covered.  Keyanna Zhao RN BA N  [facility] Case Management  [phone]

## 2020-03-09 ENCOUNTER — PATIENT OUTREACH (OUTPATIENT)
Dept: GERIATRIC MEDICINE | Facility: CLINIC | Age: 75
End: 2020-03-09

## 2020-03-09 NOTE — PROGRESS NOTES
3/9/20  called Eugenia at [facility] to follow up on who Leah's primary doctor will be. [facility].  Keyanna Zhao RN BA N  [facility] Case Management  [phone]

## 2020-03-09 NOTE — PROGRESS NOTES
3/9/20 CC received a VM from Eugenia at [facility]. She states Leah has changed PCP to Dr Lujan at [facility].  CC sent an email to Letitia Huffman and Iliana Mckee to notify [employer] of the change.  Keyanna Zhao RN BA N  [facility] Case Management  [phone]

## 2020-03-11 DIAGNOSIS — M79.10 MUSCLE PAIN: ICD-10-CM

## 2020-03-11 RX ORDER — TIZANIDINE 2 MG/1
TABLET ORAL
Qty: 270 TABLET | Refills: 0 | OUTPATIENT
Start: 2020-03-11

## 2020-03-13 ENCOUNTER — TELEPHONE (OUTPATIENT)
Dept: FAMILY MEDICINE | Facility: CLINIC | Age: 75
End: 2020-03-13
Payer: COMMERCIAL

## 2020-03-13 DIAGNOSIS — C34.81 MALIGNANT NEOPLASM OF OVERLAPPING SITES OF RIGHT LUNG (H): Primary | ICD-10-CM

## 2020-03-13 PROCEDURE — G0179 MD RECERTIFICATION HHA PT: HCPCS | Performed by: FAMILY MEDICINE

## 2020-03-13 NOTE — TELEPHONE ENCOUNTER
Forms received from:  Home Care   Phone number listed: [phone]   Fax listed: [phone]  Date received: 03.13.20  Form description: Home Health Cert(02.07.20-04.06.20)  Once forms are completed, please return to  Home Care via Fax.  Is patient requesting to be contacted when forms are completed: NA    Form placed: in providers JEANIE Patiño

## 2020-03-16 NOTE — TELEPHONE ENCOUNTER
Medication reconciliation NOT completed at this time.     Routing to PCP to review orders and sign.     Gill Sharma RN, BSN, PHN  [facility]: [place]

## 2020-03-17 DIAGNOSIS — Z53.9 DIAGNOSIS NOT YET DEFINED: Primary | ICD-10-CM

## 2020-03-17 NOTE — TELEPHONE ENCOUNTER
Requested information faxed to number provided.  [facility]  Team 3 Coordinator

## 2020-03-19 NOTE — PROGRESS NOTES
[facility] Care Coordination Contact    2nd Attempt: Signed Letter not received from lew Yin per process.   Tricia Burkett  Case Management Specialist  [facility]  [phone]

## 2020-03-26 ENCOUNTER — PATIENT OUTREACH (OUTPATIENT)
Dept: GERIATRIC MEDICINE | Facility: CLINIC | Age: 75
End: 2020-03-26

## 2020-03-26 NOTE — PROGRESS NOTES
Care Coordinator called member to inquire how they are doing during the Covid 19 outbreak and if they need help with anything that the Care Coordinator can provide.  Care Coordinator provided member with her phone number if there are changes.  CC spoke with Leah and she states she is in lock down at the AL. CC also let her know since she will be seeing a [facility] physician at [facility] starting 4/1/20 she will be getting a new care coordinator. Her new care coordinator will call or send her a letter.  Keyanna Zhao RN BA N  [facility] Case Management  [phone]

## 2020-04-01 ENCOUNTER — PATIENT OUTREACH (OUTPATIENT)
Dept: GERIATRIC MEDICINE | Facility: CLINIC | Age: 75
End: 2020-04-01

## 2020-04-01 NOTE — PROGRESS NOTES
[facility] Care Coordination Contact    No longer active with [facility] case management effective 4/1/20.  Reason for community disenrollment: Change Care System/PCC to [place]gl[place]ie Zhao RN BA PHN  [facility] Case Management  [phone]

## 2020-05-05 NOTE — PROGRESS NOTES
[facility] Care Coordination Contact    No Letter Received: 60 day tracking of letter complete, no letter received from Charles Melton. Tracking discontinued.   Tricia Burkett  Case Management Specialist  [facility]  [phone]

## 2020-08-06 ENCOUNTER — TRANSFERRED RECORDS (OUTPATIENT)
Dept: HEALTH INFORMATION MANAGEMENT | Facility: CLINIC | Age: 75
End: 2020-08-06

## 2020-08-27 ENCOUNTER — TRANSFERRED RECORDS (OUTPATIENT)
Dept: HEALTH INFORMATION MANAGEMENT | Facility: CLINIC | Age: 75
End: 2020-08-27

## 2021-02-03 ENCOUNTER — TRANSFERRED RECORDS (OUTPATIENT)
Dept: HEALTH INFORMATION MANAGEMENT | Facility: CLINIC | Age: 76
End: 2021-02-03

## 2021-08-23 NOTE — TELEPHONE ENCOUNTER
Medication is being filled for 1 time refill only due to:  Patient needs to be seen because due for diabetes follow up and labs.     Encounter routed to team to reach out to patient to schedule.      Keyanna Carrasquillo RN  [facility]         with patient

## 2022-09-30 NOTE — PROGRESS NOTES
1/24/20 CC received a call from Larissa BARROS at [facility] stating they are planning discharge to [facility] the end of next week. Family will be moving her things from her apartment to [place].  CC let Larissa know CC will see member next Wed 1/29 to do her assessment for the CL tool.  CC also called the contact at [facility] [phone] and  letting her know CC is members Care Coordinator from [facility] and that CC will be doing a visit with member next Wed and will do a CL tool. Do they have someone who would like to review this to make sure everything they need is included. Also CC needs their address and NPI number.  Keyanna Zhao RN BA PHN  [facility] Case Management  [phone]    
Awake

## 2022-11-10 NOTE — TELEPHONE ENCOUNTER
I called and discussed in detail with patient  This is now all cleared up  Kevin Esquviel MD     Solaraze Counseling:  I discussed with the patient the risks of Solaraze including but not limited to erythema, scaling, itching, weeping, crusting, and pain.

## 2023-10-16 NOTE — PROGRESS NOTES
12/10/18  called [facility] to see if they did homemaking in the [facility] and they do not have any staff at this time.  Keyanna Zhao RN BA N  [facility] Case Management  [phone]     2